# Patient Record
Sex: FEMALE | Race: WHITE | NOT HISPANIC OR LATINO | URBAN - METROPOLITAN AREA
[De-identification: names, ages, dates, MRNs, and addresses within clinical notes are randomized per-mention and may not be internally consistent; named-entity substitution may affect disease eponyms.]

---

## 2017-06-30 ENCOUNTER — ON CAMPUS - OUTPATIENT (AMBULATORY)
Dept: URBAN - METROPOLITAN AREA HOSPITAL 2 | Facility: HOSPITAL | Age: 72
End: 2017-06-30
Payer: OTHER GOVERNMENT

## 2017-06-30 VITALS
DIASTOLIC BLOOD PRESSURE: 56 MMHG | WEIGHT: 187 LBS | DIASTOLIC BLOOD PRESSURE: 64 MMHG | SYSTOLIC BLOOD PRESSURE: 144 MMHG | OXYGEN SATURATION: 92 % | SYSTOLIC BLOOD PRESSURE: 184 MMHG | TEMPERATURE: 97.7 F | HEART RATE: 74 BPM | DIASTOLIC BLOOD PRESSURE: 67 MMHG | DIASTOLIC BLOOD PRESSURE: 84 MMHG | DIASTOLIC BLOOD PRESSURE: 82 MMHG | RESPIRATION RATE: 16 BRPM | HEIGHT: 67 IN | HEART RATE: 71 BPM | RESPIRATION RATE: 19 BRPM | OXYGEN SATURATION: 98 % | RESPIRATION RATE: 18 BRPM | SYSTOLIC BLOOD PRESSURE: 146 MMHG | SYSTOLIC BLOOD PRESSURE: 125 MMHG | OXYGEN SATURATION: 95 % | OXYGEN SATURATION: 96 % | HEART RATE: 67 BPM | HEART RATE: 80 BPM | SYSTOLIC BLOOD PRESSURE: 138 MMHG | HEART RATE: 66 BPM | OXYGEN SATURATION: 97 %

## 2017-06-30 DIAGNOSIS — R10.13 EPIGASTRIC PAIN: ICD-10-CM

## 2017-06-30 DIAGNOSIS — K44.9 DIAPHRAGMATIC HERNIA WITHOUT OBSTRUCTION OR GANGRENE: ICD-10-CM

## 2017-06-30 DIAGNOSIS — K21.9 GASTRO-ESOPHAGEAL REFLUX DISEASE WITHOUT ESOPHAGITIS: ICD-10-CM

## 2017-06-30 PROCEDURE — 43235 EGD DIAGNOSTIC BRUSH WASH: CPT | Performed by: INTERNAL MEDICINE

## 2017-06-30 RX ORDER — DICYCLOMINE HYDROCHLORIDE 20 MG/1
40 TABLET ORAL
Qty: 60 | Refills: 11 | Status: COMPLETED
Start: 2017-06-30 | End: 2023-05-31

## 2017-06-30 RX ADMIN — PROPOFOL: 10 INJECTION, EMULSION INTRAVENOUS at 13:43

## 2017-07-06 ENCOUNTER — HOSPITAL ENCOUNTER (OUTPATIENT)
Dept: LAB | Facility: HOSPITAL | Age: 72
Setting detail: SPECIMEN
Discharge: HOME | End: 2017-07-06
Attending: INTERNAL MEDICINE | Admitting: INTERNAL MEDICINE

## 2017-07-06 LAB
ALBUMIN SERPL-MCNC: 3.8 G/DL (ref 3.5–4.8)
ALBUMIN/GLOB SERPL: 1.1 {RATIO} (ref 1–1.7)
ALP SERPL-CCNC: 61 IU/L (ref 32–91)
ALT SERPL-CCNC: 16 IU/L (ref 14–54)
ANION GAP SERPL CALC-SCNC: 15.2 MMOL/L (ref 10–20)
AST SERPL-CCNC: 23 IU/L (ref 15–41)
BILIRUB SERPL-MCNC: 0.5 MG/DL (ref 0.3–1.2)
BUN SERPL-MCNC: 13 MG/DL (ref 8–20)
BUN/CREAT SERPL: 13 (ref 5.4–26.2)
CALCIUM SERPL-MCNC: 8.4 MG/DL (ref 8.9–10.3)
CHLORIDE SERPL-SCNC: 101 MMOL/L (ref 101–111)
CONV CO2: 28 MMOL/L (ref 22–32)
CONV TOTAL PROTEIN: 7.2 G/DL (ref 6.1–7.9)
CREAT UR-MCNC: 1 MG/DL (ref 0.4–1)
GLOBULIN UR ELPH-MCNC: 3.4 G/DL (ref 2.5–3.8)
GLUCOSE SERPL-MCNC: 140 MG/DL (ref 65–99)
POTASSIUM SERPL-SCNC: 4.2 MMOL/L (ref 3.6–5.1)
SODIUM SERPL-SCNC: 140 MMOL/L (ref 136–144)

## 2017-08-04 ENCOUNTER — HOSPITAL ENCOUNTER (OUTPATIENT)
Dept: CT IMAGING | Facility: HOSPITAL | Age: 72
Discharge: HOME | End: 2017-08-04
Attending: INTERNAL MEDICINE | Admitting: INTERNAL MEDICINE

## 2017-08-04 LAB — CREAT BLDA-MCNC: 0.9 MG/DL (ref 0.6–1.3)

## 2018-01-18 ENCOUNTER — OFFICE (AMBULATORY)
Dept: URBAN - METROPOLITAN AREA PATHOLOGY 4 | Facility: PATHOLOGY | Age: 73
End: 2018-01-18
Payer: OTHER GOVERNMENT

## 2018-01-18 ENCOUNTER — ON CAMPUS - OUTPATIENT (AMBULATORY)
Dept: URBAN - METROPOLITAN AREA HOSPITAL 2 | Facility: HOSPITAL | Age: 73
End: 2018-01-18

## 2018-01-18 ENCOUNTER — HOSPITAL ENCOUNTER (OUTPATIENT)
Dept: OTHER | Facility: HOSPITAL | Age: 73
Setting detail: SPECIMEN
Discharge: HOME | End: 2018-01-18
Attending: INTERNAL MEDICINE | Admitting: INTERNAL MEDICINE

## 2018-01-18 VITALS
DIASTOLIC BLOOD PRESSURE: 56 MMHG | HEART RATE: 65 BPM | HEART RATE: 67 BPM | SYSTOLIC BLOOD PRESSURE: 149 MMHG | SYSTOLIC BLOOD PRESSURE: 126 MMHG | DIASTOLIC BLOOD PRESSURE: 71 MMHG | HEART RATE: 58 BPM | HEART RATE: 62 BPM | DIASTOLIC BLOOD PRESSURE: 70 MMHG | SYSTOLIC BLOOD PRESSURE: 166 MMHG | OXYGEN SATURATION: 93 % | HEART RATE: 61 BPM | SYSTOLIC BLOOD PRESSURE: 145 MMHG | TEMPERATURE: 97.5 F | SYSTOLIC BLOOD PRESSURE: 125 MMHG | DIASTOLIC BLOOD PRESSURE: 74 MMHG | OXYGEN SATURATION: 90 % | DIASTOLIC BLOOD PRESSURE: 68 MMHG | DIASTOLIC BLOOD PRESSURE: 55 MMHG | RESPIRATION RATE: 16 BRPM | DIASTOLIC BLOOD PRESSURE: 108 MMHG | SYSTOLIC BLOOD PRESSURE: 123 MMHG | HEART RATE: 59 BPM | DIASTOLIC BLOOD PRESSURE: 69 MMHG | DIASTOLIC BLOOD PRESSURE: 51 MMHG | RESPIRATION RATE: 18 BRPM | SYSTOLIC BLOOD PRESSURE: 100 MMHG | WEIGHT: 185 LBS | OXYGEN SATURATION: 94 % | SYSTOLIC BLOOD PRESSURE: 101 MMHG | OXYGEN SATURATION: 97 % | HEART RATE: 66 BPM | SYSTOLIC BLOOD PRESSURE: 89 MMHG | OXYGEN SATURATION: 91 % | HEIGHT: 67 IN

## 2018-01-18 DIAGNOSIS — D12.3 BENIGN NEOPLASM OF TRANSVERSE COLON: ICD-10-CM

## 2018-01-18 DIAGNOSIS — D50.0 IRON DEFICIENCY ANEMIA SECONDARY TO BLOOD LOSS (CHRONIC): ICD-10-CM

## 2018-01-18 DIAGNOSIS — K57.30 DIVERTICULOSIS OF LARGE INTESTINE WITHOUT PERFORATION OR ABS: ICD-10-CM

## 2018-01-18 LAB
GI HISTOLOGY: A. UNSPECIFIED: (no result)
GI HISTOLOGY: PDF REPORT: (no result)

## 2018-01-18 PROCEDURE — 88305 TISSUE EXAM BY PATHOLOGIST: CPT | Mod: 26 | Performed by: INTERNAL MEDICINE

## 2018-01-18 PROCEDURE — 45385 COLONOSCOPY W/LESION REMOVAL: CPT | Performed by: INTERNAL MEDICINE

## 2018-01-18 RX ADMIN — PROPOFOL: 10 INJECTION, EMULSION INTRAVENOUS at 08:10

## 2018-02-02 ENCOUNTER — HOSPITAL ENCOUNTER (OUTPATIENT)
Dept: LAB | Facility: HOSPITAL | Age: 73
Setting detail: SPECIMEN
Discharge: HOME | End: 2018-02-02
Attending: INTERNAL MEDICINE | Admitting: INTERNAL MEDICINE

## 2018-02-14 ENCOUNTER — HOSPITAL ENCOUNTER (OUTPATIENT)
Dept: CT IMAGING | Facility: HOSPITAL | Age: 73
Discharge: HOME | End: 2018-02-14
Attending: NURSE PRACTITIONER | Admitting: NURSE PRACTITIONER

## 2018-02-14 LAB — CREAT BLDA-MCNC: 0.9 MG/DL (ref 0.6–1.3)

## 2018-03-23 ENCOUNTER — HOSPITAL ENCOUNTER (OUTPATIENT)
Dept: GENERAL RADIOLOGY | Facility: HOSPITAL | Age: 73
Discharge: HOME | End: 2018-03-23
Attending: NURSE PRACTITIONER | Admitting: NURSE PRACTITIONER

## 2018-04-25 ENCOUNTER — HOSPITAL ENCOUNTER (OUTPATIENT)
Dept: PAIN MEDICINE | Facility: HOSPITAL | Age: 73
Discharge: HOME | End: 2018-04-25
Attending: ANESTHESIOLOGY | Admitting: ANESTHESIOLOGY

## 2018-04-25 LAB
AMPHETAMINES UR QL SCN: NEGATIVE
BZE UR QL SCN: NEGATIVE
CREAT 24H UR-MCNC: NORMAL MG/DL
METHADONE UR QL SCN: NEGATIVE
OPIATE CONFIRMATION URINE: NORMAL
THC SERPLBLD CFM-MCNC: NEGATIVE NG/ML

## 2018-05-23 ENCOUNTER — HOSPITAL ENCOUNTER (OUTPATIENT)
Dept: ULTRASOUND IMAGING | Facility: HOSPITAL | Age: 73
Discharge: HOME | End: 2018-05-23
Attending: INTERNAL MEDICINE | Admitting: INTERNAL MEDICINE

## 2018-05-30 ENCOUNTER — HOSPITAL ENCOUNTER (OUTPATIENT)
Dept: PAIN MEDICINE | Facility: HOSPITAL | Age: 73
Discharge: HOME | End: 2018-05-30
Attending: ANESTHESIOLOGY | Admitting: ANESTHESIOLOGY

## 2018-06-29 ENCOUNTER — HOSPITAL ENCOUNTER (OUTPATIENT)
Dept: PAIN MEDICINE | Facility: HOSPITAL | Age: 73
Discharge: HOME | End: 2018-06-29
Attending: ANESTHESIOLOGY | Admitting: ANESTHESIOLOGY

## 2018-07-09 ENCOUNTER — HOSPITAL ENCOUNTER (OUTPATIENT)
Dept: OTHER | Facility: HOSPITAL | Age: 73
Discharge: HOME | End: 2018-07-09
Attending: NURSE PRACTITIONER | Admitting: NURSE PRACTITIONER

## 2018-08-21 ENCOUNTER — HOSPITAL ENCOUNTER (OUTPATIENT)
Dept: LAB | Facility: HOSPITAL | Age: 73
Setting detail: SPECIMEN
Discharge: HOME | End: 2018-08-21
Attending: INTERNAL MEDICINE | Admitting: INTERNAL MEDICINE

## 2018-08-21 LAB
ALBUMIN SERPL-MCNC: 3.9 G/DL (ref 3.5–4.8)
ALBUMIN/GLOB SERPL: 1.1 {RATIO} (ref 1–1.7)
ALP SERPL-CCNC: 67 IU/L (ref 32–91)
ALT SERPL-CCNC: 18 IU/L (ref 14–54)
ANION GAP SERPL CALC-SCNC: 13 MMOL/L (ref 10–20)
AST SERPL-CCNC: 19 IU/L (ref 15–41)
BILIRUB SERPL-MCNC: 0.6 MG/DL (ref 0.3–1.2)
BUN SERPL-MCNC: 13 MG/DL (ref 8–20)
BUN/CREAT SERPL: 13 (ref 5.4–26.2)
CALCIUM SERPL-MCNC: 8.6 MG/DL (ref 8.9–10.3)
CHLORIDE SERPL-SCNC: 101 MMOL/L (ref 101–111)
CHOLEST SERPL-MCNC: 125 MG/DL
CHOLEST/HDLC SERPL: 3.6 {RATIO}
CONV CO2: 28 MMOL/L (ref 22–32)
CONV LDL CHOLESTEROL DIRECT: 69 MG/DL (ref 0–100)
CONV MICROALBUM.,U,RANDOM: 5 MG/L
CONV TOTAL PROTEIN: 7.4 G/DL (ref 6.1–7.9)
CREAT 24H UR-MCNC: 154.5 MG/DL
CREAT UR-MCNC: 1 MG/DL (ref 0.4–1)
GLOBULIN UR ELPH-MCNC: 3.5 G/DL (ref 2.5–3.8)
GLUCOSE SERPL-MCNC: 126 MG/DL (ref 65–99)
HDLC SERPL-MCNC: 35 MG/DL
LDLC/HDLC SERPL: 2 {RATIO}
LIPID INTERPRETATION: ABNORMAL
MICROALBUMIN/CREAT UR: 3.2 UG/MG
POTASSIUM SERPL-SCNC: 4 MMOL/L (ref 3.6–5.1)
SODIUM SERPL-SCNC: 138 MMOL/L (ref 136–144)
TRIGL SERPL-MCNC: 158 MG/DL
VLDLC SERPL CALC-MCNC: 21.5 MG/DL

## 2018-08-30 ENCOUNTER — HOSPITAL ENCOUNTER (OUTPATIENT)
Dept: PAIN MEDICINE | Facility: HOSPITAL | Age: 73
Discharge: HOME | End: 2018-08-30
Attending: ANESTHESIOLOGY | Admitting: ANESTHESIOLOGY

## 2018-08-30 LAB
AMPHETAMINES UR QL SCN: NEGATIVE
BARBITURATES UR QL SCN: NEGATIVE
BENZODIAZ UR QL SCN: NEGATIVE
BZE UR QL SCN: NEGATIVE
CREAT 24H UR-MCNC: NORMAL MG/DL
METHADONE UR QL SCN: NEGATIVE
OPIATE CONFIRMATION URINE: NORMAL
OPIATES TESTED UR SCN: NEGATIVE
PCP UR QL: NEGATIVE
THC SERPLBLD CFM-MCNC: NEGATIVE NG/ML

## 2018-09-21 ENCOUNTER — HOSPITAL ENCOUNTER (OUTPATIENT)
Dept: LAB | Facility: HOSPITAL | Age: 73
Setting detail: SPECIMEN
Discharge: HOME | End: 2018-09-21
Attending: INTERNAL MEDICINE | Admitting: INTERNAL MEDICINE

## 2018-09-21 LAB
ALBUMIN SERPL-MCNC: 3.9 G/DL (ref 3.5–4.8)
ALBUMIN/GLOB SERPL: 1.1 {RATIO} (ref 1–1.7)
ALP SERPL-CCNC: 77 IU/L (ref 32–91)
ALT SERPL-CCNC: 18 IU/L (ref 14–54)
ANION GAP SERPL CALC-SCNC: 15.7 MMOL/L (ref 10–20)
AST SERPL-CCNC: 20 IU/L (ref 15–41)
BILIRUB SERPL-MCNC: 0.5 MG/DL (ref 0.3–1.2)
BUN SERPL-MCNC: 21 MG/DL (ref 8–20)
BUN/CREAT SERPL: 21 (ref 5.4–26.2)
CALCIUM SERPL-MCNC: 9.8 MG/DL (ref 8.9–10.3)
CHLORIDE SERPL-SCNC: 101 MMOL/L (ref 101–111)
CHOLEST SERPL-MCNC: 152 MG/DL
CHOLEST/HDLC SERPL: 3.2 {RATIO}
CONV CO2: 29 MMOL/L (ref 22–32)
CONV LDL CHOLESTEROL DIRECT: 93 MG/DL (ref 0–100)
CONV MICROALBUM.,U,RANDOM: 18 MG/L
CONV TOTAL PROTEIN: 7.4 G/DL (ref 6.1–7.9)
CREAT 24H UR-MCNC: 134.4 MG/DL
CREAT UR-MCNC: 1 MG/DL (ref 0.4–1)
GLOBULIN UR ELPH-MCNC: 3.5 G/DL (ref 2.5–3.8)
GLUCOSE SERPL-MCNC: 94 MG/DL (ref 65–99)
HDLC SERPL-MCNC: 48 MG/DL
LDLC/HDLC SERPL: 1.9 {RATIO}
LIPID INTERPRETATION: NORMAL
MICROALBUMIN/CREAT UR: 13.4 UG/MG
POTASSIUM SERPL-SCNC: 4.7 MMOL/L (ref 3.6–5.1)
SODIUM SERPL-SCNC: 141 MMOL/L (ref 136–144)
TRIGL SERPL-MCNC: 97 MG/DL
VLDLC SERPL CALC-MCNC: 10.8 MG/DL

## 2018-10-29 ENCOUNTER — HOSPITAL ENCOUNTER (OUTPATIENT)
Dept: PAIN MEDICINE | Facility: HOSPITAL | Age: 73
Discharge: HOME | End: 2018-10-29
Attending: ANESTHESIOLOGY | Admitting: ANESTHESIOLOGY

## 2018-12-19 ENCOUNTER — HOSPITAL ENCOUNTER (OUTPATIENT)
Dept: LAB | Facility: HOSPITAL | Age: 73
Setting detail: SPECIMEN
Discharge: HOME | End: 2018-12-19
Attending: INTERNAL MEDICINE | Admitting: INTERNAL MEDICINE

## 2018-12-19 ENCOUNTER — HOSPITAL ENCOUNTER (OUTPATIENT)
Dept: PAIN MEDICINE | Facility: HOSPITAL | Age: 73
Discharge: HOME | End: 2018-12-19
Attending: ANESTHESIOLOGY | Admitting: ANESTHESIOLOGY

## 2018-12-19 LAB
ALBUMIN SERPL-MCNC: 3.6 G/DL (ref 3.5–4.8)
ALBUMIN/GLOB SERPL: 1.1 {RATIO} (ref 1–1.7)
ALP SERPL-CCNC: 76 IU/L (ref 32–91)
ALT SERPL-CCNC: 16 IU/L (ref 14–54)
AMPHETAMINES UR QL SCN: NEGATIVE
ANION GAP SERPL CALC-SCNC: 17.1 MMOL/L (ref 10–20)
AST SERPL-CCNC: 20 IU/L (ref 15–41)
BARBITURATES UR QL SCN: NEGATIVE
BENZODIAZ UR QL SCN: NEGATIVE
BILIRUB SERPL-MCNC: 0.5 MG/DL (ref 0.3–1.2)
BUN SERPL-MCNC: 12 MG/DL (ref 8–20)
BUN/CREAT SERPL: 13.3 (ref 5.4–26.2)
BZE UR QL SCN: NEGATIVE
CALCIUM SERPL-MCNC: 8.1 MG/DL (ref 8.9–10.3)
CHLORIDE SERPL-SCNC: 102 MMOL/L (ref 101–111)
CONV CO2: 25 MMOL/L (ref 22–32)
CONV TOTAL PROTEIN: 6.8 G/DL (ref 6.1–7.9)
CREAT 24H UR-MCNC: 57.7 MG/DL
CREAT UR-MCNC: 0.9 MG/DL (ref 0.4–1)
GLOBULIN UR ELPH-MCNC: 3.2 G/DL (ref 2.5–3.8)
GLUCOSE SERPL-MCNC: 101 MG/DL (ref 65–99)
METHADONE UR QL SCN: NEGATIVE
OPIATE CONFIRMATION URINE: NORMAL
OPIATES TESTED UR SCN: NEGATIVE
PCP UR QL: NEGATIVE
POTASSIUM SERPL-SCNC: 4.1 MMOL/L (ref 3.6–5.1)
SODIUM SERPL-SCNC: 140 MMOL/L (ref 136–144)
T4 FREE SERPL-MCNC: 1.4 NG/DL (ref 0.58–1.64)
THC SERPLBLD CFM-MCNC: NEGATIVE NG/ML
THYROGLOB AB SERPL-ACNC: 14 [IU]/ML (ref 0–4)
THYROGLOB AG TISS QL IMSTN: <1 NG/ML (ref 0–33)
TSH SERPL-ACNC: 0.06 UIU/ML (ref 0.34–5.6)

## 2018-12-20 LAB — HBA1C MFR BLD: 7.9 % (ref 0–5.6)

## 2019-01-12 ENCOUNTER — HOSPITAL ENCOUNTER (OUTPATIENT)
Dept: ULTRASOUND IMAGING | Facility: HOSPITAL | Age: 74
Discharge: HOME | End: 2019-01-12
Attending: INTERNAL MEDICINE | Admitting: INTERNAL MEDICINE

## 2019-01-14 ENCOUNTER — HOSPITAL ENCOUNTER (OUTPATIENT)
Dept: RESPIRATORY THERAPY | Facility: HOSPITAL | Age: 74
Discharge: HOME | End: 2019-01-14
Attending: NURSE PRACTITIONER | Admitting: NURSE PRACTITIONER

## 2019-01-22 ENCOUNTER — HOSPITAL ENCOUNTER (OUTPATIENT)
Dept: PREADMISSION TESTING | Facility: HOSPITAL | Age: 74
Discharge: HOME | End: 2019-01-22
Attending: INTERNAL MEDICINE | Admitting: INTERNAL MEDICINE

## 2019-01-22 LAB
ANION GAP SERPL CALC-SCNC: 17.7 MMOL/L (ref 10–20)
APTT BLD: 26.8 SEC (ref 24–31)
BASOPHILS # BLD AUTO: 0.1 10*3/UL (ref 0–0.2)
BASOPHILS NFR BLD AUTO: 1 % (ref 0–2)
BUN SERPL-MCNC: 15 MG/DL (ref 8–20)
BUN/CREAT SERPL: 16.7 (ref 5.4–26.2)
CALCIUM SERPL-MCNC: 7.9 MG/DL (ref 8.9–10.3)
CHLORIDE SERPL-SCNC: 95 MMOL/L (ref 101–111)
CHOLEST SERPL-MCNC: 140 MG/DL
CHOLEST/HDLC SERPL: 2.9 {RATIO}
CONV CO2: 27 MMOL/L (ref 22–32)
CONV LDL CHOLESTEROL DIRECT: 79 MG/DL (ref 0–100)
CREAT UR-MCNC: 0.9 MG/DL (ref 0.4–1)
DIFFERENTIAL METHOD BLD: (no result)
EOSINOPHIL # BLD AUTO: 0.2 10*3/UL (ref 0–0.3)
EOSINOPHIL # BLD AUTO: 3 % (ref 0–3)
ERYTHROCYTE [DISTWIDTH] IN BLOOD BY AUTOMATED COUNT: 15.6 % (ref 11.5–14.5)
GLUCOSE SERPL-MCNC: 72 MG/DL (ref 65–99)
HCT VFR BLD AUTO: 45.5 % (ref 35–49)
HDLC SERPL-MCNC: 48 MG/DL
HGB BLD-MCNC: 14.9 G/DL (ref 12–15)
INR PPP: 1
LDLC/HDLC SERPL: 1.6 {RATIO}
LIPID INTERPRETATION: NORMAL
LYMPHOCYTES # BLD AUTO: 2.6 10*3/UL (ref 0.8–4.8)
LYMPHOCYTES NFR BLD AUTO: 35 % (ref 18–42)
MCH RBC QN AUTO: 27.9 PG (ref 26–32)
MCHC RBC AUTO-ENTMCNC: 32.7 G/DL (ref 32–36)
MCV RBC AUTO: 85.2 FL (ref 80–94)
MONOCYTES # BLD AUTO: 0.8 10*3/UL (ref 0.1–1.3)
MONOCYTES NFR BLD AUTO: 11 % (ref 2–11)
NEUTROPHILS # BLD AUTO: 3.7 10*3/UL (ref 2.3–8.6)
NEUTROPHILS NFR BLD AUTO: 50 % (ref 50–75)
NRBC BLD AUTO-RTO: 0 /100{WBCS}
NRBC/RBC NFR BLD MANUAL: 0 10*3/UL
PLATELET # BLD AUTO: 190 10*3/UL (ref 150–450)
PMV BLD AUTO: 9.6 FL (ref 7.4–10.4)
POTASSIUM SERPL-SCNC: 3.7 MMOL/L (ref 3.6–5.1)
PROTHROMBIN TIME: 10.2 SEC (ref 9.6–11.7)
RBC # BLD AUTO: 5.34 10*6/UL (ref 4–5.4)
SODIUM SERPL-SCNC: 136 MMOL/L (ref 136–144)
TRIGL SERPL-MCNC: 100 MG/DL
VLDLC SERPL CALC-MCNC: 13.1 MG/DL
WBC # BLD AUTO: 7.4 10*3/UL (ref 4.5–11.5)

## 2019-02-07 ENCOUNTER — HOSPITAL ENCOUNTER (OUTPATIENT)
Dept: LAB | Facility: HOSPITAL | Age: 74
Setting detail: SPECIMEN
Discharge: HOME | End: 2019-02-07
Attending: INTERNAL MEDICINE | Admitting: INTERNAL MEDICINE

## 2019-02-07 LAB
ALBUMIN SERPL-MCNC: 3.9 G/DL (ref 3.5–4.8)
ALBUMIN/GLOB SERPL: 1.1 {RATIO} (ref 1–1.7)
ALP SERPL-CCNC: 79 IU/L (ref 32–91)
ALT SERPL-CCNC: 19 IU/L (ref 14–54)
ANION GAP SERPL CALC-SCNC: 16.9 MMOL/L (ref 10–20)
AST SERPL-CCNC: 22 IU/L (ref 15–41)
BILIRUB SERPL-MCNC: 0.5 MG/DL (ref 0.3–1.2)
BUN SERPL-MCNC: 9 MG/DL (ref 8–20)
BUN/CREAT SERPL: 9 (ref 5.4–26.2)
CALCIUM SERPL-MCNC: 8.2 MG/DL (ref 8.9–10.3)
CHLORIDE SERPL-SCNC: 98 MMOL/L (ref 101–111)
CONV CO2: 25 MMOL/L (ref 22–32)
CONV TOTAL PROTEIN: 7.5 G/DL (ref 6.1–7.9)
CREAT UR-MCNC: 1 MG/DL (ref 0.4–1)
GLOBULIN UR ELPH-MCNC: 3.6 G/DL (ref 2.5–3.8)
GLUCOSE SERPL-MCNC: 162 MG/DL (ref 65–99)
POTASSIUM SERPL-SCNC: 3.9 MMOL/L (ref 3.6–5.1)
SODIUM SERPL-SCNC: 136 MMOL/L (ref 136–144)

## 2019-02-18 ENCOUNTER — HOSPITAL ENCOUNTER (OUTPATIENT)
Dept: OTHER | Facility: HOSPITAL | Age: 74
Setting detail: RECURRING SERIES
Discharge: HOME | End: 2019-02-20
Attending: INTERNAL MEDICINE | Admitting: INTERNAL MEDICINE

## 2019-02-20 ENCOUNTER — HOSPITAL ENCOUNTER (OUTPATIENT)
Dept: NUCLEAR MEDICINE | Facility: HOSPITAL | Age: 74
Discharge: HOME | End: 2019-02-20
Attending: INTERNAL MEDICINE | Admitting: INTERNAL MEDICINE

## 2019-02-27 ENCOUNTER — HOSPITAL ENCOUNTER (OUTPATIENT)
Dept: PAIN MEDICINE | Facility: HOSPITAL | Age: 74
Discharge: HOME | End: 2019-02-27
Attending: ANESTHESIOLOGY | Admitting: ANESTHESIOLOGY

## 2019-04-29 ENCOUNTER — HOSPITAL ENCOUNTER (OUTPATIENT)
Dept: PAIN MEDICINE | Facility: HOSPITAL | Age: 74
Discharge: HOME | End: 2019-04-29
Attending: ANESTHESIOLOGY | Admitting: ANESTHESIOLOGY

## 2019-04-29 ENCOUNTER — CONVERSION ENCOUNTER (OUTPATIENT)
Dept: PAIN MEDICINE | Facility: CLINIC | Age: 74
End: 2019-04-29

## 2019-04-29 LAB
AMPHETAMINES UR QL SCN: NEGATIVE
BARBITURATES UR QL SCN: NEGATIVE
BENZODIAZ UR QL SCN: ABNORMAL
BENZODIAZ UR QL SCN: ABNORMAL
BZE UR QL SCN: NEGATIVE
CREAT 24H UR-MCNC: ABNORMAL MG/DL
METHADONE UR QL SCN: NEGATIVE
OPIATE CONFIRMATION URINE: ABNORMAL
OPIATES TESTED UR SCN: NEGATIVE
PCP UR QL: NEGATIVE
THC SERPLBLD CFM-MCNC: NEGATIVE NG/ML

## 2019-05-15 ENCOUNTER — HOSPITAL ENCOUNTER (OUTPATIENT)
Dept: LAB | Facility: HOSPITAL | Age: 74
Setting detail: SPECIMEN
Discharge: HOME | End: 2019-05-15
Attending: INTERNAL MEDICINE | Admitting: INTERNAL MEDICINE

## 2019-05-15 LAB
ALBUMIN SERPL-MCNC: 3.7 G/DL (ref 3.5–4.8)
ALBUMIN/GLOB SERPL: 1.1 {RATIO} (ref 1–1.7)
ALP SERPL-CCNC: 79 IU/L (ref 32–91)
ALT SERPL-CCNC: 17 IU/L (ref 14–54)
ANION GAP SERPL CALC-SCNC: 18.1 MMOL/L (ref 10–20)
AST SERPL-CCNC: 16 IU/L (ref 15–41)
BILIRUB SERPL-MCNC: 0.5 MG/DL (ref 0.3–1.2)
BUN SERPL-MCNC: 12 MG/DL (ref 8–20)
BUN/CREAT SERPL: 15 (ref 5.4–26.2)
CALCIUM SERPL-MCNC: 7.9 MG/DL (ref 8.9–10.3)
CHLORIDE SERPL-SCNC: 102 MMOL/L (ref 101–111)
CHOLEST SERPL-MCNC: 131 MG/DL
CHOLEST/HDLC SERPL: 3 {RATIO}
CONV CO2: 25 MMOL/L (ref 22–32)
CONV LDL CHOLESTEROL DIRECT: 82 MG/DL (ref 0–100)
CONV MICROALBUM.,U,RANDOM: 11 MG/L
CONV TOTAL PROTEIN: 7 G/DL (ref 6.1–7.9)
CREAT 24H UR-MCNC: 46.5 MG/DL
CREAT UR-MCNC: 0.8 MG/DL (ref 0.4–1)
GLOBULIN UR ELPH-MCNC: 3.3 G/DL (ref 2.5–3.8)
GLUCOSE SERPL-MCNC: 181 MG/DL (ref 65–99)
HBA1C MFR BLD: 7.8 % (ref 0–5.6)
HDLC SERPL-MCNC: 44 MG/DL
LDLC/HDLC SERPL: 1.9 {RATIO}
LIPID INTERPRETATION: NORMAL
MICROALBUMIN/CREAT UR: 23.7 UG/MG
POTASSIUM SERPL-SCNC: 4.1 MMOL/L (ref 3.6–5.1)
SODIUM SERPL-SCNC: 141 MMOL/L (ref 136–144)
T4 FREE SERPL-MCNC: 1.31 NG/DL (ref 0.58–1.64)
THYROGLOB AB SERPL-ACNC: 12 [IU]/ML (ref 0–4)
THYROGLOB AG TISS QL IMSTN: <1 NG/ML (ref 0–33)
TRIGL SERPL-MCNC: 135 MG/DL
TSH SERPL-ACNC: 0.04 UIU/ML (ref 0.34–5.6)
VLDLC SERPL CALC-MCNC: 4.9 MG/DL

## 2019-06-03 VITALS
WEIGHT: 185 LBS | SYSTOLIC BLOOD PRESSURE: 144 MMHG | HEART RATE: 76 BPM | OXYGEN SATURATION: 97 % | HEIGHT: 67 IN | RESPIRATION RATE: 16 BRPM | BODY MASS INDEX: 29.03 KG/M2 | DIASTOLIC BLOOD PRESSURE: 69 MMHG

## 2019-06-10 ENCOUNTER — HOSPITAL ENCOUNTER (OUTPATIENT)
Dept: CARDIOLOGY | Facility: HOSPITAL | Age: 74
Discharge: HOME | End: 2019-06-10
Attending: NURSE PRACTITIONER | Admitting: NURSE PRACTITIONER

## 2019-06-24 ENCOUNTER — OFFICE VISIT (OUTPATIENT)
Dept: PAIN MEDICINE | Facility: CLINIC | Age: 74
End: 2019-06-24

## 2019-06-24 VITALS
RESPIRATION RATE: 16 BRPM | TEMPERATURE: 97.8 F | HEIGHT: 67 IN | WEIGHT: 184 LBS | SYSTOLIC BLOOD PRESSURE: 147 MMHG | OXYGEN SATURATION: 96 % | HEART RATE: 70 BPM | DIASTOLIC BLOOD PRESSURE: 80 MMHG | BODY MASS INDEX: 28.88 KG/M2

## 2019-06-24 DIAGNOSIS — M47.817 LUMBOSACRAL SPONDYLOSIS WITHOUT MYELOPATHY: ICD-10-CM

## 2019-06-24 DIAGNOSIS — M06.9 RHEUMATOID ARTHRITIS INVOLVING MULTIPLE SITES, UNSPECIFIED RHEUMATOID FACTOR PRESENCE: ICD-10-CM

## 2019-06-24 DIAGNOSIS — M25.50 POLYARTHRALGIA: ICD-10-CM

## 2019-06-24 DIAGNOSIS — Z79.899 OTHER LONG TERM (CURRENT) DRUG THERAPY: ICD-10-CM

## 2019-06-24 DIAGNOSIS — G89.4 CHRONIC PAIN SYNDROME: Primary | ICD-10-CM

## 2019-06-24 PROBLEM — G89.29 CHRONIC PAIN: Status: ACTIVE | Noted: 2018-04-25

## 2019-06-24 PROCEDURE — 99214 OFFICE O/P EST MOD 30 MIN: CPT | Performed by: ANESTHESIOLOGY

## 2019-06-24 PROCEDURE — G0463 HOSPITAL OUTPT CLINIC VISIT: HCPCS

## 2019-06-24 RX ORDER — CALCIUM CARBONATE/VITAMIN D3 500-10/5ML
LIQUID (ML) ORAL
COMMUNITY
Start: 2018-04-25 | End: 2019-08-20 | Stop reason: ALTCHOICE

## 2019-06-24 RX ORDER — ERGOCALCIFEROL 1.25 MG/1
50000 CAPSULE ORAL WEEKLY
Refills: 2 | Status: ON HOLD | COMMUNITY
Start: 2019-06-07 | End: 2019-10-14

## 2019-06-24 RX ORDER — HYDROCODONE BITARTRATE AND ACETAMINOPHEN 7.5; 325 MG/1; MG/1
1 TABLET ORAL 2 TIMES DAILY PRN
Qty: 60 TABLET | Refills: 0 | Status: SHIPPED | OUTPATIENT
Start: 2019-06-24 | End: 2019-09-18

## 2019-06-24 RX ORDER — HYDROCODONE BITARTRATE AND ACETAMINOPHEN 7.5; 325 MG/1; MG/1
1 TABLET ORAL 2 TIMES DAILY PRN
Refills: 0 | COMMUNITY
Start: 2019-06-05 | End: 2019-06-24 | Stop reason: SDUPTHER

## 2019-06-24 RX ORDER — AMLODIPINE BESYLATE AND BENAZEPRIL HYDROCHLORIDE 10; 40 MG/1; MG/1
CAPSULE ORAL
COMMUNITY
Start: 2011-10-21 | End: 2019-10-02

## 2019-06-24 RX ORDER — GLIMEPIRIDE 2 MG/1
TABLET ORAL
COMMUNITY
Start: 2019-01-18 | End: 2019-08-20

## 2019-06-24 RX ORDER — ASPIRIN 325 MG
TABLET ORAL
COMMUNITY
Start: 2018-04-23 | End: 2019-10-02

## 2019-06-24 RX ORDER — ESOMEPRAZOLE MAGNESIUM 40 MG/1
CAPSULE, DELAYED RELEASE ORAL
COMMUNITY
Start: 2011-10-21 | End: 2019-10-02

## 2019-06-24 RX ORDER — METOPROLOL SUCCINATE 100 MG/1
TABLET, EXTENDED RELEASE ORAL
COMMUNITY
Start: 2011-10-21 | End: 2019-10-02

## 2019-06-24 RX ORDER — LEVOTHYROXINE SODIUM 137 UG/1
137 TABLET ORAL DAILY
COMMUNITY
Start: 2011-10-21 | End: 2021-05-24 | Stop reason: SDUPTHER

## 2019-06-24 RX ORDER — HYDROCODONE BITARTRATE AND ACETAMINOPHEN 7.5; 325 MG/1; MG/1
1 TABLET ORAL 2 TIMES DAILY PRN
Qty: 60 TABLET | Refills: 0 | Status: SHIPPED | OUTPATIENT
Start: 2019-06-24 | End: 2019-06-24 | Stop reason: SDUPTHER

## 2019-06-24 RX ORDER — CLORAZEPATE DIPOTASSIUM 7.5 MG/1
7.5 TABLET ORAL 2 TIMES DAILY
Refills: 3 | COMMUNITY
Start: 2019-05-29 | End: 2019-08-20 | Stop reason: ALTCHOICE

## 2019-06-24 RX ORDER — ROSUVASTATIN CALCIUM 20 MG/1
TABLET, COATED ORAL
COMMUNITY
Start: 2017-06-29 | End: 2019-10-02

## 2019-06-24 RX ORDER — CALCITRIOL 0.25 UG/1
CAPSULE, LIQUID FILLED ORAL
Refills: 0 | COMMUNITY
Start: 2019-03-27 | End: 2019-07-10 | Stop reason: SDUPTHER

## 2019-06-24 RX ORDER — ESCITALOPRAM OXALATE 10 MG/1
10 TABLET ORAL EVERY EVENING
COMMUNITY
Start: 2019-05-21

## 2019-06-24 RX ORDER — INSULIN GLARGINE 100 [IU]/ML
INJECTION, SOLUTION SUBCUTANEOUS
COMMUNITY
Start: 2017-06-29 | End: 2019-10-02

## 2019-06-24 NOTE — PROGRESS NOTES
Subjective    CC joint pain  Marilyn J Pumphrey is a 73 y.o. female with multiple comorbidities including DM, polyarthralgia from  inflammatory osteoarthritis here for f/u.  Continued polyarthralgia/left knee pain,  and generalized myofascial pain interfering with daily activity, sleep,mood.    Continues to derive functional benefit from hydrocodone.  Tried methotrexate and  Celebrex without relief.  Tried Cymbalta, sevella, tramadol without relief.    Keeps regular follow-up with Rheumatology. and ortho. considering right knee replecement.   Good releif with knee injections by ortho.      Utilizes hydrocodone 7.5/325  twice daily with good relief functional benefit denies side effects.    Pain Assessment   Location of Pain: Lower Back, R Hip, L Hip, L Leg, neck pain, joint  Description of Pain: Dull/Aching, Throbbing, Stabbing  Previous Pain Rating :9  Current Pain Ratin  Aggravating Factors: Activity  Alleviating Factors: Rest, Medication    The following portions of the patient's history were reviewed and updated as appropriate: allergies, current medications, past family history, past medical history, past social history, past surgical history and problem list.    Review of Systems   Constitutional: Negative for chills, fatigue and fever.   HENT: Negative for trouble swallowing.    Respiratory: Negative.  Negative for shortness of breath.    Cardiovascular: Negative for chest pain, palpitations and leg swelling.   Gastrointestinal: Negative for nausea and vomiting.   Musculoskeletal: Positive for arthralgias, back pain and myalgias. Negative for gait problem, joint swelling, neck pain and neck stiffness.   Skin: Negative for color change, dry skin, rash and skin lesions.   Neurological: Negative for dizziness, weakness, light-headedness and headache.   Hematological: Does not bruise/bleed easily.   Psychiatric/Behavioral: Negative for behavioral problems, suicidal ideas and depressed mood.   All other  "systems reviewed and are negative.      Objective   Physical Exam   Constitutional: She is oriented to person, place, and time. She appears well-developed and well-nourished. No distress.   Eyes: Conjunctivae are normal. Pupils are equal, round, and reactive to light.   Neck: Muscular tenderness present. Decreased range of motion present. No Kernig's sign noted.   Cardiovascular: Normal heart sounds and normal pulses.   Pulmonary/Chest: Effort normal and breath sounds normal.   Musculoskeletal:        Lumbar back: She exhibits decreased range of motion and tenderness.   Back: Paraspinal tenderness, negative leg raise.  Pain with extension/side rotation.      Vascular Status -  Her right foot exhibits no edema. Her left foot exhibits no edema.  Lymphadenopathy:     She has no cervical adenopathy.   Neurological: She is alert and oriented to person, place, and time. She has normal strength and normal reflexes. No sensory deficit. Coordination and gait normal.   Skin: Skin is warm and dry. Capillary refill takes less than 2 seconds.   Psychiatric: She has a normal mood and affect. Her behavior is normal.   Vitals reviewed.    /80   Pulse 70   Temp 97.8 °F (36.6 °C)   Resp 16   Ht 170.2 cm (67\")   Wt 83.5 kg (184 lb)   SpO2 96%   BMI 28.82 kg/m²     Global pain scale and PHQ 9 on chart  Opioid risk tool low risk    Assessment/Plan   Shaila was seen today for back pain and pain.    Diagnoses and all orders for this visit:    Chronic pain syndrome    Other long term (current) drug therapy    Rheumatoid arthritis involving multiple sites, unspecified rheumatoid factor presence (CMS/HCC)    Polyarthralgia    Lumbosacral spondylosis without myelopathy    Other orders  -     Discontinue: HYDROcodone-acetaminophen (NORCO) 7.5-325 MG per tablet; Take 1 tablet by mouth 2 (Two) Times a Day As Needed for Moderate Pain  or Severe Pain .  -     Discontinue: HYDROcodone-acetaminophen (NORCO) 7.5-325 MG per tablet; Take " 1 tablet by mouth 2 (Two) Times a Day As Needed for Moderate Pain  or Severe Pain .  -     HYDROcodone-acetaminophen (NORCO) 7.5-325 MG per tablet; Take 1 tablet by mouth 2 (Two) Times a Day As Needed for Moderate Pain  or Severe Pain .        Summary  73 y.o. female with multiple comorbidities including DM, polyarthralgia from  inflammatory osteoarthritis here for f/u.  Continued polyarthralgia/left knee pain,  and generalized myofascial pain interfering with daily activity, sleep,mood.    Continues to derive functional benefit from hydrocodone.    Continue Hydrocodone 7.5/325 twice daily as needed for severe pain.  UDS and  Inspect reviewed.    Discussed risk of tolerance, dependence, respiratory depression, coma and death associated with use of oral opioids for treatment of chronic nonmalignant pain.      Discussed increased risk with combining opioid and benzo. On clorazepate prn anxiety.     RTC in 3 month

## 2019-07-10 RX ORDER — CALCITRIOL 0.25 UG/1
0.25 CAPSULE, LIQUID FILLED ORAL 2 TIMES DAILY
Qty: 180 CAPSULE | Refills: 0 | Status: SHIPPED | OUTPATIENT
Start: 2019-07-10 | End: 2019-09-28 | Stop reason: SDUPTHER

## 2019-08-08 PROBLEM — I10 HYPERTENSION, BENIGN: Status: ACTIVE | Noted: 2017-06-29

## 2019-08-08 PROBLEM — I25.10 CAD (CORONARY ARTERY DISEASE): Status: ACTIVE | Noted: 2019-01-24

## 2019-08-08 PROBLEM — G47.33 OSA (OBSTRUCTIVE SLEEP APNEA): Status: ACTIVE | Noted: 2019-01-24

## 2019-08-08 PROBLEM — C73 MALIGNANT NEOPLASM OF THYROID GLAND: Status: ACTIVE | Noted: 2017-06-29

## 2019-08-08 PROBLEM — J20.9 ACUTE BRONCHITIS: Status: ACTIVE | Noted: 2017-12-17

## 2019-08-08 PROBLEM — E89.2 POSTSURGICAL HYPOPARATHYROIDISM: Status: ACTIVE | Noted: 2017-06-29

## 2019-08-08 PROBLEM — M25.50 POLYARTHRALGIA: Status: ACTIVE | Noted: 2018-04-25

## 2019-08-08 PROBLEM — E87.6 HYPOKALEMIA: Status: ACTIVE | Noted: 2017-09-15

## 2019-08-08 PROBLEM — J39.2: Status: ACTIVE | Noted: 2017-08-07

## 2019-08-08 PROBLEM — G25.81 RESTLESS LEGS SYNDROME (RLS): Status: ACTIVE | Noted: 2019-01-24

## 2019-08-08 PROBLEM — E78.5 HYPERLIPIDEMIA: Status: ACTIVE | Noted: 2017-06-29

## 2019-08-08 PROBLEM — F32.A DEPRESSION: Status: ACTIVE | Noted: 2019-08-08

## 2019-08-08 PROBLEM — E11.9 TYPE 2 DIABETES MELLITUS WITHOUT COMPLICATIONS (HCC): Status: ACTIVE | Noted: 2018-04-23

## 2019-08-08 PROBLEM — Z82.49: Status: ACTIVE | Noted: 2019-01-24

## 2019-08-08 PROBLEM — Z86.711 HISTORY OF PULMONARY EMBOLISM: Status: ACTIVE | Noted: 2019-01-24

## 2019-08-08 PROBLEM — E89.0 HYPOTHYROIDISM, POSTSURGICAL: Status: ACTIVE | Noted: 2017-08-11

## 2019-08-08 PROBLEM — E13.9 DIABETES 1.5, MANAGED AS TYPE 1 (HCC): Status: ACTIVE | Noted: 2019-01-24

## 2019-08-08 PROBLEM — E11.65 TYPE 2 DIABETES MELLITUS WITH HYPERGLYCEMIA: Status: ACTIVE | Noted: 2017-06-29

## 2019-08-08 PROBLEM — R07.9 CHEST PAIN: Status: ACTIVE | Noted: 2019-01-18

## 2019-08-08 PROBLEM — M19.90 OSTEOARTHRITIS: Status: ACTIVE | Noted: 2018-04-25

## 2019-08-08 PROBLEM — K21.9 GASTROESOPHAGEAL REFLUX DISEASE: Status: ACTIVE | Noted: 2019-01-24

## 2019-08-08 RX ORDER — PRAMIPEXOLE DIHYDROCHLORIDE 1 MG/1
1 TABLET ORAL 2 TIMES DAILY
COMMUNITY
End: 2021-05-24

## 2019-08-08 RX ORDER — THIAMINE HCL 100 MG
TABLET ORAL
COMMUNITY
End: 2019-10-02

## 2019-08-08 RX ORDER — POTASSIUM CHLORIDE 750 MG/1
10 CAPSULE, EXTENDED RELEASE ORAL
COMMUNITY
End: 2019-08-20 | Stop reason: ALTCHOICE

## 2019-08-12 DIAGNOSIS — E89.2 POSTSURGICAL HYPOPARATHYROIDISM (HCC): ICD-10-CM

## 2019-08-12 DIAGNOSIS — E89.0 HYPOTHYROIDISM, POSTSURGICAL: ICD-10-CM

## 2019-08-12 DIAGNOSIS — C73 MALIGNANT NEOPLASM OF THYROID GLAND (HCC): ICD-10-CM

## 2019-08-12 DIAGNOSIS — E78.5 HYPERLIPIDEMIA, UNSPECIFIED HYPERLIPIDEMIA TYPE: Primary | ICD-10-CM

## 2019-08-12 DIAGNOSIS — E11.65 TYPE 2 DIABETES MELLITUS WITH HYPERGLYCEMIA, UNSPECIFIED WHETHER LONG TERM INSULIN USE (HCC): ICD-10-CM

## 2019-08-12 DIAGNOSIS — I10 HYPERTENSION, BENIGN: ICD-10-CM

## 2019-08-13 ENCOUNTER — LAB (OUTPATIENT)
Dept: LAB | Facility: HOSPITAL | Age: 74
End: 2019-08-13

## 2019-08-13 DIAGNOSIS — E89.0 HYPOTHYROIDISM, POSTSURGICAL: ICD-10-CM

## 2019-08-13 DIAGNOSIS — E89.2 POSTSURGICAL HYPOPARATHYROIDISM (HCC): ICD-10-CM

## 2019-08-13 DIAGNOSIS — C73 MALIGNANT NEOPLASM OF THYROID GLAND (HCC): ICD-10-CM

## 2019-08-13 DIAGNOSIS — E11.65 TYPE 2 DIABETES MELLITUS WITH HYPERGLYCEMIA, UNSPECIFIED WHETHER LONG TERM INSULIN USE (HCC): ICD-10-CM

## 2019-08-13 DIAGNOSIS — I10 HYPERTENSION, BENIGN: ICD-10-CM

## 2019-08-13 DIAGNOSIS — E78.5 HYPERLIPIDEMIA, UNSPECIFIED HYPERLIPIDEMIA TYPE: ICD-10-CM

## 2019-08-13 LAB
ALBUMIN SERPL-MCNC: 3.6 G/DL (ref 3.5–4.8)
ALBUMIN/GLOB SERPL: 1.1 G/DL (ref 1–1.7)
ALP SERPL-CCNC: 75 U/L (ref 32–91)
ALT SERPL W P-5'-P-CCNC: 15 U/L (ref 14–54)
ANION GAP SERPL CALCULATED.3IONS-SCNC: 16.6 MMOL/L (ref 5–15)
AST SERPL-CCNC: 17 U/L (ref 15–41)
BILIRUB SERPL-MCNC: 0.5 MG/DL (ref 0.3–1.2)
BUN BLD-MCNC: 13 MG/DL (ref 8–20)
BUN/CREAT SERPL: 16.3 (ref 5.4–26.2)
CALCIUM SPEC-SCNC: 8.6 MG/DL (ref 8.9–10.3)
CHLORIDE SERPL-SCNC: 100 MMOL/L (ref 101–111)
CO2 SERPL-SCNC: 29 MMOL/L (ref 22–32)
CREAT BLD-MCNC: 0.8 MG/DL (ref 0.4–1)
GFR SERPL CREATININE-BSD FRML MDRD: 71 ML/MIN/1.73
GLOBULIN UR ELPH-MCNC: 3.2 GM/DL (ref 2.5–3.8)
GLUCOSE BLD-MCNC: 78 MG/DL (ref 65–99)
HBA1C MFR BLD: 7.9 % (ref 3.5–5.6)
POTASSIUM BLD-SCNC: 4.6 MMOL/L (ref 3.6–5.1)
PROT SERPL-MCNC: 6.8 G/DL (ref 6.1–7.9)
SODIUM BLD-SCNC: 141 MMOL/L (ref 136–144)
T4 FREE SERPL-MCNC: 1.46 NG/DL (ref 0.58–1.64)
TSH SERPL DL<=0.05 MIU/L-ACNC: 0.05 MIU/ML (ref 0.34–5.6)

## 2019-08-13 PROCEDURE — 36415 COLL VENOUS BLD VENIPUNCTURE: CPT

## 2019-08-13 PROCEDURE — 84443 ASSAY THYROID STIM HORMONE: CPT | Performed by: INTERNAL MEDICINE

## 2019-08-13 PROCEDURE — 80053 COMPREHEN METABOLIC PANEL: CPT | Performed by: INTERNAL MEDICINE

## 2019-08-13 PROCEDURE — 83036 HEMOGLOBIN GLYCOSYLATED A1C: CPT | Performed by: INTERNAL MEDICINE

## 2019-08-13 PROCEDURE — 84439 ASSAY OF FREE THYROXINE: CPT | Performed by: INTERNAL MEDICINE

## 2019-08-13 PROCEDURE — 84432 ASSAY OF THYROGLOBULIN: CPT | Performed by: INTERNAL MEDICINE

## 2019-08-14 LAB
THYROGLOB AB SERPL-ACNC: 5 IU/ML (ref 1–4)
THYROGLOB AG TISS QL IMSTN: <1 NG/ML (ref 0–33)

## 2019-08-15 ENCOUNTER — LAB (OUTPATIENT)
Dept: LAB | Facility: HOSPITAL | Age: 74
End: 2019-08-15

## 2019-08-15 DIAGNOSIS — I10 HYPERTENSION, BENIGN: ICD-10-CM

## 2019-08-15 DIAGNOSIS — E89.0 HYPOTHYROIDISM, POSTSURGICAL: ICD-10-CM

## 2019-08-15 DIAGNOSIS — E78.5 HYPERLIPIDEMIA, UNSPECIFIED HYPERLIPIDEMIA TYPE: ICD-10-CM

## 2019-08-15 DIAGNOSIS — E89.2 POSTSURGICAL HYPOPARATHYROIDISM (HCC): ICD-10-CM

## 2019-08-15 DIAGNOSIS — E11.65 TYPE 2 DIABETES MELLITUS WITH HYPERGLYCEMIA, UNSPECIFIED WHETHER LONG TERM INSULIN USE (HCC): ICD-10-CM

## 2019-08-15 DIAGNOSIS — C73 MALIGNANT NEOPLASM OF THYROID GLAND (HCC): ICD-10-CM

## 2019-08-15 LAB
CALCIUM 24H UR-MCNC: 8.3 MG/DL
CALCIUM 24H UR-MRATE: 311.3 MG/24 HR (ref 50–150)
COLLECT DURATION TIME UR: 24 HRS
COLLECT DURATION TIME UR: 24 HRS
CREAT UR-MCNC: 31.4 MG/DL
CREATINE 24H UR-MRATE: 1.18 G/24 HR (ref 0.8–1.7)
SPECIMEN VOL 24H UR: 3750 ML
SPECIMEN VOL 24H UR: 3750 ML

## 2019-08-15 PROCEDURE — 82340 ASSAY OF CALCIUM IN URINE: CPT | Performed by: INTERNAL MEDICINE

## 2019-08-15 PROCEDURE — 82570 ASSAY OF URINE CREATININE: CPT | Performed by: INTERNAL MEDICINE

## 2019-08-15 PROCEDURE — 81050 URINALYSIS VOLUME MEASURE: CPT | Performed by: INTERNAL MEDICINE

## 2019-08-20 ENCOUNTER — OFFICE VISIT (OUTPATIENT)
Dept: ENDOCRINOLOGY | Facility: CLINIC | Age: 74
End: 2019-08-20

## 2019-08-20 VITALS
HEIGHT: 67 IN | WEIGHT: 182 LBS | SYSTOLIC BLOOD PRESSURE: 145 MMHG | OXYGEN SATURATION: 96 % | HEART RATE: 69 BPM | DIASTOLIC BLOOD PRESSURE: 70 MMHG | BODY MASS INDEX: 28.56 KG/M2

## 2019-08-20 DIAGNOSIS — E89.2 POSTSURGICAL HYPOPARATHYROIDISM (HCC): ICD-10-CM

## 2019-08-20 DIAGNOSIS — Z79.4 TYPE 2 DIABETES MELLITUS WITH HYPERGLYCEMIA, WITH LONG-TERM CURRENT USE OF INSULIN (HCC): ICD-10-CM

## 2019-08-20 DIAGNOSIS — E11.65 TYPE 2 DIABETES MELLITUS WITH HYPERGLYCEMIA, WITH LONG-TERM CURRENT USE OF INSULIN (HCC): ICD-10-CM

## 2019-08-20 DIAGNOSIS — C73 MALIGNANT NEOPLASM OF THYROID GLAND (HCC): Primary | ICD-10-CM

## 2019-08-20 DIAGNOSIS — E89.0 HYPOTHYROIDISM, POSTSURGICAL: ICD-10-CM

## 2019-08-20 DIAGNOSIS — E78.5 HYPERLIPIDEMIA, UNSPECIFIED HYPERLIPIDEMIA TYPE: ICD-10-CM

## 2019-08-20 DIAGNOSIS — I10 HYPERTENSION, BENIGN: ICD-10-CM

## 2019-08-20 PROCEDURE — 99214 OFFICE O/P EST MOD 30 MIN: CPT | Performed by: INTERNAL MEDICINE

## 2019-08-20 RX ORDER — HYDROCHLOROTHIAZIDE 12.5 MG/1
12.5 TABLET ORAL DAILY
Qty: 30 TABLET | Refills: 6 | Status: SHIPPED | OUTPATIENT
Start: 2019-08-20 | End: 2019-10-02

## 2019-08-20 NOTE — PROGRESS NOTES
Endocrine Progress Note Outpatient     Patient Care Team:  Suma Campbell APRN as PCP - General  Suma Campbell APRN as PCP - Claims Attributed  Suma Campbell APRN as PCP - Family Medicine    Chief Complaint: Follow up thyroid cancer and type 2 diabetes    HPI: 73-year-old female with history of thyroid cancer, surgical hypoparathyroidism, type 2 diabetes, hypothyroidism, hyperlipidemia is here for follow-up.  For type 2 diabetes she is currently on Janumet XR 50/thousand 2 tablets daily, Jardiance 25 mg once a day, Lantus 50 units daily, Amaryl 2 mg 2 mg twice a day along with NovoLog sliding scale.  She tells me that she does not need NovoLog often because she drops blood sugars quickly after that.    Thyroid cancer: Status post total thyroidectomy, she did not receive radioactive iodine treatment after thyroid surgery.  Surgery was done in September 2016.  Pathology showed 1.6 cm papillary thyroid cancer with clear margins and no lymphatic or vascular invasion.  Is currently on suppressive therapy with levothyroxine.  Neck ultrasound in January 2019 did not show abnormal lymph nodes.    Postsurgical hypothyroidism: Currently on calcium supplementation along with calcitriol 0.25 mcg twice a day and vitamin D 50,000 units once a week.  She denies any history of kidney stones.    Hypothyroidism: On levothyroxine 137 mcg p.o. daily.    Hyperlipidemia: On Crestor.    Overall she is doing well however she does have some fluctuations in the blood sugars.  She has been having some aches and pains.    Past Medical History:   Diagnosis Date   • Arthritis    • Bone pain    • Depression    • Disease of thyroid gland    • DM type 2 (diabetes mellitus, type 2) (CMS/[facility])    • GERD (gastroesophageal reflux disease)    • Hypertension    • Osteoarthritis    • Sleep apnea        Social History     Socioeconomic History   • Marital status:      Spouse name: Not on file   • Number of children: Not  on file   • Years of education: Not on file   • Highest education level: Not on file   Tobacco Use   • Smoking status: Never Smoker   Substance and Sexual Activity   • Alcohol use: No     Frequency: Never   • Drug use: No       Family History   Problem Relation Age of Onset   • Diabetes Father    • Heart disease Father    • Hypertension Father    • Hyperlipidemia Father    • Thyroid disease Father    • Cancer Sister         lung cancer   • Thyroid disease Sister    • Diabetes Brother    • Heart disease Brother    • Cancer Brother         lung cancer       Allergies   Allergen Reactions   • Codeine Shortness Of Breath       ROS:   Constitutional:  Admit fatigue, tiredness.    Eyes:  Denies change in visual acuity   HENT:  Denies nasal congestion or sore throat   Respiratory: denies cough, admit shortness of breath.   Cardiovascular:  denies chest pain, edema   GI:  Denies abdominal pain, admit nausea, and vomiting.   Musculoskeletal:  Denies back pain or joint pain, admit muscle aches.    Integument:  Denies dry skin and rash   Neurologic:  Admit headache, denies focal weakness or sensory changes   Endocrine:  Denies polyuria or polydipsia   Psychiatric:  Admit depression or anxiety      Vitals:    08/20/19 1328   BP: 145/70   Pulse: 69   SpO2: 96%       Physical Exam:  GEN: NAD, conversant  EYES: EOMI, PERRL, no conjunctival erythema  NECK: no thyromegaly, full ROM   CV: RRR, no murmurs/rubs/gallops, no peripheral edema  LUNG: CTAB, no wheezes/rales/ronchi  SKIN: no rashes, no acanthosis  MSK: no deformities, full ROM of all extremities  NEURO: no tremors, DTR normal  PSYCH: AOX3, appropriate mood, affect normal      Results Review:     I reviewed the patient's new clinical results.    Lab Results   Component Value Date    HGBA1C 7.9 (H) 08/13/2019    HGBA1C 7.8 (H) 05/15/2019    HGBA1C 7.9 (H) 12/19/2018      Lab Results   Component Value Date    GLUCOSE 78 08/13/2019    BUN 13 08/13/2019    CREATININE 0.80  08/13/2019    EGFRIFNONA 71 08/13/2019    EGFRIFAFRI >60 02/14/2018    BCR 16.3 08/13/2019    K 4.6 08/13/2019    CO2 29.0 08/13/2019    CALCIUM 8.6 (L) 08/13/2019    ALBUMIN 3.60 08/13/2019    LABIL2 1.1 05/15/2019    AST 17 08/13/2019    ALT 15 08/13/2019    CHOL 131 05/15/2019    TRIG 135 05/15/2019    LDL 82 05/15/2019    HDL 44 05/15/2019     Lab Results   Component Value Date    TSH 0.050 (L) 08/13/2019    FREET4 1.46 08/13/2019    THYROIDAB 24 (H) 06/11/2017     Labs from August 2019 showed a 24 urine calcium was high at 311, TG antibodies 5 for the TG level less than 1, TSH 0.05 with free T4 1.46, calcium was 8.6 and A1c 7.9%.    Medication Review: Reviewed.       Current Outpatient Medications:   •  amLODIPine-benazepril (LOTREL) 10-40 MG per capsule, LOTREL 10-40 MG CAPS, Disp: , Rfl:   •  aspirin 325 MG tablet, ASPIR- MG ORAL TABLET DELAYED RELEASE (ASPIRIN), Disp: , Rfl:   •  calcitriol (ROCALTROL) 0.25 MCG capsule, Take 1 capsule by mouth 2 (Two) Times a Day., Disp: 180 capsule, Rfl: 0  •  CALCIUM CARBONATE-VIT D-MIN PO, Take  by mouth., Disp: , Rfl:   •  CALCIUM PO, CALCIUM TABS, Disp: , Rfl:   •  Empagliflozin (JARDIANCE) 25 MG tablet, Daily., Disp: , Rfl:   •  escitalopram (LEXAPRO) 10 MG tablet, Daily., Disp: , Rfl:   •  esomeprazole (NEXIUM) 40 MG capsule, NEXIUM 40 MG CPDR, Disp: , Rfl:   •  glimepiride (AMARYL) 2 MG tablet, GLIMEPIRIDE 2 MG TABS, Disp: , Rfl:   •  HYDROcodone-acetaminophen (NORCO) 7.5-325 MG per tablet, Take 1 tablet by mouth 2 (Two) Times a Day As Needed for Moderate Pain  or Severe Pain ., Disp: 60 tablet, Rfl: 0  •  insulin aspart (NOVOLOG FLEXPEN) 100 UNIT/ML solution pen-injector sc pen, NOVOLOG FLEXPEN 100 UNIT/ML SOPN, Disp: , Rfl:   •  insulin glargine (LANTUS) 100 UNIT/ML injection, LANTUS 100 UNIT/ML SOLN, Disp: , Rfl:   •  levothyroxine (SYNTHROID) 137 MCG tablet, Daily., Disp: , Rfl:   •  Magnesium 500 MG tablet, Take  by mouth., Disp: , Rfl:   •  metFORMIN  "(GLUCOPHAGE) 1000 MG tablet, Daily., Disp: , Rfl:   •  metoprolol succinate XL (TOPROL XL) 100 MG 24 hr tablet, TOPROL  MG XR24H-TAB, Disp: , Rfl:   •  pramipexole (MIRAPEX) 1 MG tablet, Take 1 mg by mouth 3 (Three) Times a Day., Disp: , Rfl:   •  rosuvastatin (CRESTOR) 20 MG tablet, CRESTOR 20 MG TABS, Disp: , Rfl:   •  sitaGLIPtin-metFORMIN (JANUMET)  MG per tablet, Take 1,000 mg by mouth., Disp: , Rfl:   •  vitamin D (ERGOCALCIFEROL) 48527 units capsule capsule, Take 50,000 Units by mouth 1 (One) Time Per Week., Disp: , Rfl: 2      Assessment/Plan   1.  Diabetes mellitus type II: Uncontrolled with A1c 7.9%.  Will DC glimepiride due to fluctuating blood sugars and add NovoLog 10 units with each meal.  We will continue Lantus 50 units subcu daily along with Janumet and Jardiance and will follow blood sugars and A1c.  She wants to proceed with knee surgery, her A1c is below 8, it is at 7.9% now.  She still is little higher risk for the infection and slow healing, I would recommend that she can proceed with surgery if it is absolutely necessary however if she can wait then will be better her A1c is below 7.5.  2.  Thyroid cancer: Status post total thyroidectomy, TG level antibodies are declining, TSH is 0.05 with free T4 1.46.  We will continue suppressive therapy with levothyroxine.  3.  Hypothyroidism: Secondary to thyroid surgery, on levothyroxine 137 mcg p.o. daily.  We will continue and follow labs  4.  Postsurgical hypoparathyroidism: Serum calcium is 8.6 however she does have significant hypercalciuria, I am going to add hydrochlorothiazide 12.5 mg p.o. daily and follow labs.  5.  Hyperlipidemia: Well-controlled and on Crestor, will follow lipid panel.            Gege Ford MD FACE.  06/15/19  4:34 PM      EMR Dragon / transcription disclaimer:     \"Dictated utilizing Dragon dictation\".                 "

## 2019-08-20 NOTE — PATIENT INSTRUCTIONS
DC glimepiride and start NovoLog 10 units before each meal  Start hydrochlorothiazide 12.5 mg p.o. daily  Please get all labs done before follow-up in 3 months  She can proceed with knee surgery since her A1c is 7.9 however I would prefer that if possible we can wait and bring her A1c below 7.5%.

## 2019-09-03 ENCOUNTER — APPOINTMENT (OUTPATIENT)
Dept: CT IMAGING | Facility: HOSPITAL | Age: 74
End: 2019-09-03

## 2019-09-03 ENCOUNTER — HOSPITAL ENCOUNTER (EMERGENCY)
Facility: HOSPITAL | Age: 74
Discharge: HOME | End: 2019-09-03
Admitting: EMERGENCY MEDICINE

## 2019-09-03 VITALS
TEMPERATURE: 98.3 F | OXYGEN SATURATION: 94 % | HEART RATE: 67 BPM | RESPIRATION RATE: 16 BRPM | HEIGHT: 67 IN | WEIGHT: 185.63 LBS | SYSTOLIC BLOOD PRESSURE: 158 MMHG | DIASTOLIC BLOOD PRESSURE: 81 MMHG | BODY MASS INDEX: 29.13 KG/M2

## 2019-09-03 DIAGNOSIS — R51.9 ACUTE INTRACTABLE HEADACHE, UNSPECIFIED HEADACHE TYPE: Primary | ICD-10-CM

## 2019-09-03 DIAGNOSIS — M54.2 CERVICAL MUSCLE PAIN: ICD-10-CM

## 2019-09-03 LAB
ANION GAP SERPL CALCULATED.3IONS-SCNC: 18.8 MMOL/L (ref 5–15)
BASOPHILS # BLD AUTO: 0.1 10*3/MM3 (ref 0–0.2)
BASOPHILS NFR BLD AUTO: 0.7 % (ref 0–1.5)
BUN BLD-MCNC: 17 MG/DL (ref 8–20)
BUN/CREAT SERPL: 15.5 (ref 5.4–26.2)
CALCIUM SPEC-SCNC: 8.8 MG/DL (ref 8.9–10.3)
CALCIUM SPEC-SCNC: 8.8 MG/DL (ref 8.9–10.3)
CHLORIDE SERPL-SCNC: 97 MMOL/L (ref 101–111)
CO2 SERPL-SCNC: 26 MMOL/L (ref 22–32)
CREAT BLD-MCNC: 1.1 MG/DL (ref 0.4–1)
DEPRECATED RDW RBC AUTO: 50.3 FL (ref 37–54)
EOSINOPHIL # BLD AUTO: 0.5 10*3/MM3 (ref 0–0.4)
EOSINOPHIL NFR BLD AUTO: 5.7 % (ref 0.3–6.2)
ERYTHROCYTE [DISTWIDTH] IN BLOOD BY AUTOMATED COUNT: 17.6 % (ref 12.3–15.4)
GFR SERPL CREATININE-BSD FRML MDRD: 49 ML/MIN/1.73
GLUCOSE BLD-MCNC: 280 MG/DL (ref 65–99)
HCT VFR BLD AUTO: 46.9 % (ref 34–46.6)
HGB BLD-MCNC: 15.1 G/DL (ref 12–15.9)
LYMPHOCYTES # BLD AUTO: 2.3 10*3/MM3 (ref 0.7–3.1)
LYMPHOCYTES NFR BLD AUTO: 26.5 % (ref 19.6–45.3)
MCH RBC QN AUTO: 26.1 PG (ref 26.6–33)
MCHC RBC AUTO-ENTMCNC: 32.1 G/DL (ref 31.5–35.7)
MCV RBC AUTO: 81.2 FL (ref 79–97)
MONOCYTES # BLD AUTO: 1 10*3/MM3 (ref 0.1–0.9)
MONOCYTES NFR BLD AUTO: 11.3 % (ref 5–12)
NEUTROPHILS # BLD AUTO: 4.9 10*3/MM3 (ref 1.7–7)
NEUTROPHILS NFR BLD AUTO: 55.8 % (ref 42.7–76)
NRBC BLD AUTO-RTO: 0.2 /100 WBC (ref 0–0.2)
PLATELET # BLD AUTO: 203 10*3/MM3 (ref 140–450)
PMV BLD AUTO: 9.4 FL (ref 6–12)
POTASSIUM BLD-SCNC: 3.8 MMOL/L (ref 3.6–5.1)
RBC # BLD AUTO: 5.77 10*6/MM3 (ref 3.77–5.28)
SODIUM BLD-SCNC: 138 MMOL/L (ref 136–144)
TSH SERPL DL<=0.05 MIU/L-ACNC: 0.05 UIU/ML (ref 0.34–5.6)
WBC NRBC COR # BLD: 8.7 10*3/MM3 (ref 3.4–10.8)

## 2019-09-03 PROCEDURE — 25010000002 DIPHENHYDRAMINE PER 50 MG: Performed by: PHYSICIAN ASSISTANT

## 2019-09-03 PROCEDURE — 84443 ASSAY THYROID STIM HORMONE: CPT | Performed by: PHYSICIAN ASSISTANT

## 2019-09-03 PROCEDURE — 85025 COMPLETE CBC W/AUTO DIFF WBC: CPT | Performed by: PHYSICIAN ASSISTANT

## 2019-09-03 PROCEDURE — 82310 ASSAY OF CALCIUM: CPT | Performed by: PHYSICIAN ASSISTANT

## 2019-09-03 PROCEDURE — 96375 TX/PRO/DX INJ NEW DRUG ADDON: CPT

## 2019-09-03 PROCEDURE — 80048 BASIC METABOLIC PNL TOTAL CA: CPT | Performed by: PHYSICIAN ASSISTANT

## 2019-09-03 PROCEDURE — 25010000002 PROMETHAZINE PER 50 MG: Performed by: PHYSICIAN ASSISTANT

## 2019-09-03 PROCEDURE — 25010000002 KETOROLAC TROMETHAMINE PER 15 MG: Performed by: PHYSICIAN ASSISTANT

## 2019-09-03 PROCEDURE — 99284 EMERGENCY DEPT VISIT MOD MDM: CPT

## 2019-09-03 PROCEDURE — 72125 CT NECK SPINE W/O DYE: CPT

## 2019-09-03 PROCEDURE — 96374 THER/PROPH/DIAG INJ IV PUSH: CPT

## 2019-09-03 PROCEDURE — 70450 CT HEAD/BRAIN W/O DYE: CPT

## 2019-09-03 RX ORDER — METHOCARBAMOL 500 MG/1
500 TABLET, FILM COATED ORAL 3 TIMES DAILY PRN
Qty: 30 TABLET | Refills: 0 | Status: SHIPPED | OUTPATIENT
Start: 2019-09-03 | End: 2019-10-02

## 2019-09-03 RX ORDER — HYDROCODONE BITARTRATE AND ACETAMINOPHEN 5; 325 MG/1; MG/1
1 TABLET ORAL EVERY 6 HOURS PRN
Qty: 8 TABLET | Refills: 0 | Status: SHIPPED | OUTPATIENT
Start: 2019-09-03 | End: 2019-09-18

## 2019-09-03 RX ORDER — DIPHENHYDRAMINE HYDROCHLORIDE 50 MG/ML
25 INJECTION INTRAMUSCULAR; INTRAVENOUS ONCE
Status: COMPLETED | OUTPATIENT
Start: 2019-09-03 | End: 2019-09-03

## 2019-09-03 RX ORDER — METHYLPREDNISOLONE 4 MG/1
TABLET ORAL
Qty: 21 TABLET | Refills: 0 | Status: SHIPPED | OUTPATIENT
Start: 2019-09-03 | End: 2019-10-02

## 2019-09-03 RX ORDER — KETOROLAC TROMETHAMINE 15 MG/ML
15 INJECTION, SOLUTION INTRAMUSCULAR; INTRAVENOUS ONCE
Status: COMPLETED | OUTPATIENT
Start: 2019-09-03 | End: 2019-09-03

## 2019-09-03 RX ORDER — METHOCARBAMOL 500 MG/1
500 TABLET, FILM COATED ORAL ONCE
Status: COMPLETED | OUTPATIENT
Start: 2019-09-03 | End: 2019-09-03

## 2019-09-03 RX ORDER — LIDOCAINE 50 MG/G
1 PATCH TOPICAL EVERY 24 HOURS
Qty: 30 PATCH | Refills: 0 | Status: SHIPPED | OUTPATIENT
Start: 2019-09-03 | End: 2020-06-12

## 2019-09-03 RX ORDER — SODIUM CHLORIDE 0.9 % (FLUSH) 0.9 %
10 SYRINGE (ML) INJECTION AS NEEDED
Status: DISCONTINUED | OUTPATIENT
Start: 2019-09-03 | End: 2019-09-03 | Stop reason: HOSPADM

## 2019-09-03 RX ADMIN — METHOCARBAMOL 500 MG: 500 TABLET ORAL at 13:19

## 2019-09-03 RX ADMIN — KETOROLAC TROMETHAMINE 15 MG: 15 INJECTION, SOLUTION INTRAMUSCULAR; INTRAVENOUS at 13:19

## 2019-09-03 RX ADMIN — PROMETHAZINE HYDROCHLORIDE 12.5 MG: 25 INJECTION INTRAMUSCULAR; INTRAVENOUS at 13:19

## 2019-09-03 RX ADMIN — DIPHENHYDRAMINE HYDROCHLORIDE 25 MG: 50 INJECTION, SOLUTION INTRAMUSCULAR; INTRAVENOUS at 13:20

## 2019-09-03 RX ADMIN — SODIUM CHLORIDE 500 ML: 0.9 INJECTION, SOLUTION INTRAVENOUS at 13:25

## 2019-09-03 NOTE — ED NOTES
Headache x 3 weeks. Vomited today, became lightheaded and broke out in a sweat. Pressure in back of her head     Lyubov Lyons RN  09/03/19 8628

## 2019-09-03 NOTE — ED PROVIDER NOTES
Subjective   History: Patient is a 73 year old female who presents to the ER with a 3 week HA history. She reports that she has been radiating from her upper back to the base of her skull. She has tried OTC medications without relief. She reports that nothing makes it better. She has had some associated nausea and vomiting for this. She reports no difficulty with full ROM of her neck. No fevers, chills, chest pain, cough, congestion. She has an appointment tomorrow with her PCP. Patient reports that she has never had a headache similar to this. She does have a history of thyroid problems and reports she used to get migraines before they discovered her thyroid problems, but hasn't had problems with Has since treatment for this several years ago      Onset: 3 weeks  Location: base of skull and head  Duration:constant  Character: Sharp pain   Aggravating/Alleviating factors: None  Radiation upper back and neck  Severity: moderate              Review of Systems   Constitutional: Negative.    Respiratory: Negative.    Cardiovascular: Negative.    Gastrointestinal: Positive for nausea and vomiting.   Genitourinary: Negative.    Musculoskeletal: Positive for neck pain.   Skin: Negative.    Neurological: Positive for headaches.   Psychiatric/Behavioral: Negative.        Past Medical History:   Diagnosis Date   • Arthritis    • Bone pain    • Depression    • Disease of thyroid gland    • DM type 2 (diabetes mellitus, type 2) (CMS/[facility])    • GERD (gastroesophageal reflux disease)    • Hypertension    • Osteoarthritis    • Sleep apnea        Allergies   Allergen Reactions   • Codeine Shortness Of Breath       Past Surgical History:   Procedure Laterality Date   • CARPAL TUNNEL RELEASE     • CHOLECYSTECTOMY  1974   • HYSTERECTOMY  1993    partial abdominal hysterectomy   • OOPHORECTOMY Bilateral 2015   • THYROID SURGERY      2 thyroid surgery   • TONSILLECTOMY     • VEIN LIGATION AND STRIPPING BILATERAL         Family History    Problem Relation Age of Onset   • Diabetes Father    • Heart disease Father    • Hypertension Father    • Hyperlipidemia Father    • Thyroid disease Father    • Cancer Sister         lung cancer   • Thyroid disease Sister    • Diabetes Brother    • Heart disease Brother    • Cancer Brother         lung cancer       Social History     Socioeconomic History   • Marital status:      Spouse name: Not on file   • Number of children: Not on file   • Years of education: Not on file   • Highest education level: Not on file   Tobacco Use   • Smoking status: Never Smoker   Substance and Sexual Activity   • Alcohol use: No     Frequency: Never   • Drug use: No           Objective   Physical Exam   Constitutional: She is oriented to person, place, and time. She appears well-developed and well-nourished.   HENT:   Head: Normocephalic and atraumatic.   Eyes: Pupils are equal, round, and reactive to light.   Neck: Normal range of motion.   Pulmonary/Chest: Effort normal.   Musculoskeletal: Normal range of motion.   Full ROM of neck intact without difficulty or increase in pain. Patient reports painful area to palpation in between her scapular region   Neurological: She is alert and oriented to person, place, and time. No cranial nerve deficit or sensory deficit. She exhibits normal muscle tone. Coordination normal.   Skin: Skin is warm and dry.   Psychiatric: She has a normal mood and affect. Her behavior is normal.       Procedures           ED Course  ED Course as of Sep 03 1654   Tue Sep 03, 2019   1633 On-revaluation patient was crying stating that initially she felt better but her neck and between her shoulder blades was immensely painful. She was moved from triage because she was in such severe pain.   [MG]   1634 CT cervical spine was ordered for further evaluation  [MG]      ED Course User Index  [MG] Lindsay Theodore PA-C         Final Diagnosis: as of Sep 03 1654   Acute intractable headache, unspecified headache  type   Cervical muscle pain        Ct Head Without Contrast    Result Date: 9/3/2019  Features of mild chronic microvascular disease, similar to 06/10/2017. No acute intracranial findings.  Electronically Signed By-Dr. Halima Harding MD On:9/3/2019 2:42 PM This report was finalized on [id number] by Dr. Halima Harding MD.    Ct Cervical Spine Without Contrast    Result Date: 9/3/2019   1. No acute fracture or dislocation. 2. Degenerative changes. 3. Neural foraminal narrowing as described above. There is no central canal stenosis.  Electronically Signed By-Palmer Broussard On:9/3/2019 4:41 PM This report was finalized on [id number] by  Palmer Broussard, .    Labs Reviewed   BASIC METABOLIC PANEL - Abnormal; Notable for the following components:       Result Value    Glucose 280 (*)     Creatinine 1.10 (*)     Chloride 97 (*)     Calcium 8.8 (*)     eGFR Non African Amer 49 (*)     Anion Gap 18.8 (*)     All other components within normal limits    Narrative:     The MDRD GFR formula is only valid for adults with stable renal function between ages 18 and 70.   TSH - Abnormal; Notable for the following components:    TSH 0.050 (*)     All other components within normal limits   CALCIUM - Abnormal; Notable for the following components:    Calcium 8.8 (*)     All other components within normal limits   CBC WITH AUTO DIFFERENTIAL - Abnormal; Notable for the following components:    RBC 5.77 (*)     Hematocrit 46.9 (*)     MCH 26.1 (*)     RDW 17.6 (*)     Monocytes, Absolute 1.00 (*)     Eosinophils, Absolute 0.50 (*)     All other components within normal limits   CBC AND DIFFERENTIAL    Narrative:     The following orders were created for panel order CBC & Differential.  Procedure                               Abnormality         Status                     ---------                               -----------         ------                     CBC Auto Differential[[id number]]        Abnormal            Final result                  Please view results for these tests on the individual orders.     Medications   sodium chloride 0.9 % flush 10 mL (not administered)   sodium chloride 0.9 % bolus 500 mL (0 mL Intravenous Stopped 9/3/19 1513)   methocarbamol (ROBAXIN) tablet 500 mg (500 mg Oral Given 9/3/19 1319)   promethazine (PHENERGAN) 12.5 mg in sodium chloride 0.9 % 50 mL (12.5 mg Intravenous Given 9/3/19 1319)   diphenhydrAMINE (BENADRYL) injection 25 mg (25 mg Intravenous Given 9/3/19 1320)   ketorolac (TORADOL) injection 15 mg (15 mg Intravenous Given 9/3/19 1319)               MDM  Number of Diagnoses or Management Options  Acute intractable headache, unspecified headache type:   Cervical muscle pain:   Diagnosis management comments: DISPOSITION:   Chart Review:  Comorbidity:  has a past medical history of Arthritis, Bone pain, Cancer (CMS/HCC), Depression, Disease of thyroid gland, DM type 2 (diabetes mellitus, type 2) (CMS/HCC), GERD (gastroesophageal reflux disease), Hypertension, Osteoarthritis, and Sleep apnea.  Differentials:this list is not all inclusive and does not constitute the entirety of considered causes --> intracranial abnormality, muscular strain from neck   ECG: interpreted by ER physician and reviewed by myself:   Labs: CBC - WBC/Hgb/Hct/Plts: --/15.1/46.9*/-- (09/03 1314) LYTES - Na/K/Cl/CO2: 138/3.8/97*/26.0 (09/03 1314) CHEM - BUN/Cr/glu/ALT/AST/amyl/lip:17/--/--/--/--/--/-- (09/03 1314)   .rr    Imaging: Was interpreted by physician and reviewed by myself:  Ct Head Without Contrast    Result Date: 9/3/2019  Features of mild chronic microvascular disease, similar to 06/10/2017. No acute intracranial findings.  Electronically Signed By-Dr. Halima Harding MD On:9/3/2019 2:42 PM This report was finalized on [id number] by Dr. Halima Harding MD.    Ct Cervical Spine Without Contrast    Result Date: 9/3/2019   1. No acute fracture or dislocation. 2. Degenerative changes. 3. Neural foraminal narrowing as described  above. There is no central canal stenosis.  Electronically Signed By-Palmer Broussard On:9/3/2019 4:41 PM This report was finalized on [id number] by  Palmer Broussard, .      Disposition/Treatment:  Patient is a 73-year-old female presents to the ER with headache.  Patient was initially evaluated and reported that she was having some pain in between her scapular region rating up to her neck but her headache was her main concern.  She was given a migraine cocktail.  She went for CT scan when she returned to the staging.  She reported an increase in her neck pain and started crying.  She was moved into a discharge room and had a cervical spine CT ordered.  Patient CT of her cervical spine she has degenerative and arthritic changes of her spine but no acute abnormalities.  Patient was discharged home with a Medrol Dosepak Robaxin and lidocaine patches.  She was given a small prescription for pain medication she was stable at time of discharge.  And inspect was performed.  Return precautions all concerns were provided she has follow-up scheduled with her primary care doctor tomorrow.    She did have some basic lab work obtained.  I will defer these findings for her PCP appointment tomorrow.       Amount and/or Complexity of Data Reviewed  Clinical lab tests: reviewed  Tests in the radiology section of CPT®: reviewed    Patient Progress  Patient progress: stable               Lindsay Theodore PA-C  09/03/19 1405

## 2019-09-04 ENCOUNTER — TRANSCRIBE ORDERS (OUTPATIENT)
Dept: ADMINISTRATIVE | Facility: HOSPITAL | Age: 74
End: 2019-09-04

## 2019-09-04 DIAGNOSIS — Z12.39 SCREENING BREAST EXAMINATION: Primary | ICD-10-CM

## 2019-09-13 ENCOUNTER — APPOINTMENT (OUTPATIENT)
Dept: MAMMOGRAPHY | Facility: HOSPITAL | Age: 74
End: 2019-09-13

## 2019-09-18 ENCOUNTER — OFFICE VISIT (OUTPATIENT)
Dept: PAIN MEDICINE | Facility: CLINIC | Age: 74
End: 2019-09-18

## 2019-09-18 VITALS
SYSTOLIC BLOOD PRESSURE: 146 MMHG | HEIGHT: 67 IN | WEIGHT: 185 LBS | TEMPERATURE: 98 F | RESPIRATION RATE: 16 BRPM | BODY MASS INDEX: 29.03 KG/M2 | OXYGEN SATURATION: 95 % | DIASTOLIC BLOOD PRESSURE: 76 MMHG | HEART RATE: 67 BPM

## 2019-09-18 DIAGNOSIS — Z79.899 HIGH RISK MEDICATION USE: ICD-10-CM

## 2019-09-18 DIAGNOSIS — M47.817 LUMBOSACRAL SPONDYLOSIS WITHOUT MYELOPATHY: ICD-10-CM

## 2019-09-18 DIAGNOSIS — M06.9 RHEUMATOID ARTHRITIS, INVOLVING UNSPECIFIED SITE, UNSPECIFIED RHEUMATOID FACTOR PRESENCE: ICD-10-CM

## 2019-09-18 DIAGNOSIS — F19.90 CURRENT DRUG USE: Primary | ICD-10-CM

## 2019-09-18 DIAGNOSIS — G89.4 CHRONIC PAIN SYNDROME: Primary | ICD-10-CM

## 2019-09-18 DIAGNOSIS — M25.50 POLYARTHRALGIA: ICD-10-CM

## 2019-09-18 PROCEDURE — 99214 OFFICE O/P EST MOD 30 MIN: CPT | Performed by: ANESTHESIOLOGY

## 2019-09-18 PROCEDURE — G0463 HOSPITAL OUTPT CLINIC VISIT: HCPCS | Performed by: ANESTHESIOLOGY

## 2019-09-18 RX ORDER — HYDROCODONE BITARTRATE AND ACETAMINOPHEN 7.5; 325 MG/1; MG/1
1 TABLET ORAL 2 TIMES DAILY PRN
Qty: 60 TABLET | Refills: 0 | Status: ON HOLD | OUTPATIENT
Start: 2019-09-18 | End: 2019-10-14

## 2019-09-18 RX ORDER — BACLOFEN 10 MG/1
TABLET ORAL
Refills: 0 | COMMUNITY
Start: 2019-09-04 | End: 2019-10-02

## 2019-09-18 RX ORDER — CHLORTHALIDONE 25 MG/1
TABLET ORAL
COMMUNITY
Start: 2019-09-04 | End: 2019-10-02

## 2019-09-18 RX ORDER — IBUPROFEN 800 MG/1
TABLET ORAL
Refills: 0 | COMMUNITY
Start: 2019-09-04 | End: 2019-10-02

## 2019-09-18 NOTE — PROGRESS NOTES
Subjective    CC joint pain  Marilyn J Pumphrey is a 73 y.o. female with multiple comorbidities including DM, polyarthralgia from  inflammatory osteoarthritis here for f/u.  Continued polyarthralgia/left knee pain,  and generalized myofascial pain interfering with daily activity, sleep,mood.    Planning knee surgery in 2 weeks  Continues to derive functional benefit from hydrocodone.  Tried methotrexate and  Celebrex without relief.  Tried Cymbalta, sevella, tramadol without relief.    Keeps regular follow-up with Rheumatology. and ortho. considering right knee replecement.   Good releif with knee injections by ortho.      Utilizes hydrocodone 7.5/325  twice daily with good relief functional benefit denies side effects.    Pain Assessment   Location of Pain: Lower Back, R Hip, L Hip, L Leg, neck pain, joint  Description of Pain: Dull/Aching, Throbbing, Stabbing  Previous Pain Rating :9  Current Pain Ratin  Aggravating Factors: Activity  Alleviating Factors: Rest, Medication    The following portions of the patient's history were reviewed and updated as appropriate: allergies, current medications, past family history, past medical history, past social history, past surgical history and problem list.   has a past medical history of Arthritis, Bone pain, Cancer (CMS/HCC), Depression, Disease of thyroid gland, DM type 2 (diabetes mellitus, type 2) (CMS/HCC), GERD (gastroesophageal reflux disease), Hypertension, Osteoarthritis, and Sleep apnea.   has a past surgical history that includes Tonsillectomy; Thyroid surgery; Oophorectomy (Bilateral, ); Hysterectomy (); Cholecystectomy (); Carpal tunnel release; and vein ligation and stripping bilateral.  Social History     Tobacco Use   • Smoking status: Never Smoker   Substance Use Topics   • Alcohol use: No     Frequency: Never       Review of Systems   Constitutional: Negative for chills, fatigue and fever.   HENT: Negative for swollen glands and trouble  "swallowing.    Respiratory: Negative.  Negative for shortness of breath.    Cardiovascular: Negative for chest pain, palpitations and leg swelling.   Gastrointestinal: Negative for nausea and vomiting.   Musculoskeletal: Positive for arthralgias, back pain and myalgias. Negative for gait problem, joint swelling, neck pain and neck stiffness.   Skin: Negative for color change, dry skin, rash and skin lesions.   Neurological: Negative for dizziness, weakness, light-headedness and headache.   Hematological: Does not bruise/bleed easily.   Psychiatric/Behavioral: Negative for behavioral problems, suicidal ideas and depressed mood.   All other systems reviewed and are negative.      Objective   Physical Exam   Constitutional: She is oriented to person, place, and time. She appears well-developed and well-nourished. No distress.   Eyes: Conjunctivae are normal. Pupils are equal, round, and reactive to light.   Neck: Muscular tenderness present. Decreased range of motion present. No Kernig's sign noted.   Cardiovascular: Normal heart sounds and normal pulses.   Pulmonary/Chest: Effort normal and breath sounds normal.   Musculoskeletal:        Lumbar back: She exhibits decreased range of motion and tenderness.   Back: Paraspinal tenderness, negative leg raise.  Pain with extension/side rotation.      Vascular Status -  Her right foot exhibits no edema. Her left foot exhibits no edema.  Lymphadenopathy:     She has no cervical adenopathy.   Neurological: She is alert and oriented to person, place, and time. She has normal strength and normal reflexes. No sensory deficit. Coordination and gait normal.   Skin: Skin is warm and dry. Capillary refill takes less than 2 seconds.   Psychiatric: She has a normal mood and affect. Her behavior is normal.   Vitals reviewed.    /76   Pulse 67   Temp 98 °F (36.7 °C)   Resp 16   Ht 170.2 cm (67\")   Wt 83.9 kg (185 lb)   SpO2 95%   BMI 28.98 kg/m²     Global pain scale and PHQ " 9 on chart  Opioid risk tool low risk    Assessment/Plan    Shaila was seen today for back pain, joint pain and follow-up.    Diagnoses and all orders for this visit:    Chronic pain syndrome    Polyarthralgia    Lumbosacral spondylosis without myelopathy    High risk medication use    Rheumatoid arthritis, involving unspecified site, unspecified rheumatoid factor presence (CMS/HCC)    Other orders  -     HYDROcodone-acetaminophen (NORCO) 7.5-325 MG per tablet; Take 1 tablet by mouth 2 (Two) Times a Day As Needed for Moderate Pain  or Severe Pain .      Summary  73 y.o. female with multiple comorbidities including DM, polyarthralgia from  inflammatory osteoarthritis here for f/u.  Chronic polyarthralgia/left knee pain,  and generalized myofascial pain interfering with daily activity, sleep,mood.      Planning knee surgery in 2 weeks.  Okay for surgeon to prescribe opioid postop if indicated.    Continue Hydrocodone 7.5/325 twice daily as needed for severe pain.  UDS and  Inspect reviewed.    Discussed risk of tolerance, dependence, respiratory depression, coma and death associated with use of oral opioids for treatment of chronic nonmalignant pain.      Discussed increased risk with combining opioid and benzo. On clorazepate prn anxiety.     RTC in 2 months

## 2019-09-23 ENCOUNTER — HOSPITAL ENCOUNTER (OUTPATIENT)
Dept: MAMMOGRAPHY | Facility: HOSPITAL | Age: 74
Discharge: HOME | End: 2019-09-23
Admitting: NURSE PRACTITIONER

## 2019-09-23 DIAGNOSIS — Z12.39 SCREENING BREAST EXAMINATION: ICD-10-CM

## 2019-09-23 PROCEDURE — 77063 BREAST TOMOSYNTHESIS BI: CPT

## 2019-09-23 PROCEDURE — 77067 SCR MAMMO BI INCL CAD: CPT

## 2019-09-30 RX ORDER — CALCITRIOL 0.25 UG/1
CAPSULE, LIQUID FILLED ORAL
Qty: 180 CAPSULE | Refills: 1 | Status: SHIPPED | OUTPATIENT
Start: 2019-09-30 | End: 2019-10-02

## 2019-10-02 ENCOUNTER — HOSPITAL ENCOUNTER (OUTPATIENT)
Dept: GENERAL RADIOLOGY | Facility: HOSPITAL | Age: 74
Discharge: HOME | End: 2019-10-02
Admitting: ORTHOPAEDIC SURGERY

## 2019-10-02 ENCOUNTER — HOSPITAL ENCOUNTER (OUTPATIENT)
Dept: GENERAL RADIOLOGY | Facility: HOSPITAL | Age: 74
Discharge: HOME | End: 2019-10-02

## 2019-10-02 ENCOUNTER — APPOINTMENT (OUTPATIENT)
Dept: PREADMISSION TESTING | Facility: HOSPITAL | Age: 74
End: 2019-10-02

## 2019-10-02 VITALS
TEMPERATURE: 98.1 F | RESPIRATION RATE: 20 BRPM | HEART RATE: 73 BPM | WEIGHT: 180 LBS | OXYGEN SATURATION: 95 % | BODY MASS INDEX: 28.25 KG/M2 | HEIGHT: 67 IN | SYSTOLIC BLOOD PRESSURE: 143 MMHG | DIASTOLIC BLOOD PRESSURE: 77 MMHG

## 2019-10-02 DIAGNOSIS — M17.12 PRIMARY OSTEOARTHRITIS OF LEFT KNEE: ICD-10-CM

## 2019-10-02 LAB
ANION GAP SERPL CALCULATED.3IONS-SCNC: 11 MMOL/L (ref 5–15)
BILIRUB UR QL STRIP: NEGATIVE
BUN BLD-MCNC: 20 MG/DL (ref 8–23)
BUN/CREAT SERPL: 20.2 (ref 7–25)
CALCIUM SPEC-SCNC: 10.8 MG/DL (ref 8.6–10.5)
CHLORIDE SERPL-SCNC: 95 MMOL/L (ref 98–107)
CLARITY UR: ABNORMAL
CO2 SERPL-SCNC: 32 MMOL/L (ref 22–29)
COLOR UR: YELLOW
CREAT BLD-MCNC: 0.99 MG/DL (ref 0.57–1)
DEPRECATED RDW RBC AUTO: 44.6 FL (ref 37–54)
ERYTHROCYTE [DISTWIDTH] IN BLOOD BY AUTOMATED COUNT: 15.6 % (ref 12.3–15.4)
GFR SERPL CREATININE-BSD FRML MDRD: 55 ML/MIN/1.73
GLUCOSE BLD-MCNC: 142 MG/DL (ref 65–99)
GLUCOSE UR STRIP-MCNC: ABNORMAL MG/DL
HBA1C MFR BLD: 8.3 % (ref 4.8–5.6)
HCT VFR BLD AUTO: 46.5 % (ref 34–46.6)
HGB BLD-MCNC: 14.8 G/DL (ref 12–15.9)
HGB UR QL STRIP.AUTO: NEGATIVE
KETONES UR QL STRIP: NEGATIVE
LEUKOCYTE ESTERASE UR QL STRIP.AUTO: NEGATIVE
MCH RBC QN AUTO: 25.6 PG (ref 26.6–33)
MCHC RBC AUTO-ENTMCNC: 31.8 G/DL (ref 31.5–35.7)
MCV RBC AUTO: 80.3 FL (ref 79–97)
NITRITE UR QL STRIP: NEGATIVE
PH UR STRIP.AUTO: 8 [PH] (ref 5–8)
PLATELET # BLD AUTO: 223 10*3/MM3 (ref 140–450)
PMV BLD AUTO: 11.1 FL (ref 6–12)
POTASSIUM BLD-SCNC: 4.5 MMOL/L (ref 3.5–5.2)
PROT UR QL STRIP: NEGATIVE
RBC # BLD AUTO: 5.79 10*6/MM3 (ref 3.77–5.28)
SODIUM BLD-SCNC: 138 MMOL/L (ref 136–145)
SP GR UR STRIP: 1.01 (ref 1–1.03)
UROBILINOGEN UR QL STRIP: ABNORMAL
WBC NRBC COR # BLD: 8.63 10*3/MM3 (ref 3.4–10.8)

## 2019-10-02 PROCEDURE — 80048 BASIC METABOLIC PNL TOTAL CA: CPT | Performed by: ORTHOPAEDIC SURGERY

## 2019-10-02 PROCEDURE — 36415 COLL VENOUS BLD VENIPUNCTURE: CPT

## 2019-10-02 PROCEDURE — 85027 COMPLETE CBC AUTOMATED: CPT | Performed by: ORTHOPAEDIC SURGERY

## 2019-10-02 PROCEDURE — 83036 HEMOGLOBIN GLYCOSYLATED A1C: CPT | Performed by: ORTHOPAEDIC SURGERY

## 2019-10-02 PROCEDURE — 81003 URINALYSIS AUTO W/O SCOPE: CPT | Performed by: ORTHOPAEDIC SURGERY

## 2019-10-02 PROCEDURE — 93005 ELECTROCARDIOGRAM TRACING: CPT

## 2019-10-02 PROCEDURE — 63710000001 MUPIROCIN 2 % OINTMENT: Performed by: ORTHOPAEDIC SURGERY

## 2019-10-02 PROCEDURE — 73562 X-RAY EXAM OF KNEE 3: CPT

## 2019-10-02 PROCEDURE — 93010 ELECTROCARDIOGRAM REPORT: CPT | Performed by: INTERNAL MEDICINE

## 2019-10-02 PROCEDURE — 71046 X-RAY EXAM CHEST 2 VIEWS: CPT

## 2019-10-02 PROCEDURE — A9270 NON-COVERED ITEM OR SERVICE: HCPCS | Performed by: ORTHOPAEDIC SURGERY

## 2019-10-02 RX ORDER — ASPIRIN 325 MG
325 TABLET ORAL DAILY
COMMUNITY
End: 2019-10-15 | Stop reason: HOSPADM

## 2019-10-02 RX ORDER — CHLORTHALIDONE 25 MG/1
25 TABLET ORAL DAILY
COMMUNITY
End: 2019-12-06

## 2019-10-02 RX ORDER — PROMETHAZINE HYDROCHLORIDE 25 MG/1
1 TABLET ORAL AS NEEDED
Refills: 1 | COMMUNITY
Start: 2019-09-22

## 2019-10-02 RX ORDER — CLORAZEPATE DIPOTASSIUM 7.5 MG/1
7.5 TABLET ORAL 2 TIMES DAILY
Refills: 3 | COMMUNITY
Start: 2019-09-25 | End: 2022-05-12 | Stop reason: HOSPADM

## 2019-10-02 RX ORDER — ESOMEPRAZOLE MAGNESIUM 40 MG/1
40 CAPSULE, DELAYED RELEASE ORAL 2 TIMES DAILY
COMMUNITY

## 2019-10-02 RX ORDER — METOPROLOL TARTRATE 100 MG/1
100 TABLET ORAL 2 TIMES DAILY
COMMUNITY
End: 2020-10-06

## 2019-10-02 RX ORDER — AMLODIPINE BESYLATE AND BENAZEPRIL HYDROCHLORIDE 5; 20 MG/1; MG/1
1 CAPSULE ORAL 2 TIMES DAILY
Status: ON HOLD | COMMUNITY
End: 2022-05-12 | Stop reason: SDUPTHER

## 2019-10-02 RX ORDER — CALCITRIOL 0.25 UG/1
0.25 CAPSULE, LIQUID FILLED ORAL 2 TIMES DAILY
COMMUNITY
End: 2020-03-26

## 2019-10-02 RX ORDER — CHOLECALCIFEROL (VITAMIN D3) 1250 MCG
50000 CAPSULE ORAL
COMMUNITY
End: 2020-12-30 | Stop reason: SDUPTHER

## 2019-10-02 RX ORDER — INSULIN GLARGINE 100 [IU]/ML
50 INJECTION, SOLUTION SUBCUTANEOUS DAILY
COMMUNITY
End: 2020-10-06 | Stop reason: SDUPTHER

## 2019-10-02 ASSESSMENT — KOOS JR
KOOS JR SCORE: 28.251
KOOS JR SCORE: 23

## 2019-10-02 NOTE — DISCHARGE INSTRUCTIONS
Take the following medications the morning of surgery with a small sip of water:        General Instructions:  • Do not eat solid food after midnight the night before surgery.  • You may drink clear liquids day of surgery but must stop at least one hour before your hospital arrival time.  • It is beneficial for you to have a clear drink that contains carbohydrates the day of surgery.  We suggest a 12 to 20 ounce bottle of Gatorade or Powerade for non-diabetic patients or a 12 to 20 ounce bottle of G2 or Powerade Zero for diabetic patients. (Pediatric patients, are not advised to drink a 12 to 20 ounce carbohydrate drink)    Clear liquids are liquids you can see through.  Nothing red in color.     Plain water                               Sports drinks  Sodas                                   Gelatin (Jell-O)  Fruit juices without pulp such as white grape juice and apple juice  Popsicles that contain no fruit or yogurt  Tea or coffee (no cream or milk added)  Gatorade / Powerade  G2 / Powerade Zero    • Infants may have breast milk up to four hours before surgery.  • Infants drinking formula may drink formula up to six hours before surgery.   • Patients who avoid smoking, chewing tobacco and alcohol for 4 weeks prior to surgery have a reduced risk of post-operative complications.  Quit smoking as many days before surgery as you can.  • Do not smoke, use chewing tobacco or drink alcohol the day of surgery.   • If applicable bring your C-PAP/ BI-PAP machine.  • Bring any papers given to you in the doctor’s office.  • Wear clean comfortable clothes.  • Do not wear contact lenses, false eyelashes or make-up.  Bring a case for your glasses.   • Bring crutches or walker if applicable.  • Remove all piercings.  Leave jewelry and any other valuables at home.  • Hair extensions with metal clips must be removed prior to surgery.  • The Pre-Admission Testing nurse will instruct you to bring medications if unable to obtain an  accurate list in Pre-Admission Testing.        If you were given a blood bank ID arm band remember to bring it with you the day of surgery.    Preventing a Surgical Site Infection:  • For 2 to 3 days before surgery, avoid shaving with a razor because the razor can irritate skin and make it easier to develop an infection.    • Any areas of open skin can increase the risk of a post-operative wound infection by allowing bacteria to enter and travel throughout the body.  Notify your surgeon if you have any skin wounds / rashes even if it is not near the expected surgical site.  The area will need assessed to determine if surgery should be delayed until it is healed.  • The night prior to surgery sleep in a clean bed with clean clothing.  Do not allow pets to sleep with you.  • Shower on the morning of surgery using a fresh bar of anti-bacterial soap (such as Dial) and clean washcloth.  Dry with a clean towel and dress in clean clothing.  • Ask your surgeon if you will be receiving antibiotics prior to surgery.  • Make sure you, your family, and all healthcare providers clean their hands with soap and water or an alcohol based hand  before caring for you or your wound.    Day of surgery:  Your arrival time is approximately two hours before your scheduled surgery time.  Upon arrival, a Pre-op nurse and Anesthesiologist will review your health history, obtain vital signs, and answer questions you may have.  The only belongings needed at this time will be a list of your home medications and if applicable your C-PAP/BI-PAP machine.  If you are staying overnight your family can leave the rest of your belongings in the car and bring them to your room later.  A Pre-op nurse will start an IV and you may receive medication in preparation for surgery, including something to help you relax.  Your family will be able to see you in the Pre-op area.  Two visitors at a time will be allowed in the Pre-op room.  While you are in  surgery your family should notify the waiting room  if they leave the waiting room area and provide a contact phone number.    Please be aware that surgery does come with discomfort.  We want to make every effort to control your discomfort so please discuss any uncontrolled symptoms with your nurse.   Your doctor will most likely have prescribed pain medications.      If you are going home after surgery you will receive individualized written care instructions before being discharged.  A responsible adult must drive you to and from the hospital on the day of your surgery and stay with you for 24 hours.    If you are staying overnight following surgery, you will be transported to your hospital room following the recovery period.  [facility] has all private rooms.    You have received a list of surgical assistants for your reference.  If you have any questions please call Pre-Admission Testing at [phone].  Deductibles and co-payments are collected on the day of service. Please be prepared to pay the required co-pay, deductible or deposit on the day of service as defined by your plan.  2% CHLORAHEXIDINE GLUCONATE* CLOTH  Preparing or “prepping” skin before surgery can reduce the risk of infection at the surgical site. To make the process easier, [facility] has chosen disposable cloths moistened with a rinse-free, 2% Chlorhexidine Gluconate (CHG) antiseptic solution. The steps below outline the prepping process and should be carefully followed.        Use the prep cloth on the area that is circled in the diagram             Directions Night before Surgery  1) Shower using a fresh bar of anti-bacterial soap (such as Dial) and clean washcloth.  Use a clean towel to completely dry your skin.  2) Do not use any lotions, oils or creams on your skin.  3) Open the package and remove 1 cloth, wipe your skin for 30 seconds in a circular motion.  Allow to dry for 3 minutes.  4) Repeat #3  with second cloth.  5) Do not touch your eyes, ears, or mouth with the prep cloth.  6) Allow the wet prep solution to air dry.  7) Discard the prep cloth and wash your hands with soap and water.   8) Dress in clean bed clothes and sleep on fresh clean bed sheets.   9) You may experience some temporary itching after the prep.    Directions Day of Surgery  1) Repeat steps 1,2,3,4,5,6,7, and 9.   2) Dress in clean clothes before coming to the hospital.    BACTROBAN NASAL OINTMENT  There are many germs normally in your nose. Bactroban is an ointment that will help reduce these germs. Please follow these instructions for Bactroban use:      ____The day before surgery in the morning  Date________    ____The day before surgery in the evening              Date________    ____The day of surgery in the morning    Date________    **Squirt ½ package of Bactroban Ointment onto a cotton applicator and apply to inside of 1st nostril.  Squirt the remaining Bactroban and apply to the inside of the other nostril.    PERIDEX- ORAL:  Use only if your surgeon has ordered  Use the night before and morning of surgery - Swish, gargle, and spit - do not swallow.

## 2019-10-09 NOTE — PROGRESS NOTES
Pre Op Ortho Assessment    [facility]     Patient Name: Marilyn J Pumphrey  MRN: [id number]  Today's Date: 10/9/2019        PRE-OPERATIVE ORTHOPEDIC ASSESSMENT     Row Name 10/09/19 1538       Question    What is your age group?  1    Gender?  1    How far on average can you walk? (a block is 200 meters)  1    Which gait aid do you use? (more often than not)  1    Do you use community supports: (home help, meals on wheels, district nursing)  1    Will you live with someone who can care for you after your operation?  3    Your Score (out of 12)  8       Discharge Disposition/Planning:    Discussed the predicted discharge disposition needed based on RAPT Assessment with the patient  Yes    Patient Selected Discharge Disposition:  Home HAS A RAMP INTO THE HOSUE AND NO STEPS INSIDE.    Outpatient Rehabilitation Facility of Choice:  other *see comment NO CHOICE YET.  [facility]OME PL[facility] Preferred:  Other *see comment  [facility]    Post-operative Caregiver Name and Phone Number  Other LIVES WITH HER SISTER JASMINA AND SHE WILL TAKE CARE OF HER.       Home Equipment    Does patient have a walker for home use?  No    Does patient have a 3 in 1 commode for home use?  No    Does patient have a shower chair for home use?  No    Does patient have an elevated commode seat for home use?  No    Does patient have a cane for home use?  Yes    Is there any other medical equipment in the home?  No       Pre-Operative Class Attendance    Attended or scheduled to attend the pre-operative class within 1 year of total joint replacement?  Yes       Patient Education    Expected time of discharge discussed  Yes    Encouraged to attend pre-operative class  Yes    Education regarding predicted discharge disposition based on RAPT score  Yes    Patient receptive and voiced understanding of information given  Yes            Wilman Morris LPN

## 2019-10-14 ENCOUNTER — HOSPITAL ENCOUNTER (INPATIENT)
Facility: HOSPITAL | Age: 74
LOS: 1 days | Discharge: HOME HEALTH | End: 2019-10-15
Attending: ORTHOPAEDIC SURGERY | Admitting: ORTHOPAEDIC SURGERY

## 2019-10-14 ENCOUNTER — APPOINTMENT (OUTPATIENT)
Dept: GENERAL RADIOLOGY | Facility: HOSPITAL | Age: 74
End: 2019-10-14

## 2019-10-14 ENCOUNTER — ANESTHESIA (OUTPATIENT)
Dept: PERIOP | Facility: HOSPITAL | Age: 74
End: 2019-10-14

## 2019-10-14 ENCOUNTER — ANESTHESIA EVENT (OUTPATIENT)
Dept: PERIOP | Facility: HOSPITAL | Age: 74
End: 2019-10-14

## 2019-10-14 DIAGNOSIS — M17.12 PRIMARY OSTEOARTHRITIS OF LEFT KNEE: Primary | ICD-10-CM

## 2019-10-14 LAB
GLUCOSE BLDC GLUCOMTR-MCNC: 156 MG/DL (ref 70–130)
GLUCOSE BLDC GLUCOMTR-MCNC: 192 MG/DL (ref 70–130)
GLUCOSE BLDC GLUCOMTR-MCNC: 231 MG/DL (ref 70–130)
HBA1C MFR BLD: 8.3 % (ref 4.8–5.6)
HCT VFR BLD AUTO: 42.7 % (ref 34–46.6)
HGB BLD-MCNC: 13.5 G/DL (ref 12–15.9)

## 2019-10-14 PROCEDURE — 25010000003 CEFAZOLIN IN DEXTROSE 2-4 GM/100ML-% SOLUTION: Performed by: ORTHOPAEDIC SURGERY

## 2019-10-14 PROCEDURE — 25010000002 FENTANYL CITRATE (PF) 100 MCG/2ML SOLUTION: Performed by: NURSE ANESTHETIST, CERTIFIED REGISTERED

## 2019-10-14 PROCEDURE — 25010000003 CEFAZOLIN IN DEXTROSE 2-4 GM/100ML-% SOLUTION: Performed by: NURSE ANESTHETIST, CERTIFIED REGISTERED

## 2019-10-14 PROCEDURE — 97110 THERAPEUTIC EXERCISES: CPT

## 2019-10-14 PROCEDURE — 97162 PT EVAL MOD COMPLEX 30 MIN: CPT

## 2019-10-14 PROCEDURE — C1776 JOINT DEVICE (IMPLANTABLE): HCPCS | Performed by: ORTHOPAEDIC SURGERY

## 2019-10-14 PROCEDURE — 25010000002 ROPIVACAINE PER 1 MG: Performed by: ANESTHESIOLOGY

## 2019-10-14 PROCEDURE — 85014 HEMATOCRIT: CPT | Performed by: ORTHOPAEDIC SURGERY

## 2019-10-14 PROCEDURE — 83036 HEMOGLOBIN GLYCOSYLATED A1C: CPT | Performed by: ORTHOPAEDIC SURGERY

## 2019-10-14 PROCEDURE — 0SRD0J9 REPLACEMENT OF LEFT KNEE JOINT WITH SYNTHETIC SUBSTITUTE, CEMENTED, OPEN APPROACH: ICD-10-PCS | Performed by: ORTHOPAEDIC SURGERY

## 2019-10-14 PROCEDURE — 63710000001 INSULIN GLARGINE PER 5 UNITS: Performed by: ORTHOPAEDIC SURGERY

## 2019-10-14 PROCEDURE — 25010000002 HYDROMORPHONE PER 4 MG: Performed by: NURSE ANESTHETIST, CERTIFIED REGISTERED

## 2019-10-14 PROCEDURE — 25010000002 DEXAMETHASONE PER 1 MG: Performed by: NURSE ANESTHETIST, CERTIFIED REGISTERED

## 2019-10-14 PROCEDURE — 25010000002 FENTANYL CITRATE (PF) 100 MCG/2ML SOLUTION: Performed by: ANESTHESIOLOGY

## 2019-10-14 PROCEDURE — 25010000002 MIDAZOLAM PER 1 MG: Performed by: ANESTHESIOLOGY

## 2019-10-14 PROCEDURE — 25010000002 ONDANSETRON PER 1 MG: Performed by: NURSE ANESTHETIST, CERTIFIED REGISTERED

## 2019-10-14 PROCEDURE — C1713 ANCHOR/SCREW BN/BN,TIS/BN: HCPCS | Performed by: ORTHOPAEDIC SURGERY

## 2019-10-14 PROCEDURE — 25010000002 NEOSTIGMINE PER 0.5 MG: Performed by: NURSE ANESTHETIST, CERTIFIED REGISTERED

## 2019-10-14 PROCEDURE — 85018 HEMOGLOBIN: CPT | Performed by: ORTHOPAEDIC SURGERY

## 2019-10-14 PROCEDURE — C9290 INJ, BUPIVACAINE LIPOSOME: HCPCS | Performed by: ORTHOPAEDIC SURGERY

## 2019-10-14 PROCEDURE — 73560 X-RAY EXAM OF KNEE 1 OR 2: CPT

## 2019-10-14 PROCEDURE — 25010000002 PROPOFOL 10 MG/ML EMULSION: Performed by: NURSE ANESTHETIST, CERTIFIED REGISTERED

## 2019-10-14 PROCEDURE — 82962 GLUCOSE BLOOD TEST: CPT

## 2019-10-14 PROCEDURE — 63710000001 INSULIN LISPRO (HUMAN) PER 5 UNITS: Performed by: ORTHOPAEDIC SURGERY

## 2019-10-14 PROCEDURE — 25010000003 BUPIVACAINE LIPOSOME 1.3 % SUSPENSION 20 ML VIAL: Performed by: ORTHOPAEDIC SURGERY

## 2019-10-14 DEVICE — CMT BONE R 1X40: Type: IMPLANTABLE DEVICE | Site: KNEE | Status: FUNCTIONAL

## 2019-10-14 DEVICE — IMPLANTABLE DEVICE: Type: IMPLANTABLE DEVICE | Site: KNEE | Status: FUNCTIONAL

## 2019-10-14 DEVICE — STEM TIB/KN PERSONA TRABECULAR 2PEG SZE LT: Type: IMPLANTABLE DEVICE | Site: KNEE | Status: FUNCTIONAL

## 2019-10-14 DEVICE — PAT KN PERSONA VE CRS/LNK CMT 8.5X32MM: Type: IMPLANTABLE DEVICE | Site: KNEE | Status: FUNCTIONAL

## 2019-10-14 DEVICE — CAP TOTL KN POROUS/HYBRID PRIMARY: Type: IMPLANTABLE DEVICE | Site: KNEE | Status: FUNCTIONAL

## 2019-10-14 DEVICE — SCRW HEX PERSONA FML 2.5X25MM PK/2: Type: IMPLANTABLE DEVICE | Site: KNEE | Status: FUNCTIONAL

## 2019-10-14 DEVICE — CAP BEAR KN VE UPCHRG: Type: IMPLANTABLE DEVICE | Site: KNEE | Status: FUNCTIONAL

## 2019-10-14 DEVICE — CAP PAT KN VE/TM UPCHRG: Type: IMPLANTABLE DEVICE | Site: KNEE | Status: FUNCTIONAL

## 2019-10-14 DEVICE — COMP FEM/KN PERSONA CR POR COCR STD SZ7 LT: Type: IMPLANTABLE DEVICE | Site: KNEE | Status: FUNCTIONAL

## 2019-10-14 RX ORDER — LABETALOL HYDROCHLORIDE 5 MG/ML
5 INJECTION, SOLUTION INTRAVENOUS
Status: DISCONTINUED | OUTPATIENT
Start: 2019-10-14 | End: 2019-10-14 | Stop reason: HOSPADM

## 2019-10-14 RX ORDER — SENNA AND DOCUSATE SODIUM 50; 8.6 MG/1; MG/1
2 TABLET, FILM COATED ORAL 2 TIMES DAILY
Status: DISCONTINUED | OUTPATIENT
Start: 2019-10-14 | End: 2019-10-15 | Stop reason: HOSPADM

## 2019-10-14 RX ORDER — ESCITALOPRAM OXALATE 10 MG/1
10 TABLET ORAL EVERY EVENING
Status: DISCONTINUED | OUTPATIENT
Start: 2019-10-14 | End: 2019-10-15 | Stop reason: HOSPADM

## 2019-10-14 RX ORDER — NALOXONE HCL 0.4 MG/ML
0.1 VIAL (ML) INJECTION
Status: DISCONTINUED | OUTPATIENT
Start: 2019-10-14 | End: 2019-10-15 | Stop reason: HOSPADM

## 2019-10-14 RX ORDER — ROSUVASTATIN CALCIUM 20 MG/1
20 TABLET, COATED ORAL NIGHTLY
Status: DISCONTINUED | OUTPATIENT
Start: 2019-10-14 | End: 2019-10-15 | Stop reason: HOSPADM

## 2019-10-14 RX ORDER — SODIUM CHLORIDE 0.9 % (FLUSH) 0.9 %
1-10 SYRINGE (ML) INJECTION AS NEEDED
Status: DISCONTINUED | OUTPATIENT
Start: 2019-10-14 | End: 2019-10-15 | Stop reason: HOSPADM

## 2019-10-14 RX ORDER — LIDOCAINE HYDROCHLORIDE 20 MG/ML
INJECTION, SOLUTION INFILTRATION; PERINEURAL AS NEEDED
Status: DISCONTINUED | OUTPATIENT
Start: 2019-10-14 | End: 2019-10-14 | Stop reason: SURG

## 2019-10-14 RX ORDER — NALOXONE HCL 0.4 MG/ML
0.2 VIAL (ML) INJECTION AS NEEDED
Status: DISCONTINUED | OUTPATIENT
Start: 2019-10-14 | End: 2019-10-14 | Stop reason: HOSPADM

## 2019-10-14 RX ORDER — FLUMAZENIL 0.1 MG/ML
0.2 INJECTION INTRAVENOUS AS NEEDED
Status: DISCONTINUED | OUTPATIENT
Start: 2019-10-14 | End: 2019-10-14 | Stop reason: HOSPADM

## 2019-10-14 RX ORDER — UREA 10 %
1 LOTION (ML) TOPICAL NIGHTLY PRN
Status: DISCONTINUED | OUTPATIENT
Start: 2019-10-14 | End: 2019-10-15 | Stop reason: HOSPADM

## 2019-10-14 RX ORDER — PRAMIPEXOLE DIHYDROCHLORIDE 1 MG/1
1 TABLET ORAL 2 TIMES DAILY
Status: DISCONTINUED | OUTPATIENT
Start: 2019-10-14 | End: 2019-10-15 | Stop reason: HOSPADM

## 2019-10-14 RX ORDER — FENTANYL CITRATE 50 UG/ML
50 INJECTION, SOLUTION INTRAMUSCULAR; INTRAVENOUS
Status: DISCONTINUED | OUTPATIENT
Start: 2019-10-14 | End: 2019-10-14 | Stop reason: HOSPADM

## 2019-10-14 RX ORDER — CHLORTHALIDONE 25 MG/1
25 TABLET ORAL DAILY
Status: DISCONTINUED | OUTPATIENT
Start: 2019-10-14 | End: 2019-10-14

## 2019-10-14 RX ORDER — ACETAMINOPHEN 325 MG/1
325 TABLET ORAL EVERY 4 HOURS PRN
Status: DISCONTINUED | OUTPATIENT
Start: 2019-10-14 | End: 2019-10-15 | Stop reason: HOSPADM

## 2019-10-14 RX ORDER — DEXTROSE MONOHYDRATE 25 G/50ML
25 INJECTION, SOLUTION INTRAVENOUS
Status: DISCONTINUED | OUTPATIENT
Start: 2019-10-14 | End: 2019-10-15 | Stop reason: HOSPADM

## 2019-10-14 RX ORDER — SODIUM CHLORIDE 0.9 % (FLUSH) 0.9 %
3-10 SYRINGE (ML) INJECTION AS NEEDED
Status: DISCONTINUED | OUTPATIENT
Start: 2019-10-14 | End: 2019-10-14 | Stop reason: HOSPADM

## 2019-10-14 RX ORDER — DEXAMETHASONE SODIUM PHOSPHATE 10 MG/ML
INJECTION INTRAMUSCULAR; INTRAVENOUS AS NEEDED
Status: DISCONTINUED | OUTPATIENT
Start: 2019-10-14 | End: 2019-10-14 | Stop reason: SURG

## 2019-10-14 RX ORDER — PROMETHAZINE HYDROCHLORIDE 25 MG/1
25 TABLET ORAL EVERY 6 HOURS PRN
Status: DISCONTINUED | OUTPATIENT
Start: 2019-10-14 | End: 2019-10-15 | Stop reason: HOSPADM

## 2019-10-14 RX ORDER — GLYCOPYRROLATE 0.2 MG/ML
INJECTION INTRAMUSCULAR; INTRAVENOUS AS NEEDED
Status: DISCONTINUED | OUTPATIENT
Start: 2019-10-14 | End: 2019-10-14 | Stop reason: SURG

## 2019-10-14 RX ORDER — CLORAZEPATE DIPOTASSIUM 3.75 MG/1
7.5 TABLET ORAL 2 TIMES DAILY
Status: DISCONTINUED | OUTPATIENT
Start: 2019-10-14 | End: 2019-10-15 | Stop reason: HOSPADM

## 2019-10-14 RX ORDER — MIDAZOLAM HYDROCHLORIDE 1 MG/ML
2 INJECTION INTRAMUSCULAR; INTRAVENOUS
Status: DISCONTINUED | OUTPATIENT
Start: 2019-10-14 | End: 2019-10-14 | Stop reason: HOSPADM

## 2019-10-14 RX ORDER — LIDOCAINE HYDROCHLORIDE 10 MG/ML
0.5 INJECTION, SOLUTION EPIDURAL; INFILTRATION; INTRACAUDAL; PERINEURAL ONCE AS NEEDED
Status: DISCONTINUED | OUTPATIENT
Start: 2019-10-14 | End: 2019-10-14 | Stop reason: HOSPADM

## 2019-10-14 RX ORDER — LEVOTHYROXINE SODIUM 137 UG/1
137 TABLET ORAL DAILY
Status: DISCONTINUED | OUTPATIENT
Start: 2019-10-14 | End: 2019-10-15 | Stop reason: HOSPADM

## 2019-10-14 RX ORDER — PROPOFOL 10 MG/ML
VIAL (ML) INTRAVENOUS AS NEEDED
Status: DISCONTINUED | OUTPATIENT
Start: 2019-10-14 | End: 2019-10-14 | Stop reason: SURG

## 2019-10-14 RX ORDER — ONDANSETRON 4 MG/1
4 TABLET, FILM COATED ORAL EVERY 6 HOURS PRN
Status: DISCONTINUED | OUTPATIENT
Start: 2019-10-14 | End: 2019-10-15 | Stop reason: HOSPADM

## 2019-10-14 RX ORDER — INSULIN GLARGINE 100 [IU]/ML
50 INJECTION, SOLUTION SUBCUTANEOUS DAILY
Status: DISCONTINUED | OUTPATIENT
Start: 2019-10-14 | End: 2019-10-15 | Stop reason: HOSPADM

## 2019-10-14 RX ORDER — METOPROLOL TARTRATE 100 MG/1
100 TABLET ORAL EVERY 12 HOURS SCHEDULED
Status: DISCONTINUED | OUTPATIENT
Start: 2019-10-14 | End: 2019-10-15 | Stop reason: HOSPADM

## 2019-10-14 RX ORDER — PROMETHAZINE HYDROCHLORIDE 25 MG/1
25 TABLET ORAL ONCE AS NEEDED
Status: DISCONTINUED | OUTPATIENT
Start: 2019-10-14 | End: 2019-10-14 | Stop reason: HOSPADM

## 2019-10-14 RX ORDER — CALCITRIOL 0.25 UG/1
0.25 CAPSULE, LIQUID FILLED ORAL 2 TIMES DAILY
Status: DISCONTINUED | OUTPATIENT
Start: 2019-10-14 | End: 2019-10-15 | Stop reason: HOSPADM

## 2019-10-14 RX ORDER — ACETAMINOPHEN 650 MG
TABLET, EXTENDED RELEASE ORAL AS NEEDED
Status: DISCONTINUED | OUTPATIENT
Start: 2019-10-14 | End: 2019-10-14 | Stop reason: HOSPADM

## 2019-10-14 RX ORDER — PROMETHAZINE HYDROCHLORIDE 25 MG/1
25 SUPPOSITORY RECTAL ONCE AS NEEDED
Status: DISCONTINUED | OUTPATIENT
Start: 2019-10-14 | End: 2019-10-14 | Stop reason: HOSPADM

## 2019-10-14 RX ORDER — SODIUM CHLORIDE 0.9 % (FLUSH) 0.9 %
3 SYRINGE (ML) INJECTION EVERY 12 HOURS SCHEDULED
Status: DISCONTINUED | OUTPATIENT
Start: 2019-10-14 | End: 2019-10-14 | Stop reason: HOSPADM

## 2019-10-14 RX ORDER — MAGNESIUM HYDROXIDE 1200 MG/15ML
LIQUID ORAL AS NEEDED
Status: DISCONTINUED | OUTPATIENT
Start: 2019-10-14 | End: 2019-10-14 | Stop reason: HOSPADM

## 2019-10-14 RX ORDER — HYDRALAZINE HYDROCHLORIDE 20 MG/ML
5 INJECTION INTRAMUSCULAR; INTRAVENOUS
Status: DISCONTINUED | OUTPATIENT
Start: 2019-10-14 | End: 2019-10-14 | Stop reason: HOSPADM

## 2019-10-14 RX ORDER — SODIUM CHLORIDE 0.9 % (FLUSH) 0.9 %
3 SYRINGE (ML) INJECTION EVERY 12 HOURS SCHEDULED
Status: DISCONTINUED | OUTPATIENT
Start: 2019-10-14 | End: 2019-10-15 | Stop reason: HOSPADM

## 2019-10-14 RX ORDER — ACETAMINOPHEN 325 MG/1
650 TABLET ORAL ONCE AS NEEDED
Status: DISCONTINUED | OUTPATIENT
Start: 2019-10-14 | End: 2019-10-14 | Stop reason: HOSPADM

## 2019-10-14 RX ORDER — MIDAZOLAM HYDROCHLORIDE 1 MG/ML
1 INJECTION INTRAMUSCULAR; INTRAVENOUS
Status: DISCONTINUED | OUTPATIENT
Start: 2019-10-14 | End: 2019-10-14 | Stop reason: HOSPADM

## 2019-10-14 RX ORDER — ONDANSETRON 2 MG/ML
INJECTION INTRAMUSCULAR; INTRAVENOUS AS NEEDED
Status: DISCONTINUED | OUTPATIENT
Start: 2019-10-14 | End: 2019-10-14 | Stop reason: SURG

## 2019-10-14 RX ORDER — KETOROLAC TROMETHAMINE 15 MG/ML
15 INJECTION, SOLUTION INTRAMUSCULAR; INTRAVENOUS EVERY 6 HOURS PRN
Status: DISCONTINUED | OUTPATIENT
Start: 2019-10-14 | End: 2019-10-15 | Stop reason: HOSPADM

## 2019-10-14 RX ORDER — TRANEXAMIC ACID 100 MG/ML
INJECTION, SOLUTION INTRAVENOUS AS NEEDED
Status: DISCONTINUED | OUTPATIENT
Start: 2019-10-14 | End: 2019-10-14 | Stop reason: HOSPADM

## 2019-10-14 RX ORDER — PROMETHAZINE HYDROCHLORIDE 25 MG/ML
12.5 INJECTION, SOLUTION INTRAMUSCULAR; INTRAVENOUS ONCE AS NEEDED
Status: DISCONTINUED | OUTPATIENT
Start: 2019-10-14 | End: 2019-10-14 | Stop reason: HOSPADM

## 2019-10-14 RX ORDER — PROMETHAZINE HYDROCHLORIDE 25 MG/ML
6.25 INJECTION, SOLUTION INTRAMUSCULAR; INTRAVENOUS
Status: DISCONTINUED | OUTPATIENT
Start: 2019-10-14 | End: 2019-10-14 | Stop reason: HOSPADM

## 2019-10-14 RX ORDER — HYDROCODONE BITARTRATE AND ACETAMINOPHEN 5; 325 MG/1; MG/1
1 TABLET ORAL ONCE AS NEEDED
Status: DISCONTINUED | OUTPATIENT
Start: 2019-10-14 | End: 2019-10-14 | Stop reason: HOSPADM

## 2019-10-14 RX ORDER — ASPIRIN 325 MG
325 TABLET ORAL DAILY
Status: COMPLETED | OUTPATIENT
Start: 2019-10-14 | End: 2019-10-14

## 2019-10-14 RX ORDER — DIPHENHYDRAMINE HYDROCHLORIDE 50 MG/ML
12.5 INJECTION INTRAMUSCULAR; INTRAVENOUS
Status: DISCONTINUED | OUTPATIENT
Start: 2019-10-14 | End: 2019-10-14 | Stop reason: HOSPADM

## 2019-10-14 RX ORDER — OXYCODONE HYDROCHLORIDE AND ACETAMINOPHEN 5; 325 MG/1; MG/1
TABLET ORAL
Status: COMPLETED
Start: 2019-10-14 | End: 2019-10-14

## 2019-10-14 RX ORDER — OXYCODONE AND ACETAMINOPHEN 10; 325 MG/1; MG/1
2 TABLET ORAL EVERY 4 HOURS PRN
Status: DISCONTINUED | OUTPATIENT
Start: 2019-10-14 | End: 2019-10-15 | Stop reason: HOSPADM

## 2019-10-14 RX ORDER — HYDROMORPHONE HYDROCHLORIDE 1 MG/ML
0.5 INJECTION, SOLUTION INTRAMUSCULAR; INTRAVENOUS; SUBCUTANEOUS
Status: DISCONTINUED | OUTPATIENT
Start: 2019-10-14 | End: 2019-10-15 | Stop reason: HOSPADM

## 2019-10-14 RX ORDER — SODIUM CHLORIDE, SODIUM LACTATE, POTASSIUM CHLORIDE, CALCIUM CHLORIDE 600; 310; 30; 20 MG/100ML; MG/100ML; MG/100ML; MG/100ML
9 INJECTION, SOLUTION INTRAVENOUS CONTINUOUS
Status: DISCONTINUED | OUTPATIENT
Start: 2019-10-14 | End: 2019-10-14 | Stop reason: HOSPADM

## 2019-10-14 RX ORDER — NICOTINE POLACRILEX 4 MG
15 LOZENGE BUCCAL
Status: DISCONTINUED | OUTPATIENT
Start: 2019-10-14 | End: 2019-10-15 | Stop reason: HOSPADM

## 2019-10-14 RX ORDER — ROCURONIUM BROMIDE 10 MG/ML
INJECTION, SOLUTION INTRAVENOUS AS NEEDED
Status: DISCONTINUED | OUTPATIENT
Start: 2019-10-14 | End: 2019-10-14 | Stop reason: SURG

## 2019-10-14 RX ORDER — ROPIVACAINE HYDROCHLORIDE 5 MG/ML
INJECTION, SOLUTION EPIDURAL; INFILTRATION; PERINEURAL
Status: COMPLETED | OUTPATIENT
Start: 2019-10-14 | End: 2019-10-14

## 2019-10-14 RX ORDER — OXYCODONE HYDROCHLORIDE AND ACETAMINOPHEN 5; 325 MG/1; MG/1
2 TABLET ORAL EVERY 4 HOURS PRN
Status: DISCONTINUED | OUTPATIENT
Start: 2019-10-14 | End: 2019-10-15 | Stop reason: HOSPADM

## 2019-10-14 RX ORDER — OXYCODONE AND ACETAMINOPHEN 7.5; 325 MG/1; MG/1
1 TABLET ORAL ONCE AS NEEDED
Status: DISCONTINUED | OUTPATIENT
Start: 2019-10-14 | End: 2019-10-14 | Stop reason: HOSPADM

## 2019-10-14 RX ORDER — ONDANSETRON 2 MG/ML
4 INJECTION INTRAMUSCULAR; INTRAVENOUS EVERY 6 HOURS PRN
Status: DISCONTINUED | OUTPATIENT
Start: 2019-10-14 | End: 2019-10-15 | Stop reason: HOSPADM

## 2019-10-14 RX ORDER — SODIUM CHLORIDE, SODIUM LACTATE, POTASSIUM CHLORIDE, CALCIUM CHLORIDE 600; 310; 30; 20 MG/100ML; MG/100ML; MG/100ML; MG/100ML
100 INJECTION, SOLUTION INTRAVENOUS CONTINUOUS
Status: DISCONTINUED | OUTPATIENT
Start: 2019-10-14 | End: 2019-10-15

## 2019-10-14 RX ORDER — BISACODYL 10 MG
10 SUPPOSITORY, RECTAL RECTAL DAILY PRN
Status: DISCONTINUED | OUTPATIENT
Start: 2019-10-14 | End: 2019-10-15 | Stop reason: HOSPADM

## 2019-10-14 RX ORDER — HYDROMORPHONE HYDROCHLORIDE 1 MG/ML
0.5 INJECTION, SOLUTION INTRAMUSCULAR; INTRAVENOUS; SUBCUTANEOUS
Status: DISCONTINUED | OUTPATIENT
Start: 2019-10-14 | End: 2019-10-14 | Stop reason: HOSPADM

## 2019-10-14 RX ORDER — CEFAZOLIN SODIUM 2 G/100ML
INJECTION, SOLUTION INTRAVENOUS AS NEEDED
Status: DISCONTINUED | OUTPATIENT
Start: 2019-10-14 | End: 2019-10-14 | Stop reason: SURG

## 2019-10-14 RX ORDER — PANTOPRAZOLE SODIUM 40 MG/1
40 TABLET, DELAYED RELEASE ORAL EVERY MORNING
Status: DISCONTINUED | OUTPATIENT
Start: 2019-10-15 | End: 2019-10-15 | Stop reason: HOSPADM

## 2019-10-14 RX ORDER — FAMOTIDINE 10 MG/ML
20 INJECTION, SOLUTION INTRAVENOUS ONCE
Status: COMPLETED | OUTPATIENT
Start: 2019-10-14 | End: 2019-10-14

## 2019-10-14 RX ORDER — CEFAZOLIN SODIUM 2 G/100ML
2 INJECTION, SOLUTION INTRAVENOUS EVERY 8 HOURS
Status: COMPLETED | OUTPATIENT
Start: 2019-10-14 | End: 2019-10-15

## 2019-10-14 RX ORDER — EPHEDRINE SULFATE 50 MG/ML
5 INJECTION, SOLUTION INTRAVENOUS ONCE AS NEEDED
Status: DISCONTINUED | OUTPATIENT
Start: 2019-10-14 | End: 2019-10-14 | Stop reason: HOSPADM

## 2019-10-14 RX ORDER — DIPHENHYDRAMINE HCL 25 MG
25 CAPSULE ORAL
Status: DISCONTINUED | OUTPATIENT
Start: 2019-10-14 | End: 2019-10-14 | Stop reason: HOSPADM

## 2019-10-14 RX ORDER — ONDANSETRON 2 MG/ML
4 INJECTION INTRAMUSCULAR; INTRAVENOUS ONCE AS NEEDED
Status: DISCONTINUED | OUTPATIENT
Start: 2019-10-14 | End: 2019-10-14 | Stop reason: HOSPADM

## 2019-10-14 RX ADMIN — Medication 400 MG: at 21:01

## 2019-10-14 RX ADMIN — INSULIN GLARGINE 50 UNITS: 100 INJECTION, SOLUTION SUBCUTANEOUS at 17:11

## 2019-10-14 RX ADMIN — FENTANYL CITRATE 50 MCG: 50 INJECTION INTRAMUSCULAR; INTRAVENOUS at 10:18

## 2019-10-14 RX ADMIN — DEXAMETHASONE SODIUM PHOSPHATE 10 MG: 10 INJECTION INTRAMUSCULAR; INTRAVENOUS at 09:50

## 2019-10-14 RX ADMIN — FENTANYL CITRATE 100 MCG: 50 INJECTION INTRAMUSCULAR; INTRAVENOUS at 09:43

## 2019-10-14 RX ADMIN — INSULIN LISPRO 4 UNITS: 100 INJECTION, SOLUTION INTRAVENOUS; SUBCUTANEOUS at 17:13

## 2019-10-14 RX ADMIN — FENTANYL CITRATE 50 MCG: 50 INJECTION INTRAMUSCULAR; INTRAVENOUS at 08:10

## 2019-10-14 RX ADMIN — SUGAMMADEX 200 MG: 100 INJECTION, SOLUTION INTRAVENOUS at 11:15

## 2019-10-14 RX ADMIN — METOPROLOL TARTRATE 100 MG: 100 TABLET, FILM COATED ORAL at 21:01

## 2019-10-14 RX ADMIN — OXYCODONE HYDROCHLORIDE AND ACETAMINOPHEN 2 TABLET: 10; 325 TABLET ORAL at 21:07

## 2019-10-14 RX ADMIN — INSULIN LISPRO 10 UNITS: 100 INJECTION, SOLUTION INTRAVENOUS; SUBCUTANEOUS at 17:11

## 2019-10-14 RX ADMIN — CALCITRIOL 0.25 MCG: 0.25 CAPSULE ORAL at 21:01

## 2019-10-14 RX ADMIN — HYDROMORPHONE HYDROCHLORIDE 0.5 MG: 1 INJECTION, SOLUTION INTRAMUSCULAR; INTRAVENOUS; SUBCUTANEOUS at 12:16

## 2019-10-14 RX ADMIN — GLYCOPYRROLATE 0.6 MG: 0.2 INJECTION INTRAMUSCULAR; INTRAVENOUS at 11:00

## 2019-10-14 RX ADMIN — CLORAZEPATE DIPOTASSIUM 7.5 MG: 3.75 TABLET ORAL at 22:53

## 2019-10-14 RX ADMIN — FAMOTIDINE 20 MG: 10 INJECTION INTRAVENOUS at 08:16

## 2019-10-14 RX ADMIN — HYDROMORPHONE HYDROCHLORIDE 0.5 MG: 1 INJECTION, SOLUTION INTRAMUSCULAR; INTRAVENOUS; SUBCUTANEOUS at 12:27

## 2019-10-14 RX ADMIN — OXYCODONE AND ACETAMINOPHEN 2 TABLET: 5; 325 TABLET ORAL at 12:18

## 2019-10-14 RX ADMIN — LIDOCAINE HYDROCHLORIDE 100 MG: 20 INJECTION, SOLUTION INFILTRATION; PERINEURAL at 09:43

## 2019-10-14 RX ADMIN — PRAMIPEXOLE DIHYDROCHLORIDE 1 MG: 1 TABLET ORAL at 21:01

## 2019-10-14 RX ADMIN — SODIUM CHLORIDE, PRESERVATIVE FREE 3 ML: 5 INJECTION INTRAVENOUS at 17:12

## 2019-10-14 RX ADMIN — CEFAZOLIN SODIUM 2 G: 2 INJECTION, SOLUTION INTRAVENOUS at 09:36

## 2019-10-14 RX ADMIN — ROSUVASTATIN CALCIUM 20 MG: 20 TABLET, FILM COATED ORAL at 21:01

## 2019-10-14 RX ADMIN — CALCIUM CARBONATE-VITAMIN D TAB 500 MG-200 UNIT 1000 MG: 500-200 TAB at 21:01

## 2019-10-14 RX ADMIN — ONDANSETRON 4 MG: 2 INJECTION INTRAMUSCULAR; INTRAVENOUS at 09:50

## 2019-10-14 RX ADMIN — ESCITALOPRAM 10 MG: 10 TABLET, FILM COATED ORAL at 21:01

## 2019-10-14 RX ADMIN — ASPIRIN 325 MG: 325 TABLET ORAL at 21:02

## 2019-10-14 RX ADMIN — OXYCODONE HYDROCHLORIDE AND ACETAMINOPHEN 2 TABLET: 5; 325 TABLET ORAL at 12:18

## 2019-10-14 RX ADMIN — INSULIN LISPRO 2 UNITS: 100 INJECTION, SOLUTION INTRAVENOUS; SUBCUTANEOUS at 21:01

## 2019-10-14 RX ADMIN — MIDAZOLAM 2 MG: 1 INJECTION INTRAMUSCULAR; INTRAVENOUS at 08:15

## 2019-10-14 RX ADMIN — FENTANYL CITRATE 50 MCG: 50 INJECTION INTRAMUSCULAR; INTRAVENOUS at 10:05

## 2019-10-14 RX ADMIN — CEFAZOLIN SODIUM 2 G: 2 INJECTION, SOLUTION INTRAVENOUS at 17:11

## 2019-10-14 RX ADMIN — FENTANYL CITRATE 50 MCG: 50 INJECTION INTRAMUSCULAR; INTRAVENOUS at 11:50

## 2019-10-14 RX ADMIN — FENTANYL CITRATE 50 MCG: 50 INJECTION INTRAMUSCULAR; INTRAVENOUS at 11:57

## 2019-10-14 RX ADMIN — SODIUM CHLORIDE, POTASSIUM CHLORIDE, SODIUM LACTATE AND CALCIUM CHLORIDE: 600; 310; 30; 20 INJECTION, SOLUTION INTRAVENOUS at 11:20

## 2019-10-14 RX ADMIN — ROPIVACAINE HYDROCHLORIDE 30 ML: 5 INJECTION, SOLUTION EPIDURAL; INFILTRATION; PERINEURAL at 08:17

## 2019-10-14 RX ADMIN — PROPOFOL 200 MG: 10 INJECTION, EMULSION INTRAVENOUS at 09:43

## 2019-10-14 RX ADMIN — NEOSTIGMINE METHYLSULFATE 5 MG: 1 INJECTION INTRAMUSCULAR; INTRAVENOUS; SUBCUTANEOUS at 11:00

## 2019-10-14 RX ADMIN — OXYCODONE HYDROCHLORIDE AND ACETAMINOPHEN 2 TABLET: 10; 325 TABLET ORAL at 16:22

## 2019-10-14 RX ADMIN — ROCURONIUM BROMIDE 50 MG: 10 INJECTION INTRAVENOUS at 09:43

## 2019-10-14 RX ADMIN — SENNOSIDES AND DOCUSATE SODIUM 2 TABLET: 8.6; 5 TABLET ORAL at 21:02

## 2019-10-14 NOTE — THERAPY EVALUATION
Patient Name: Marilyn J Pumphrey  : 1945    MRN: [id number]                              Today's Date: 10/14/2019       Admit Date: 10/14/2019    Visit Dx:     ICD-10-CM ICD-9-CM   1. Primary osteoarthritis of left knee M17.12 715.16     Patient Active Problem List   Diagnosis   • Chronic pain   • Other long term (current) drug therapy   • Rheumatoid arthritis (CMS/[facility])   • Acute bronchitis   • Arm pain, right   • CAD (coronary artery disease)   • Chest pain   • Depression   • Diabetes 1.5, managed as type 1 (CMS/[facility])   • Edema of nasopharynx   • FH: thromboembolic disease   • Gastroesophageal reflux disease   • History of pulmonary embolism   • Hyperlipidemia   • Hypertension, benign   • Hypokalemia   • Hypothyroidism, postsurgical   • Injury of costal cartilage   • Malignant neoplasm of thyroid gland (CMS/[facility])   • XAVI (obstructive sleep apnea)   • Osteoarthritis   • Polyarthralgia   • Postsurgical hypoparathyroidism (CMS/[facility])   • Restless legs syndrome (RLS)   • Type 2 diabetes mellitus with hyperglycemia (CMS/[facility])   • Type 2 diabetes mellitus without complications (CMS/[facility])   • Wrist sprain   • Osteoarthritis of left knee     Past Medical History:   Diagnosis Date   • Anxiety    • Arthritis    • Bone pain    • Cancer (CMS/[facility]) 2016    THYROID   • Depression    • Disease of thyroid gland    • DM type 2 (diabetes mellitus, type 2) (CMS/[facility])     TYPE 2   • GERD (gastroesophageal reflux disease)    • Hypertension    • Irregular heart beat    • Osteoarthritis    • PONV (postoperative nausea and vomiting)     DOES WELL WITH PROPOFOL   • Sleep apnea     C-PAP IN USE     Past Surgical History:   Procedure Laterality Date   • CARPAL TUNNEL RELEASE     • CHOLECYSTECTOMY     • HYSTERECTOMY      partial abdominal hysterectomy   • OOPHORECTOMY Bilateral    • THYROID SURGERY      2 thyroid surgery   • TONSILLECTOMY     • VEIN LIGATION AND STRIPPING BILATERAL       General Information     Row Name  10/14/19 1611          PT Evaluation Time/Intention    Document Type  evaluation  -MS     Row Name 10/14/19 1611          General Information    Patient Profile Reviewed?  yes  -MS     Prior Level of Function  independent:;all household mobility no prior use of AD, access to BSC  -MS       User Key  (r) = Recorded By, (t) = Taken By, (c) = Cosigned By    Initials Name Provider Type    MS MarcialLuiza, PT Physical Therapist        Mobility     Row Name 10/14/19 1629          Bed Mobility Assessment/Treatment    Bed Mobility Assessment/Treatment  sit-supine  -MS     Sit-Supine [place] (Bed Mobility)  supervision  -MS     Row Name 10/14/19 1629          Sit-Stand Transfer    Sit-Stand [place] (Transfers)  contact guard;verbal cues;nonverbal cues (demo/gesture)  -MS     Assistive Device (Sit-Stand Transfers)  walker, front-wheeled  -MS     Row Name 10/14/19 1629          Gait/Stairs Assessment/Training    [place]ord Level (Gait)  contact guard;verbal cues;nonverbal cues (demo/gesture)  -MS     Assistive Device (Gait)  walker, front-wheeled  -MS     Distance in Feet (Gait)  30  -MS     Pattern (Gait)  step-to  -MS     Deviations/Abnormal Patterns (Gait)  antalgic  -MS     Right Sided Gait Deviations  weight shift ability decreased  -MS     Row Name 10/14/19 1629          Mobility Assessment/Intervention    Extremity Weight-bearing Status  left lower extremity  -MS     Left Upper Extremity (Weight-bearing Status)  weight-bearing as tolerated (WBAT)  -MS       User Key  (r) = Recorded By, (t) = Taken By, (c) = Cosigned By    Initials Name Provider Type    MS MarcialLuiza, PT Physical Therapist        Obj/Interventions     Row Name 10/14/19 1629          General ROM    GENERAL ROM COMMENTS  L LE limited 2/2 pain  -MS     Row Name 10/14/19 1629          MMT (Manual Muscle Testing)    General MMT Comments  L LE post op weakness  -MS     Row Name 10/14/19 1629          Therapeutic Exercise    Comment  (Therapeutic Exercise)  TKA protocol x 10 reps  -MS     Row Name 10/14/19 1629          Static Sitting Balance    Level of Barber (Unsupported Sitting, Static Balance)  supervision  -MS     Sitting Position (Unsupported Sitting, Static Balance)  sitting on edge of bed  -MS     Row Name 10/14/19 1629          Static Standing Balance    Level of Barber (Supported Standing, Static Balance)  supervision  -MS     Assistive Device Utilized (Supported Standing, Static Balance)  walker, rolling  -MS     Row Name 10/14/19 1629          Sensory Assessment/Intervention    Sensory General Assessment  no sensation deficits identified  -MS       User Key  (r) = Recorded By, (t) = Taken By, (c) = Cosigned By    Initials Name Provider Type    MS MarcialLuiza, PT Physical Therapist        Goals/Plan     Row Name 10/14/19 1631          Bed Mobility Goal 1 (PT)    Activity/Assistive Device (Bed Mobility Goal 1, PT)  bed mobility activities, all  -MS     Barber Level/Cues Needed (Bed Mobility Goal 1, PT)  supervision required  -MS     Time Frame (Bed Mobility Goal 1, PT)  1 week  -MS     Row Name 10/14/19 1631          Transfer Goal 1 (PT)    Activity/Assistive Device (Transfer Goal 1, PT)  transfers, all  -MS     Barber Level/Cues Needed (Transfer Goal 1, PT)  supervision required  -MS     Time Frame (Transfer Goal 1, PT)  1 week  -MS     Row Name 10/14/19 1631          Gait Training Goal 1 (PT)    Activity/Assistive Device (Gait Training Goal 1, PT)  gait (walking locomotion)  -MS     Barber Level (Gait Training Goal 1, PT)  supervision required  -MS     Distance (Gait Goal 1, PT)  150  -MS     Time Frame (Gait Training Goal 1, PT)  1 week  -MS     Row Name 10/14/19 1631          ROM Goal 1 (PT)    ROM Goal 1 (PT)  L knee AROM 5-90'  -MS     Time Frame (ROM Goal 1, PT)  1 week  -MS       User Key  (r) = Recorded By, (t) = Taken By, (c) = Cosigned By    Initials Name Provider Type    MS Marcial  Luiza MOSS, PT Physical Therapist        Clinical Impression     Row Name 10/14/19 1630          Pain Assessment    Additional Documentation  Pain Scale: Numbers Pre/Post-Treatment (Group)  -MS     Row Name 10/14/19 1630          Pain Scale: Numbers Pre/Post-Treatment    Pain Scale: Numbers, Pretreatment  8/10  -MS     Pain Scale: Numbers, Post-Treatment  8/10  -MS     Pain Location - Side  Left  -MS     Pain Location - Orientation  lower  -MS     Pain Location  knee  -MS     Pain Intervention(s)  Repositioned;Ambulation/increased activity;Rest  -MS     Row Name 10/14/19 1630          Plan of Care Review    Plan of Care Reviewed With  patient  -MS     Row Name 10/14/19 1630          Physical Therapy Clinical Impression    Patient/Family Goals Statement (PT Clinical Impression)  POD0 L TKA  -MS     Criteria for Skilled Interventions Met (PT Clinical Impression)  treatment indicated  -MS     Rehab Potential (PT Clinical Summary)  good, to achieve stated therapy goals  -MS     Row Name 10/14/19 1630          Vital Signs    O2 Delivery Pre Treatment  room air  -MS     O2 Delivery Post Treatment  room air  -MS     Row Name 10/14/19 1630          Positioning and Restraints    Pre-Treatment Position  bedside commode  -MS     Post Treatment Position  bed  -MS     In Bed  fowlers;call light within reach;encouraged to call for assist;with family/caregiver  -MS       User Key  (r) = Recorded By, (t) = Taken By, (c) = Cosigned By    Initials Name Provider Type    MS MarcialLuiza, PT Physical Therapist        Outcome Measures     Row Name 10/14/19 1632          How much help from another person do you currently need...    Turning from your back to your side while in flat bed without using bedrails?  4  -MS     Moving from lying on back to sitting on the side of a flat bed without bedrails?  4  -MS     Moving to and from a bed to a chair (including a wheelchair)?  3  -MS     Standing up from a chair using your arms (e.g.,  wheelchair, bedside chair)?  3  -MS     Climbing 3-5 steps with a railing?  2  -MS     To walk in hospital room?  3  -MS     AM-PAC 6 Clicks Score (PT)  19  -MS     Row Name 10/14/19 1632          Functional Assessment    Outcome Measure Options  AM-PAC 6 Clicks Basic Mobility (PT)  -MS       User Key  (r) = Recorded By, (t) = Taken By, (c) = Cosigned By    Initials Name Provider Type    MS Luiza Marcial, PT Physical Therapist        Physical Therapy Education     Title: PT OT SLP Therapies (In Progress)     Topic: Physical Therapy (In Progress)     Point: Mobility training (In Progress)     Learning Progress Summary           Patient Acceptance, E, NR by MS at 10/14/2019  4:32 PM                   Point: Home exercise program (In Progress)     Learning Progress Summary           Patient Acceptance, E, NR by MS at 10/14/2019  4:32 PM                   Point: Body mechanics (In Progress)     Learning Progress Summary           Patient Acceptance, E, NR by MS at 10/14/2019  4:32 PM                   Point: Precautions (In Progress)     Learning Progress Summary           Patient Acceptance, E, NR by MS at 10/14/2019  4:32 PM                               User Key     Initials Effective Dates Name Provider Type Discipline    MS 03/04/19 -  Luiza Marcial, PT Physical Therapist PT              PT Recommendation and Plan  Planned Therapy Interventions (PT Eval): balance training, bed mobility training, home exercise program, patient/family education, postural re-education, stair training, transfer training  Outcome Summary/Treatment Plan (PT)  Anticipated Equipment Needs at Discharge (PT): front wheeled walker  Anticipated Discharge Disposition (PT): home with home health  Plan of Care Reviewed With: patient  Outcome Summary: Patient is a pleasant 73 y.o. female POD0 L TKA with expected post op weakness and impaired functional mobility. Patient is independent at baseline and lives at home with no stairs. RW  ordered 10/14. Today, patient performed bed mobility with SV, required CGA for transfers, and ambulated 30' CGA with a RW. Patient will benefit from skilled PT services acutely to address functional deficits as well as improve level of independence prior to discharge. Anticipate home with HH PT upon DC.     Time Calculation:   PT Charges     Row Name 10/14/19 1610             Time Calculation    Start Time  1552  -MS      Stop Time  1610  -MS      Time Calculation (min)  18 min  -MS      PT Received On  10/14/19  -MS      PT - Next Appointment  10/15/19  -MS      PT Goal Re-Cert Due Date  10/21/19  -MS        User Key  (r) = Recorded By, (t) = Taken By, (c) = Cosigned By    Initials Name Provider Type    Luiza Williamson, PT Physical Therapist        Therapy Charges for Today     Code Description Service Date Service Provider Modifiers Qty    49208231700 HC PT EVAL MOD COMPLEXITY 2 10/14/2019 Luiza Marcial, PT GP 1    63089238469 HC PT THER PROC EA 15 MIN 10/14/2019 Luiza Marcial, PT GP 1          PT G-Codes  Outcome Measure Options: AM-PAC 6 Clicks Basic Mobility (PT)  AM-PAC 6 Clicks Score (PT): 19    Luiza Marcial PT  10/14/2019

## 2019-10-14 NOTE — H&P
ORTHOPEDIC SURGERY PRE-OP HISTORY AND PHYSICAL      Patient: Marilyn J Pumphrey  Date of Admission: 10/14/2019  7:13 AM  YOB: 1945  Medical Record Number: [id number]  Attending Physician: Sanjay Collado MD  Consulting Physician: Sanjay Clolado MD    CHIEF COMPLIANT: Left Knee Pain.    HISTORY OF PRESENT ILLINESS: Patient is a 73 y.o. female presents to [facility] with above complaints.  The patient failed conservative treatment and patient requested surgical intervention.  The patient presents for a  Left total knee.    ALLERGIES:   Allergies   Allergen Reactions   • Codeine Shortness Of Breath       HOME MEDICATIONS:  Medications Prior to Admission   Medication Sig Dispense Refill Last Dose   • amLODIPine-benazepril (LOTREL) 10-20 MG per capsule Take 1 capsule by mouth Every Evening.   10/13/2019 at 2100   • calcitriol (ROCALTROL) 0.25 MCG capsule Take 0.25 mcg by mouth 2 (Two) Times a Day.   10/13/2019 at 2100   • CALCIUM CARBONATE-VIT D-MIN PO Take 1,200 mg by mouth 2 (Two) Times a Day.   10/13/2019 at 2100   • chlorthalidone (HYGROTON) 25 MG tablet Take 25 mg by mouth Daily.   10/13/2019 at 0800   • Cholecalciferol (VITAMIN D3) 85808 units capsule Take 50,000 Units by mouth Every 7 (Seven) Days. AMPARO   10/13/2019 at 0800   • Empagliflozin (JARDIANCE) 10 MG tablet Take 1 tablet by mouth Every Morning.   10/13/2019 at 0800   • escitalopram (LEXAPRO) 10 MG tablet Take 10 mg by mouth Every Evening.   10/13/2019 at 2100   • esomeprazole (nexIUM) 40 MG capsule Take 40 mg by mouth Every Evening.   10/13/2019 at 2100   • insulin aspart (NOVOLOG FLEXPEN) 100 UNIT/ML solution pen-injector sc pen Inject 10 units before each meal (Patient taking differently: 10 Units. Inject 10 units before each meal) 5 pen 4 10/13/2019 at 1800   • insulin glargine (LANTUS) 100 UNIT/ML injection Inject 50 Units under the skin into the appropriate area as directed Daily.   10/13/2019 at 0800   •  levothyroxine (SYNTHROID) 137 MCG tablet Take 137 mcg by mouth Daily.   10/13/2019 at 0800   • lidocaine (LIDODERM) 5 % Place 1 patch on the skin as directed by provider Daily. Remove & Discard patch within 12 hours or as directed by MD (Patient taking differently: Place 1 patch on the skin as directed by provider As Needed. Remove & Discard patch within 12 hours or as directed by MD) 30 patch 0 Past Week at Unknown time   • magnesium oxide (MAG-OX) 400 tablet tablet Take 400 mg by mouth 2 (Two) Times a Day.   10/13/2019 at 2100   • metFORMIN (GLUCOPHAGE) 1000 MG tablet Take 1,000 mg by mouth 2 (Two) Times a Day With Meals.   10/13/2019 at 2100   • metoprolol tartrate (LOPRESSOR) 100 MG tablet Take 100 mg by mouth 2 (Two) Times a Day.   10/14/2019 at 0500   • pramipexole (MIRAPEX) 1 MG tablet Take 1 mg by mouth 2 (Two) Times a Day.   10/13/2019 at 2100   • promethazine (PHENERGAN) 25 MG tablet Take 1 tablet by mouth As Needed.  1 10/13/2019 at 1400   • rosuvastatin (CRESTOR) 20 MG tablet Take 20 mg by mouth Every Night.   10/13/2019 at 2100   • vitamin D (ERGOCALCIFEROL) 99457 units capsule capsule Take 50,000 Units by mouth 1 (One) Time Per Week.  2 Patient Taking Differently at Unknown time   • aspirin 325 MG tablet Take 325 mg by mouth Daily. HOLD 5 DAYS FOR SX   10/7/2019 at 0800   • clorazepate (TRANXENE) 7.5 MG tablet Take 7.5 mg by mouth 2 (Two) Times a Day.  3 10/12/2019 at 0800   • HYDROcodone-acetaminophen (NORCO) 7.5-325 MG per tablet Take 1 tablet by mouth 2 (Two) Times a Day As Needed for Moderate Pain  or Severe Pain . (Patient taking differently: Take 1 tablet by mouth As Needed for Moderate Pain  or Severe Pain .) 60 tablet 0 10/13/2019 at 1400       Past Medical History:   Diagnosis Date   • Anxiety    • Arthritis    • Bone pain    • Cancer (CMS/[facility]) 09/2016    THYROID   • Depression    • Disease of thyroid gland    • DM type 2 (diabetes mellitus, type 2) (CMS/[facility])     TYPE 2   • GERD  (gastroesophageal reflux disease)    • Hypertension    • Irregular heart beat    • Osteoarthritis    • PONV (postoperative nausea and vomiting)     DOES WELL WITH PROPOFOL   • Sleep apnea     C-PAP IN USE     Past Surgical History:   Procedure Laterality Date   • CARPAL TUNNEL RELEASE     • CHOLECYSTECTOMY  1974   • HYSTERECTOMY  1993    partial abdominal hysterectomy   • OOPHORECTOMY Bilateral 2015   • THYROID SURGERY      2 thyroid surgery   • TONSILLECTOMY     • VEIN LIGATION AND STRIPPING BILATERAL       Social History     Occupational History   • Not on file   Tobacco Use   • Smoking status: Never Smoker   Substance and Sexual Activity   • Alcohol use: No     Frequency: Never   • Drug use: No   • Sexual activity: Defer      Social History     Social History Narrative   • Not on file     Family History   Problem Relation Age of Onset   • Diabetes Father    • Heart disease Father    • Hypertension Father    • Hyperlipidemia Father    • Thyroid disease Father    • Cancer Sister         lung cancer   • Thyroid disease Sister    • Diabetes Brother    • Heart disease Brother    • Cancer Brother         lung cancer       REVIEW OF SYSTEMS:    HEENT: Patient denies any headaches, vision changes, change in hearing, or tinnitus, Patient denies epistaxis, sinus pain, hoarseness, or dysphagia   Pulmonary: Patient denies any cough, congestion, acute change in SOA or wheezing.   Cardiovascular: Patient denies any change in chest pain, dyspnea, palpitations, weakness, intolerance of exercise, varicosities, change in murmur   Gastrointestinal:  Patient denies change in appetite, melena, change in bowel habits.   Genital/Urinary: Patient denies dysuria, change in color of urine, change in frequency of urination, pain with urgency, change in incontinence, retention.   Musculoskeletal: Patient denies complaints of acute changes in symptoms of other joints not mentioned above.   Neurological: Patient denies changes in dizziness,  tremor, ataxia, or difficulty in speaking or changes in memory.   Endocrine system: Patient denies acute changes in tremors, palpitations, polyuria, polydipsia, polyphagia, diaphoresis, exophthalmos, or goiter.   Psychological: Patient denies thoughts/plans of harming self or other; denies acute changes in depression,  insomnia, night terrors, dillan, disorientation.   Skin: Patient denies any bruising, rashes, discoloration, pruritus,or wounds not mentioned in history of present illness or chief complaint above.   Hematopoietic: Patient denies current bleeding, epistaxis, hematuria, or melena.    PHYSICAL EXAM:   Vitals:  Vitals:    10/14/19 0816 10/14/19 0818 10/14/19 0819 10/14/19 0821   BP:   129/68    BP Location:       Pulse: 72 73  66   Resp:       Temp:       TempSrc:       SpO2:           General:  73 y.o. female who appears about stated age.    Alert, cooperative, in no acute distress                       Head:    Normocephalic, without obvious abnormality, atraumatic   Eyes:            Lids and lashes normal, conjunctivae and sclerae normal, no         icterus, no pallor, corneas clear, PERRLA   Ears:    Ears appear intact with no abnormalities noted   Throat:   No oral lesions, no thrush, oral mucosa moist   Neck:   No adenopathy, supple, trachea midline, no JVD   Back:     Limited exam shows no severe kyphosis present,no visible           erythema, no excessive  tenderness to palpation.    Lungs:     Respirations regular, even and unlabored.     Heart:    Normal rate, Pulses palpable   Chest Wall:    No abnormalities observed.   Abdomen:     Normal bowel sounds, no masses, no organomegaly, soft              non-tender, non-distended, no guarding, no rebound                      tenderness   Rectal:     Deferred   Pulses:   Pulses palpable and equal bilaterally   Skin:   No bleeding, bruising or rash   Lymph nodes:   No palpable adenopathy   Extremities:     Left Knee: Skin intact.  Painful ROM.  NVI  distally.      DIAGNOSTIC TEST:  Admission on 10/14/2019   Component Date Value Ref Range Status   • Glucose 10/14/2019 156* 70 - 130 mg/dL Final       No results found.      ASSESSMENT:  Left Knee Osteoarthritis.    Elevated DVT/PE risk    Patient Active Problem List   Diagnosis   • Chronic pain   • Other long term (current) drug therapy   • Rheumatoid arthritis (CMS/HCC)   • Acute bronchitis   • Arm pain, right   • CAD (coronary artery disease)   • Chest pain   • Depression   • Diabetes 1.5, managed as type 1 (CMS/HCC)   • Edema of nasopharynx   • FH: thromboembolic disease   • Gastroesophageal reflux disease   • History of pulmonary embolism   • Hyperlipidemia   • Hypertension, benign   • Hypokalemia   • Hypothyroidism, postsurgical   • Injury of costal cartilage   • Malignant neoplasm of thyroid gland (CMS/HCC)   • XAVI (obstructive sleep apnea)   • Osteoarthritis   • Polyarthralgia   • Postsurgical hypoparathyroidism (CMS/HCC)   • Restless legs syndrome (RLS)   • Type 2 diabetes mellitus with hyperglycemia (CMS/HCC)   • Type 2 diabetes mellitus without complications (CMS/HCC)   • Wrist sprain   • Osteoarthritis of left knee       PLAN:    Left TKA. Will use eliquis for DVT prophylaxis post-op.    Risks and benefits of surgical intervention were discussed in detail with the patient.  Risks of infection, fracture, dislocation, extremity length discrepancy, neurovascular injury, persistent pain, medical risks, anesthetic risk, need for additional surgery, deep venous thrombosis, pulmonary embolism and death.      The above diagnosis and treatment plan was discussed with the patient and/or family.  They were educated in both non-surgical and surgical treatment options for their condition.   They were given the opportunity to ask questions and were answered to their satisfaction.  They agreed to proceed with the above treatment plan.        Sanjay Collado MD  Date: 10/14/2019

## 2019-10-14 NOTE — ANESTHESIA PREPROCEDURE EVALUATION
Anesthesia Evaluation     Patient summary reviewed and Nursing notes reviewed   history of anesthetic complications: PONV  NPO Solid Status: > 8 hours  NPO Liquid Status: > 8 hours           Airway   Mallampati: I  Dental    (+) upper dentures    Pulmonary - normal exam    breath sounds clear to auscultation  (+) sleep apnea on CPAP,   Cardiovascular - normal exam    (+) hypertension, hyperlipidemia,       Neuro/Psych  (+) psychiatric history Anxiety and Depression,     GI/Hepatic/Renal/Endo    (+)  GERD,  diabetes mellitus type 2, hypothyroidism,     Musculoskeletal     Abdominal    Substance History      OB/GYN          Other   (+) arthritis                     Anesthesia Plan    ASA 3     general with block     intravenous induction   Anesthetic plan, all risks, benefits, and alternatives have been provided, discussed and informed consent has been obtained with: patient.

## 2019-10-14 NOTE — ANESTHESIA PROCEDURE NOTES
Peripheral Block    Pre-sedation assessment completed: 10/14/2019 8:08 AM    Patient reassessed immediately prior to procedure    Patient location during procedure: holding area  Start time: 10/14/2019 8:09 AM  Stop time: 10/14/2019 8:18 AM  Reason for block: at surgeon's request and post-op pain management  Performed by  Anesthesiologist: Luis Manuel Lambert MD  Preanesthetic Checklist  Completed: patient identified, site marked, surgical consent, pre-op evaluation, timeout performed, IV checked, risks and benefits discussed and monitors and equipment checked  Prep:  Sterile barriers:gloves and sterile barriers  Prep: ChloraPrep  Patient monitoring: blood pressure monitoring, continuous pulse oximetry and EKG  Procedure  Sedation:yes  Performed under: local infiltration  Guidance:ultrasound guided  ULTRASOUND INTERPRETATION.  Using ultrasound guidance a 21 G gauge needle was placed in close proximity to the femoral nerve, at which point, under ultrasound guidance anesthetic was injected in the area of the nerve and spread of the anesthesia was seen on ultrasound in close proximity thereto.  There were no abnormalities seen on ultrasound; a digital image was taken; and the patient tolerated the procedure with no complications. Images:still images obtained    Block Type:adductor canal block and femoral  Injection Technique:single-shot  Needle Type:echogenic      Medications Used: ropivacaine (NAROPIN) 0.5 % injection, 30 mL  Med admintered at 10/14/2019 8:17 AM      Post Assessment  Injection Assessment: negative aspiration for heme, no paresthesia on injection and incremental injection  Patient Tolerance:comfortable throughout block  Complications:no

## 2019-10-14 NOTE — DISCHARGE PLACEMENT REQUEST
"Pumphrey, Marilyn J (73 y.o. Female)     Date of Birth Social Security Number Address Home Phone MRN    1945  [id number] [address]  [place] [address] [phone] [id number]    Jewish Marital Status          Sabianism        Admission Date Admission Type Admitting Provider Attending Provider Department, Room/Bed    10/14/19 Elective Sanjay Collado MD Rhoads, David, MD [address], P878/1    Discharge Date Discharge Disposition Discharge Destination                       Attending Provider:  Sanjay Collado MD    Allergies:  Codeine    Isolation:  None   Infection:  None   Code Status:  CPR    Ht:  170.2 cm (67\")   Wt:  81.6 kg (180 lb)    Admission Cmt:  None   Principal Problem:  Osteoarthritis of left knee [M17.12]                 Active Insurance as of 10/14/2019     Primary Coverage     Payor Plan Insurance Group Employer/Plan Group    MEDICARE MEDICARE A & B      Payor Plan Address Payor Plan Phone Number Payor Plan Fax Number Effective Dates    [address] [phone]  10/1/2011 - None Entered    [facility] [id number]       Subscriber Name Subscriber Birth Date Member ID       PUMPHREY,MARILYN J 1945 [id number]           Secondary Coverage     Payor Plan Insurance Group Employer/Plan Group           Payor Plan Address Payor Plan Phone Number Payor Plan Fax Number Effective Dates    [address] [phone]  3/1/2012 - None Entered    [place] [address]       Subscriber Name Subscriber Birth Date Member ID       PUMPHREY,MARILYN J 1945 [id number]                 Emergency Contacts      (Rel.) Home Phone Work Phone Mobile Phone    MICHELLE VYASRINA (Daughter) [phone] -- [phone]    JASMINA SLAUGHTER (Sister) -- -- [phone]          "

## 2019-10-14 NOTE — CONSULTS
Patient Name:  Marilyn J Pumphrey  YOB: 1945  MRN:  [id number]  Date of Admission:  10/14/2019  Date of Consult:  10/14/2019  Patient Care Team:  Suma Campbell APRN as PCP - General  Lisa Wells MD as PCP - Claims Attributed  Suma Campbell APRN as PCP - Family Medicine    Inpatient Internal Medicine Consult  Consult performed by: Chai Koo MD  Consult ordered by: Sanjay Collado MD  Reason for consult: Evaluate status and make recommendations regarding treatment for diabetes and hypertension.          Subjective   History of Present Illness  Ms. Pumphrey is a 73 y.o. female that has been admitted to [facility] following elective TOTAL KNEE ARTHROPLASTY.  She has been admitted to an orthopedic floor following surgery and we were asked to see and assist with her medical problems, specifically relating to her blood sugars.  At the time of my visit she denies any chest pain, SOA, nausea, vomiting or diarrhea.  She has tolerated a diet.  She does complain of expected postoperative discomfort.  She reports being in a normal state of health leading up to surgery.      Past Medical History:   Diagnosis Date   • Anxiety    • Arthritis    • Bone pain    • Cancer (CMS/[facility]) 09/2016    THYROID   • Depression    • Disease of thyroid gland    • DM type 2 (diabetes mellitus, type 2) (CMS/[facility])     TYPE 2   • GERD (gastroesophageal reflux disease)    • Hypertension    • Irregular heart beat    • Osteoarthritis    • PONV (postoperative nausea and vomiting)     DOES WELL WITH PROPOFOL   • Sleep apnea     C-PAP IN USE     Past Surgical History:   Procedure Laterality Date   • CARPAL TUNNEL RELEASE     • CHOLECYSTECTOMY  1974   • HYSTERECTOMY  1993    partial abdominal hysterectomy   • OOPHORECTOMY Bilateral 2015   • THYROID SURGERY      2 thyroid surgery   • TONSILLECTOMY     • VEIN LIGATION AND STRIPPING BILATERAL       Family History   Problem Relation Age of Onset    • Diabetes Father    • Heart disease Father    • Hypertension Father    • Hyperlipidemia Father    • Thyroid disease Father    • Cancer Sister         lung cancer   • Thyroid disease Sister    • Diabetes Brother    • Heart disease Brother    • Cancer Brother         lung cancer     Social History     Tobacco Use   • Smoking status: Never Smoker   Substance Use Topics   • Alcohol use: No     Frequency: Never   • Drug use: No     Medications Prior to Admission   Medication Sig Dispense Refill Last Dose   • amLODIPine-benazepril (LOTREL) 10-20 MG per capsule Take 1 capsule by mouth Every Evening.   10/13/2019 at 2100   • calcitriol (ROCALTROL) 0.25 MCG capsule Take 0.25 mcg by mouth 2 (Two) Times a Day.   10/13/2019 at 2100   • CALCIUM CARBONATE-VIT D-MIN PO Take 1,200 mg by mouth 2 (Two) Times a Day.   10/13/2019 at 2100   • chlorthalidone (HYGROTON) 25 MG tablet Take 25 mg by mouth Daily.   10/13/2019 at 0800   • Cholecalciferol (VITAMIN D3) 19945 units capsule Take 50,000 Units by mouth Every 7 (Seven) Days. AMPARO   10/13/2019 at 0800   • Empagliflozin (JARDIANCE) 10 MG tablet Take 1 tablet by mouth Every Morning.   10/13/2019 at 0800   • escitalopram (LEXAPRO) 10 MG tablet Take 10 mg by mouth Every Evening.   10/13/2019 at 2100   • esomeprazole (nexIUM) 40 MG capsule Take 40 mg by mouth Every Evening.   10/13/2019 at 2100   • insulin aspart (NOVOLOG FLEXPEN) 100 UNIT/ML solution pen-injector sc pen Inject 10 units before each meal (Patient taking differently: 10 Units. Inject 10 units before each meal) 5 pen 4 10/13/2019 at 1800   • insulin glargine (LANTUS) 100 UNIT/ML injection Inject 50 Units under the skin into the appropriate area as directed Daily.   10/13/2019 at 0800   • levothyroxine (SYNTHROID) 137 MCG tablet Take 137 mcg by mouth Daily.   10/13/2019 at 0800   • lidocaine (LIDODERM) 5 % Place 1 patch on the skin as directed by provider Daily. Remove & Discard patch within 12 hours or as directed by MD  (Patient taking differently: Place 1 patch on the skin as directed by provider As Needed. Remove & Discard patch within 12 hours or as directed by MD) 30 patch 0 Past Week at Unknown time   • magnesium oxide (MAG-OX) 400 tablet tablet Take 400 mg by mouth 2 (Two) Times a Day.   10/13/2019 at 2100   • metFORMIN (GLUCOPHAGE) 1000 MG tablet Take 1,000 mg by mouth 2 (Two) Times a Day With Meals.   10/13/2019 at 2100   • metoprolol tartrate (LOPRESSOR) 100 MG tablet Take 100 mg by mouth 2 (Two) Times a Day.   10/14/2019 at 0500   • pramipexole (MIRAPEX) 1 MG tablet Take 1 mg by mouth 2 (Two) Times a Day.   10/13/2019 at 2100   • promethazine (PHENERGAN) 25 MG tablet Take 1 tablet by mouth As Needed.  1 10/13/2019 at 1400   • rosuvastatin (CRESTOR) 20 MG tablet Take 20 mg by mouth Every Night.   10/13/2019 at 2100   • aspirin 325 MG tablet Take 325 mg by mouth Daily. HOLD 5 DAYS FOR SX   10/7/2019 at 0800   • clorazepate (TRANXENE) 7.5 MG tablet Take 7.5 mg by mouth 2 (Two) Times a Day.  3 10/12/2019 at 0800     Allergies:  Codeine    Review of Systems   Constitutional: Negative.    HENT: Negative.    Respiratory: Negative.    Cardiovascular: Negative.    Gastrointestinal: Negative.    Endocrine: Negative.    Genitourinary: Negative.    Musculoskeletal: Negative.    Skin: Negative.    Neurological: Negative.    Hematological: Negative.    Psychiatric/Behavioral: Negative.        Objective      Vital Signs  Temp:  [97.6 °F (36.4 °C)-98.9 °F (37.2 °C)] 97.6 °F (36.4 °C)  Heart Rate:  [64-80] 69  Resp:  [12-18] 12  BP: (119-146)/(58-95) 121/60  There is no height or weight on file to calculate BMI.    Physical Exam   Constitutional: She is oriented to person, place, and time. She appears well-developed and well-nourished. No distress.   HENT:   Head: Normocephalic and atraumatic.   Eyes: Conjunctivae and EOM are normal. No scleral icterus.   Neck: Normal range of motion. No JVD present.   Cardiovascular: Normal rate and  regular rhythm.   Pulmonary/Chest: Effort normal and breath sounds normal.   Abdominal: Soft. Bowel sounds are normal. She exhibits no distension. There is no tenderness.   Musculoskeletal: Normal range of motion. She exhibits no edema.   Neurological: She is alert and oriented to person, place, and time.   Skin: Skin is warm and dry.   Psychiatric: She has a normal mood and affect. Her behavior is normal.   Nursing note and vitals reviewed.      Results Review:   I reviewed the patient's new clinical results.  I reviewed the patient's new imaging results and agree with the interpretation.  I reviewed the patient's other test results and agree with the interpretation  I personally viewed and interpreted the patient's EKG/Telemetry data         Assessment/Plan     Active Hospital Problems    Diagnosis POA   • **Osteoarthritis of left knee [M17.12] Yes   • Rheumatoid arthritis (CMS/[facility]) [M06.9] Yes   • XAVI (obstructive sleep apnea) [G47.33] Yes   • CAD (coronary artery disease) [I25.10] Yes   • Type 2 diabetes mellitus without complications (CMS/[facility]) [E11.9] Yes   • Hypertension, benign [I10] Yes       Ms. Pumphrey is a 73 y.o. female who is s/p TOTAL KNEE ARTHROPLASTY.    · She seems to be doing well thus far postoperatively.   · Jardiance has been restarted.  · Continue her long acting insulin - LANTUS 50 units SC daily.  · HUMALOG - moderate dose correctional factor as needed.  · Monitor glucose QAC and QHS. For any BG less than 70 mg/dL, treat per hospital protocol  · Recent A1c 8.3%.  Fasting BMP ordered for in the morning.  Will review.    · NCS diet.  · Will hold chlorthalidone, amlodipine and lisinopril.  Anticipate fluctuations in BP due to blood loss, hypovolemia, anesthesia, narcotics etc.   · Continue IVFs as ordered.    · Monitor renal function.    Thank you very much for asking [facility] to be involved in this patient's care. We will follow along with you.      Chai Koo MD  [facility]  10/14/19  1:05 PM

## 2019-10-14 NOTE — ANESTHESIA PROCEDURE NOTES
Airway  Urgency: elective    Date/Time: 10/14/2019 9:46 AM  Airway not difficult    General Information and Staff    Patient location during procedure: OR  CRNA: Nini Bustamante CRNA    Indications and Patient Condition  Indications for airway management: airway protection    Preoxygenated: yes  MILS maintained throughout  Mask difficulty assessment: 2 - vent by mask + OA or adjuvant +/- NMBA    Final Airway Details  Final airway type: endotracheal airway      Successful airway: ETT  Cuffed: yes   Successful intubation technique: direct laryngoscopy  Endotracheal tube insertion site: oral  Blade: Fontana  Blade size: 2  ETT size (mm): 7.0  Cormack-Lehane Classification: grade I - full view of glottis  Placement verified by: chest auscultation and capnometry   Cuff volume (mL): 8  Measured from: lips  ETT/EBT  to lips (cm): 19  Number of attempts at approach: 1  Assessment: lips, teeth, and gum same as pre-op and atraumatic intubation

## 2019-10-14 NOTE — PLAN OF CARE
Problem: Patient Care Overview  Goal: Plan of Care Review  Outcome: Ongoing (interventions implemented as appropriate)   10/14/19 1400 10/14/19 2614   Plan of Care Review   Progress --  improving   OTHER   Outcome Summary --  pt admitted to unit from PACU at 1300 with LTK. VSS. Dressing in place CDI. C/O increased pain on arrival. pain controlled with PO pain medication. pt able to wotk with PT. Voiding per BSC. Up to chair. family at bedside. pt plans to D/C home with HH when able. pt educated on the importance of Glucose monitoring and the use of medications as ordered for HO DM. Verbalized understanding. will cont to monitor.    Coping/Psychosocial   Plan of Care Reviewed With patient --      Goal: Individualization and Mutuality  Outcome: Ongoing (interventions implemented as appropriate)    Goal: Discharge Needs Assessment  Outcome: Ongoing (interventions implemented as appropriate)    Goal: Interprofessional Rounds/Family Conf  Outcome: Ongoing (interventions implemented as appropriate)      Problem: Fall Risk (Adult)  Goal: Identify Related Risk Factors and Signs and Symptoms  Outcome: Ongoing (interventions implemented as appropriate)    Goal: Absence of Fall  Outcome: Ongoing (interventions implemented as appropriate)      Problem: Pain, Chronic (Adult)  Goal: Identify Related Risk Factors and Signs and Symptoms  Outcome: Ongoing (interventions implemented as appropriate)    Goal: Acceptable Pain/Comfort Level and Functional Ability  Outcome: Ongoing (interventions implemented as appropriate)      Problem: Knee Arthroplasty (Total, Partial) (Adult)  Goal: Signs and Symptoms of Listed Potential Problems Will be Absent, Minimized or Managed (Knee Arthroplasty)  Outcome: Ongoing (interventions implemented as appropriate)    Goal: Anesthesia/Sedation Recovery  Outcome: Ongoing (interventions implemented as appropriate)

## 2019-10-14 NOTE — OP NOTE
TOTAL KNEE ARTHROPLASTY  Procedure Note    Marilyn J Pumphrey  10/14/2019    Pre-op Diagnosis: Osteoarthritis Left  knee primary generalized  Post-op Diagnosis:Same  Procedure: Left  Total Knee Arthroplasty, Pressfit  Surgeon:  Sanjay Collado MD  Anesthesia: General with Block, Anesthesiologist: Rupal Small MD  CRNA: Nini Bustamante CRNA  Staff: Circulator: Ashley Roy RN  Scrub Person: Celia Vaughan; Myles Purvis  Vendor Representative: Shai Cortes  Assistant: Jann Crooks DO CFA  Estimated Blood Loss:50 ml   Specimens: none  Drains: none  Complications: None    Components Utilized:     Implant Name Type Inv. Item Serial No.  Lot No. LRB No. Used   SCRW HEX PERSONA FML 2.5X25MM - [id number] Implant SCRW HEX PERSONA FML 2.5X25MM  [employer] [id number] Left 1   CMT BONE R 1X40 - [id number] Implant CMT BONE R 1X40  [employer] [id number] Left 1   PAT KN PERSONA VE CRS/LNK CMT 32MM - [id number] Implant PAT KN PERSONA VE CRS/LNK CMT 32MM  [employer] [id number] Left 1   STEM TIB PERSONA TRABECULAR 2PEG RASHEEDA LT - [id number] Implant STEM TIB PERSONA TRABECULAR 2PEG RASHEEDA LT  [employer] [id number] Left 1   COMP FEM PERSONA CR POR COCR STD SZ7 LT - [id number] Implant COMP FEM PERSONA CR POR COCR STD SZ7 LT  [employer] [id number] Left 1   ART/SRF KN PERSONA/VE PS EF 6TO7 13MM LT - [id number] Implant ART/SRF KN PERSONA/VE PS EF 6TO7 13MM LT  [employer] [id number] Left 1       Indication for Procedure:   The patient is a 73 y.o. female presents today for a total knee arthroplasty procedure because of failure to conservatively manage the patient's pain for arthritis.  The patient was educated in risks of surgery that could include possible risk of infection, deep venous thrombosis, pulmonary embolism, fracture, neurovascular injury, leg length discrepancy, dislocation, possible persistent pain, need for additional surgeries, anesthetic risks, medical risks including heart  attack and stroke, and death.  The discussion occurred in the office pre-operatively, and patient had the opportunity to ask questions, and concerns about the proposed surgery.  The patient also understood that medicine is not an exact science, and that outcomes of the surgical procedure may be less than desired. The patient wished to proceed.      Protocols for intravenous antibiotics and venous thrombosis were followed for this patient.  IV antibiotics were infused prior to surgery and will be discontinued within 24 hours of completion of the surgical procedure.  Thrombosis prophylaxis will be initiated within 24 hours of the completion of the surgical procedure.      Procedure:   After the patient was identified in the preoperative area, and the surgical site confirmed and marked, the patient was brought to the operating room on a stretcher.  They were placed supine on the operating room table and the above anesthetic was placed uneventfully.  A time-out procedure was performed.  The intravenous ancef antibiotics infusion was completed.  A non-sterile tourniquet was placed on the operative thigh, and was prepped and draped in the usual sterile fashion.  An Esmarch was to exsanguinate the limb, and the tourniquet was inflated.     A 10 blade scalpel was used to make a longitudinal incision from above the patella to medial to the tibial tubercle.  A medial parapatellar arthrotomy was performed with another 10 blade scalpel.  The medial joint line was elevated subperiosteally with electrocautery and a Lopez elevator.  The patellar fat pad was removed.  The patella was everted and osteotomy cut completed with oscillating saw.  The patella guide and drill was used to finish preparing the patella.  A patella protecting guide was placed, and the patella was everted off the side of the femur laterally.     The knee was then flexed and [place]'s line was drawn on the distal femur with electrocautery.  Then the drill was  placed into the distal femur into the medullary canal.  The intramedullary distal femoral valgus cutting guide set to 5 degrees of femoral valgus was then placed into the medullary canal.  It was then pinned and the oscillating saw was used to make the osteotomy.  Then the anterior referencing distal femoral guide was then placed and the above femoral size was measured and 3 degrees of external rotation were drilled.  The 4 in 1 femoral cutting guide was placed and pinned and the oscillating saw was used to make the appropriate cuts.     Then the extramedullary cutting guide was placed aligned with the tibial eminence superiorly and the tibial shaft distally, pinned 4 mm from the medial tibial plateau.  The oscillating saw was then used to complete the osteotomy cuts.   A laminar  was then placed medially and then laterally to remove the medial and lateral meniscus and the posterior osteophytes.  Then the tibial baseplate was placed on the tibial surface with a drop ingris to confirm an appropriate cut and size of implant.  It was then pinned externally rotated to the medial third of the tibial tubercle.  Then trial reduction was then performed with the above implants and was found to be stable in all planes.  The patella tracked centrally on the trochlear groove.    The lug holes were drilled in the distal femur and the tibia was drilled and broached in the typical fashion.  The trial components were then removed and the final implants were confirmed and opened.  The tibial and femoral components were then impacted pressfit.  The trial polyethylene component was placed and the knee was placed into extension.  The patellar bone surfaces were then irrigated and dried.  [place] cement was then mixed and placed on the cut surface of the patella and the back side of the patellar component.  The patellar clamp was placed and the cement was allowed to harden.  The tourniquet was then dropped.  Hemostasis was  obtained.  The wound was then irrigated with the pulse lavage irrigation system with a 3 liter mixture of betadine and normal saline.  The local mixture was injected throughout the knee for post-operative pain control. The knee was ranged in flexion and extension and was checked for stability.  The above polyethylene gave the best balance and was opened.  The final polyethylene was inserted into the tibial tray and snapped into place.  The knee was flexed to 90 degrees and the arthrotomy was closed with #1 Stratafix suture.  Transexemic acid 1 gm was injected into the joint.  The deep dermis was closed with #1 Stratafix, and the skin was closed with 3-0 Monocryl suture.  Skin glue was applied and sterile dressing were applied.   Sponge and needle counts were completed and were correct.  The patient was awakened from anesthetic and was returned to recovery in stable condition.    Sanjay Collado MD    Date: 10/14/2019  Time: 11:01 AM

## 2019-10-14 NOTE — PLAN OF CARE
Problem: Patient Care Overview  Goal: Plan of Care Review   10/14/19 7409   OTHER   Outcome Summary Patient is a pleasant 73 y.o. female POD0 L TKA with expected post op weakness and impaired functional mobility. Patient is independent at baseline and lives at home with no stairs. RW ordered 10/14. Today, patient performed bed mobility with SV, required CGA for transfers, and ambulated 30' CGA with a RW. Patient will benefit from skilled PT services acutely to address functional deficits as well as improve level of independence prior to discharge. Anticipate home with HH PT upon DC.   Coping/Psychosocial   Plan of Care Reviewed With patient

## 2019-10-14 NOTE — ANESTHESIA POSTPROCEDURE EVALUATION
Patient: Marilyn J Pumphrey    Procedure Summary     Date:  10/14/19 Room / Location:  [facility] OR [address] [place] [address] MAIN OR    Anesthesia Start:  0936 Anesthesia Stop:  1124    Procedure:  TOTAL KNEE ARTHROPLASTY (Left Knee) Diagnosis:      Surgeon:  Sanjay Collado MD Provider:  Rupal Small MD    Anesthesia Type:  general with block ASA Status:  3          Anesthesia Type: general with block  Last vitals  BP   126/61 (10/14/19 1205)   Temp   37.2 °C (98.9 °F) (10/14/19 1121)   Pulse   75 (10/14/19 1205)   Resp   14 (10/14/19 1205)     SpO2   92 % (10/14/19 1205)     Post Anesthesia Care and Evaluation    Patient location during evaluation: bedside  Patient participation: complete - patient participated  Level of consciousness: awake  Pain management: adequate  Airway patency: patent  Anesthetic complications: No anesthetic complications    Cardiovascular status: acceptable  Respiratory status: acceptable  Hydration status: acceptable

## 2019-10-15 ENCOUNTER — APPOINTMENT (OUTPATIENT)
Dept: CARDIOLOGY | Facility: HOSPITAL | Age: 74
End: 2019-10-15

## 2019-10-15 VITALS
DIASTOLIC BLOOD PRESSURE: 55 MMHG | HEIGHT: 67 IN | BODY MASS INDEX: 28.24 KG/M2 | WEIGHT: 179.9 LBS | SYSTOLIC BLOOD PRESSURE: 96 MMHG | TEMPERATURE: 97 F | RESPIRATION RATE: 16 BRPM | HEART RATE: 62 BPM | OXYGEN SATURATION: 92 %

## 2019-10-15 LAB
ANION GAP SERPL CALCULATED.3IONS-SCNC: 12.1 MMOL/L (ref 5–15)
BH CV LOW VAS RIGHT LESSER SAPH VESSEL: 1
BH CV LOWER VASCULAR LEFT COMMON FEMORAL AUGMENT: NORMAL
BH CV LOWER VASCULAR LEFT COMMON FEMORAL COMPETENT: NORMAL
BH CV LOWER VASCULAR LEFT COMMON FEMORAL COMPRESS: NORMAL
BH CV LOWER VASCULAR LEFT COMMON FEMORAL PHASIC: NORMAL
BH CV LOWER VASCULAR LEFT COMMON FEMORAL SPONT: NORMAL
BH CV LOWER VASCULAR LEFT DISTAL FEMORAL COMPRESS: NORMAL
BH CV LOWER VASCULAR LEFT GASTRONEMIUS COMPRESS: NORMAL
BH CV LOWER VASCULAR LEFT GREATER SAPH AK COMPRESS: NORMAL
BH CV LOWER VASCULAR LEFT GREATER SAPH BK COMPRESS: NORMAL
BH CV LOWER VASCULAR LEFT MID FEMORAL AUGMENT: NORMAL
BH CV LOWER VASCULAR LEFT MID FEMORAL COMPETENT: NORMAL
BH CV LOWER VASCULAR LEFT MID FEMORAL COMPRESS: NORMAL
BH CV LOWER VASCULAR LEFT MID FEMORAL PHASIC: NORMAL
BH CV LOWER VASCULAR LEFT MID FEMORAL SPONT: NORMAL
BH CV LOWER VASCULAR LEFT PERONEAL COMPRESS: NORMAL
BH CV LOWER VASCULAR LEFT POPLITEAL AUGMENT: NORMAL
BH CV LOWER VASCULAR LEFT POPLITEAL COMPETENT: NORMAL
BH CV LOWER VASCULAR LEFT POPLITEAL COMPRESS: NORMAL
BH CV LOWER VASCULAR LEFT POPLITEAL PHASIC: NORMAL
BH CV LOWER VASCULAR LEFT POPLITEAL SPONT: NORMAL
BH CV LOWER VASCULAR LEFT POSTERIOR TIBIAL COMPRESS: NORMAL
BH CV LOWER VASCULAR LEFT PROFUNDA FEMORAL COMPRESS: NORMAL
BH CV LOWER VASCULAR LEFT PROXIMAL FEMORAL COMPRESS: NORMAL
BH CV LOWER VASCULAR LEFT SAPHENOFEMORAL JUNCTION COMPRESS: NORMAL
BH CV LOWER VASCULAR RIGHT COMMON FEMORAL AUGMENT: NORMAL
BH CV LOWER VASCULAR RIGHT COMMON FEMORAL COMPETENT: NORMAL
BH CV LOWER VASCULAR RIGHT COMMON FEMORAL COMPRESS: NORMAL
BH CV LOWER VASCULAR RIGHT COMMON FEMORAL PHASIC: NORMAL
BH CV LOWER VASCULAR RIGHT COMMON FEMORAL SPONT: NORMAL
BH CV LOWER VASCULAR RIGHT DISTAL FEMORAL COMPRESS: NORMAL
BH CV LOWER VASCULAR RIGHT GASTRONEMIUS COMPRESS: NORMAL
BH CV LOWER VASCULAR RIGHT LESSER SAPH COMPRESS: NORMAL
BH CV LOWER VASCULAR RIGHT LESSER SAPH THROMBUS: NORMAL
BH CV LOWER VASCULAR RIGHT MID FEMORAL AUGMENT: NORMAL
BH CV LOWER VASCULAR RIGHT MID FEMORAL COMPETENT: NORMAL
BH CV LOWER VASCULAR RIGHT MID FEMORAL COMPRESS: NORMAL
BH CV LOWER VASCULAR RIGHT MID FEMORAL PHASIC: NORMAL
BH CV LOWER VASCULAR RIGHT MID FEMORAL SPONT: NORMAL
BH CV LOWER VASCULAR RIGHT PERONEAL COMPRESS: NORMAL
BH CV LOWER VASCULAR RIGHT POPLITEAL AUGMENT: NORMAL
BH CV LOWER VASCULAR RIGHT POPLITEAL COMPETENT: NORMAL
BH CV LOWER VASCULAR RIGHT POPLITEAL COMPRESS: NORMAL
BH CV LOWER VASCULAR RIGHT POPLITEAL PHASIC: NORMAL
BH CV LOWER VASCULAR RIGHT POPLITEAL SPONT: NORMAL
BH CV LOWER VASCULAR RIGHT POSTERIOR TIBIAL COMPRESS: NORMAL
BH CV LOWER VASCULAR RIGHT PROFUNDA FEMORAL COMPRESS: NORMAL
BH CV LOWER VASCULAR RIGHT PROXIMAL FEMORAL COMPRESS: NORMAL
BH CV LOWER VASCULAR RIGHT SAPHENOFEMORAL JUNCTION COMPRESS: NORMAL
BUN BLD-MCNC: 18 MG/DL (ref 8–23)
BUN/CREAT SERPL: 18.6 (ref 7–25)
CALCIUM SPEC-SCNC: 9 MG/DL (ref 8.6–10.5)
CHLORIDE SERPL-SCNC: 87 MMOL/L (ref 98–107)
CO2 SERPL-SCNC: 30.9 MMOL/L (ref 22–29)
CREAT BLD-MCNC: 0.97 MG/DL (ref 0.57–1)
GFR SERPL CREATININE-BSD FRML MDRD: 56 ML/MIN/1.73
GLUCOSE BLD-MCNC: 165 MG/DL (ref 65–99)
GLUCOSE BLDC GLUCOMTR-MCNC: 149 MG/DL (ref 70–130)
GLUCOSE BLDC GLUCOMTR-MCNC: 89 MG/DL (ref 70–130)
HCT VFR BLD AUTO: 43.1 % (ref 34–46.6)
HGB BLD-MCNC: 13.5 G/DL (ref 12–15.9)
POTASSIUM BLD-SCNC: 3.6 MMOL/L (ref 3.5–5.2)
SODIUM BLD-SCNC: 130 MMOL/L (ref 136–145)

## 2019-10-15 PROCEDURE — 93970 EXTREMITY STUDY: CPT

## 2019-10-15 PROCEDURE — 82962 GLUCOSE BLOOD TEST: CPT

## 2019-10-15 PROCEDURE — 97150 GROUP THERAPEUTIC PROCEDURES: CPT

## 2019-10-15 PROCEDURE — 63710000001 INSULIN LISPRO (HUMAN) PER 5 UNITS: Performed by: ORTHOPAEDIC SURGERY

## 2019-10-15 PROCEDURE — 97110 THERAPEUTIC EXERCISES: CPT

## 2019-10-15 PROCEDURE — 25010000003 CEFAZOLIN IN DEXTROSE 2-4 GM/100ML-% SOLUTION: Performed by: ORTHOPAEDIC SURGERY

## 2019-10-15 PROCEDURE — 80048 BASIC METABOLIC PNL TOTAL CA: CPT | Performed by: ORTHOPAEDIC SURGERY

## 2019-10-15 PROCEDURE — 25010000002 KETOROLAC TROMETHAMINE PER 15 MG: Performed by: ORTHOPAEDIC SURGERY

## 2019-10-15 PROCEDURE — 63710000001 INSULIN GLARGINE PER 5 UNITS: Performed by: ORTHOPAEDIC SURGERY

## 2019-10-15 PROCEDURE — 85018 HEMOGLOBIN: CPT | Performed by: ORTHOPAEDIC SURGERY

## 2019-10-15 PROCEDURE — 85014 HEMATOCRIT: CPT | Performed by: ORTHOPAEDIC SURGERY

## 2019-10-15 RX ORDER — SENNA AND DOCUSATE SODIUM 50; 8.6 MG/1; MG/1
2 TABLET, FILM COATED ORAL 2 TIMES DAILY
Qty: 60 TABLET | Refills: 1 | Status: SHIPPED | OUTPATIENT
Start: 2019-10-15 | End: 2020-10-06

## 2019-10-15 RX ORDER — OXYCODONE AND ACETAMINOPHEN 10; 325 MG/1; MG/1
1-2 TABLET ORAL EVERY 4 HOURS PRN
Qty: 56 TABLET | Refills: 0 | Status: SHIPPED | OUTPATIENT
Start: 2019-10-15 | End: 2019-10-21 | Stop reason: HOSPADM

## 2019-10-15 RX ADMIN — CALCITRIOL 0.25 MCG: 0.25 CAPSULE ORAL at 08:04

## 2019-10-15 RX ADMIN — CLORAZEPATE DIPOTASSIUM 7.5 MG: 3.75 TABLET ORAL at 08:05

## 2019-10-15 RX ADMIN — INSULIN GLARGINE 50 UNITS: 100 INJECTION, SOLUTION SUBCUTANEOUS at 08:07

## 2019-10-15 RX ADMIN — LEVOTHYROXINE SODIUM 137 MCG: 137 TABLET ORAL at 08:05

## 2019-10-15 RX ADMIN — OXYCODONE AND ACETAMINOPHEN 2 TABLET: 5; 325 TABLET ORAL at 10:11

## 2019-10-15 RX ADMIN — SODIUM CHLORIDE, PRESERVATIVE FREE 3 ML: 5 INJECTION INTRAVENOUS at 08:05

## 2019-10-15 RX ADMIN — KETOROLAC TROMETHAMINE 15 MG: 15 INJECTION, SOLUTION INTRAMUSCULAR; INTRAVENOUS at 05:22

## 2019-10-15 RX ADMIN — SENNOSIDES AND DOCUSATE SODIUM 2 TABLET: 8.6; 5 TABLET ORAL at 08:05

## 2019-10-15 RX ADMIN — OXYCODONE AND ACETAMINOPHEN 2 TABLET: 5; 325 TABLET ORAL at 10:30

## 2019-10-15 RX ADMIN — METOPROLOL TARTRATE 100 MG: 100 TABLET, FILM COATED ORAL at 08:05

## 2019-10-15 RX ADMIN — APIXABAN 2.5 MG: 2.5 TABLET, FILM COATED ORAL at 08:05

## 2019-10-15 RX ADMIN — CALCIUM CARBONATE-VITAMIN D TAB 500 MG-200 UNIT 1000 MG: 500-200 TAB at 08:05

## 2019-10-15 RX ADMIN — PANTOPRAZOLE SODIUM 40 MG: 40 TABLET, DELAYED RELEASE ORAL at 05:22

## 2019-10-15 RX ADMIN — Medication 400 MG: at 08:05

## 2019-10-15 RX ADMIN — OXYCODONE AND ACETAMINOPHEN 2 TABLET: 5; 325 TABLET ORAL at 14:58

## 2019-10-15 RX ADMIN — INSULIN LISPRO 10 UNITS: 100 INJECTION, SOLUTION INTRAVENOUS; SUBCUTANEOUS at 08:05

## 2019-10-15 RX ADMIN — OXYCODONE HYDROCHLORIDE AND ACETAMINOPHEN 2 TABLET: 10; 325 TABLET ORAL at 01:46

## 2019-10-15 RX ADMIN — OXYCODONE HYDROCHLORIDE AND ACETAMINOPHEN 2 TABLET: 10; 325 TABLET ORAL at 05:22

## 2019-10-15 RX ADMIN — CEFAZOLIN SODIUM 2 G: 2 INJECTION, SOLUTION INTRAVENOUS at 01:45

## 2019-10-15 NOTE — PLAN OF CARE
Problem: Patient Care Overview  Goal: Plan of Care Review  Outcome: Ongoing (interventions implemented as appropriate)   10/14/19 9079   Plan of Care Review   Progress improving   OTHER   Outcome Summary patient ambulating with assistance to bathroom, pain controlled with PO medication at this time, educated on b/p and glucose monitoring and CPAP use at night   Coping/Psychosocial   Plan of Care Reviewed With patient     Goal: Individualization and Mutuality  Outcome: Ongoing (interventions implemented as appropriate)    Goal: Discharge Needs Assessment  Outcome: Ongoing (interventions implemented as appropriate)    Goal: Interprofessional Rounds/Family Conf  Outcome: Ongoing (interventions implemented as appropriate)      Problem: Fall Risk (Adult)  Goal: Identify Related Risk Factors and Signs and Symptoms  Outcome: Outcome(s) achieved Date Met: 10/14/19    Goal: Absence of Fall  Outcome: Ongoing (interventions implemented as appropriate)      Problem: Knee Arthroplasty (Total, Partial) (Adult)  Goal: Signs and Symptoms of Listed Potential Problems Will be Absent, Minimized or Managed (Knee Arthroplasty)  Outcome: Ongoing (interventions implemented as appropriate)    Goal: Anesthesia/Sedation Recovery  Outcome: Outcome(s) achieved Date Met: 10/14/19

## 2019-10-15 NOTE — DISCHARGE INSTRUCTIONS
"    Dr. Sanjay Collado Total Knee Joint Replacement Discharge Instructions:  Office Phone Number: [phone]    \"I would like to thank you for the opportunity for trusting me in taking care of you during your recent surgical procedure. I do not take that trust lightly, and I look forward to the opportunity in providing any future orthopedic care to you or your family.\"    Sanjay Collado M.D.    [phone] personal cell    I. ACTIVITIES:  1. Exercises:  ? Complete exercise program as taught by the hospital physical therapist 2 times per day  ? Exercise program will be advanced by the physical therapist  ? During the day be up ambulating every 2 hours (while awake) for short distances  ? Complete the ankle pump exercises at least 10 times per hour (while awake)  ? Elevate legs most of the day the first week post operatively and thereafter elevate legs when in bed and for at least 30 minutes during the day. Caution must be taken to avoid pillow placement under the bend of the knee as this can led to flexion contractures of the knee.  ? Use cold packs 20-30 minutes approximately 5 times per day. This should be done before and after completing your exercises and at any time you are experiencing pain/ stiffness in your operative extremity.      2. Activities of Daily Living:  ? No tub baths, hot tubs, or swimming pools for 4 weeks  ? The clear dressing with thin white gauze strip dressing is waterproof.  You may shower without covering the dressing.  After 7 days you may remove the dressing.  After dressing removal, do not scrub or rub the incision. Allow skin glue to fall off over the next few weeks.  After the dressing is removed, simply let the water run over the incision and pat dry.    II. Restrictions  ? Do not kneel on operative leg.  ? Dr. Collado will discuss with you when you will be able to drive again.  ? Weight bearing is as tolerated, and range of motion as tolerated.  ? First week stay inside on even " terrain. May go up and down stairs one stair at a time utilizing the hand rail.  ? Once you feel confident, you may venture outside.    III. Precautions:  ? Everyone that comes near you should wash their hands  ? Avoid if possible dental work or other elective surgeries within 12 weeks of your surgical procedure.  ?  If dental work or surgical procedure is deemed absolutely necessary within 12 weeks of surgery, you will need to contact Dr. Collado office as you will need to take antibiotics 1 hour prior to any dental work (including teeth cleanings).  ? Dr. Collado will prescribe prophylactic antibiotics for all dental procedures for one year  as a precautionary measure to minimize risk of infection.  If you are a diabetic or take immunosuppressive medication, you may have to take prophylactic antibiotics the remainder of your life before dental work.    ? Avoid sick people. If you must be around someone who is ill, they should wear a mask.  ? Avoid visits to the Emergency Room or Urgent Care unless you are having a life threatening event.   ? Dr. Collado does not routinely place on stockings, but if you are having swelling in your feet or ankle then you may obtain stockings from your local pharmacy or [facility].   Stockings are to be placed on in the morning and removed at night. Monitor the stockings to ensure that any swelling is not causing the stockings to become too tight. In this case, remove stockings immediately.    IV. INCISION CARE:   Dr. Sanjay Collado takes great care in closing your incision to give you the best opportunity for a healthy incision with minimal scarring. He places sutures below the skin surface that will eventually dissolve.  The incision is then covered with a skin glue which makes the incision water tight, and minimizes bleeding onto the dressing.  No staples are used.  Occasionally one of the buried stitches may come to the skin surface and may need to be removed.  Please  resist the temptation of removing the stitch by yourself.  Dr. Collado will be happy to remove it for you.  Bruising around the knee and back of thigh is normal and to be expected.  You may also have pain and or bruising in the thigh where a tourniquet was used.    ? Please keep dressing in place at least until post-op day 7. You may remove and replace dressing before day 7 if the dressing begins to fall off or becomes saturated. Wash your hands and under your finger nails prior to dressing changes.  After day 7 as long as incision is dry and intact, you may leave the dressing off and open to air.    ? If dressing must be changed, utilize dry gauze and paper tape. Avoid touching the side of the gauze that goes against the incision with your hands.  ? No creams or ointments to the incision until permission given by Dr. Collado.  ? Do not touch or pick at the incision, or try to remove any sutures or skin glue.  ? Check dressing every day and notify surgeon immediately if any of the following signs or symptoms are noted:  o Increase in redness  o Increase in swelling of the entire extremity that does not go away with elevation.  Notify office that you may have a blood clot.    o Drainage oozing from the incision  o Pulling apart of the edges of the incision  o Increase in overall body temperature (greater than 100.5 degrees)    V. Medications:   1. Anticoagulants: You will be discharged on an anticoagulant. This is a prophylactic medication that helps prevent blood clots during your post-operative period. The type and length of dosage varies based on your individual needs, procedure performed, and Dr. Collado' preference.  ? While taking the anticoagulant, you should avoid taking any additional aspirin than what is prescribed.   ? Notify surgeon immediately if any sarah bleeding is noted in the urine, stool, emesis, or from the nose or the incision. Blood in the stool will often appear as black rather than red. Blood in  urine may appear as pink. Blood in emesis may appear as brown/black like coffee grounds.  ? You will need to apply pressure for longer periods of time to any cuts or abrasions to stop bleeding  ? Avoid alcohol while taking anticoagulants.    2. Stool Softeners: You will be at greater risk of constipation after surgery due to being less mobile and the pain medications.   ? Take stool softeners as instructed by your surgeon while on pain medications. Over the counter Colace 100 mg 1-2 capsules twice daily.   ? If stools become too loose or too frequent, please decreases the dosage or stop the stool softener.  ? If constipation occurs despite use of stool softeners, you are to continue the stool softeners and add a laxative (Milk of Magnesia 1 ounce daily as needed).  If no bowel movement occurs past 3 days, then purchase Magnesium citrate and drink 1/2 bottle every 8 hours (on ice tastes better) until success. If no bowel movement by post-operative day 5,  please call Dr. Collado office for further instructions.   You may need to decrease or stop your pain medications if bowel movements to not occur.     ? Drink plenty of fluids, and eat fruits and vegetables during your recovery time.    3. Pain Medications utilized after surgery are narcotics and the law requires that the following information be given to all patients that are prescribed narcotics:  ? CLASSIFICATION: Pain medications are called Opioids and are narcotics  ? LEGALITIES: It is illegal to share narcotics with others and to drive within 24 hours of taking narcotics  ? POTENTIAL SIDE EFFECTS: Potential side effects of opioids include: nausea, vomiting, itching, dizziness, drowsiness, dry mouth, constipation, and difficulty urinating.  ? POTENTIAL ADVERSE EFFECTS:   o Opioid tolerance can develop with use of pain medications and this simply means that it requires more and more of the medication to control pain; however, this is seen more in patients that  "use opioids for longer periods of time.  o Opioid dependence can develop with use of Opioids and this simply means that to stop the medication can cause withdrawal symptoms; however, this is seen with patients that use Opioids for longer periods of time.  o Opioid addiction can develop with use of Opioids and the incidence of this is very unlikely in patients who take the medications as ordered and stop the medications as instructed.  o Opioid overdose can be dangerous, but is unlikely when the medication is taken as ordered and stopped when ordered. It is important not to mix opioids with alcohol or with and type of sedative such as Benadryl as this can lead to over sedation and respiratory difficulty.  ? DOSAGE:   o Pain medications will need to be taken consistently for the first few days to decrease pain and promote adequate pain relief and participation in physical therapy.  o After the initial surgical pain begins to resolve, you may begin to decrease the pain medication. By the end of 6 weeks, you should be off of pain medications except for before physical therapy or to help with pain when attempting to fall asleep.  Pain medications will be tapered to lesser dosages as you are further from your surgical day.  No pain medications will be provided after 3 months from surgery.     o Refills will not be given by the office during evening hours, or weekends.  o To seek refills on pain medications during the post-operative period, you must call the office 48 hours in advance to request the refill. The office will then notify you when to  the prescription. DO NOT wait until you are out of the medication to request a refill.  They can not be \"called in\" to the pharmacy.      V. FOLLOW-UP VISITS:  ? You will need to follow up in the office with Dr. Collado in 10-14 days.  Please call [phone] if you need to confirm or reschedule your appointment time.   ? If you have any concerns or suspected complications " prior to your follow up visit, please call your surgeons office. Do not wait until your appointment time if you suspect complications. These will need to be addressed in the office promptly.

## 2019-10-15 NOTE — PROGRESS NOTES
Bilateral leg venous Doppler preliminary report: chronic superficial venous thrombosis right leg.  Negative for dvt.  Called report to Dr. Collado.

## 2019-10-15 NOTE — SIGNIFICANT NOTE
10/15/19 0946   Rehab Treatment   Discipline physical therapy assistant   Reason Treatment Not Performed other (see comments)  (per nsg, pt may have blood clot. pt going for doppler this am. will f/u later this afternoon)   Recommendation   PT - Next Appointment 10/15/19

## 2019-10-15 NOTE — DISCHARGE SUMMARY
Discharge Summary    Date of Admission: 10/14/2019  7:13 AM  Date of Discharge:  10/15/2019    Discharge Diagnosis:   Osteoarthritis of left knee, unspecified osteoarthritis type [M17.12]      PMHX:   Past Medical History:   Diagnosis Date   • Anxiety    • Arthritis    • Bone pain    • Cancer (CMS/[facility]) 09/2016    THYROID   • Depression    • Disease of thyroid gland    • DM type 2 (diabetes mellitus, type 2) (CMS/[facility])     TYPE 2   • GERD (gastroesophageal reflux disease)    • Hypertension    • Irregular heart beat    • Osteoarthritis    • PONV (postoperative nausea and vomiting)     DOES WELL WITH PROPOFOL   • Sleep apnea     C-PAP IN USE       Discharge Disposition  Home-Health Care [facility]    Procedures Performed  Procedure(s):  TOTAL KNEE ARTHROPLASTY       Indication for Admission  Patient is a 73 y.o. female admitted after undergoing the above surgical procedure. They were admitted for post-operative pain control, medical management and physical therapy.  They progressed with physical therapy.  She was having calf pain so a doppler was ordered.  This was pending at the time of this dictation.  They were deemed stable for discharge.      Consults:   Consults     Date and Time Order Name Status Description    10/14/2019 0858 Inpatient Internal Medicine Consult Completed           Discharge Instructions:  Patient is weight bearing as tolerated on the operative leg.  Patient is to progress range of motion and ambulation as tolerated.  Use walker as needed for stability and gait.  May progress to cane as tolerated.  The dressing should be left on knee until post-operative day 7, and then removed.  Dressing is waterproof. Patient may shower.  Use ace wrap as needed for swelling.  Patient will follow-up in the office in 2 weeks.  Call the office at [phone] for any questions or concerns.      Discharge Medications     Discharge Medications      New Medications      Instructions Start Date   apixaban 2.5 MG tablet  tablet  Commonly known as:  ELIQUIS   2.5 mg, Oral, Every 12 Hours Scheduled      oxyCODONE-acetaminophen  MG per tablet  Commonly known as:  PERCOCET   1-2 tablets, Oral, Every 4 Hours PRN      sennosides-docusate sodium 8.6-50 MG tablet  Commonly known as:  SENOKOT-S   2 tablets, Oral, 2 Times Daily         Changes to Medications      Instructions Start Date   aspirin 81 MG tablet  What changed:    · medication strength  · how much to take  · additional instructions   81 mg, Oral, Daily      insulin aspart 100 UNIT/ML solution pen-injector sc pen  Commonly known as:  NOVOLOG FLEXPEN  What changed:    · how much to take  · additional instructions   Inject 10 units before each meal      lidocaine 5 %  Commonly known as:  LIDODERM  What changed:    · when to take this  · reasons to take this  · additional instructions   1 patch, Transdermal, Every 24 Hours, Remove & Discard patch within 12 hours or as directed by MD         Continue These Medications      Instructions Start Date   amLODIPine-benazepril 10-20 MG per capsule  Commonly known as:  LOTREL   1 capsule, Oral, Every Evening      calcitriol 0.25 MCG capsule  Commonly known as:  ROCALTROL   0.25 mcg, Oral, 2 Times Daily      CALCIUM CARBONATE-VIT D-MIN PO   1,200 mg, Oral, 2 Times Daily      chlorthalidone 25 MG tablet  Commonly known as:  HYGROTON   25 mg, Oral, Daily      clorazepate 7.5 MG tablet  Commonly known as:  TRANXENE   7.5 mg, Oral, 2 Times Daily      esomeprazole 40 MG capsule  Commonly known as:  nexIUM   40 mg, Oral, Every Evening      insulin glargine 100 UNIT/ML injection  Commonly known as:  LANTUS   50 Units, Subcutaneous, Daily      JARDIANCE 10 MG tablet  Generic drug:  Empagliflozin   1 tablet, Oral, Every Morning      LEXAPRO 10 MG tablet  Generic drug:  escitalopram   10 mg, Oral, Every Evening      magnesium oxide 400 tablet tablet  Commonly known as:  MAG-OX   400 mg, Oral, 2 Times Daily      metFORMIN 1000 MG tablet  Commonly  known as:  GLUCOPHAGE   1,000 mg, Oral, 2 Times Daily With Meals      metoprolol tartrate 100 MG tablet  Commonly known as:  LOPRESSOR   100 mg, Oral, 2 Times Daily      pramipexole 1 MG tablet  Commonly known as:  MIRAPEX   1 mg, Oral, 2 Times Daily      promethazine 25 MG tablet  Commonly known as:  PHENERGAN   1 tablet, Oral, As Needed      rosuvastatin 20 MG tablet  Commonly known as:  CRESTOR   20 mg, Oral, Nightly      SYNTHROID 137 MCG tablet  Generic drug:  levothyroxine   137 mcg, Oral, Daily      Vitamin D3 77102 units capsule   50,000 Units, Oral, Every 7 Days, SUNDAY              Discharge Diet:   Diet Instructions     Advance Diet As Tolerated      Resume your normal diet.        You may continue your diabetic diet as previously tolerated                    Activity at Discharge:   Activity Instructions     Activity as Tolerated      Work Restrictions      Type of Restriction:  Work    May Return to Work:  Other    Return to Work Instructions:  No work until cleared by physician's office.    Additional Activity Instructions:      You are weight bearing as tolerated  You may increase your activity as you feel able.                     Follow-up Appointments  Future Appointments   Date Time Provider Department Center   11/13/2019 10:00 AM Lisa Wells MD [facility]  [facility] None   11/22/2019  8:30 AM LAB  [facility]  [facility] None   12/6/2019  1:50 PM Gege Ford MD [facility] None     Additional Instructions for the Follow-ups that You Need to Schedule     Discharge Follow-up with Specified Provider: Dr. Collado office.  An appointment has already been made at time of surgery scheduling.  Call office if you need to verify appointment time or date.  [phone]; 2 Weeks   As directed      To:  Dr. Collado office.  An appointment has already been made at time of surgery scheduling.  Call office if you need to verify appointment time or date.  [phone]    Follow Up:  2 Weeks          Referral to Home Health   As directed      Face to Face Visit Date:  10/15/2019    Follow-up provider for Plan of Care?:  I will be treating the patient on an ongoing basis.  Please send me the Plan of Care for signature.    Follow-up provider:  SANJAY GARZA [[id number]]    Reason/Clinical Findings:  s/p TKA    Describe mobility limitations that make leaving home difficult:  taxing effort    Nursing/Therapeutic Services Requested:  Physical Therapy    PT orders:  Total joint pathway    Frequency:  1 Week 1               Test Results Pending at Discharge       Sanjay Garza MD  10/15/19,  7:27 AM

## 2019-10-15 NOTE — PROGRESS NOTES
Orthopedic Progress Note    Subjective :   Patient seen and examined. Resting comfortably. No issues overnight. Pain controlled. Walked yesterday with PT.    Objective :    Vital signs in last 24 hours:  Temp:  [96.8 °F (36 °C)-99 °F (37.2 °C)] 96.8 °F (36 °C)  Heart Rate:  [60-80] 60  Resp:  [12-16] 16  BP: (102-146)/(56-79) 107/61  Vitals:    10/14/19 1918 10/14/19 2252 10/15/19 0228 10/15/19 0715   BP: 137/65 102/56 111/59 107/61   BP Location: Right arm Left arm Left arm Left arm   Patient Position: Sitting Lying Lying Lying   Pulse: 73 60 64 60   Resp: 16 16 16 16   Temp: 96.8 °F (36 °C) 98.4 °F (36.9 °C) 97.3 °F (36.3 °C) 96.8 °F (36 °C)   TempSrc: Oral Oral Oral Oral   SpO2: 92% 92% 93% 96%   Weight:       Height:           PHYSICAL EXAM:  Patient is calm, in no acute distress, awake and oriented x 3.  Dressing clean, dry and intact.  No signs of infection.  Swelling is appropriate.  Ecchymosis is appropriate in amount.  Homans test is negative.  Patient is neurovascularly intact distally.  EHL,FHL,TA,GS are intact  DP/TP 2+    LABS:  Results from last 7 days   Lab Units 10/15/19  0320   HEMOGLOBIN g/dL 13.5   HEMATOCRIT % 43.1     Results from last 7 days   Lab Units 10/15/19  0320   SODIUM mmol/L 130*   POTASSIUM mmol/L 3.6   CHLORIDE mmol/L 87*   CO2 mmol/L 30.9*   BUN mg/dL 18   CREATININE mg/dL 0.97   GLUCOSE mg/dL 165*   CALCIUM mg/dL 9.0           ASSESSMENT:  Status post left Total knee arthroplasty, POD# 1    Plan:  Continue physical therapy, WBAT. ROM as tolerated with physical therapy  Continue SCDs, Continue Eliquis 2.5mg one tab PO BID x14 days for DVT prophylaxis  Doppler today to rule out DVT given her history of DVT/PE in past. IF normal then ok to d/c.   Dispo planning for home with home health when medically stable today.  F/U Dr. Collado office in 2 weeks.     Augusto Shankar, PA-C    Date: 10/15/2019  Time: 7:30 AM

## 2019-10-15 NOTE — PROGRESS NOTES
Discharge Planning Assessment  [facility]     Patient Name: Marilyn J Pumphrey  MRN: [id number]  Today's Date: 10/15/2019    Admit Date: 10/14/2019    Discharge Needs Assessment     Row Name 10/15/19 1116       Living Environment    Lives With  alone    Current Living Arrangements  home/apartment/condo    Family Caregiver if Needed  sibling(s)       Discharge Needs Assessment    Readmission Within the Last 30 Days  no previous admission in last 30 days    Concerns to be Addressed  discharge planning    Equipment Needed After Discharge  walker, rolling;raised toilet    Discharge Facility/Level of Care Needs  home with [facility]        Discharge Plan     Row Name 10/15/19 1116       Plan    Plan  [facility]    Patient/Family in Agreement with Plan  yes    Plan Comments  Spoke with pt, verified correct information on facesheet and explained the role of CCP. Pt would like to d/c home with [facility], referral given to Nida with [facility] who states they are able to accept. Plan will be to d/c home with [facility] and family support. No other needs identified.     Final Discharge Disposition Code  06 - home with home health care    Final Note  Pt to d/c home with [facility], notified Nida with [facility] of d/c orders.        Destination      No service coordination in this encounter.      Durable Medical Equipment      No service coordination in this encounter.      Dialysis/Infusion      No service coordination in this encounter.      Home Medical Care      Service Provider Request Status Selected Services Address Phone Number Fax Number    [facility] Accepted N/A [address] [address] [phone]    [facility] Pending - Request Sent N/A [address] [facility] [address] [phone]      Therapy      No service coordination in this encounter.      Community Resources      No service coordination in this encounter.        Expected Discharge Date  and Time     Expected Discharge Date Expected Discharge Time    Oct 15, 2019         Demographic Summary    No documentation.       Functional Status    No documentation.       Psychosocial    No documentation.       Abuse/Neglect    No documentation.       Legal    No documentation.       Substance Abuse    No documentation.       Patient Forms     Row Name 10/15/19 1117       Patient Forms    Provider Choice List  Delivered    Delivered to  Patient    Method of delivery  In person            Kelly Wilcox RN

## 2019-10-15 NOTE — THERAPY TREATMENT NOTE
Patient Name: Marilyn J Pumphrey  : 1945    MRN: [id number]                              Today's Date: 10/15/2019       Admit Date: 10/14/2019    Visit Dx:     ICD-10-CM ICD-9-CM   1. Primary osteoarthritis of left knee M17.12 715.16     Patient Active Problem List   Diagnosis   • Chronic pain   • Other long term (current) drug therapy   • Rheumatoid arthritis (CMS/[facility])   • Acute bronchitis   • Arm pain, right   • CAD (coronary artery disease)   • Chest pain   • Depression   • Diabetes 1.5, managed as type 1 (CMS/[facility])   • Edema of nasopharynx   • FH: thromboembolic disease   • Gastroesophageal reflux disease   • History of pulmonary embolism   • Hyperlipidemia   • Hypertension, benign   • Hypokalemia   • Hypothyroidism, postsurgical   • Injury of costal cartilage   • Malignant neoplasm of thyroid gland (CMS/[facility])   • XVAI (obstructive sleep apnea)   • Osteoarthritis   • Polyarthralgia   • Postsurgical hypoparathyroidism (CMS/[facility])   • Restless legs syndrome (RLS)   • Type 2 diabetes mellitus with hyperglycemia (CMS/[facility])   • Type 2 diabetes mellitus without complications (CMS/[facility])   • Wrist sprain   • Osteoarthritis of left knee     Past Medical History:   Diagnosis Date   • Anxiety    • Arthritis    • Bone pain    • Cancer (CMS/[facility]) 2016    THYROID   • Depression    • Disease of thyroid gland    • DM type 2 (diabetes mellitus, type 2) (CMS/[facility])     TYPE 2   • GERD (gastroesophageal reflux disease)    • Hypertension    • Irregular heart beat    • Osteoarthritis    • PONV (postoperative nausea and vomiting)     DOES WELL WITH PROPOFOL   • Sleep apnea     C-PAP IN USE     Past Surgical History:   Procedure Laterality Date   • CARPAL TUNNEL RELEASE     • CHOLECYSTECTOMY     • HYSTERECTOMY      partial abdominal hysterectomy   • OOPHORECTOMY Bilateral    • THYROID SURGERY      2 thyroid surgery   • TONSILLECTOMY     • TOTAL KNEE ARTHROPLASTY Left 10/14/2019    Procedure: TOTAL KNEE ARTHROPLASTY;   Surgeon: Sanjay Collado MD;  [facility];  Service: Orthopedics   • VEIN LIGATION AND STRIPPING BILATERAL       General Information     Row Name 10/15/19 1532          PT Evaluation Time/Intention    Document Type  therapy note (daily note)  -     Mode of Treatment  physical therapy;group therapy  -     Row Name 10/15/19 1532          General Information    Existing Precautions/Restrictions  fall  -     Row Name 10/15/19 1532          Cognitive Assessment/Intervention- PT/OT    Orientation Status (Cognition)  oriented x 3  -       User Key  (r) = Recorded By, (t) = Taken By, (c) = Cosigned By    Initials Name Provider Type     Ashley Haas PTA Physical Therapy Assistant        Mobility     Row Name 10/15/19 1532          Gait/Stairs Assessment/Training    Hughes Level (Gait)  contact guard  -     Assistive Device (Gait)  walker, front-wheeled  -     Distance in Feet (Gait)  130  -     Pattern (Gait)  step-to  -     Deviations/Abnormal Patterns (Gait)  antalgic;gait speed decreased  -     Bilateral Gait Deviations  forward flexed posture  -     Row Name 10/15/19 1532          Mobility Assessment/Intervention    Extremity Weight-bearing Status  left lower extremity  -     Left Upper Extremity (Weight-bearing Status)  weight-bearing as tolerated (WBAT)  -       User Key  (r) = Recorded By, (t) = Taken By, (c) = Cosigned By    Initials Name Provider Type     Ashley Haas PTA Physical Therapy Assistant        Obj/Interventions     Row Name 10/15/19 1533          Therapeutic Exercise    Comment (Therapeutic Exercise)  Left TKA protocol X15 reps  -       User Key  (r) = Recorded By, (t) = Taken By, (c) = Cosigned By    Initials Name Provider Type     Ashley Haas PTA Physical Therapy Assistant        Goals/Plan    No documentation.       Clinical Impression     Row Name 10/15/19 1533          Pain Scale: Numbers Pre/Post-Treatment    Pain Location - Side  Left  -      Pain Location  knee  -EH     Pain Intervention(s)  Ambulation/increased activity;Repositioned  -     Row Name 10/15/19 1533          Plan of Care Review    Plan of Care Reviewed With  patient  -     Row Name 10/15/19 1533          Positioning and Restraints    Pre-Treatment Position  sitting in chair/recliner  -EH     Post Treatment Position  chair  -EH     In Chair  reclined;call light within reach;encouraged to call for assist;exit alarm on;with family/caregiver  -     Row Name 10/15/19 1533          Pain Scale: Word Pre/Post-Treatment    Pain: Word Scale, Pretreatment  4 - moderate pain  -       User Key  (r) = Recorded By, (t) = Taken By, (c) = Cosigned By    Initials Name Provider Type     Ashley Haas PTA Physical Therapy Assistant        Outcome Measures     Row Name 10/15/19 1534          How much help from another person do you currently need...    Turning from your back to your side while in flat bed without using bedrails?  4  -EH     Moving from lying on back to sitting on the side of a flat bed without bedrails?  4  -EH     Moving to and from a bed to a chair (including a wheelchair)?  3  -EH     Standing up from a chair using your arms (e.g., wheelchair, bedside chair)?  3  -EH     Climbing 3-5 steps with a railing?  2  -EH     To walk in hospital room?  3  -EH     AM-PAC 6 Clicks Score (PT)  19  -EH       User Key  (r) = Recorded By, (t) = Taken By, (c) = Cosigned By    Initials Name Provider Type    Ashley Garvin PTA Physical Therapy Assistant        Physical Therapy Education     Title: PT OT SLP Therapies (Resolved)     Topic: Physical Therapy (Resolved)     Point: Mobility training (Resolved)     Learning Progress Summary           Patient Acceptance, E, NR by MS at 10/14/2019  4:32 PM                   Point: Home exercise program (Resolved)     Learning Progress Summary           Patient Acceptance, E, NR by MS at 10/14/2019  4:32 PM                   Point: Body mechanics  (Resolved)     Learning Progress Summary           Patient Acceptance, E, NR by MS at 10/14/2019  4:32 PM                   Point: Precautions (Resolved)     Learning Progress Summary           Patient Acceptance, E, NR by MS at 10/14/2019  4:32 PM                               User Key     Initials Effective Dates Name Provider Type Discipline    MS 03/04/19 -  Luiza Marcial PT Physical Therapist PT              PT Recommendation and Plan     Plan of Care Reviewed With: patient     Time Calculation:   PT Charges     Row Name 10/15/19 1531 10/15/19 0946          Time Calculation    Start Time  1400  -EH  --     Stop Time  1442  -EH  --     Time Calculation (min)  42 min  -EH  --     PT Received On  10/15/19  -  --     PT - Next Appointment  10/16/19  -  10/15/19  -        Time Calculation- PT    Total Timed Code Minutes- PT  35 minute(s)  -EH  --       User Key  (r) = Recorded By, (t) = Taken By, (c) = Cosigned By    Initials Name Provider Type     Ashley Haas PTA Physical Therapy Assistant        Therapy Charges for Today     Code Description Service Date Service Provider Modifiers Qty    96502068252 HC PT THER PROC EA 15 MIN 10/15/2019 Ashley Haas PTA GP 1    78847497906 HC PT THER PROC GROUP 10/15/2019 Ashley Haas PTA GP 1          PT G-Codes  Outcome Measure Options: AM-PAC 6 Clicks Basic Mobility (PT)  AM-PAC 6 Clicks Score (PT): 19    Ashley Haas PTA  10/15/2019

## 2019-10-16 ENCOUNTER — READMISSION MANAGEMENT (OUTPATIENT)
Dept: CALL CENTER | Facility: HOSPITAL | Age: 74
End: 2019-10-16

## 2019-10-16 NOTE — OUTREACH NOTE
Prep Survey      Responses   Facility patient discharged from?  [facility]   Is patient eligible?  Yes   Discharge diagnosis  TOTAL KNEE ARTHROPLASTY   Does the patient have one of the following disease processes/diagnoses(primary or secondary)?  Total Joint Replacement   Does the patient have Home health ordered?  Yes   What is the Home health agency?   [facility]   Is there a DME ordered?  Yes   What DME was ordered?  Olivia Meneses, raised toilet   Medication alerts for this patient  Started Eliquis   Prep survey completed?  Yes          Marjorie Jovel LPN

## 2019-10-18 ENCOUNTER — READMISSION MANAGEMENT (OUTPATIENT)
Dept: CALL CENTER | Facility: HOSPITAL | Age: 74
End: 2019-10-18

## 2019-10-18 NOTE — OUTREACH NOTE
Total Joint Week 1 Survey      Responses   Facility patient discharged from?  [facility]   Does the patient have one of the following disease processes/diagnoses(primary or secondary)?  Total Joint Replacement   Is there a successful TCM telephone encounter documented?  No   Joint surgery performed?  Knee   Week 1 attempt successful?  Yes   Call start time  1421   Call end time  1431   Has the patient been back in either the hospital or Emergency Department since discharge?  No   Discharge diagnosis  TOTAL KNEE ARTHROPLASTY   Does the patient have all medications related to this admission filled (includes all antibiotics, pain medications, etc.)  Yes   Is the patient taking all medications as directed (includes completed medication regime)?  Yes   Is the patient able to teach back alternate methods of pain control?  Ice, Knee-elevation/no pillow under knee, Reposition, Correct alignment, Short, frequent activity   Does the patient have a follow up appointment with their surgeon?  Yes   Has the patient kept scheduled appointments due by today?  N/A   What is the Home health agency?   [facility]   Has home health visited the patient within 72 hours of discharge?  Yes   What DME was ordered?  Rolling Walker, raised toilet   Psychosocial issues?  No   When is the first therapy visit scheduled (PO Day) including how many days per week   Therapy at home per PT. She has had therapy x 2 this week.    Has the patient began therapy sessions (either in the home or as an out patient)?  Yes   If the patient has started attending therapy, what post op day did they begin to attend (either in home or as an out patient)?    Post op Day 2   Does the patient have a wound vac in place?  N/A   Has the patient fallen since discharge?  No   Comments  Patient is in alot of pain. She is using pain medication as recommended .  is coming to check today.    Did the patient receive a copy of their discharge instructions?  Yes    Nursing interventions  Reviewed instructions with patient   What is the patient's perception of their functional status since discharge?  Improving   Is the patient able to teach back signs and symptoms of infection?  Temp >100.4 for 24h or longer, Incisional drainage, Increased swelling or redness around incision (not associated with surgical edema), Changes in mobility, Severe discomfort or pain, Shortness of breath or chest pain   Is the patient able to teach back how to prevent infection?  Check incision daily, Wash hands before and after touching incision, Keep incision covered if drainage, Keep incision covered if pets in house, Shower only as directed by surgeon, Eat well-balanced diet, No tub baths, hot tub or swimming   Is the patient able to teach back signs and symptoms of DVT?  Redness in calf, Area hot to touch, Shortness of breath or chest pain, Severe pain in calf, Swelling in calf   Is the patient able to teach back home safety measures?  Ability to shower   Is the patient/caregiver able to teach back the hierarchy of who to call/visit for symptoms/problems? PCP, Specialist, Home health nurse, Urgent Care, ED, 911  Yes   Week 1 call completed?  Yes          Donny Torres RN

## 2019-10-19 ENCOUNTER — APPOINTMENT (OUTPATIENT)
Dept: GENERAL RADIOLOGY | Facility: HOSPITAL | Age: 74
End: 2019-10-19

## 2019-10-19 ENCOUNTER — HOSPITAL ENCOUNTER (EMERGENCY)
Facility: HOSPITAL | Age: 74
Discharge: ANOTHER HOSPITAL | End: 2019-10-19
Admitting: EMERGENCY MEDICINE

## 2019-10-19 ENCOUNTER — HOSPITAL ENCOUNTER (OUTPATIENT)
Facility: HOSPITAL | Age: 74
Setting detail: OBSERVATION
LOS: 1 days | Discharge: HOME HEALTH | End: 2019-10-21
Attending: HOSPITALIST | Admitting: ORTHOPAEDIC SURGERY

## 2019-10-19 ENCOUNTER — APPOINTMENT (OUTPATIENT)
Dept: CT IMAGING | Facility: HOSPITAL | Age: 74
End: 2019-10-19

## 2019-10-19 VITALS
HEIGHT: 67 IN | BODY MASS INDEX: 28.56 KG/M2 | OXYGEN SATURATION: 96 % | WEIGHT: 182 LBS | RESPIRATION RATE: 18 BRPM | DIASTOLIC BLOOD PRESSURE: 48 MMHG | SYSTOLIC BLOOD PRESSURE: 131 MMHG | TEMPERATURE: 99.3 F | HEART RATE: 99 BPM

## 2019-10-19 DIAGNOSIS — R06.00 DYSPNEA, UNSPECIFIED TYPE: ICD-10-CM

## 2019-10-19 DIAGNOSIS — A41.9 SEPSIS WITHOUT ACUTE ORGAN DYSFUNCTION, DUE TO UNSPECIFIED ORGANISM (HCC): ICD-10-CM

## 2019-10-19 DIAGNOSIS — M25.562 ACUTE PAIN OF LEFT KNEE: Primary | ICD-10-CM

## 2019-10-19 DIAGNOSIS — R07.9 CHEST PAIN, UNSPECIFIED TYPE: ICD-10-CM

## 2019-10-19 DIAGNOSIS — Z96.652 STATUS POST LEFT KNEE REPLACEMENT: Primary | ICD-10-CM

## 2019-10-19 DIAGNOSIS — J02.9 SORE THROAT: ICD-10-CM

## 2019-10-19 PROBLEM — R79.89 ABNORMAL LFTS: Status: ACTIVE | Noted: 2019-10-19

## 2019-10-19 PROBLEM — E86.0 DEHYDRATION: Status: ACTIVE | Noted: 2019-10-19

## 2019-10-19 PROBLEM — E83.52 HYPERCALCEMIA: Status: ACTIVE | Noted: 2019-10-19

## 2019-10-19 PROBLEM — R07.0 IMPAIRED SWALLOWING ASSOCIATED WITH THROAT PAIN: Status: ACTIVE | Noted: 2019-10-19

## 2019-10-19 PROBLEM — R13.10 IMPAIRED SWALLOWING ASSOCIATED WITH THROAT PAIN: Status: ACTIVE | Noted: 2019-10-19

## 2019-10-19 LAB
ALBUMIN SERPL-MCNC: 3.3 G/DL (ref 3.5–5.2)
ALBUMIN/GLOB SERPL: 0.9 G/DL
ALP SERPL-CCNC: 271 U/L (ref 39–117)
ALT SERPL W P-5'-P-CCNC: 65 U/L (ref 1–33)
ANION GAP SERPL CALCULATED.3IONS-SCNC: 18 MMOL/L (ref 5–15)
AST SERPL-CCNC: 50 U/L (ref 1–32)
BASOPHILS # BLD AUTO: 0.1 10*3/MM3 (ref 0–0.2)
BASOPHILS NFR BLD AUTO: 0.9 % (ref 0–1.5)
BILIRUB SERPL-MCNC: 1 MG/DL (ref 0.2–1.2)
BILIRUB UR QL STRIP: NEGATIVE
BUN BLD-MCNC: 16 MG/DL (ref 8–23)
BUN/CREAT SERPL: 14.5 (ref 7–25)
CALCIUM SPEC-SCNC: 11.4 MG/DL (ref 8.6–10.5)
CHLORIDE SERPL-SCNC: 87 MMOL/L (ref 98–107)
CLARITY UR: CLEAR
CO2 SERPL-SCNC: 28 MMOL/L (ref 22–29)
COLOR UR: YELLOW
CREAT BLD-MCNC: 1.1 MG/DL (ref 0.57–1)
CRP SERPL-MCNC: 17.57 MG/DL (ref 0–0.5)
D-LACTATE SERPL-SCNC: 0.9 MMOL/L (ref 0.5–2)
D-LACTATE SERPL-SCNC: 2 MMOL/L (ref 0.5–2)
DEPRECATED RDW RBC AUTO: 47.3 FL (ref 37–54)
EOSINOPHIL # BLD AUTO: 0.3 10*3/MM3 (ref 0–0.4)
EOSINOPHIL NFR BLD AUTO: 2.3 % (ref 0.3–6.2)
ERYTHROCYTE [DISTWIDTH] IN BLOOD BY AUTOMATED COUNT: 16.7 % (ref 12.3–15.4)
ERYTHROCYTE [SEDIMENTATION RATE] IN BLOOD: 54 MM/HR (ref 0–30)
GFR SERPL CREATININE-BSD FRML MDRD: 49 ML/MIN/1.73
GLOBULIN UR ELPH-MCNC: 3.5 GM/DL
GLUCOSE BLD-MCNC: 139 MG/DL (ref 65–99)
GLUCOSE BLDC GLUCOMTR-MCNC: 91 MG/DL (ref 70–130)
GLUCOSE BLDC GLUCOMTR-MCNC: 98 MG/DL (ref 70–130)
GLUCOSE UR STRIP-MCNC: ABNORMAL MG/DL
HCT VFR BLD AUTO: 41 % (ref 34–46.6)
HGB BLD-MCNC: 13.5 G/DL (ref 12–15.9)
HGB UR QL STRIP.AUTO: NEGATIVE
INR PPP: 1.19 (ref 0.9–1.1)
KETONES UR QL STRIP: ABNORMAL
LEUKOCYTE ESTERASE UR QL STRIP.AUTO: NEGATIVE
LYMPHOCYTES # BLD AUTO: 1.4 10*3/MM3 (ref 0.7–3.1)
LYMPHOCYTES NFR BLD AUTO: 10.3 % (ref 19.6–45.3)
MCH RBC QN AUTO: 26.7 PG (ref 26.6–33)
MCHC RBC AUTO-ENTMCNC: 33 G/DL (ref 31.5–35.7)
MCV RBC AUTO: 81 FL (ref 79–97)
MONOCYTES # BLD AUTO: 1.2 10*3/MM3 (ref 0.1–0.9)
MONOCYTES NFR BLD AUTO: 9 % (ref 5–12)
NEUTROPHILS # BLD AUTO: 10.8 10*3/MM3 (ref 1.7–7)
NEUTROPHILS NFR BLD AUTO: 77.5 % (ref 42.7–76)
NITRITE UR QL STRIP: NEGATIVE
NRBC BLD AUTO-RTO: 0.2 /100 WBC (ref 0–0.2)
PH UR STRIP.AUTO: 6 [PH] (ref 5–8)
PLATELET # BLD AUTO: 258 10*3/MM3 (ref 140–450)
PMV BLD AUTO: 9.5 FL (ref 6–12)
POTASSIUM BLD-SCNC: 3.9 MMOL/L (ref 3.5–5.2)
PROCALCITONIN SERPL-MCNC: 0.23 NG/ML (ref 0.1–0.25)
PROT SERPL-MCNC: 6.8 G/DL (ref 6–8.5)
PROT UR QL STRIP: NEGATIVE
PROTHROMBIN TIME: 12.1 SECONDS (ref 9.6–11.7)
RBC # BLD AUTO: 5.06 10*6/MM3 (ref 3.77–5.28)
SODIUM BLD-SCNC: 133 MMOL/L (ref 136–145)
SP GR UR STRIP: 1.02 (ref 1–1.03)
UROBILINOGEN UR QL STRIP: ABNORMAL
WBC NRBC COR # BLD: 13.9 10*3/MM3 (ref 3.4–10.8)

## 2019-10-19 PROCEDURE — 82962 GLUCOSE BLOOD TEST: CPT

## 2019-10-19 PROCEDURE — 85025 COMPLETE CBC W/AUTO DIFF WBC: CPT | Performed by: NURSE PRACTITIONER

## 2019-10-19 PROCEDURE — 96375 TX/PRO/DX INJ NEW DRUG ADDON: CPT

## 2019-10-19 PROCEDURE — 87040 BLOOD CULTURE FOR BACTERIA: CPT | Performed by: NURSE PRACTITIONER

## 2019-10-19 PROCEDURE — 25010000002 VANCOMYCIN 10 G RECONSTITUTED SOLUTION: Performed by: NURSE PRACTITIONER

## 2019-10-19 PROCEDURE — 86140 C-REACTIVE PROTEIN: CPT | Performed by: NURSE PRACTITIONER

## 2019-10-19 PROCEDURE — 85610 PROTHROMBIN TIME: CPT | Performed by: NURSE PRACTITIONER

## 2019-10-19 PROCEDURE — 81003 URINALYSIS AUTO W/O SCOPE: CPT | Performed by: NURSE PRACTITIONER

## 2019-10-19 PROCEDURE — 96365 THER/PROPH/DIAG IV INF INIT: CPT

## 2019-10-19 PROCEDURE — 0 IOPAMIDOL PER 1 ML: Performed by: NURSE PRACTITIONER

## 2019-10-19 PROCEDURE — 71045 X-RAY EXAM CHEST 1 VIEW: CPT

## 2019-10-19 PROCEDURE — 96361 HYDRATE IV INFUSION ADD-ON: CPT

## 2019-10-19 PROCEDURE — 83605 ASSAY OF LACTIC ACID: CPT | Performed by: HOSPITALIST

## 2019-10-19 PROCEDURE — 25010000002 CEFEPIME PER 500 MG: Performed by: NURSE PRACTITIONER

## 2019-10-19 PROCEDURE — 96360 HYDRATION IV INFUSION INIT: CPT

## 2019-10-19 PROCEDURE — 71275 CT ANGIOGRAPHY CHEST: CPT

## 2019-10-19 PROCEDURE — 25010000002 ONDANSETRON PER 1 MG: Performed by: NURSE PRACTITIONER

## 2019-10-19 PROCEDURE — 83605 ASSAY OF LACTIC ACID: CPT

## 2019-10-19 PROCEDURE — 93005 ELECTROCARDIOGRAM TRACING: CPT | Performed by: EMERGENCY MEDICINE

## 2019-10-19 PROCEDURE — 96372 THER/PROPH/DIAG INJ SC/IM: CPT

## 2019-10-19 PROCEDURE — 70491 CT SOFT TISSUE NECK W/DYE: CPT

## 2019-10-19 PROCEDURE — 25010000002 ENOXAPARIN PER 10 MG: Performed by: HOSPITALIST

## 2019-10-19 PROCEDURE — 80053 COMPREHEN METABOLIC PANEL: CPT | Performed by: NURSE PRACTITIONER

## 2019-10-19 PROCEDURE — 84145 PROCALCITONIN (PCT): CPT | Performed by: NURSE PRACTITIONER

## 2019-10-19 PROCEDURE — 85652 RBC SED RATE AUTOMATED: CPT | Performed by: NURSE PRACTITIONER

## 2019-10-19 PROCEDURE — 99284 EMERGENCY DEPT VISIT MOD MDM: CPT

## 2019-10-19 RX ORDER — PANTOPRAZOLE SODIUM 40 MG/1
40 TABLET, DELAYED RELEASE ORAL EVERY MORNING
Status: DISCONTINUED | OUTPATIENT
Start: 2019-10-20 | End: 2019-10-21 | Stop reason: HOSPADM

## 2019-10-19 RX ORDER — VANCOMYCIN HYDROCHLORIDE
20 ONCE
Status: COMPLETED | OUTPATIENT
Start: 2019-10-19 | End: 2019-10-19

## 2019-10-19 RX ORDER — DEXTROSE MONOHYDRATE 25 G/50ML
25 INJECTION, SOLUTION INTRAVENOUS
Status: DISCONTINUED | OUTPATIENT
Start: 2019-10-19 | End: 2019-10-21 | Stop reason: HOSPADM

## 2019-10-19 RX ORDER — PRAMIPEXOLE DIHYDROCHLORIDE 1 MG/1
1 TABLET ORAL 2 TIMES DAILY
Status: DISCONTINUED | OUTPATIENT
Start: 2019-10-19 | End: 2019-10-21 | Stop reason: HOSPADM

## 2019-10-19 RX ORDER — OXYCODONE AND ACETAMINOPHEN 10; 325 MG/1; MG/1
1 TABLET ORAL EVERY 4 HOURS PRN
Status: DISCONTINUED | OUTPATIENT
Start: 2019-10-19 | End: 2019-10-21

## 2019-10-19 RX ORDER — LEVOTHYROXINE SODIUM 137 UG/1
137 TABLET ORAL DAILY
Status: DISCONTINUED | OUTPATIENT
Start: 2019-10-20 | End: 2019-10-21 | Stop reason: HOSPADM

## 2019-10-19 RX ORDER — ONDANSETRON 4 MG/1
4 TABLET, FILM COATED ORAL EVERY 6 HOURS PRN
Status: DISCONTINUED | OUTPATIENT
Start: 2019-10-19 | End: 2019-10-21 | Stop reason: HOSPADM

## 2019-10-19 RX ORDER — METOPROLOL TARTRATE 100 MG/1
100 TABLET ORAL EVERY 12 HOURS SCHEDULED
Status: DISCONTINUED | OUTPATIENT
Start: 2019-10-19 | End: 2019-10-21 | Stop reason: HOSPADM

## 2019-10-19 RX ORDER — ESCITALOPRAM OXALATE 10 MG/1
10 TABLET ORAL EVERY EVENING
Status: DISCONTINUED | OUTPATIENT
Start: 2019-10-19 | End: 2019-10-21 | Stop reason: HOSPADM

## 2019-10-19 RX ORDER — CLORAZEPATE DIPOTASSIUM 7.5 MG/1
7.5 TABLET ORAL 2 TIMES DAILY
Status: DISCONTINUED | OUTPATIENT
Start: 2019-10-19 | End: 2019-10-21 | Stop reason: HOSPADM

## 2019-10-19 RX ORDER — ACETAMINOPHEN 160 MG/5ML
650 SOLUTION ORAL EVERY 4 HOURS PRN
Status: DISCONTINUED | OUTPATIENT
Start: 2019-10-19 | End: 2019-10-21 | Stop reason: HOSPADM

## 2019-10-19 RX ORDER — ONDANSETRON 2 MG/ML
4 INJECTION INTRAMUSCULAR; INTRAVENOUS ONCE
Status: COMPLETED | OUTPATIENT
Start: 2019-10-19 | End: 2019-10-19

## 2019-10-19 RX ORDER — NICOTINE POLACRILEX 4 MG
15 LOZENGE BUCCAL
Status: DISCONTINUED | OUTPATIENT
Start: 2019-10-19 | End: 2019-10-21 | Stop reason: HOSPADM

## 2019-10-19 RX ORDER — SENNA AND DOCUSATE SODIUM 50; 8.6 MG/1; MG/1
2 TABLET, FILM COATED ORAL 2 TIMES DAILY
Status: DISCONTINUED | OUTPATIENT
Start: 2019-10-19 | End: 2019-10-21 | Stop reason: HOSPADM

## 2019-10-19 RX ORDER — ACETAMINOPHEN 650 MG/1
650 SUPPOSITORY RECTAL EVERY 4 HOURS PRN
Status: DISCONTINUED | OUTPATIENT
Start: 2019-10-19 | End: 2019-10-21 | Stop reason: HOSPADM

## 2019-10-19 RX ORDER — SODIUM CHLORIDE 0.9 % (FLUSH) 0.9 %
10 SYRINGE (ML) INJECTION AS NEEDED
Status: DISCONTINUED | OUTPATIENT
Start: 2019-10-19 | End: 2019-10-19 | Stop reason: HOSPADM

## 2019-10-19 RX ORDER — SODIUM CHLORIDE 9 MG/ML
150 INJECTION, SOLUTION INTRAVENOUS CONTINUOUS
Status: DISCONTINUED | OUTPATIENT
Start: 2019-10-19 | End: 2019-10-20

## 2019-10-19 RX ORDER — ONDANSETRON 2 MG/ML
4 INJECTION INTRAMUSCULAR; INTRAVENOUS EVERY 6 HOURS PRN
Status: DISCONTINUED | OUTPATIENT
Start: 2019-10-19 | End: 2019-10-21 | Stop reason: HOSPADM

## 2019-10-19 RX ORDER — NITROGLYCERIN 0.4 MG/1
0.4 TABLET SUBLINGUAL
Status: DISCONTINUED | OUTPATIENT
Start: 2019-10-19 | End: 2019-10-21 | Stop reason: HOSPADM

## 2019-10-19 RX ORDER — OXYCODONE HYDROCHLORIDE 5 MG/1
5 TABLET ORAL ONCE
Status: COMPLETED | OUTPATIENT
Start: 2019-10-19 | End: 2019-10-19

## 2019-10-19 RX ORDER — ACETAMINOPHEN 325 MG/1
650 TABLET ORAL EVERY 4 HOURS PRN
Status: DISCONTINUED | OUTPATIENT
Start: 2019-10-19 | End: 2019-10-21 | Stop reason: HOSPADM

## 2019-10-19 RX ORDER — ASPIRIN 81 MG/1
81 TABLET ORAL DAILY
Status: DISCONTINUED | OUTPATIENT
Start: 2019-10-20 | End: 2019-10-21 | Stop reason: HOSPADM

## 2019-10-19 RX ORDER — ROSUVASTATIN CALCIUM 20 MG/1
20 TABLET, COATED ORAL NIGHTLY
Status: DISCONTINUED | OUTPATIENT
Start: 2019-10-19 | End: 2019-10-21 | Stop reason: HOSPADM

## 2019-10-19 RX ORDER — INSULIN GLARGINE 100 [IU]/ML
50 INJECTION, SOLUTION SUBCUTANEOUS DAILY
Status: DISCONTINUED | OUTPATIENT
Start: 2019-10-20 | End: 2019-10-21 | Stop reason: HOSPADM

## 2019-10-19 RX ADMIN — ROSUVASTATIN CALCIUM 20 MG: 20 TABLET, FILM COATED ORAL at 22:38

## 2019-10-19 RX ADMIN — ENOXAPARIN SODIUM 40 MG: 40 INJECTION SUBCUTANEOUS at 18:44

## 2019-10-19 RX ADMIN — OXYCODONE HYDROCHLORIDE 5 MG: 5 TABLET ORAL at 16:40

## 2019-10-19 RX ADMIN — METOPROLOL TARTRATE 100 MG: 100 TABLET, FILM COATED ORAL at 22:38

## 2019-10-19 RX ADMIN — IOPAMIDOL 150 ML: 755 INJECTION, SOLUTION INTRAVENOUS at 14:39

## 2019-10-19 RX ADMIN — PRAMIPEXOLE DIHYDROCHLORIDE 1 MG: 1 TABLET ORAL at 22:38

## 2019-10-19 RX ADMIN — APIXABAN 2.5 MG: 2.5 TABLET, FILM COATED ORAL at 22:38

## 2019-10-19 RX ADMIN — ONDANSETRON 4 MG: 2 INJECTION INTRAMUSCULAR; INTRAVENOUS at 13:28

## 2019-10-19 RX ADMIN — OXYCODONE HYDROCHLORIDE AND ACETAMINOPHEN 1 TABLET: 10; 325 TABLET ORAL at 21:41

## 2019-10-19 RX ADMIN — VANCOMYCIN HYDROCHLORIDE 1500 MG: 10 INJECTION, POWDER, LYOPHILIZED, FOR SOLUTION INTRAVENOUS at 14:45

## 2019-10-19 RX ADMIN — SODIUM CHLORIDE 150 ML/HR: 9 INJECTION, SOLUTION INTRAVENOUS at 18:44

## 2019-10-19 RX ADMIN — ESCITALOPRAM 10 MG: 10 TABLET, FILM COATED ORAL at 22:38

## 2019-10-19 RX ADMIN — CEFEPIME HYDROCHLORIDE 2 G: 2 INJECTION, POWDER, FOR SOLUTION INTRAVENOUS at 13:28

## 2019-10-19 NOTE — PROGRESS NOTES
Pharmacy Consult - [facility]    Marilyn J Pumphrey has been consulted for pharmacy to dose enoxaparin for venous thromboembolism prophylaxis per Dr Koo's request.       Relevant clinical data and objective history reviewed:  73 y.o. female   87.5 kg (192 lb 14.4 oz)    Home Anticoagulation: Apixaban--currently held     Past Medical History:   Diagnosis Date   • Anxiety    • Arthritis    • Bone pain    • Cancer (CMS/[facility]) 09/2016    THYROID   • Depression    • Disease of thyroid gland    • DM type 2 (diabetes mellitus, type 2) (CMS/[facility])     TYPE 2   • GERD (gastroesophageal reflux disease)    • Hypertension    • Irregular heart beat    • Osteoarthritis    • PONV (postoperative nausea and vomiting)     DOES WELL WITH PROPOFOL   • Sleep apnea     C-PAP IN USE     is allergic to codeine.    Lab Results   Component Value Date    WBC 13.90 (H) 10/19/2019    HGB 13.5 10/19/2019    HCT 41.0 10/19/2019    MCV 81.0 10/19/2019     10/19/2019     Lab Results   Component Value Date    GLUCOSE 139 (H) 10/19/2019    CALCIUM 11.4 (H) 10/19/2019     (L) 10/19/2019    K 3.9 10/19/2019    CO2 28.0 10/19/2019    CL 87 (L) 10/19/2019    BUN 16 10/19/2019    CREATININE 1.10 (H) 10/19/2019    EGFRIFAFRI >60 02/14/2018    EGFRIFNONA 49 (L) 10/19/2019    BCR 14.5 10/19/2019    ANIONGAP 18.0 (H) 10/19/2019       Estimated Creatinine Clearance: 52.5 mL/min (A) (by C-G formula based on SCr of 1.1 mg/dL (H)).    Active Inpatient Anticoagulation Orders: None    Assessment/Plan    Will start patient on 40 mg subcutaneous every 24 hours, adjusted for renal function. Pharmacy will continue to follow.     Meredith Harmon [facility]

## 2019-10-19 NOTE — PLAN OF CARE
Problem: Patient Care Overview  Goal: Plan of Care Review  Outcome: Ongoing (interventions implemented as appropriate)   10/19/19 6441   Coping/Psychosocial   Plan of Care Reviewed With patient   Plan of Care Review   Progress no change   OTHER   Outcome Summary DA from Rk, c/o pain left knee, 3L NC, no falls, await admission orders, continue to monitor       Problem: Sepsis/Septic Shock (Adult)  Goal: Signs and Symptoms of Listed Potential Problems Will be Absent, Minimized or Managed (Sepsis/Septic Shock)  Outcome: Ongoing (interventions implemented as appropriate)      Problem: Pain, Acute (Adult)  Goal: Identify Related Risk Factors and Signs and Symptoms  Outcome: Ongoing (interventions implemented as appropriate)    Goal: Acceptable Pain Control/Comfort Level  Outcome: Ongoing (interventions implemented as appropriate)

## 2019-10-20 ENCOUNTER — APPOINTMENT (OUTPATIENT)
Dept: CARDIOLOGY | Facility: HOSPITAL | Age: 74
End: 2019-10-20

## 2019-10-20 ENCOUNTER — READMISSION MANAGEMENT (OUTPATIENT)
Dept: CALL CENTER | Facility: HOSPITAL | Age: 74
End: 2019-10-20

## 2019-10-20 PROBLEM — E83.52 HYPERCALCEMIA: Status: RESOLVED | Noted: 2019-10-19 | Resolved: 2019-10-20

## 2019-10-20 PROBLEM — Z96.659 HX OF TOTAL KNEE ARTHROPLASTY: Status: ACTIVE | Noted: 2019-10-20

## 2019-10-20 PROBLEM — E86.0 DEHYDRATION: Status: RESOLVED | Noted: 2019-10-19 | Resolved: 2019-10-20

## 2019-10-20 PROBLEM — J98.11 ATELECTASIS: Status: ACTIVE | Noted: 2019-10-20

## 2019-10-20 PROBLEM — J96.01 ACUTE RESPIRATORY FAILURE WITH HYPOXIA (HCC): Status: ACTIVE | Noted: 2019-10-20

## 2019-10-20 LAB
25(OH)D3 SERPL-MCNC: 42.6 NG/ML (ref 30–100)
ALBUMIN SERPL-MCNC: 2.9 G/DL (ref 3.5–5.2)
ALBUMIN/GLOB SERPL: 0.8 G/DL
ALP SERPL-CCNC: 217 U/L (ref 39–117)
ALT SERPL W P-5'-P-CCNC: 49 U/L (ref 1–33)
ANION GAP SERPL CALCULATED.3IONS-SCNC: 12.1 MMOL/L (ref 5–15)
AST SERPL-CCNC: 37 U/L (ref 1–32)
BASOPHILS # BLD AUTO: 0.03 10*3/MM3 (ref 0–0.2)
BASOPHILS NFR BLD AUTO: 0.3 % (ref 0–1.5)
BH CV LOWER VASCULAR LEFT COMMON FEMORAL AUGMENT: NORMAL
BH CV LOWER VASCULAR LEFT COMMON FEMORAL COMPETENT: NORMAL
BH CV LOWER VASCULAR LEFT COMMON FEMORAL COMPRESS: NORMAL
BH CV LOWER VASCULAR LEFT COMMON FEMORAL PHASIC: NORMAL
BH CV LOWER VASCULAR LEFT COMMON FEMORAL SPONT: NORMAL
BH CV LOWER VASCULAR LEFT DISTAL FEMORAL COMPRESS: NORMAL
BH CV LOWER VASCULAR LEFT GASTRONEMIUS COMPRESS: NORMAL
BH CV LOWER VASCULAR LEFT GREATER SAPH AK COMPRESS: NORMAL
BH CV LOWER VASCULAR LEFT GREATER SAPH BK COMPRESS: NORMAL
BH CV LOWER VASCULAR LEFT MID FEMORAL AUGMENT: NORMAL
BH CV LOWER VASCULAR LEFT MID FEMORAL COMPETENT: NORMAL
BH CV LOWER VASCULAR LEFT MID FEMORAL COMPRESS: NORMAL
BH CV LOWER VASCULAR LEFT MID FEMORAL PHASIC: NORMAL
BH CV LOWER VASCULAR LEFT MID FEMORAL SPONT: NORMAL
BH CV LOWER VASCULAR LEFT PERONEAL COMPRESS: NORMAL
BH CV LOWER VASCULAR LEFT POPLITEAL AUGMENT: NORMAL
BH CV LOWER VASCULAR LEFT POPLITEAL COMPETENT: NORMAL
BH CV LOWER VASCULAR LEFT POPLITEAL COMPRESS: NORMAL
BH CV LOWER VASCULAR LEFT POPLITEAL PHASIC: NORMAL
BH CV LOWER VASCULAR LEFT POPLITEAL SPONT: NORMAL
BH CV LOWER VASCULAR LEFT POSTERIOR TIBIAL COMPRESS: NORMAL
BH CV LOWER VASCULAR LEFT PROFUNDA FEMORAL COMPRESS: NORMAL
BH CV LOWER VASCULAR LEFT PROXIMAL FEMORAL COMPRESS: NORMAL
BH CV LOWER VASCULAR LEFT SAPHENOFEMORAL JUNCTION AUGMENT: NORMAL
BH CV LOWER VASCULAR LEFT SAPHENOFEMORAL JUNCTION COMPETENT: NORMAL
BH CV LOWER VASCULAR LEFT SAPHENOFEMORAL JUNCTION COMPRESS: NORMAL
BH CV LOWER VASCULAR LEFT SAPHENOFEMORAL JUNCTION PHASIC: NORMAL
BH CV LOWER VASCULAR LEFT SAPHENOFEMORAL JUNCTION SPONT: NORMAL
BH CV LOWER VASCULAR RIGHT COMMON FEMORAL AUGMENT: NORMAL
BH CV LOWER VASCULAR RIGHT COMMON FEMORAL COMPETENT: NORMAL
BH CV LOWER VASCULAR RIGHT COMMON FEMORAL COMPRESS: NORMAL
BH CV LOWER VASCULAR RIGHT COMMON FEMORAL PHASIC: NORMAL
BH CV LOWER VASCULAR RIGHT COMMON FEMORAL SPONT: NORMAL
BILIRUB SERPL-MCNC: 0.7 MG/DL (ref 0.2–1.2)
BUN BLD-MCNC: 13 MG/DL (ref 8–23)
BUN/CREAT SERPL: 14.6 (ref 7–25)
CALCIUM SPEC-SCNC: 10.2 MG/DL (ref 8.6–10.5)
CHLORIDE SERPL-SCNC: 93 MMOL/L (ref 98–107)
CO2 SERPL-SCNC: 31.9 MMOL/L (ref 22–29)
CREAT BLD-MCNC: 0.89 MG/DL (ref 0.57–1)
CRP SERPL-MCNC: 19.65 MG/DL (ref 0–0.5)
D-LACTATE SERPL-SCNC: 1.3 MMOL/L (ref 0.5–2)
DEPRECATED RDW RBC AUTO: 43.7 FL (ref 37–54)
EOSINOPHIL # BLD AUTO: 0.34 10*3/MM3 (ref 0–0.4)
EOSINOPHIL NFR BLD AUTO: 3.3 % (ref 0.3–6.2)
ERYTHROCYTE [DISTWIDTH] IN BLOOD BY AUTOMATED COUNT: 15.2 % (ref 12.3–15.4)
GFR SERPL CREATININE-BSD FRML MDRD: 62 ML/MIN/1.73
GLOBULIN UR ELPH-MCNC: 3.6 GM/DL
GLUCOSE BLD-MCNC: 86 MG/DL (ref 65–99)
GLUCOSE BLDC GLUCOMTR-MCNC: 142 MG/DL (ref 70–130)
GLUCOSE BLDC GLUCOMTR-MCNC: 188 MG/DL (ref 70–130)
GLUCOSE BLDC GLUCOMTR-MCNC: 82 MG/DL (ref 70–130)
GLUCOSE BLDC GLUCOMTR-MCNC: 86 MG/DL (ref 70–130)
HCT VFR BLD AUTO: 36.9 % (ref 34–46.6)
HGB BLD-MCNC: 11.8 G/DL (ref 12–15.9)
IMM GRANULOCYTES # BLD AUTO: 0.06 10*3/MM3 (ref 0–0.05)
IMM GRANULOCYTES NFR BLD AUTO: 0.6 % (ref 0–0.5)
LYMPHOCYTES # BLD AUTO: 2.01 10*3/MM3 (ref 0.7–3.1)
LYMPHOCYTES NFR BLD AUTO: 19.8 % (ref 19.6–45.3)
MAGNESIUM SERPL-MCNC: 1.9 MG/DL (ref 1.6–2.4)
MCH RBC QN AUTO: 25.8 PG (ref 26.6–33)
MCHC RBC AUTO-ENTMCNC: 32 G/DL (ref 31.5–35.7)
MCV RBC AUTO: 80.6 FL (ref 79–97)
MONOCYTES # BLD AUTO: 1.65 10*3/MM3 (ref 0.1–0.9)
MONOCYTES NFR BLD AUTO: 16.3 % (ref 5–12)
NEUTROPHILS # BLD AUTO: 6.06 10*3/MM3 (ref 1.7–7)
NEUTROPHILS NFR BLD AUTO: 59.7 % (ref 42.7–76)
NRBC BLD AUTO-RTO: 0 /100 WBC (ref 0–0.2)
PLATELET # BLD AUTO: 247 10*3/MM3 (ref 140–450)
PMV BLD AUTO: 11.3 FL (ref 6–12)
POTASSIUM BLD-SCNC: 4.6 MMOL/L (ref 3.5–5.2)
PROCALCITONIN SERPL-MCNC: 0.25 NG/ML (ref 0.1–0.25)
PROT SERPL-MCNC: 6.5 G/DL (ref 6–8.5)
RBC # BLD AUTO: 4.58 10*6/MM3 (ref 3.77–5.28)
SODIUM BLD-SCNC: 137 MMOL/L (ref 136–145)
WBC NRBC COR # BLD: 10.15 10*3/MM3 (ref 3.4–10.8)

## 2019-10-20 PROCEDURE — 97162 PT EVAL MOD COMPLEX 30 MIN: CPT

## 2019-10-20 PROCEDURE — G0378 HOSPITAL OBSERVATION PER HR: HCPCS

## 2019-10-20 PROCEDURE — 85025 COMPLETE CBC W/AUTO DIFF WBC: CPT | Performed by: HOSPITALIST

## 2019-10-20 PROCEDURE — 80053 COMPREHEN METABOLIC PANEL: CPT | Performed by: HOSPITALIST

## 2019-10-20 PROCEDURE — 94799 UNLISTED PULMONARY SVC/PX: CPT

## 2019-10-20 PROCEDURE — 93971 EXTREMITY STUDY: CPT

## 2019-10-20 PROCEDURE — 96361 HYDRATE IV INFUSION ADD-ON: CPT

## 2019-10-20 PROCEDURE — 94640 AIRWAY INHALATION TREATMENT: CPT

## 2019-10-20 PROCEDURE — 82962 GLUCOSE BLOOD TEST: CPT

## 2019-10-20 PROCEDURE — 97116 GAIT TRAINING THERAPY: CPT

## 2019-10-20 PROCEDURE — 63710000001 INSULIN LISPRO (HUMAN) PER 5 UNITS: Performed by: HOSPITALIST

## 2019-10-20 PROCEDURE — 97110 THERAPEUTIC EXERCISES: CPT

## 2019-10-20 PROCEDURE — 63710000001 INSULIN GLARGINE PER 5 UNITS: Performed by: HOSPITALIST

## 2019-10-20 PROCEDURE — 83605 ASSAY OF LACTIC ACID: CPT | Performed by: HOSPITALIST

## 2019-10-20 PROCEDURE — 83735 ASSAY OF MAGNESIUM: CPT | Performed by: HOSPITALIST

## 2019-10-20 PROCEDURE — 84145 PROCALCITONIN (PCT): CPT | Performed by: HOSPITALIST

## 2019-10-20 PROCEDURE — 86140 C-REACTIVE PROTEIN: CPT | Performed by: HOSPITALIST

## 2019-10-20 PROCEDURE — 82306 VITAMIN D 25 HYDROXY: CPT | Performed by: HOSPITALIST

## 2019-10-20 RX ORDER — ALBUTEROL SULFATE 1.25 MG/3ML
1.25 SOLUTION RESPIRATORY (INHALATION)
Status: DISCONTINUED | OUTPATIENT
Start: 2019-10-20 | End: 2019-10-21 | Stop reason: HOSPADM

## 2019-10-20 RX ORDER — SODIUM CHLORIDE 9 MG/ML
100 INJECTION, SOLUTION INTRAVENOUS CONTINUOUS
Status: DISCONTINUED | OUTPATIENT
Start: 2019-10-20 | End: 2019-10-20

## 2019-10-20 RX ADMIN — CLORAZEPATE DIPOTASSIUM 7.5 MG: 7.5 TABLET ORAL at 08:58

## 2019-10-20 RX ADMIN — APIXABAN 2.5 MG: 2.5 TABLET, FILM COATED ORAL at 20:44

## 2019-10-20 RX ADMIN — OXYCODONE HYDROCHLORIDE AND ACETAMINOPHEN 1 TABLET: 10; 325 TABLET ORAL at 08:57

## 2019-10-20 RX ADMIN — ALBUTEROL SULFATE 1.25 MG: 1.25 SOLUTION RESPIRATORY (INHALATION) at 20:31

## 2019-10-20 RX ADMIN — METOPROLOL TARTRATE 100 MG: 100 TABLET, FILM COATED ORAL at 08:58

## 2019-10-20 RX ADMIN — PRAMIPEXOLE DIHYDROCHLORIDE 1 MG: 1 TABLET ORAL at 20:43

## 2019-10-20 RX ADMIN — OXYCODONE HYDROCHLORIDE AND ACETAMINOPHEN 1 TABLET: 10; 325 TABLET ORAL at 20:43

## 2019-10-20 RX ADMIN — AMLODIPINE BESYLATE: 10 TABLET ORAL at 08:58

## 2019-10-20 RX ADMIN — LEVOTHYROXINE SODIUM 137 MCG: 137 TABLET ORAL at 06:50

## 2019-10-20 RX ADMIN — OXYCODONE HYDROCHLORIDE AND ACETAMINOPHEN 1 TABLET: 10; 325 TABLET ORAL at 02:18

## 2019-10-20 RX ADMIN — ESCITALOPRAM 10 MG: 10 TABLET, FILM COATED ORAL at 17:50

## 2019-10-20 RX ADMIN — ALBUTEROL SULFATE 1.25 MG: 1.25 SOLUTION RESPIRATORY (INHALATION) at 15:52

## 2019-10-20 RX ADMIN — ROSUVASTATIN CALCIUM 20 MG: 20 TABLET, FILM COATED ORAL at 20:44

## 2019-10-20 RX ADMIN — APIXABAN 2.5 MG: 2.5 TABLET, FILM COATED ORAL at 08:58

## 2019-10-20 RX ADMIN — OXYCODONE HYDROCHLORIDE AND ACETAMINOPHEN 1 TABLET: 10; 325 TABLET ORAL at 14:25

## 2019-10-20 RX ADMIN — PANTOPRAZOLE SODIUM 40 MG: 40 TABLET, DELAYED RELEASE ORAL at 06:50

## 2019-10-20 RX ADMIN — ASPIRIN 81 MG: 81 TABLET, COATED ORAL at 08:58

## 2019-10-20 RX ADMIN — INSULIN LISPRO 2 UNITS: 100 INJECTION, SOLUTION INTRAVENOUS; SUBCUTANEOUS at 17:49

## 2019-10-20 RX ADMIN — SENNOSIDES AND DOCUSATE SODIUM 2 TABLET: 8.6; 5 TABLET ORAL at 20:43

## 2019-10-20 RX ADMIN — INSULIN GLARGINE 50 UNITS: 100 INJECTION, SOLUTION SUBCUTANEOUS at 08:58

## 2019-10-20 RX ADMIN — PRAMIPEXOLE DIHYDROCHLORIDE 1 MG: 1 TABLET ORAL at 08:58

## 2019-10-20 RX ADMIN — METOPROLOL TARTRATE 100 MG: 100 TABLET, FILM COATED ORAL at 20:43

## 2019-10-20 NOTE — PLAN OF CARE
Problem: Patient Care Overview  Goal: Plan of Care Review  Outcome: Ongoing (interventions implemented as appropriate)   10/20/19 1828   Coping/Psychosocial   Plan of Care Reviewed With patient   Plan of Care Review   Progress improving   OTHER   Outcome Summary Requiring 3-5L O2 with CPAP. O2 drops to 79% easily. C/o pain in throat and left knee. Ice applied and prn medications given. ENT and ortho consult. Up with assist to BSC, stand and pivot. Lactic normal. IVF. vss. will continue to monitor.

## 2019-10-20 NOTE — H&P
Patient Name:  Marilyn J Pumphrey  YOB: 1945  MRN:  [id number]  Admit Date:  10/19/2019  Patient Care Team:  Suma Campbell APRN as PCP - General  Lisa Wells MD as PCP - Claims Attributed  Suma Campbell APRN as PCP - Family Medicine      Subjective   History Present Illness     No chief complaint on file.      Ms. Pumphrey is a 73 y.o. non-smoker who  has a past medical history of Anxiety, Arthritis, Bone pain, Cancer (CMS/[facility]ly [facility]) (09/2016), Depression, Disease of thyroid gland, DM type 2 (diabetes mellitus, type 2) (CMS/[facility]), GERD (gastroesophageal reflux disease), Hypertension, Irregular heart beat, Osteoarthritis, PONV (postoperative nausea and vomiting), and Sleep apnea. that presents to [facility] complaining of throat pain.  She initially was seen at [facility] and sent here for further evaluation by her orthopedic surgeon.  She had left knee replacement surgery last week by Dr. Sanjay Powell.  She was discharged after an uneventful hospital stay.  She says she had some discomfort in her throat felt due to ET tube from surgery.  She has been able to swallow but has had increasing pain and discomfort.  She has regurgitation of food intermittently.  She is able to tolerate some soft food and liquids.  No fever or chills.  No sick contacts or other respiratory symptoms.      History of Present Illness  Review of Systems   Constitutional: Negative.    HENT: Positive for sore throat and trouble swallowing.    Respiratory: Negative.  Negative for shortness of breath.    Cardiovascular: Negative.  Negative for chest pain.   Gastrointestinal: Negative.    Endocrine: Negative.    Genitourinary: Negative.    Musculoskeletal: Positive for joint swelling.   Skin: Negative.    Neurological: Negative.    Hematological: Negative.    Psychiatric/Behavioral: Negative.         Personal History     Past Medical History:   Diagnosis Date   • Anxiety    • Arthritis     • Bone pain    • Cancer (CMS/HCC) 09/2016    THYROID   • Depression    • Disease of thyroid gland    • DM type 2 (diabetes mellitus, type 2) (CMS/HCC)     TYPE 2   • GERD (gastroesophageal reflux disease)    • Hypertension    • Irregular heart beat    • Osteoarthritis    • PONV (postoperative nausea and vomiting)     DOES WELL WITH PROPOFOL   • Sleep apnea     C-PAP IN USE     Past Surgical History:   Procedure Laterality Date   • CARPAL TUNNEL RELEASE     • CHOLECYSTECTOMY  1974   • HYSTERECTOMY  1993    partial abdominal hysterectomy   • OOPHORECTOMY Bilateral 2015   • THYROID SURGERY      2 thyroid surgery   • TONSILLECTOMY     • TOTAL KNEE ARTHROPLASTY Left 10/14/2019    Procedure: TOTAL KNEE ARTHROPLASTY;  Surgeon: Sanjay Collado MD;  Location: [facility] OR;  Service: Orthopedics   • VEIN LIGATION AND STRIPPING BILATERAL       Family History   Problem Relation Age of Onset   • Diabetes Father    • Heart disease Father    • Hypertension Father    • Hyperlipidemia Father    • Thyroid disease Father    • Cancer Sister         lung cancer   • Thyroid disease Sister    • Diabetes Brother    • Heart disease Brother    • Cancer Brother         lung cancer     Social History     Tobacco Use   • Smoking status: Never Smoker   Substance Use Topics   • Alcohol use: No     Frequency: Never   • Drug use: No     Current Facility-Administered Medications on File Prior to Encounter   Medication Dose Route Frequency Provider Last Rate Last Dose   • [COMPLETED] ceFEPime (MAXIPIME) in SWFI 2g/10ml IV PUSH syringe  2 g Intravenous Once Abdiaziz Horton APRN   2 g at 10/19/19 1328   • [COMPLETED] iopamidol (ISOVUE-370) 76 % injection 100 mL  100 mL Intravenous Once in imaging Surinder Hortona APRN   150 mL at 10/19/19 1439   • [COMPLETED] ondansetron (ZOFRAN) injection 4 mg  4 mg Intravenous Once Surinder Hortona APRN   4 mg at 10/19/19 1328   • [COMPLETED] oxyCODONE (ROXICODONE) immediate release tablet 5 mg  5 mg Oral Once  Abdiaziz Horton APRN   5 mg at 10/19/19 1640   • [COMPLETED] vancomycin IVPB 1500 mg in 0.9% NaCl (Premix) 250 mL  20 mg/kg (Adjusted) Intravenous Once Abdiaziz Horton APRN   Stopped at 10/19/19 1650   • [DISCONTINUED] !Vancomycin Level Draw Needed   Does not apply Once Abdiaziz Horton APRN       • [DISCONTINUED] !Vancomycin Level Draw Needed   Does not apply Once Abdiaziz Horton APRN       • [DISCONTINUED] cefepime 2 gm IVPB in 100 ml NS (MBP)  2 g Intravenous Q12H Abdiaziz Horton APRN       • [DISCONTINUED] Pharmacy to dose vancomycin   Does not apply Continuous PRN Abdiaziz Horton APRN       • [DISCONTINUED] sodium chloride 0.9 % flush 10 mL  10 mL Intravenous PRN Abdiaziz Horton APRN       • [DISCONTINUED] vancomycin 1000 mg/250 mL 0.9% NS IVPB ([facility])  15 mg/kg (Adjusted) Intravenous Q24H Abdiaziz Horton APRN         Current Outpatient Medications on File Prior to Encounter   Medication Sig Dispense Refill   • amLODIPine-benazepril (LOTREL) 10-20 MG per capsule Take 1 capsule by mouth Every Evening.     • apixaban (ELIQUIS) 2.5 MG tablet tablet Take 1 tablet by mouth Every 12 (Twelve) Hours. 60 tablet 0   • aspirin 81 MG tablet Take 1 tablet by mouth Daily. 30 tablet 0   • calcitriol (ROCALTROL) 0.25 MCG capsule Take 0.25 mcg by mouth 2 (Two) Times a Day.     • CALCIUM CARBONATE-VIT D-MIN PO Take 1,200 mg by mouth 2 (Two) Times a Day.     • chlorthalidone (HYGROTON) 25 MG tablet Take 25 mg by mouth Daily.     • Cholecalciferol (VITAMIN D3) 28006 units capsule Take 50,000 Units by mouth Every 7 (Seven) Days. SUNDAY     • clorazepate (TRANXENE) 7.5 MG tablet Take 7.5 mg by mouth 2 (Two) Times a Day.  3   • escitalopram (LEXAPRO) 10 MG tablet Take 10 mg by mouth Every Evening.     • esomeprazole (nexIUM) 40 MG capsule Take 40 mg by mouth Every Evening.     • insulin aspart (NOVOLOG FLEXPEN) 100 UNIT/ML solution pen-injector sc pen Inject 10 units before each meal (Patient taking differently: 10  Units. Inject 10 units before each meal) 5 pen 4   • insulin glargine (LANTUS) 100 UNIT/ML injection Inject 50 Units under the skin into the appropriate area as directed Daily.     • levothyroxine (SYNTHROID) 137 MCG tablet Take 137 mcg by mouth Daily.     • magnesium oxide (MAG-OX) 400 tablet tablet Take 400 mg by mouth 2 (Two) Times a Day.     • metFORMIN (GLUCOPHAGE) 1000 MG tablet Take 1,000 mg by mouth 2 (Two) Times a Day With Meals.     • metoprolol tartrate (LOPRESSOR) 100 MG tablet Take 100 mg by mouth 2 (Two) Times a Day.     • oxyCODONE-acetaminophen (PERCOCET)  MG per tablet Take 1-2 tablets by mouth Every 4 (Four) Hours As Needed for Severe Pain  for up to 9 days. 56 tablet 0   • pramipexole (MIRAPEX) 1 MG tablet Take 1 mg by mouth 2 (Two) Times a Day.     • promethazine (PHENERGAN) 25 MG tablet Take 1 tablet by mouth As Needed.  1   • rosuvastatin (CRESTOR) 20 MG tablet Take 20 mg by mouth Every Night.     • SITagliptin (JANUVIA) 50 MG tablet Take 50 mg by mouth 2 (Two) Times a Day.     • Empagliflozin (JARDIANCE) 10 MG tablet Take 1 tablet by mouth Every Morning.     • lidocaine (LIDODERM) 5 % Place 1 patch on the skin as directed by provider Daily. Remove & Discard patch within 12 hours or as directed by MD (Patient taking differently: Place 1 patch on the skin as directed by provider As Needed. Remove & Discard patch within 12 hours or as directed by MD) 30 patch 0   • sennosides-docusate sodium (SENOKOT-S) 8.6-50 MG tablet Take 2 tablets by mouth 2 (Two) Times a Day. 60 tablet 1     Allergies   Allergen Reactions   • Codeine Shortness Of Breath       Objective    Objective     Vital Signs  Temp:  [99.1 °F (37.3 °C)-99.7 °F (37.6 °C)] 99.1 °F (37.3 °C)  Heart Rate:  [] 96  Resp:  [18] 18  BP: (111-131)/(44-64) 126/44  SpO2:  [89 %-96 %] 92 %  on  Flow (L/min):  [3-4] 3;   Device (Oxygen Therapy): nasal cannula  Body mass index is 30.21 kg/m².    Physical Exam   Constitutional: She is  oriented to person, place, and time. She appears well-developed and well-nourished. No distress.   HENT:   Head: Normocephalic and atraumatic.   Mouth/Throat: Mucous membranes are dry. Posterior oropharyngeal erythema present. No oropharyngeal exudate.   Eyes: Conjunctivae and EOM are normal. No scleral icterus.   Neck: Normal range of motion. No JVD present.   Cardiovascular: Normal rate and regular rhythm.   Pulmonary/Chest: Effort normal and breath sounds normal.   Abdominal: Soft. Bowel sounds are normal. She exhibits no distension. There is no tenderness.   Musculoskeletal: Normal range of motion. She exhibits no edema.   Left knee with expected postsurgical swelling, no obvious erythema.  Wound dressed and did not remove dressing.   Neurological: She is alert and oriented to person, place, and time.   Skin: Skin is warm and dry.   Psychiatric: She has a normal mood and affect. Her behavior is normal.   Nursing note and vitals reviewed.      Results Review:  I reviewed the patient's new clinical results.  I reviewed the patient's new imaging results and agree with the interpretation.  I reviewed the patient's other test results and agree with the interpretation  I personally viewed and interpreted the patient's EKG/Telemetry data  Discussed with ED provider.    Lab Results (last 24 hours)     Procedure Component Value Units Date/Time    POC Lactate [[id number]]  (Normal) Collected:  10/19/19 1304    Specimen:  Blood Updated:  10/19/19 1306     Lactate 2.0 mmol/L      Comment: Serial Number: [id number]:  [id number]       CBC & Differential [[id number]] Collected:  10/19/19 1308    Specimen:  Blood Updated:  10/19/19 1320    Narrative:       The following orders were created for panel order CBC & Differential.  Procedure                               Abnormality         Status                     ---------                               -----------         ------                     CBC Auto  Differential[[id number]]        Abnormal            Final result                 Please view results for these tests on the individual orders.    Comprehensive Metabolic Panel [[id number]]  (Abnormal) Collected:  10/19/19 1308    Specimen:  Blood Updated:  10/19/19 1346     Glucose 139 mg/dL      BUN 16 mg/dL      Creatinine 1.10 mg/dL      Sodium 133 mmol/L      Potassium 3.9 mmol/L      Chloride 87 mmol/L      CO2 28.0 mmol/L      Calcium 11.4 mg/dL      Total Protein 6.8 g/dL      Albumin 3.30 g/dL      ALT (SGPT) 65 U/L      AST (SGOT) 50 U/L      Alkaline Phosphatase 271 U/L      Total Bilirubin 1.0 mg/dL      eGFR Non African Amer 49 mL/min/1.73      Globulin 3.5 gm/dL      A/G Ratio 0.9 g/dL      BUN/Creatinine Ratio 14.5     Anion Gap 18.0 mmol/L     Narrative:       GFR Normal >60  Chronic Kidney Disease <60  Kidney Failure <15    Protime-INR [[id number]]  (Abnormal) Collected:  10/19/19 1308    Specimen:  Blood Updated:  10/19/19 1324     Protime 12.1 Seconds      INR 1.19    Blood Culture - Blood, Arm, Left [[id number]] Collected:  10/19/19 1308    Specimen:  Blood from Arm, Left Updated:  10/19/19 1317    CBC Auto Differential [[id number]]  (Abnormal) Collected:  10/19/19 1308    Specimen:  Blood Updated:  10/19/19 1320     WBC 13.90 10*3/mm3      RBC 5.06 10*6/mm3      Hemoglobin 13.5 g/dL      Hematocrit 41.0 %      MCV 81.0 fL      MCH 26.7 pg      MCHC 33.0 g/dL      RDW 16.7 %      RDW-SD 47.3 fl      MPV 9.5 fL      Platelets 258 10*3/mm3      Neutrophil % 77.5 %      Lymphocyte % 10.3 %      Monocyte % 9.0 %      Eosinophil % 2.3 %      Basophil % 0.9 %      Neutrophils, Absolute 10.80 10*3/mm3      Lymphocytes, Absolute 1.40 10*3/mm3      Monocytes, Absolute 1.20 10*3/mm3      Eosinophils, Absolute 0.30 10*3/mm3      Basophils, Absolute 0.10 10*3/mm3      nRBC 0.2 /100 WBC     Sedimentation Rate [[id number]]  (Abnormal) Collected:  10/19/19 1308    Specimen:  Blood Updated:  10/19/19 1614     Sed Rate  "54 mm/hr     C-reactive Protein [[id number]]  (Abnormal) Collected:  10/19/19 1308    Specimen:  Blood Updated:  10/19/19 1549     C-Reactive Protein 17.57 mg/dL     Procalcitonin [[id number]]  (Normal) Collected:  10/19/19 1308    Specimen:  Blood Updated:  10/19/19 1618     Procalcitonin 0.23 ng/mL     Narrative:       As a Marker for Sepsis (Non-Neonates):   1. <0.5 ng/mL represents a low risk of severe sepsis and/or septic shock.  1. >2 ng/mL represents a high risk of severe sepsis and/or septic shock.    As a Marker for Lower Respiratory Tract Infections that require antibiotic therapy:  PCT on Admission     Antibiotic Therapy             6-12 Hrs later  > 0.5                Strongly Recommended            >0.25 - <0.5         Recommended  0.1 - 0.25           Discouraged                   Remeasure/reassess PCT  <0.1                 Strongly Discouraged          Remeasure/reassess PCT      As 28 day mortality risk marker: \"Change in Procalcitonin Result\" (> 80 % or <=80 %) if Day 0 (or Day 1) and Day 4 values are available. Refer to http://www.[employer]-pct-calculator.com/   Change in PCT <=80 %   A decrease of PCT levels below or equal to 80 % defines a positive change in PCT test result representing a higher risk for 28-day all-cause mortality of patients diagnosed with severe sepsis or septic shock.  Change in PCT > 80 %   A decrease of PCT levels of more than 80 % defines a negative change in PCT result representing a lower risk for 28-day all-cause mortality of patients diagnosed with severe sepsis or septic shock.                Urinalysis With Microscopic If Indicated (No Culture) - Urine, Clean Catch [[id number]]  (Abnormal) Collected:  10/19/19 1322    Specimen:  Urine, Clean Catch Updated:  10/19/19 1341     Color, UA Yellow     Appearance, UA Clear     pH, UA 6.0     Specific Gravity, UA 1.025     Glucose, UA >=1000 mg/dL (3+)     Ketones, UA 15 mg/dL (1+)     Bilirubin, UA Negative     Blood, UA Negative "     Protein, UA Negative     Leuk Esterase, UA Negative     Nitrite, UA Negative     Urobilinogen, UA 0.2 E.U./dL    Narrative:       Urine microscopic not indicated.    Blood Culture - Blood, Arm, Left [[id number]] Collected:  10/19/19 1322    Specimen:  Blood from Arm, Left Updated:  10/19/19 1339    POC Glucose Once [[id number]]  (Normal) Collected:  10/19/19 1835    Specimen:  Blood Updated:  10/19/19 1836     Glucose 98 mg/dL     Lactic Acid, Plasma [[id number]]  (Normal) Collected:  10/19/19 1838    Specimen:  Blood Updated:  10/19/19 1906     Lactate 0.9 mmol/L     POC Glucose Once [[id number]]  (Normal) Collected:  10/19/19 2029    Specimen:  Blood Updated:  10/19/19 2031     Glucose 91 mg/dL           Imaging Results (last 24 hours)     ** No results found for the last 24 hours. **            No orders to display        Assessment/Plan     Active Hospital Problems    Diagnosis POA   • **Sepsis (CMS/[facility]) [A41.9] Yes   • Impaired swallowing associated with throat pain [R13.10, R07.0] Yes   • Status post left knee replacement [Z96.652] Not Applicable   • Hypercalcemia [E83.52] Yes   • Dehydration [E86.0] Yes   • Abnormal LFTs [R94.5] Yes   • Rheumatoid arthritis (CMS/[facility]) [M06.9] Yes   • XAVI (obstructive sleep apnea) [G47.33] Yes   • CAD (coronary artery disease) [I25.10] Yes   • Type 2 diabetes mellitus without complications (CMS/[facility]) [E11.9] Yes   • Hypertension, benign [I10] Yes       73 y.o. female admitted with Sepsis (CMS/HCC).    · Was asked to admit patient here for concerns of sepsis at outside facility.  Work-up there was negative for source of infection and it was felt possible she had a postoperative knee infection.  Her knee seems stable to me with no obvious signs of infection.  Will have her surgeon evaluate in a.m.  · Received empiric cefepime and vancomycin at OSH.  She is hemodynamically stable currently and again no obvious source of infection.  Will hold on further antibiotics for now.  Repeat  labs in a.m.  · It seems her primary complaint revolves around pain in the right side of her throat that began following her surgery last week.  She has had difficulty swallowing with regurgitation of food.  Concern for possible injury associated with intubation for surgery.  Will ask ENT to evaluate.  Currently breathing easily and controlling secretions and tolerating soft diet.  Will hold off on any steroids.  · Elevated calcium may be due to her taking calcium supplements.  She is also on calcitriol and vitamin D.  Will discontinue her calcium and check a vitamin D level.  Will give IV fluids and monitor.  · Repeat LFTs.  History cholecystectomy.  · Eliquis for DVT prophylaxis.  · Full code.  · Discussed with patient, nursing staff and ED provider.      Chai Koo MD  [facility]  10/19/19  9:48 PM

## 2019-10-20 NOTE — THERAPY EVALUATION
Patient Name: Marilyn J Pumphrey  : 1945    MRN: [id number]                              Today's Date: 10/20/2019       Admit Date: 10/19/2019    Visit Dx: No diagnosis found.  Patient Active Problem List   Diagnosis   • Chronic pain   • Other long term (current) drug therapy   • Rheumatoid arthritis (CMS/Formerly [facility])   • Acute bronchitis   • Arm pain, right   • CAD (coronary artery disease)   • Chest pain   • Depression   • Diabetes 1.5, managed as type 1 (CMS/Formerly [facility])   • Edema of nasopharynx   • FH: thromboembolic disease   • Gastroesophageal reflux disease   • History of pulmonary embolism   • Hyperlipidemia   • Hypertension, benign   • Hypokalemia   • Hypothyroidism, postsurgical   • Injury of costal cartilage   • Malignant neoplasm of thyroid gland (CMS/Formerly [facility])   • XAVI (obstructive sleep apnea)   • Osteoarthritis   • Polyarthralgia   • Postsurgical hypoparathyroidism (CMS/Formerly [facility])   • Restless legs syndrome (RLS)   • Type 2 diabetes mellitus with hyperglycemia (CMS/Formerly [facility])   • Type 2 diabetes mellitus without complications (CMS/Formerly [facility])   • Wrist sprain   • Osteoarthritis of left knee   • Sepsis (CMS/Formerly [facility])   • Impaired swallowing associated with throat pain   • Status post left knee replacement   • Hypercalcemia   • Dehydration   • Abnormal LFTs     Past Medical History:   Diagnosis Date   • Anxiety    • Arthritis    • Bone pain    • Cancer (CMS/Formerly [facility]) 2016    THYROID   • Depression    • Disease of thyroid gland    • DM type 2 (diabetes mellitus, type 2) (CMS/Formerly [facility])     TYPE 2   • GERD (gastroesophageal reflux disease)    • Hypertension    • Irregular heart beat    • Osteoarthritis    • PONV (postoperative nausea and vomiting)     DOES WELL WITH PROPOFOL   • Sleep apnea     C-PAP IN USE     Past Surgical History:   Procedure Laterality Date   • CARPAL TUNNEL RELEASE     • CHOLECYSTECTOMY     • HYSTERECTOMY      partial abdominal hysterectomy   • OOPHORECTOMY Bilateral    • THYROID SURGERY      2 thyroid surgery   •  TONSILLECTOMY     • TOTAL KNEE ARTHROPLASTY Left 10/14/2019    Procedure: TOTAL KNEE ARTHROPLASTY;  Surgeon: Sanjay Collado MD;  Location: [facility] MAIN OR;  Service: Orthopedics   • VEIN LIGATION AND STRIPPING BILATERAL       General Information     Row Name 10/20/19 0918          PT Evaluation Time/Intention    Document Type  evaluation  -     Mode of Treatment  individual therapy;physical therapy  -     Row Name 10/20/19 0918          General Information    Patient Profile Reviewed?  yes  -     Prior Level of Function  independent:;all household mobility;ADL's  -     Existing Precautions/Restrictions  fall  -     Barriers to Rehab  none identified  -     Row Name 10/20/19 0918          Relationship/Environment    Lives With  alone  -     Row Name 10/20/19 0918          Resource/Environmental Concerns    Current Living Arrangements  home/apartment/condo  -     Row Name 10/20/19 0918          Home Main Entrance    Number of Stairs, Main Entrance  none  -     Row Name 10/20/19 0918          Stairs Within Home, Primary    Number of Stairs, Within Home, Primary  none  -     Row Name 10/20/19 0918          Cognitive Assessment/Intervention- PT/OT    Orientation Status (Cognition)  oriented x 4  -     Row Name 10/20/19 0918          Safety Issues, Functional Mobility    Impairments Affecting Function (Mobility)  balance;endurance/activity tolerance;pain;range of motion (ROM);strength  -       User Key  (r) = Recorded By, (t) = Taken By, (c) = Cosigned By    Initials Name Provider Type     Paula Garner, SAMANTA Physical Therapist        Mobility     Row Name 10/20/19 0919          Bed Mobility Assessment/Treatment    Bed Mobility Assessment/Treatment  scooting/bridging;supine-sit;sit-supine  -     Scooting/Bridging [place] (Bed Mobility)  minimum assist (75% patient effort)  -     Supine-Sit [place] (Bed Mobility)  minimum assist (75% patient effort)  -     Sit-Supine [place]  (Bed Mobility)  minimum assist (75% patient effort)  -     Assistive Device (Bed Mobility)  bed rails;head of bed elevated  -     Row Name 10/20/19 0919          Sit-Stand Transfer    Sit-Stand [place] (Transfers)  contact guard  -     Assistive Device (Sit-Stand Transfers)  walker, front-wheeled  -     Row Name 10/20/19 0919          Gait/Stairs Assessment/Training    Gait/Stairs Assessment/Training  gait/ambulation independence;gait/ambulation assistive device  -     [place] Level (Gait)  minimum assist (75% patient effort)  -     Assistive Device (Gait)  walker, front-wheeled  -     Pattern (Gait)  step-to  -     Deviations/Abnormal Patterns (Gait)  antalgic;stride length decreased;pipe decreased  -     Comment (Gait/Stairs)  Pt with significant increase in pain w/ ambulation. No LOB, Leni as distance increases. Need 4L O2 and cueing for proper breathing  -       User Key  (r) = Recorded By, (t) = Taken By, (c) = Cosigned By    Initials Name Provider Type     Paula Garner, PT Physical Therapist        Obj/Interventions     Row Name 10/20/19 0921          General ROM    GENERAL ROM COMMENTS  L knee flexion/ extension impaired  -[facility] Name 10/20/19 0921          MMT (Manual Muscle Testing)    General MMT Comments  R LE grossly WNL, L LE grossly 3-/5, L knee ext/flx 2/5  -     Row Name 10/20/19 0921          Therapeutic Exercise    Comment (Therapeutic Exercise)  Ankle pumps, QS, SLR, hip abduction  -     Row Name 10/20/19 0921          Static Sitting Balance    Level of [place] (Unsupported Sitting, Static Balance)  contact guard assist  -     Sitting Position (Unsupported Sitting, Static Balance)  sitting on edge of bed  -     Row Name 10/20/19 0921          Dynamic Sitting Balance    Level of [place], Reaches Outside Midline (Sitting, Dynamic Balance)  contact guard assist  -     Sitting Position, Reaches Outside Midline (Sitting, Dynamic Balance)   sitting on edge of bed  -     Row Name 10/20/19 0921          Static Standing Balance    Level of [place] (Supported Standing, Static Balance)  minimal assist, 75% patient effort  -     Assistive Device Utilized (Supported Standing, Static Balance)  walker, rolling  -     Row Name 10/20/19 0921          Dynamic Standing Balance    Level of [place], Reaches Outside Midline (Standing, Dynamic Balance)  minimal assist, 75% patient effort  -     Assistive Device Utilized (Supported Standing, Dynamic Balance)  walker, rolling  -       User Key  (r) = Recorded By, (t) = Taken By, (c) = Cosigned By    Initials Name Provider Type     Paula Garner, PT Physical Therapist        Goals/Plan     Row Name 10/20/19 0922          Bed Mobility Goal 1 (PT)    Activity/Assistive Device (Bed Mobility Goal 1, PT)  bed mobility activities, all  -JH     [place] Level/Cues Needed (Bed Mobility Goal 1, PT)  supervision required;verbal cues required  -JH     Time Frame (Bed Mobility Goal 1, PT)  1 week  -     Row Name 10/20/19 0922          Transfer Goal 1 (PT)    Activity/Assistive Device (Transfer Goal 1, PT)  transfers, all  -JH     [place] Level/Cues Needed (Transfer Goal 1, PT)  supervision required;standby assist  -JH     Time Frame (Transfer Goal 1, PT)  1 week  -     Row Name 10/20/19 0922          Gait Training Goal 1 (PT)    Activity/Assistive Device (Gait Training Goal 1, PT)  gait (walking locomotion);assistive device use;walker, rolling  -     [place] Level (Gait Training Goal 1, PT)  standby assist  -     Distance (Gait Goal 1, PT)  150ft  -JH     Time Frame (Gait Training Goal 1, PT)  1 week  -       User Key  (r) = Recorded By, (t) = Taken By, (c) = Cosigned By    Initials Name Provider Type     Paula Garner, PT Physical Therapist        Clinical Impression     Row Name 10/20/19 0922          Pain Assessment    Additional Documentation  Pain Scale: Numbers  Pre/Post-Treatment (Group)  -     Row Name 10/20/19 0922          Pain Scale: Numbers Pre/Post-Treatment    Pain Scale: Numbers, Pretreatment  7/10  -     Pain Scale: Numbers, Post-Treatment  9/10  -     Pain Location - Side  Left  -     Pain Location  knee  -     Pain Intervention(s)  Repositioned;Rest;Cold pack  -     Row Name 10/20/19 0922          Plan of Care Review    Plan of Care Reviewed With  patient  -     Row Name 10/20/19 0922          Physical Therapy Clinical Impression    Patient/Family Goals Statement (PT Clinical Impression)  return home to [facility]  -     Criteria for Skilled Interventions Met (PT Clinical Impression)  yes;treatment indicated  -     Rehab Potential (PT Clinical Summary)  good, to achieve stated therapy goals  -     Row Name 10/20/19 0922          Positioning and Restraints    Pre-Treatment Position  in bed  -     Post Treatment Position  bed  -     In Bed  notified nsg;supine;call light within reach;encouraged to call for assist;exit alarm on  -       User Key  (r) = Recorded By, (t) = Taken By, (c) = Cosigned By    Initials Name Provider Type     Paula Garner, PT Physical Therapist        Outcome Measures     Row Name 10/20/19 0924          How much help from another person do you currently need...    Turning from your back to your side while in flat bed without using bedrails?  3  -JH     Moving from lying on back to sitting on the side of a flat bed without bedrails?  3  -JH     Moving to and from a bed to a chair (including a wheelchair)?  3  -JH     Standing up from a chair using your arms (e.g., wheelchair, bedside chair)?  3  -JH     Climbing 3-5 steps with a railing?  2  -JH     To walk in hospital room?  3  -     AM-PAC 6 Clicks Score (PT)  17  -     Row Name 10/20/19 0924          Functional Assessment    Outcome Measure Options  AM-PAC 6 Clicks Basic Mobility (PT)  -       User Key  (r) = Recorded By, (t) = Taken By, (c) = Cosigned By     Initials Name Provider Type     Paula Garner, SAMANTA Physical Therapist        Physical Therapy Education     Title: PT OT SLP Therapies (Done)     Topic: Physical Therapy (Done)     Point: Mobility training (Done)     Learning Progress Summary           Patient Acceptance, E,TB, VU,NR by  at 10/20/2019  9:24 AM                   Point: Home exercise program (Done)     Learning Progress Summary           Patient Acceptance, E,TB, VU,NR by  at 10/20/2019  9:24 AM                   Point: Body mechanics (Done)     Learning Progress Summary           Patient Acceptance, E,TB, VU,NR by  at 10/20/2019  9:24 AM                   Point: Precautions (Done)     Learning Progress Summary           Patient Acceptance, E,TB, VU,NR by  at 10/20/2019  9:24 AM                               User Key     Initials Effective Dates Name Provider Type [facility] 03/13/19 -  Paula Garner, SAMANTA Physical Therapist PT              PT Recommendation and Plan     Outcome Summary/Treatment Plan (PT)  Anticipated Discharge Disposition (PT): home with assist, home with home health  Plan of Care Reviewed With: patient  Outcome Summary: Pt evaluated for PT need following admission for sepsis after recent L TKA. She lives alone in single story home w/ no steps to enter. Reports ind PLOF. At present needs Hugh for bed mobility and transfers, as well as short distance ambulation up to 60ftw/ fww. Pt educated on therex to perform throughout day while in bed to promote increased ROM and strength in L LE per TKA protocol. She will benefit from PT to address reduced activity tolerance and allow return to PLOF. When pt is medically stable she will likely be okay for [place] home w/ HH     Time Calculation:   PT Charges     Row Name 10/20/19 0927             Time Calculation    Start Time  0855  -      Stop Time  0918  -      Time Calculation (min)  23 min  -      PT Received On  10/20/19  -      PT - Next Appointment  10/21/19  -       PT Goal Re-Cert Due Date  10/27/19  -SARA        User Key  (r) = Recorded By, (t) = Taken By, (c) = Cosigned By    Initials Name Provider Type    Paula Ambriz, PT Physical Therapist        Therapy Charges for Today     Code Description Service Date Service Provider Modifiers Qty    62548508988 HC PT EVAL MOD COMPLEXITY 1 10/20/2019 Paula Garner, PT GP 1    98616904254 HC GAIT TRAINING EA 15 MIN 10/20/2019 Paula Garner, PT GP 1    68587258949 HC PT THER PROC EA 15 MIN 10/20/2019 Paula Garner, PT GP 1          PT G-Codes  Outcome Measure Options: AM-PAC 6 Clicks Basic Mobility (PT)  AM-PAC 6 Clicks Score (PT): 17    Paula Garner, PT  10/20/2019

## 2019-10-20 NOTE — OUTREACH NOTE
Total Joint Week 2 Survey      Responses   Facility patient discharged from?  [place]   Does the patient have one of the following disease processes/diagnoses(primary or secondary)?  Total Joint Replacement   Week 2 attempt successful?  No   Revoke  Readmitted          Shania Landers RN

## 2019-10-20 NOTE — PROGRESS NOTES
"    DAILY PROGRESS NOTE  [facility]    Patient Identification:  Name: Marilyn J Pumphrey  Age: 73 y.o.  Sex: female  :  1945  MRN: [id number]         Primary Care Physician: Suma Campbell APRN    Subjective:  Interval History: Patient would like to go home.  Her only complaint is left knee pain.  She does also have some sore throat still.  She denies any new rash or erythema fever chills night sweats.  She denies nausea vomiting or diarrhea    Objective: No family at bedside.  Case discussed with RN    Scheduled Meds:    albuterol 1.25 mg Nebulization 4x Daily - RT   amLODIPine-lisinopril 10-20 mg combo dose  Oral Q24H   apixaban 2.5 mg Oral Q12H   aspirin 81 mg Oral Daily   clorazepate 7.5 mg Oral BID   escitalopram 10 mg Oral Q PM   insulin glargine 50 Units Subcutaneous Daily   insulin lispro 0-9 Units Subcutaneous 4x Daily With Meals & Nightly   levothyroxine 137 mcg Oral Daily   metoprolol tartrate 100 mg Oral Q12H   pantoprazole 40 mg Oral QAM   pramipexole 1 mg Oral BID   rosuvastatin 20 mg Oral Nightly   sennosides-docusate sodium 2 tablet Oral BID     Continuous Infusions:     Vital signs in last 24 hours:  Temp:  [98 °F (36.7 °C)-99.6 °F (37.6 °C)] 98.6 °F (37 °C)  Heart Rate:  [] 90  Resp:  [18] 18  BP: (111-139)/(44-66) 139/66    Intake/Output:    Intake/Output Summary (Last 24 hours) at 10/20/2019 1415  Last data filed at 10/20/2019 1351  Gross per 24 hour   Intake 570 ml   Output 850 ml   Net -280 ml       Exam:  /66 (BP Location: Right arm, Patient Position: Sitting)   Pulse 90   Temp 98.6 °F (37 °C) (Oral)   Resp 18   Ht 170.2 cm (67\")   Wt 86 kg (189 lb 9.5 oz)   SpO2 93%   BMI 29.69 kg/m²     General Appearance:    Alert, cooperative, no distress, AAOx3, interactive and conversational, nontoxic-appearing                          Head:    Normocephalic, without obvious abnormality, atraumatic                         Throat:   Oral mucosa pink and " moist                           Neck:   Supple, symmetrical, trachea midline, no JVD                         Lungs:    Clear to auscultation bilaterally, respirations unlabored                          Heart:    Regular rate and rhythm, S1 and S2 normal                  Abdomen:     Soft, non-tender, bowel sounds active                 Extremities: No signs of sepsis or infection to left knee.  She has diffuse edema in that left lower extremity                        Pulses:   Pulses palpable in lower extremities                  Neurologic:   CNII-XII intact, no focal deficits noted     Data Review:  Labs in chart were reviewed.    Assessment:  Active Hospital Problems    Diagnosis  POA   • **Acute respiratory failure with hypoxia (CMS/HCC) [J96.01]  Unknown   • Hx of total knee arthroplasty [Z96.659]  Not Applicable   • Atelectasis [J98.11]  Unknown   • Impaired swallowing associated with throat pain [R13.10, R07.0]  Yes   • Status post left knee replacement [Z96.652]  Not Applicable   • Abnormal LFTs [R94.5]  Yes   • Rheumatoid arthritis (CMS/HCC) [M06.9]  Yes   • XAVI (obstructive sleep apnea) [G47.33]  Yes   • CAD (coronary artery disease) [I25.10]  Yes   • Type 2 diabetes mellitus without complications (CMS/HCC) [E11.9]  Yes   • Hypertension, benign [I10]  Yes      Resolved Hospital Problems    Diagnosis Date Resolved POA   • Hypercalcemia [E83.52] 10/20/2019 Yes   • Dehydration [E86.0] 10/20/2019 Yes       Plan:    No concerns for infection from orthopedic standpoint   -Labs not consistent with sepsis though procalcitonin/CRP are elevated and can be due to multiple other etiologies but normal lactate/WBC   -Check Doppler of lower extremity to ensure no aspects of DVT given the fact that we have had to hold Eliquis at certain times -if positive then will consider CTA of the chest given hypoxic respiratory failure   -No further antibiotics continued and thoughts of a septic knee ruled out   -Trend  CBC/CRP    Hospital status transition to observation and ultimately will keep till tomorrow secondary to O2 requirements   -Transient respiratory failure with hypoxia secondary to atelectasis.    -Chest x-ray without focal pneumonia.     -Needs to increase pulmonary toilet and will add scheduled nebulizer      ENT consulted secondary to probable postoperative issues with intubation but CT of the neck without acute issue   -Definitely makes tolerance of XAVI mask more difficult      DM2 with stable blood sugars -continue basal bolus regimen      Disposition -ultimately I was planning to discharge today but since still hypoxic and I do not see her oxygen requirements correcting this quickly with use of incentive spirometry, will observe overnight while also awaiting Doppler report and ENT input.  Hospital status transitioned observation    Kirby Valencia MD  10/20/2019  2:15 PM

## 2019-10-20 NOTE — PLAN OF CARE
Problem: Patient Care Overview  Goal: Plan of Care Review   10/20/19 0924   Coping/Psychosocial   Plan of Care Reviewed With patient   OTHER   Outcome Summary Pt evaluated for PT need following admission for sepsis after recent L TKA. She lives alone in single story home w/ no steps to enter. Reports ind PLOF. At present needs Hugh for bed mobility and transfers, as well as short distance ambulation up to 60ftw/ fww. Pt educated on therex to perform throughout day while in bed to promote increased ROM and strength in L LE per TKA protocol. She will benefit from PT to address reduced activity tolerance and allow return to PLOF. When pt is medically stable she will likely be okay for [place] home w/ HH

## 2019-10-20 NOTE — PLAN OF CARE
Problem: Patient Care Overview  Goal: Plan of Care Review  Outcome: Ongoing (interventions implemented as appropriate)   10/20/19 1029   Coping/Psychosocial   Plan of Care Reviewed With patient   Plan of Care Review   Progress improving   OTHER   Outcome Summary vss, no falls, c/o pain, prn percocet, 3L NC, lle doppler negative, started IS, dc ivf, poss dc home tomorrow, await ENT consult, continue to monitor       Problem: Sepsis/Septic Shock (Adult)  Goal: Signs and Symptoms of Listed Potential Problems Will be Absent, Minimized or Managed (Sepsis/Septic Shock)  Outcome: Ongoing (interventions implemented as appropriate)      Problem: Pain, Acute (Adult)  Goal: Identify Related Risk Factors and Signs and Symptoms  Outcome: Ongoing (interventions implemented as appropriate)    Goal: Acceptable Pain Control/Comfort Level  Outcome: Ongoing (interventions implemented as appropriate)      Problem: Fall Risk (Adult)  Goal: Identify Related Risk Factors and Signs and Symptoms  Outcome: Ongoing (interventions implemented as appropriate)    Goal: Absence of Fall  Outcome: Ongoing (interventions implemented as appropriate)      Problem: Skin Injury Risk (Adult)  Goal: Identify Related Risk Factors and Signs and Symptoms  Outcome: Ongoing (interventions implemented as appropriate)    Goal: Skin Health and Integrity  Outcome: Ongoing (interventions implemented as appropriate)

## 2019-10-20 NOTE — CONSULTS
Orthopaedic Consult    Jaskaran Louise MD         Patient: Marilyn J Pumphrey    Date of Admission: 10/19/2019  5:20 PM    YOB: 1945    Medical Record Number: [id number]    Attending Physician: Kirby Valencia MD  Consulting Physician: Jaskaran Louise MD      Chief Complaints: Sepsis (CMS/[facility]) [A41.9]      History of Present Illness: 73 y.o. female admitted to [facility] with Sepsis (CMS/[facility]) [A41.9].  The patient is a 73-year-old female with a history of multiple medical comorbidities who is status post left total knee arthroplasty by my partner Dr. Collado on 10/14/2019.  She presented to [facility] emergency department yesterday with complaints of pain in her throat as well as pain in the left knee.  There is also some history of shortness of air.  An extensive work-up was performed including a CT PE protocol as the patient has a history of pulmonary embolism.  This was negative as was the duplex of the left lower extremity.  She has transferred to [facility] for further evaluation and concern for infection in her left knee replacement.  She denies any fevers or chills.  Per my chart review she has been afebrile since her admission.  She states that her knee pain is a little bit better today than it was Thursday or Friday.  Allergies:   Allergies   Allergen Reactions   • Codeine Shortness Of Breath       Medications:   Home Medications:  Medications Prior to Admission   Medication Sig Dispense Refill Last Dose   • amLODIPine-benazepril (LOTREL) 10-20 MG per capsule Take 1 capsule by mouth Every Evening.   10/13/2019 at 2100   • apixaban (ELIQUIS) 2.5 MG tablet tablet Take 1 tablet by mouth Every 12 (Twelve) Hours. 60 tablet 0    • aspirin 81 MG tablet Take 1 tablet by mouth Daily. 30 tablet 0    • calcitriol (ROCALTROL) 0.25 MCG capsule Take 0.25 mcg by mouth 2 (Two) Times a Day.   10/13/2019 at 2100   • CALCIUM CARBONATE-VIT D-MIN PO Take 1,200 mg by mouth 2 (Two)  Times a Day.   10/13/2019 at 2100   • chlorthalidone (HYGROTON) 25 MG tablet Take 25 mg by mouth Daily.   10/13/2019 at 0800   • Cholecalciferol (VITAMIN D3) 47867 units capsule Take 50,000 Units by mouth Every 7 (Seven) Days. AMPARO   10/13/2019 at 0800   • clorazepate (TRANXENE) 7.5 MG tablet Take 7.5 mg by mouth 2 (Two) Times a Day.  3 10/12/2019 at 0800   • escitalopram (LEXAPRO) 10 MG tablet Take 10 mg by mouth Every Evening.   10/13/2019 at 2100   • esomeprazole (nexIUM) 40 MG capsule Take 40 mg by mouth Every Evening.   10/13/2019 at 2100   • insulin aspart (NOVOLOG FLEXPEN) 100 UNIT/ML solution pen-injector sc pen Inject 10 units before each meal (Patient taking differently: 10 Units. Inject 10 units before each meal) 5 pen 4 10/13/2019 at 1800   • insulin glargine (LANTUS) 100 UNIT/ML injection Inject 50 Units under the skin into the appropriate area as directed Daily.   10/13/2019 at 0800   • levothyroxine (SYNTHROID) 137 MCG tablet Take 137 mcg by mouth Daily.   10/13/2019 at 0800   • magnesium oxide (MAG-OX) 400 tablet tablet Take 400 mg by mouth 2 (Two) Times a Day.   10/13/2019 at 2100   • metFORMIN (GLUCOPHAGE) 1000 MG tablet Take 1,000 mg by mouth 2 (Two) Times a Day With Meals.   10/13/2019 at 2100   • metoprolol tartrate (LOPRESSOR) 100 MG tablet Take 100 mg by mouth 2 (Two) Times a Day.   10/14/2019 at 0500   • oxyCODONE-acetaminophen (PERCOCET)  MG per tablet Take 1-2 tablets by mouth Every 4 (Four) Hours As Needed for Severe Pain  for up to 9 days. 56 tablet 0    • pramipexole (MIRAPEX) 1 MG tablet Take 1 mg by mouth 2 (Two) Times a Day.   10/13/2019 at 2100   • promethazine (PHENERGAN) 25 MG tablet Take 1 tablet by mouth As Needed.  1 10/13/2019 at 1400   • rosuvastatin (CRESTOR) 20 MG tablet Take 20 mg by mouth Every Night.   10/13/2019 at 2100   • SITagliptin (JANUVIA) 50 MG tablet Take 50 mg by mouth 2 (Two) Times a Day.      • Empagliflozin (JARDIANCE) 10 MG tablet Take 1 tablet by  mouth Every Morning.   10/13/2019 at 0800   • lidocaine (LIDODERM) 5 % Place 1 patch on the skin as directed by provider Daily. Remove & Discard patch within 12 hours or as directed by MD (Patient taking differently: Place 1 patch on the skin as directed by provider As Needed. Remove & Discard patch within 12 hours or as directed by MD) 30 patch 0 Past Week at Unknown time   • sennosides-docusate sodium (SENOKOT-S) 8.6-50 MG tablet Take 2 tablets by mouth 2 (Two) Times a Day. 60 tablet 1        Current Medications:  Scheduled Meds:  amLODIPine-lisinopril 10-20 mg combo dose  Oral Q24H   apixaban 2.5 mg Oral Q12H   aspirin 81 mg Oral Daily   clorazepate 7.5 mg Oral BID   escitalopram 10 mg Oral Q PM   insulin glargine 50 Units Subcutaneous Daily   insulin lispro 0-9 Units Subcutaneous 4x Daily With Meals & Nightly   levothyroxine 137 mcg Oral Daily   metoprolol tartrate 100 mg Oral Q12H   pantoprazole 40 mg Oral QAM   pramipexole 1 mg Oral BID   rosuvastatin 20 mg Oral Nightly   sennosides-docusate sodium 2 tablet Oral BID     Continuous Infusions:  sodium chloride 100 mL/hr Last Rate: 100 mL/hr (10/20/19 0521)     PRN Meds:.•  acetaminophen **OR** acetaminophen **OR** acetaminophen  •  dextrose  •  dextrose  •  glucagon (human recombinant)  •  nitroglycerin  •  ondansetron **OR** ondansetron  •  oxyCODONE-acetaminophen    Past Medical History:   Diagnosis Date   • Anxiety    • Arthritis    • Bone pain    • Cancer (CMS/[facility]) 09/2016    THYROID   • Depression    • Disease of thyroid gland    • DM type 2 (diabetes mellitus, type 2) (CMS/[facility])     TYPE 2   • GERD (gastroesophageal reflux disease)    • Hypertension    • Irregular heart beat    • Osteoarthritis    • PONV (postoperative nausea and vomiting)     DOES WELL WITH PROPOFOL   • Sleep apnea     C-PAP IN USE     Past Surgical History:   Procedure Laterality Date   • CARPAL TUNNEL RELEASE     • CHOLECYSTECTOMY  1974   • HYSTERECTOMY  1993    partial abdominal  hysterectomy   • OOPHORECTOMY Bilateral 2015   • THYROID SURGERY      2 thyroid surgery   • TONSILLECTOMY     • TOTAL KNEE ARTHROPLASTY Left 10/14/2019    Procedure: TOTAL KNEE ARTHROPLASTY;  Surgeon: Sanjay Collado MD;  Location: [facility] MAIN OR;  Service: Orthopedics   • VEIN LIGATION AND STRIPPING BILATERAL       Social History     Occupational History   • Not on file   Tobacco Use   • Smoking status: Never Smoker   Substance and Sexual Activity   • Alcohol use: No     Frequency: Never   • Drug use: No   • Sexual activity: Defer    Social History     Social History Narrative   • Not on file     Family History   Problem Relation Age of Onset   • Diabetes Father    • Heart disease Father    • Hypertension Father    • Hyperlipidemia Father    • Thyroid disease Father    • Cancer Sister         lung cancer   • Thyroid disease Sister    • Diabetes Brother    • Heart disease Brother    • Cancer Brother         lung cancer         Review of Systems:   Constitutional: Negative for fatigue, fever, or weight loss  HEENT: Patient denies any headaches, vision changes,Admits to wearing glasses.  She does endorse a sore throat.  Pulmonary: Patient denies any cough, congestion, SOA, or wheezing  Cardiovascular: Patient denies any chest pain, palpitations, weakness,  Gastrointestinal:  Patient denies nausea, vomiting, diarrhea, constipation  Genital/Urinary: Patient denies dysuria, urinary frequency, urgency, incontinence, retention  Musculoskeletal: Positive for left knee pain and decreased range of motion. Negative for muscle cramps  Neurological: Patient denies dizziness, paresthesia, loss of sensation, seizures, syncope  Endocrine system: Patient denies tremors, cold or heat intolerance  Psychological: Patient denies thoughts/plans or harming self or other; hallucinations,  insomnia  Skin: Patient denies any bruising, rashes, lesions, or ulcers   Hematopoietic: Patient denies history of MRSA or slow wound  healing,  spontaneous or excessive bleeding    Physical Exam: 73 y.o. female  Vitals:    10/19/19 2237 10/20/19 0455 10/20/19 0728 10/20/19 0857   BP: 123/53  125/64    BP Location:   Left arm    Patient Position:   Lying    Pulse: 97  78 90   Resp: 18  18    Temp: 98.3 °F (36.8 °C)  98 °F (36.7 °C)    TempSrc: Oral  Oral    SpO2:       Weight:  86 kg (189 lb 9.5 oz)     Height:                                General Appearance:  Alert, cooperative, in no acute distress    HEENT:    Normocephalic,  Atraumatic, Pupils are equal   Neck:   No adenopathy, supple, trachea midline, no thyromegaly       Lungs:     Clear to auscultation, equal expansion bilaterally, respirations regular, even and               unlabored    Heart:    Regular rhythm and normal rate, normal S1 and S2, no murmur, no gallops   Chest Wall:    Within normal limits   Abdomen:     Normal bowel sounds, no masses,  soft non-tender, non-distended,       Rectal:  Musculoskeletal:     Deferred  Examination of the left knee demonstrates no erythema.  Appropriate swelling given the patient's postoperative status.  Minimally tender.  She is able to perform a straight leg raise with a 15 degree extensor lag.  Active range of motion is 15-80.  There does seem to be some decreased effort upon her active examination.  No coronal or sagittal plane instability about the knee.  Distally the extremity is neurovascular intact L1-S1 dermatomes and myotomes.  Negative Karla maneuver.  No pain with resisted ankle dorsiflexion or plantar flexion.   Extremities:   No clubbing, no edema, no cyanosis   Pulses  Neurovascular:   Pulses palpable and equal bilaterally in all 4 extremities    Patient was alert and oriented x 3 spheres   Skin:   No lesions, ulcers or rashes   Neurologic:   Cranial nerves 2 - 12 grossly intact, sensation intact                    Assessment:  Patient Active Problem List   Diagnosis   • Chronic pain   • Other long term (current) drug therapy   •  Rheumatoid arthritis (CMS/HCC)   • Acute bronchitis   • Arm pain, right   • CAD (coronary artery disease)   • Chest pain   • Depression   • Diabetes 1.5, managed as type 1 (CMS/HCC)   • Edema of nasopharynx   • FH: thromboembolic disease   • Gastroesophageal reflux disease   • History of pulmonary embolism   • Hyperlipidemia   • Hypertension, benign   • Hypokalemia   • Hypothyroidism, postsurgical   • Injury of costal cartilage   • Malignant neoplasm of thyroid gland (CMS/HCC)   • XAVI (obstructive sleep apnea)   • Osteoarthritis   • Polyarthralgia   • Postsurgical hypoparathyroidism (CMS/HCC)   • Restless legs syndrome (RLS)   • Type 2 diabetes mellitus with hyperglycemia (CMS/HCC)   • Type 2 diabetes mellitus without complications (CMS/HCC)   • Wrist sprain   • Osteoarthritis of left knee   • Sepsis (CMS/HCC)   • Impaired swallowing associated with throat pain   • Status post left knee replacement   • Hypercalcemia   • Dehydration   • Abnormal LFTs           Plan:    I did examine the patient's knee with her daughters at bedside.  I discussed with her that I feel that her knee examination is very benign and consistent with a total knee arthroplasty that is 6 days out.  I did change her dressing for her today.    Her examination is not at all consistent with an infected total knee arthroplasty.  I think she is okay to resume weightbearing as well as physical therapy and range of motion exercises for her knee.    She is still on some oxygen by nasal cannula.  I discussed with her the importance of mobilization as well as performing incentive spirometry, especially given her history of prior pulmonary embolism.    Continue Eliquis for DVT prophylaxis.    Thank you for your assistance with the care of this patient.  We will continue to follow the patient along with you.    Date: 10/20/2019  Jaskaran Louise MD  Orthopaedic Surgeon    [facility]  [phone]

## 2019-10-21 VITALS
OXYGEN SATURATION: 100 % | BODY MASS INDEX: 29.48 KG/M2 | HEIGHT: 67 IN | DIASTOLIC BLOOD PRESSURE: 67 MMHG | HEART RATE: 87 BPM | SYSTOLIC BLOOD PRESSURE: 138 MMHG | TEMPERATURE: 99.3 F | RESPIRATION RATE: 18 BRPM | WEIGHT: 187.83 LBS

## 2019-10-21 LAB
CRP SERPL-MCNC: 16.59 MG/DL (ref 0–0.5)
DEPRECATED RDW RBC AUTO: 45.5 FL (ref 37–54)
ERYTHROCYTE [DISTWIDTH] IN BLOOD BY AUTOMATED COUNT: 15.2 % (ref 12.3–15.4)
GLUCOSE BLDC GLUCOMTR-MCNC: 117 MG/DL (ref 70–130)
GLUCOSE BLDC GLUCOMTR-MCNC: 135 MG/DL (ref 70–130)
HCT VFR BLD AUTO: 35.8 % (ref 34–46.6)
HGB BLD-MCNC: 11.4 G/DL (ref 12–15.9)
MCH RBC QN AUTO: 26.1 PG (ref 26.6–33)
MCHC RBC AUTO-ENTMCNC: 31.8 G/DL (ref 31.5–35.7)
MCV RBC AUTO: 82.1 FL (ref 79–97)
PLATELET # BLD AUTO: 240 10*3/MM3 (ref 140–450)
PMV BLD AUTO: 11.6 FL (ref 6–12)
RBC # BLD AUTO: 4.36 10*6/MM3 (ref 3.77–5.28)
WBC NRBC COR # BLD: 7.77 10*3/MM3 (ref 3.4–10.8)

## 2019-10-21 PROCEDURE — 85027 COMPLETE CBC AUTOMATED: CPT | Performed by: HOSPITALIST

## 2019-10-21 PROCEDURE — G0378 HOSPITAL OBSERVATION PER HR: HCPCS

## 2019-10-21 PROCEDURE — 86140 C-REACTIVE PROTEIN: CPT | Performed by: HOSPITALIST

## 2019-10-21 PROCEDURE — 82962 GLUCOSE BLOOD TEST: CPT

## 2019-10-21 PROCEDURE — 97110 THERAPEUTIC EXERCISES: CPT

## 2019-10-21 PROCEDURE — 97530 THERAPEUTIC ACTIVITIES: CPT

## 2019-10-21 PROCEDURE — 63710000001 INSULIN GLARGINE PER 5 UNITS: Performed by: HOSPITALIST

## 2019-10-21 PROCEDURE — 94799 UNLISTED PULMONARY SVC/PX: CPT

## 2019-10-21 RX ORDER — HYDROCODONE BITARTRATE AND ACETAMINOPHEN 10; 325 MG/1; MG/1
1 TABLET ORAL EVERY 4 HOURS PRN
Status: DISCONTINUED | OUTPATIENT
Start: 2019-10-21 | End: 2019-10-21 | Stop reason: HOSPADM

## 2019-10-21 RX ORDER — HYDROCODONE BITARTRATE AND ACETAMINOPHEN 10; 325 MG/1; MG/1
1 TABLET ORAL EVERY 4 HOURS PRN
Qty: 56 TABLET | Refills: 0 | Status: SHIPPED | OUTPATIENT
Start: 2019-10-21 | End: 2019-10-31

## 2019-10-21 RX ADMIN — METOPROLOL TARTRATE 100 MG: 100 TABLET, FILM COATED ORAL at 08:42

## 2019-10-21 RX ADMIN — ALBUTEROL SULFATE 1.25 MG: 1.25 SOLUTION RESPIRATORY (INHALATION) at 07:54

## 2019-10-21 RX ADMIN — ALBUTEROL SULFATE 1.25 MG: 1.25 SOLUTION RESPIRATORY (INHALATION) at 11:45

## 2019-10-21 RX ADMIN — INSULIN GLARGINE 50 UNITS: 100 INJECTION, SOLUTION SUBCUTANEOUS at 08:40

## 2019-10-21 RX ADMIN — ASPIRIN 81 MG: 81 TABLET, COATED ORAL at 08:42

## 2019-10-21 RX ADMIN — CLORAZEPATE DIPOTASSIUM 7.5 MG: 7.5 TABLET ORAL at 08:42

## 2019-10-21 RX ADMIN — AMLODIPINE BESYLATE: 10 TABLET ORAL at 08:42

## 2019-10-21 RX ADMIN — LEVOTHYROXINE SODIUM 137 MCG: 137 TABLET ORAL at 06:51

## 2019-10-21 RX ADMIN — PRAMIPEXOLE DIHYDROCHLORIDE 1 MG: 1 TABLET ORAL at 08:42

## 2019-10-21 RX ADMIN — APIXABAN 2.5 MG: 2.5 TABLET, FILM COATED ORAL at 08:42

## 2019-10-21 NOTE — DISCHARGE PLACEMENT REQUEST
"Pumphrey, Marilyn J (73 y.o. Female)     Date of Birth Social Security Number Address Home Phone MRN    1945  [id number]  [place] [address] [phone] [id number]    Sikh Marital Status          Cheondoism        Admission Date Admission Type Admitting Provider Attending Provider Department, Room/Bed    10/19/19 Urgent Chai Koo MD Masden, Troy Andrew, MD [address], [address]    Discharge Date Discharge Disposition Discharge Destination         Home-Health Care [facility]              Attending Provider:  Kirby Valencia MD    Allergies:  Codeine    Isolation:  None   Infection:  None   Code Status:  CPR    Ht:  170.2 cm (67\")   Wt:  85.2 kg (187 lb 13.3 oz)    Admission Cmt:  None   Principal Problem:  Acute respiratory failure with hypoxia (CMS/HCC) [J96.01]                 Active Insurance as of 10/19/2019     Primary Coverage     Payor Plan Insurance Group Employer/Plan Group    MEDICARE MEDICARE A & B      Payor Plan Address Payor Plan Phone Number Payor Plan Fax Number Effective Dates    [address] [phone]  10/1/2011 - None Entered    [facility] [id number]       Subscriber Name Subscriber Birth Date Member ID       PUMPHREY,MARILYN J 1945 [id number]           Secondary Coverage     Payor Plan Insurance Group Employer/Plan Group           Payor Plan Address Payor Plan Phone Number Payor Plan Fax Number Effective Dates    [address] [phone]  3/1/2012 - None Entered    [place] [address]       Subscriber Name Subscriber Birth Date Member ID       PUMPHREY,MARILYN J 1945 [id number]                 Emergency Contacts      (Rel.) Home Phone Work Phone Mobile Phone    MICHELLE VYASRINA (Daughter) [phone] -- [phone]    JASMINA SLAUGHTER (Sister) -- -- [phone]              "

## 2019-10-21 NOTE — PROGRESS NOTES
LOS: 1 day     Subjective :   Complains of pain and anxiety and hallucinations with pain meds.    Objective :    Vital signs in last 24 hours:  Vitals:    10/21/19 0459 10/21/19 0705 10/21/19 0754 10/21/19 0755   BP:  138/67     BP Location:  Right arm     Patient Position:  Sitting     Pulse:  88 89 87   Resp:  18 18 18   Temp:  99.3 °F (37.4 °C)     TempSrc:  Oral     SpO2:  93% 94% 100%   Weight: 85.2 kg (187 lb 13.3 oz)      Height:           PHYSICAL EXAM:  Patient is agitated and shaking, awake and oriented x 3.  Incision is clean, dry and intact.  No signs of infection.  ROM 0-90.  Swelling is appropriate in amount.  Ecchymosis is appropriate in amount.  Homans test is negative.  Patient is neurovascularly intact distally.    LABS:  Results from last 7 days   Lab Units 10/21/19  0428   WBC 10*3/mm3 7.77   HEMOGLOBIN g/dL 11.4*   HEMATOCRIT % 35.8   PLATELETS 10*3/mm3 240     Results from last 7 days   Lab Units 10/20/19  0510   SODIUM mmol/L 137   POTASSIUM mmol/L 4.6   CHLORIDE mmol/L 93*   CO2 mmol/L 31.9*   BUN mg/dL 13   CREATININE mg/dL 0.89   GLUCOSE mg/dL 86   CALCIUM mg/dL 10.2     Results from last 7 days   Lab Units 10/19/19  1308   INR  1.19*         ASSESSMENT:  Status post Left TKA  Anxiety    Plan:  D/C percocets, switch to hydrocodone  Continue Physical Therapy, increase mobility and range of motion as tolerated.  Continue SCDs, Continue DVT prophylaxis.  Continue BID after discharge.  Dispo planning for home today.    Sanjay Collado MD    Date: 10/21/2019  Time: 9:18 AM

## 2019-10-21 NOTE — DISCHARGE SUMMARY
Discharge Summary    Date of Admission: 10/19/2019  5:20 PM  Date of Discharge:  10/21/2019    Discharge Diagnosis:   Hypoxia  S/p left TKA      PMHX:   Past Medical History:   Diagnosis Date   • Anxiety    • Arthritis    • Bone pain    • Cancer (CMS/[facility]) 09/2016    THYROID   • Depression    • Disease of thyroid gland    • DM type 2 (diabetes mellitus, type 2) (CMS/[facility])     TYPE 2   • GERD (gastroesophageal reflux disease)    • Hypertension    • Irregular heart beat    • Osteoarthritis    • PONV (postoperative nausea and vomiting)     DOES WELL WITH PROPOFOL   • Sleep apnea     C-PAP IN USE       Discharge Disposition  Home-Health Care Svc    Procedures Performed  none         Indication for Admission  Patient is a 73 y.o. female admitted with shortness of breath requiring increased oxygen demands.  It was likely combination of metabolic with immobility and narcotics.   Her workup was negative for infection. They progressed with physical therapy.  She was weaned off O2.  They were deemed stable for discharge.      Consults:   Consults     Date and Time Order Name Status Description    10/19/2019 2112 Inpatient ENT Consult      10/19/2019 1814 Inpatient Orthopedic Surgery Consult Completed     10/19/2019 1519 Ortho (on-call MD unless specified) Completed     10/14/2019 0858 Inpatient Internal Medicine Consult Completed           Discharge Instructions:  Patient is weight bearing as tolerated on the operative leg.  Patient is to progress range of motion and ambulation as tolerated.  Use walker as needed for stability and gait.  May progress to cane as tolerated.  The dressing may be removed.  Patient may shower.  Use ace wrap as needed for swelling.  Patient will follow-up in the office in 1 weeks.  Call the office at [phone] for any questions or concerns.      Discharge Medications     Discharge Medications      New Medications      Instructions Start Date   HYDROcodone-acetaminophen  MG per  tablet  Commonly known as:  NORCO   1 tablet, Oral, Every 4 Hours PRN         Changes to Medications      Instructions Start Date   insulin aspart 100 UNIT/ML solution pen-injector sc pen  Commonly known as:  NOVOLOG FLEXPEN  What changed:    · how much to take  · additional instructions   Inject 10 units before each meal      lidocaine 5 %  Commonly known as:  LIDODERM  What changed:    · when to take this  · reasons to take this  · additional instructions   1 patch, Transdermal, Every 24 Hours, Remove & Discard patch within 12 hours or as directed by MD         Continue These Medications      Instructions Start Date   amLODIPine-benazepril 10-20 MG per capsule  Commonly known as:  LOTREL   1 capsule, Oral, Every Evening      apixaban 2.5 MG tablet tablet  Commonly known as:  ELIQUIS   2.5 mg, Oral, Every 12 Hours Scheduled      aspirin 81 MG tablet   81 mg, Oral, Daily      calcitriol 0.25 MCG capsule  Commonly known as:  ROCALTROL   0.25 mcg, Oral, 2 Times Daily      CALCIUM CARBONATE-VIT D-MIN PO   1,200 mg, Oral, 2 Times Daily      chlorthalidone 25 MG tablet  Commonly known as:  HYGROTON   25 mg, Oral, Daily      clorazepate 7.5 MG tablet  Commonly known as:  TRANXENE   7.5 mg, Oral, 2 Times Daily      esomeprazole 40 MG capsule  Commonly known as:  nexIUM   40 mg, Oral, Every Evening      insulin glargine 100 UNIT/ML injection  Commonly known as:  LANTUS   50 Units, Subcutaneous, Daily      JARDIANCE 10 MG tablet  Generic drug:  Empagliflozin   1 tablet, Oral, Every Morning      LEXAPRO 10 MG tablet  Generic drug:  escitalopram   10 mg, Oral, Every Evening      magnesium oxide 400 tablet tablet  Commonly known as:  MAG-OX   400 mg, Oral, 2 Times Daily      metFORMIN 1000 MG tablet  Commonly known as:  GLUCOPHAGE   1,000 mg, Oral, 2 Times Daily With Meals      metoprolol tartrate 100 MG tablet  Commonly known as:  LOPRESSOR   100 mg, Oral, 2 Times Daily      pramipexole 1 MG tablet  Commonly known as:   MIRAPEX   1 mg, Oral, 2 Times Daily      promethazine 25 MG tablet  Commonly known as:  PHENERGAN   1 tablet, Oral, As Needed      rosuvastatin 20 MG tablet  Commonly known as:  CRESTOR   20 mg, Oral, Nightly      sennosides-docusate sodium 8.6-50 MG tablet  Commonly known as:  SENOKOT-S   2 tablets, Oral, 2 Times Daily      SITagliptin 50 MG tablet  Commonly known as:  JANUVIA   50 mg, Oral, 2 Times Daily      SYNTHROID 137 MCG tablet  Generic drug:  levothyroxine   137 mcg, Oral, Daily      Vitamin D3 12512 units capsule   50,000 Units, Oral, Every 7 Days, SUNDAY          Stop These Medications    oxyCODONE-acetaminophen  MG per tablet  Commonly known as:  PERCOCET            Discharge Diet:   Diet Instructions     Advance Diet As Tolerated      Resume your normal diet.          Activity at Discharge:   Activity Instructions     Activity as Tolerated            Follow-up Appointments  Future Appointments   Date Time Provider Department Center   11/13/2019 10:00 AM Lisa Wells MD [facility] PAIN  [facility] None   11/22/2019  8:30 AM LAB  [facility] None   12/6/2019  1:50 PM Gege Ford MD [facility] END [facility] None     Additional Instructions for the Follow-ups that You Need to Schedule     Discharge Follow-up with Specified Provider: Dr. Garza office.  An appointment has already been made at time of surgery scheduling.  Call office if you need to verify appointment time or date.  [phone]; 2 Weeks   As directed      To:  Dr. Garza office.  An appointment has already been made at time of surgery scheduling.  Call office if you need to verify appointment time or date.  [phone]    Follow Up:  2 Weeks         Referral to Home Health   As directed      Face to Face Visit Date:  10/21/2019    Follow-up provider for Plan of Care?:  I will be treating the patient on an ongoing basis.  Please send me the Plan of Care for signature.    Follow-up provider:  NIMESH GARZA [[id number]]    Reason/Clinical  Findings:  s/p TKA    Describe mobility limitations that make leaving home difficult:  taxing effort    Nursing/Therapeutic Services Requested:  Physical Therapy    PT orders:  Total joint pathway    Frequency:  1 Week 1               Test Results Pending at Discharge  none     Sanjay Collado MD  10/21/19,  9:26 AM

## 2019-10-21 NOTE — THERAPY TREATMENT NOTE
Patient Name: Marilyn J Pumphrey  : 1945    MRN: [id number]                              Today's Date: 10/21/2019       Admit Date: 10/19/2019    Visit Dx:     ICD-10-CM ICD-9-CM   1. Status post left knee replacement Z96.652 V43.65     Patient Active Problem List   Diagnosis   • Chronic pain   • Other long term (current) drug therapy   • Rheumatoid arthritis (CMS/[facility])   • Acute bronchitis   • Arm pain, right   • CAD (coronary artery disease)   • Chest pain   • Depression   • Diabetes 1.5, managed as type 1 (CMS/[facility])   • Edema of nasopharynx   • FH: thromboembolic disease   • Gastroesophageal reflux disease   • History of pulmonary embolism   • Hyperlipidemia   • Hypertension, benign   • Hypokalemia   • Hypothyroidism, postsurgical   • Injury of costal cartilage   • Malignant neoplasm of thyroid gland (CMS/[facility])   • XAVI (obstructive sleep apnea)   • Osteoarthritis   • Polyarthralgia   • Postsurgical hypoparathyroidism (CMS/[facility])   • Restless legs syndrome (RLS)   • Type 2 diabetes mellitus with hyperglycemia (CMS/[facility])   • Type 2 diabetes mellitus without complications (CMS/[facility])   • Wrist sprain   • Osteoarthritis of left knee   • Sepsis (CMS/[facility])   • Impaired swallowing associated with throat pain   • Status post left knee replacement   • Abnormal LFTs   • Hx of total knee arthroplasty   • Acute respiratory failure with hypoxia (CMS/[facility])   • Atelectasis     Past Medical History:   Diagnosis Date   • Anxiety    • Arthritis    • Bone pain    • Cancer (CMS/HCC) 2016    THYROID   • Depression    • Disease of thyroid gland    • DM type 2 (diabetes mellitus, type 2) (CMS/[facility])     TYPE 2   • GERD (gastroesophageal reflux disease)    • Hypertension    • Irregular heart beat    • Osteoarthritis    • PONV (postoperative nausea and vomiting)     DOES WELL WITH PROPOFOL   • Sleep apnea     C-PAP IN USE     Past Surgical History:   Procedure Laterality Date   • CARPAL TUNNEL RELEASE     • CHOLECYSTECTOMY     •  HYSTERECTOMY  1993    partial abdominal hysterectomy   • OOPHORECTOMY Bilateral 2015   • THYROID SURGERY      2 thyroid surgery   • TONSILLECTOMY     • TOTAL KNEE ARTHROPLASTY Left 10/14/2019    Procedure: TOTAL KNEE ARTHROPLASTY;  Surgeon: Sanjay Collado MD;  Location: [facility] OR;  Service: Orthopedics   • VEIN LIGATION AND STRIPPING BILATERAL       General Information     Row Name 10/21/19 0922          PT Evaluation Time/Intention    Document Type  therapy note (daily note)  -     Mode of Treatment  physical therapy  -     Row Name 10/21/19 0922          General Information    Existing Precautions/Restrictions  fall;oxygen therapy device and L/min  -     Row Name 10/21/19 0922          Cognitive Assessment/Intervention- PT/OT    Orientation Status (Cognition)  oriented x 3  -       User Key  (r) = Recorded By, (t) = Taken By, (c) = Cosigned By    Initials Name Provider Type     Ashley Fontana PTA Physical Therapy Assistant        Mobility     Row Name 10/21/19 0923          Bed Mobility Assessment/Treatment    Bed Mobility Assessment/Treatment  supine-sit;sit-supine  -     Supine-Sit [place] (Bed Mobility)  supervision  -     Sit-Supine [place] (Bed Mobility)  supervision  -     Assistive Device (Bed Mobility)  bed rails;head of bed elevated  -     Row Name 10/21/19 0923          Sit-Stand Transfer    Sit-Stand [place] (Transfers)  stand by assist  -     Assistive Device (Sit-Stand Transfers)  walker, front-wheeled  -     Row Name 10/21/19 0923          Gait/Stairs Assessment/Training    [place] Level (Gait)  contact guard  -     Assistive Device (Gait)  walker, front-wheeled  -     Distance in Feet (Gait)  80  -SM     Pattern (Gait)  step-to  -     Deviations/Abnormal Patterns (Gait)  antalgic;pipe decreased;stride length decreased  -     Bilateral Gait Deviations  forward flexed posture  -     Left Sided Gait Deviations  weight shift ability  decreased;heel strike decreased  -     Comment (Gait/Stairs)  L knee flexed  -       User Key  (r) = Recorded By, (t) = Taken By, (c) = Cosigned By    Initials Name Provider Type    Ashley Lemus PTA Physical Therapy Assistant        Obj/Interventions     Row Name 10/21/19 0925          Therapeutic Exercise    Lower Extremity (Therapeutic Exercise)  quad sets, bilateral  -SM     Exercise Type (Therapeutic Exercise)  AROM (active range of motion)  -SM     Position (Therapeutic Exercise)  supine  -SM     Sets/Reps (Therapeutic Exercise)  10 reps  -     Comment (Therapeutic Exercise)  pt in severe pain, stated she will perform other exercises later when having less pain  -       User Key  (r) = Recorded By, (t) = Taken By, (c) = Cosigned By    Initials Name Provider Type    Ashley Lemus PTA Physical Therapy Assistant        Goals/Plan    No documentation.       Clinical Impression     Row Name 10/21/19 0927          Pain Assessment    Additional Documentation  Pain Scale: Numbers Pre/Post-Treatment (Group)  -[facility] Name 10/21/19 0927          Pain Scale: Numbers Pre/Post-Treatment    Pain Scale: Numbers, Pretreatment  6/10  -SM     Pain Scale: Numbers, Post-Treatment  8/10  -SM     Pain Location - Side  Left  -SM     Pain Location  knee  -SM     Pain Intervention(s)  Repositioned;Ambulation/increased activity;Rest  -     Row Name 10/21/19 0927          Positioning and Restraints    Pre-Treatment Position  in bed  -SM     Post Treatment Position  bed  -SM     In Bed  supine;call light within reach;encouraged to call for assist;with family/caregiver  -       User Key  (r) = Recorded By, (t) = Taken By, (c) = Cosigned By    Initials Name Provider Type    Ashley Lemus PTA Physical Therapy Assistant        Outcome Measures     Row Name 10/21/19 5956          How much help from another person do you currently need...    Turning from your back to your side while in flat bed  without using bedrails?  4  -SM     Moving from lying on back to sitting on the side of a flat bed without bedrails?  3  -SM     Moving to and from a bed to a chair (including a wheelchair)?  3  -SM     Standing up from a chair using your arms (e.g., wheelchair, bedside chair)?  3  -SM     Climbing 3-5 steps with a railing?  3  -SM     To walk in hospital room?  3  -SM     AM-PAC 6 Clicks Score (PT)  19  -     Row Name 10/21/19 0930          Functional Assessment    Outcome Measure Options  AM-PAC 6 Clicks Basic Mobility (PT)  -       User Key  (r) = Recorded By, (t) = Taken By, (c) = Cosigned By    Initials Name Provider Type     Ashley Fontana PTA Physical Therapy Assistant        Physical Therapy Education     Title: PT OT SLP Therapies (Done)     Topic: Physical Therapy (Done)     Point: Mobility training (Done)     Learning Progress Summary           Patient Acceptance, E,TB,D, VU,NR by  at 10/21/2019  9:27 AM    Acceptance, E,TB, VU,NR by  at 10/20/2019  9:24 AM                   Point: Home exercise program (Done)     Learning Progress Summary           Patient Acceptance, E,TB,D, VU,NR by  at 10/21/2019  9:27 AM    Acceptance, E,TB, VU,NR by  at 10/20/2019  9:24 AM                   Point: Body mechanics (Done)     Learning Progress Summary           Patient Acceptance, E,TB,D, VU,NR by  at 10/21/2019  9:27 AM    Acceptance, E,TB, VU,NR by  at 10/20/2019  9:24 AM                   Point: Precautions (Done)     Learning Progress Summary           Patient Acceptance, E,TB,D, VU,NR by  at 10/21/2019  9:27 AM    Acceptance, E,TB, VU,NR by  at 10/20/2019  9:24 AM                               User Key     Initials Effective Dates Name Provider Type Discipline     03/07/18 -  Ashley Fontana PTA Physical Therapy Assistant PT     03/13/19 -  Paula Garner PT Physical Therapist PT              PT Recommendation and Plan     Outcome Summary/Treatment Plan (PT)  Anticipated  Discharge Disposition (PT): home with assist, home with home health  Plan of Care Reviewed With: patient  Progress: improving  Outcome Summary: Pt tolerated treatment fair this date. Increased gait distance to 80ft w/ Rw and CGA. Pt having increased pain and shakes when up. Reviewed L knee protocol, though pt agreed to perform on own when pain decreased. PT will continue to address functional mobility deficits as tolerated.     Time Calculation:   PT Charges     Row Name 10/21/19 0931             Time Calculation    Start Time  0900  -      Stop Time  0924  -      Time Calculation (min)  24 min  -      PT Received On  10/21/19  -      PT - Next Appointment  10/22/19  -SM        User Key  (r) = Recorded By, (t) = Taken By, (c) = Cosigned By    Initials Name Provider Type    Ashley Lemus PTA Physical Therapy Assistant        Therapy Charges for Today     Code Description Service Date Service Provider Modifiers Qty    99146665874 HC PT THERAPEUTIC ACT EA 15 MIN 10/21/2019 Ashley Fontana PTA GP 1    77684219611 HC PT THER PROC EA 15 MIN 10/21/2019 Ashley Fontana PTA GP 1          PT G-Codes  Outcome Measure Options: AM-PAC 6 Clicks Basic Mobility (PT)  AM-PAC 6 Clicks Score (PT): 19    Ashley Fontana PTA  10/21/2019

## 2019-10-21 NOTE — DISCHARGE SUMMARY
See dictation per Dr. Collado for specifics and discharge med rec as he had evaluated this patient earlier in the day and has already written discharge orders.   -Remains afebrile and CRP trended down with normal CBC    Atelectasis - resolved and counseled importance of I-S post discharge    Pain medication regimen adjusted per orthopedics -agreed/seems less sedated    DM2 stable continue current regimen    ENT consulted at admission -patient not seen at this point in consult canceled -patient tolerating p.o. intake without issue and if has further persistence upon return home she will see her long-standing ENT in an outpatient setting    Doppler leg negative.  CTA performed at [facility] negative    Answered all questions to patient with family member x1 at bedside      Agree with discharge already performed by orthopedics

## 2019-10-21 NOTE — PLAN OF CARE
Problem: Patient Care Overview  Goal: Plan of Care Review  Outcome: Ongoing (interventions implemented as appropriate)   10/21/19 7106   Coping/Psychosocial   Plan of Care Reviewed With patient   Plan of Care Review   Progress improving   OTHER   Outcome Summary Pt tolerated treatment fair this date. Increased gait distance to 80ft w/ Rw and CGA. Pt having increased pain and shakes when up. Reviewed L knee protocol, though pt agreed to perform on own when pain decreased. PT will continue to address functional mobility deficits as tolerated.

## 2019-10-21 NOTE — PROGRESS NOTES
Discharge Planning Assessment  [facility]     Patient Name: Marilyn J Pumphrey  MRN: [id number]  Today's Date: 10/21/2019    Admit Date: 10/19/2019    Discharge Needs Assessment     Row Name 10/21/19 1027       Discharge Needs Assessment    Concerns to be Addressed  denies needs/concerns at this time    Equipment Currently Used at Home  shower chair;walker, rolling;cane, straight;wheelchair;commode;bipap/cpap;nebulizer    Row Name 10/21/19 1022       Living Environment    Lives With  sibling(s)    Current Living Arrangements  home/apartment/condo    Primary Care Provided by  self;other (see comments) sister Nini    Family Caregiver if Needed  sibling(s);child(deborah), adult    Quality of Family Relationships  helpful;involved;supportive    Able to Return to Prior Arrangements  yes       Transition Planning    Patient/Family Anticipates Transition to  home with family;home with help/services    Transportation Anticipated  family or friend will provide        Discharge Plan     Row Name 10/21/19 1028       Plan    Plan  Home with her sister and continue with [facility].     Plan Comments  Spoke with pt and her sister Nini at bedside.  Facesheet and pharmacy info confirmed.  Pt is currently staying with her sister who is assisting as needed, including with managing her meds and providing transportation.  See list of home DME.  She does have a nebulizer at home.  Pt states she is current with [facility].  Referral to [facility]/ [facility].  No hx of SNF.  Pt plans to return home with her sister and continue with [facility].  She did enroll in meds to Bed.         Destination      No service coordination in this encounter.      Durable Medical Equipment      No service coordination in this encounter.      Dialysis/Infusion      No service coordination in this encounter.      Home Medical Care      Service Provider Request Status Selected Services Address Phone Number Fax Number    [facility]  [facility] [address] [address] [phone]      Therapy      No service coordination in this encounter.      Community Resources      No service coordination in this encounter.        Expected Discharge Date and Time     Expected Discharge Date Expected Discharge Time    Oct 21, 2019         Demographic Summary     Row Name 10/21/19 1021       General Information    Admission Type  observation    Arrived From  home    Required Notices Provided  Observation Status Notice    Referral Source  admission list    Reason for Consult  discharge planning    Preferred Language  English        Functional Status     Row Name 10/21/19 1021       Functional Status    Usual Activity Tolerance  good    Current Activity Tolerance  moderate       Functional Status, IADL    Medications  assistive person    Meal Preparation  assistive person    Housekeeping  assistive person    Laundry  assistive person    Shopping  assistive person       Mental Status    General Appearance WDL  WDL       Employment/    Employment Status  retired        Psychosocial    No documentation.       Abuse/Neglect    No documentation.       Legal    No documentation.       Substance Abuse    No documentation.       Patient Forms    No documentation.           Kerri Mccall RN

## 2019-10-21 NOTE — PROGRESS NOTES
Case Management Discharge Note    Final Note: Pt DC'd home with [facility]    Destination      No service has been selected for the patient.      Durable Medical Equipment      No service has been selected for the patient.      Dialysis/Infusion      No service has been selected for the patient.      Home Medical Care - Selection Complete      Service Provider Request Status Selected Services Address Phone Number Fax Number    [facility] [address] [address] [phone]      Therapy      No service has been selected for the patient.      Community Resources      No service has been selected for the patient.             Final Discharge Disposition Code: 06 - home with home health care([facility] )

## 2019-10-22 ENCOUNTER — READMISSION MANAGEMENT (OUTPATIENT)
Dept: CALL CENTER | Facility: HOSPITAL | Age: 74
End: 2019-10-22

## 2019-10-22 NOTE — OUTREACH NOTE
Prep Survey      Responses   Facility patient discharged from?  [facility]   Is patient eligible?  Yes   Discharge diagnosis  Acute resp failure   Does the patient have one of the following disease processes/diagnoses(primary or secondary)?  Other   Does the patient have Home health ordered?  Yes   What is the Home health agency?   [facility]   Is there a DME ordered?  No   Prep survey completed?  Yes          Anita Roger RN

## 2019-10-24 LAB
BACTERIA SPEC AEROBE CULT: NORMAL
BACTERIA SPEC AEROBE CULT: NORMAL

## 2019-10-25 ENCOUNTER — READMISSION MANAGEMENT (OUTPATIENT)
Dept: CALL CENTER | Facility: HOSPITAL | Age: 74
End: 2019-10-25

## 2019-10-25 NOTE — OUTREACH NOTE
Medical Week 1 Survey      Responses   Facility patient discharged from?  [facility]   Does the patient have one of the following disease processes/diagnoses(primary or secondary)?  Other   Is there a successful TCM telephone encounter documented?  No   Week 1 attempt successful?  No   Unsuccessful attempts  Attempt 1          Donny Torres RN

## 2019-10-28 ENCOUNTER — READMISSION MANAGEMENT (OUTPATIENT)
Dept: CALL CENTER | Facility: HOSPITAL | Age: 74
End: 2019-10-28

## 2019-10-28 NOTE — OUTREACH NOTE
Medical Week 1 Survey      Responses   Facility patient discharged from?  [facility]   Does the patient have one of the following disease processes/diagnoses(primary or secondary)?  Other   Is there a successful TCM telephone encounter documented?  No   Week 1 attempt successful?  No   Unsuccessful attempts  Attempt 2          Rubina Chavez RN

## 2019-10-30 ENCOUNTER — READMISSION MANAGEMENT (OUTPATIENT)
Dept: CALL CENTER | Facility: HOSPITAL | Age: 74
End: 2019-10-30

## 2019-10-30 NOTE — OUTREACH NOTE
Medical Week 2 Survey      Responses   Facility patient discharged from?  [facility]   Does the patient have one of the following disease processes/diagnoses(primary or secondary)?  Other   Week 2 attempt successful?  Yes   Call start time  1012   Discharge diagnosis  Acute resp failure   Call end time  1013   Is patient permission given to speak with other caregiver?  Yes   List who call center can speak with  daughter- Shannon   Person spoke with today (if not patient) and relationship  daughter- Shannon   What is the patient's perception of their health status since discharge?  Improving   Additional teach back comments  Per daughter, pt is doing good, no questions or concerns at this time.   Week 2 Call Completed?  Yes   Wrap up additional comments  limited information from daughter          Michelle Elizabeth RN

## 2019-11-11 ENCOUNTER — READMISSION MANAGEMENT (OUTPATIENT)
Dept: CALL CENTER | Facility: HOSPITAL | Age: 74
End: 2019-11-11

## 2019-11-11 NOTE — OUTREACH NOTE
Medical Week 3 Survey      Responses   Facility patient discharged from?  [facility]   Does the patient have one of the following disease processes/diagnoses(primary or secondary)?  Other   Week 3 attempt successful?  No   Unsuccessful attempts  Attempt 1          Marsha Wiley RN

## 2019-11-12 ENCOUNTER — READMISSION MANAGEMENT (OUTPATIENT)
Dept: CALL CENTER | Facility: HOSPITAL | Age: 74
End: 2019-11-12

## 2019-11-12 NOTE — OUTREACH NOTE
Medical Week 3 Survey      Responses   Facility patient discharged from?  [facility]   Does the patient have one of the following disease processes/diagnoses(primary or secondary)?  Other   Week 3 attempt successful?  No   Unsuccessful attempts  Attempt 2   Rescheduled  Revoked   Revoke  Decline to participate          Marsha Wiley RN

## 2019-11-13 ENCOUNTER — APPOINTMENT (OUTPATIENT)
Dept: PAIN MEDICINE | Facility: CLINIC | Age: 74
End: 2019-11-13

## 2019-11-22 ENCOUNTER — LAB (OUTPATIENT)
Dept: LAB | Facility: HOSPITAL | Age: 74
End: 2019-11-22

## 2019-11-22 DIAGNOSIS — E89.0 HYPOTHYROIDISM, POSTSURGICAL: Primary | ICD-10-CM

## 2019-11-22 DIAGNOSIS — E89.2 POSTSURGICAL HYPOPARATHYROIDISM (HCC): ICD-10-CM

## 2019-11-22 DIAGNOSIS — Z79.4 TYPE 2 DIABETES MELLITUS WITH HYPERGLYCEMIA, WITH LONG-TERM CURRENT USE OF INSULIN (HCC): ICD-10-CM

## 2019-11-22 DIAGNOSIS — I10 HYPERTENSION, BENIGN: ICD-10-CM

## 2019-11-22 DIAGNOSIS — E78.5 HYPERLIPIDEMIA, UNSPECIFIED HYPERLIPIDEMIA TYPE: ICD-10-CM

## 2019-11-22 DIAGNOSIS — C73 MALIGNANT NEOPLASM OF THYROID GLAND (HCC): ICD-10-CM

## 2019-11-22 DIAGNOSIS — E89.0 HYPOTHYROIDISM, POSTSURGICAL: ICD-10-CM

## 2019-11-22 DIAGNOSIS — E11.65 TYPE 2 DIABETES MELLITUS WITH HYPERGLYCEMIA, WITH LONG-TERM CURRENT USE OF INSULIN (HCC): ICD-10-CM

## 2019-11-22 LAB
ALBUMIN SERPL-MCNC: 4.1 G/DL (ref 3.5–5.2)
ALBUMIN/GLOB SERPL: 1.2 G/DL
ALP SERPL-CCNC: 93 U/L (ref 39–117)
ALT SERPL W P-5'-P-CCNC: 8 U/L (ref 1–33)
ANION GAP SERPL CALCULATED.3IONS-SCNC: 13.4 MMOL/L (ref 5–15)
AST SERPL-CCNC: 13 U/L (ref 1–32)
BILIRUB SERPL-MCNC: 0.3 MG/DL (ref 0.2–1.2)
BUN BLD-MCNC: 13 MG/DL (ref 8–23)
BUN/CREAT SERPL: 13.4 (ref 7–25)
CALCIUM SPEC-SCNC: 8.4 MG/DL (ref 8.6–10.5)
CHLORIDE SERPL-SCNC: 100 MMOL/L (ref 98–107)
CHOLEST SERPL-MCNC: 112 MG/DL (ref 0–200)
CO2 SERPL-SCNC: 29.6 MMOL/L (ref 22–29)
CREAT BLD-MCNC: 0.97 MG/DL (ref 0.57–1)
GFR SERPL CREATININE-BSD FRML MDRD: 56 ML/MIN/1.73
GLOBULIN UR ELPH-MCNC: 3.4 GM/DL
GLUCOSE BLD-MCNC: 125 MG/DL (ref 65–99)
HBA1C MFR BLD: 6.6 % (ref 3.5–5.6)
HDLC SERPL-MCNC: 36 MG/DL (ref 40–60)
LDLC SERPL CALC-MCNC: 50 MG/DL (ref 0–100)
LDLC/HDLC SERPL: 1.38 {RATIO}
POTASSIUM BLD-SCNC: 4.1 MMOL/L (ref 3.5–5.2)
PROT SERPL-MCNC: 7.5 G/DL (ref 6–8.5)
SODIUM BLD-SCNC: 143 MMOL/L (ref 136–145)
T4 FREE SERPL-MCNC: 1.47 NG/DL (ref 0.93–1.7)
TRIGL SERPL-MCNC: 132 MG/DL (ref 0–150)
TSH SERPL DL<=0.05 MIU/L-ACNC: 0.15 UIU/ML (ref 0.27–4.2)
VLDLC SERPL-MCNC: 26.4 MG/DL (ref 5–40)

## 2019-11-22 PROCEDURE — 80061 LIPID PANEL: CPT

## 2019-11-22 PROCEDURE — 80053 COMPREHEN METABOLIC PANEL: CPT

## 2019-11-22 PROCEDURE — 84443 ASSAY THYROID STIM HORMONE: CPT

## 2019-11-22 PROCEDURE — 84439 ASSAY OF FREE THYROXINE: CPT

## 2019-11-22 PROCEDURE — 36415 COLL VENOUS BLD VENIPUNCTURE: CPT

## 2019-11-22 PROCEDURE — 86800 THYROGLOBULIN ANTIBODY: CPT

## 2019-11-22 PROCEDURE — 84432 ASSAY OF THYROGLOBULIN: CPT

## 2019-11-22 PROCEDURE — 83036 HEMOGLOBIN GLYCOSYLATED A1C: CPT

## 2019-11-22 RX ORDER — HYDROGEN PEROXIDE 2.65 ML/100ML
81 LIQUID ORAL; TOPICAL DAILY
Refills: 0 | COMMUNITY
Start: 2019-10-15 | End: 2019-12-06

## 2019-11-29 LAB
THYROGLOB AB SERPL-ACNC: 3.9 IU/ML
THYROGLOB SERPL-MCNC: ABNORMAL NG/ML
THYROGLOBULIN (TG-RIA): <2 NG/ML

## 2019-12-06 ENCOUNTER — OFFICE VISIT (OUTPATIENT)
Dept: ENDOCRINOLOGY | Facility: CLINIC | Age: 74
End: 2019-12-06

## 2019-12-06 VITALS
WEIGHT: 173 LBS | SYSTOLIC BLOOD PRESSURE: 140 MMHG | OXYGEN SATURATION: 98 % | HEIGHT: 67 IN | DIASTOLIC BLOOD PRESSURE: 80 MMHG | HEART RATE: 69 BPM | BODY MASS INDEX: 27.15 KG/M2

## 2019-12-06 DIAGNOSIS — E78.2 MIXED HYPERLIPIDEMIA: ICD-10-CM

## 2019-12-06 DIAGNOSIS — E89.2 POSTSURGICAL HYPOPARATHYROIDISM (HCC): ICD-10-CM

## 2019-12-06 DIAGNOSIS — Z79.4 TYPE 2 DIABETES MELLITUS WITH HYPERGLYCEMIA, WITH LONG-TERM CURRENT USE OF INSULIN (HCC): ICD-10-CM

## 2019-12-06 DIAGNOSIS — I10 HYPERTENSION, BENIGN: ICD-10-CM

## 2019-12-06 DIAGNOSIS — C73 MALIGNANT NEOPLASM OF THYROID GLAND (HCC): Primary | ICD-10-CM

## 2019-12-06 DIAGNOSIS — E11.65 TYPE 2 DIABETES MELLITUS WITH HYPERGLYCEMIA, WITH LONG-TERM CURRENT USE OF INSULIN (HCC): ICD-10-CM

## 2019-12-06 DIAGNOSIS — E89.0 HYPOTHYROIDISM, POSTSURGICAL: ICD-10-CM

## 2019-12-06 PROCEDURE — 99214 OFFICE O/P EST MOD 30 MIN: CPT | Performed by: INTERNAL MEDICINE

## 2019-12-06 RX ORDER — HYDROCODONE BITARTRATE AND ACETAMINOPHEN 7.5; 325 MG/1; MG/1
1 TABLET ORAL EVERY 6 HOURS PRN
COMMUNITY
End: 2019-12-09

## 2019-12-06 NOTE — PATIENT INSTRUCTIONS
Continue current medications  Follow-up in 6 months with labs  Always keep glucose source in case of low blood sugar  Always get annual eye exam and consider taking flu shots

## 2019-12-06 NOTE — PROGRESS NOTES
Endocrine Progress Note Outpatient     Patient Care Team:  Suma Campbell APRN as PCP - General  Lisa Wells MD as PCP - Claims Attributed  Suma Campbell APRN as PCP - Family Medicine    Chief Complaint: Follow up type 2 diabetes and thyroid cancer    HPI: 74-year-old female with history of thyroid cancer, postsurgical hypothyroidism, postsurgical hypoparathyroidism, type 2 diabetes, hyperlipidemia hypertension is here for follow-up.  For thyroid cancer she has undergone total thyroidectomy and did not receive radioactive iodine treatment.  Her pathology with 1.6 cm papillary thyroid cancer with margins were clear and no lymphatic or vascular invasion was noted.  She is currently on levothyroxine 37 mcg p.o. daily.  She is tolerating well.  TG antibodies are now at 3.9 with less than 2.0 TG level.    Surgical hypothyroidism: On levothyroxine 137 mcg p.o. daily    Postsurgical hypoparathyroidism: She is currently on calcium with vitamin D and calcitriol to 5 mcg twice a day.    Type 2 diabetes: She is currently on Janumet XR 50/1000, 2 tablets daily, Jardiance 25 mg once a day and Lantus 50 units daily.  She is off glimepiride due to low blood sugars.  Blood sugar frequency has improved.  Does not get flu vaccine but she did get her eye exam done.    Hyperlipidemia: On Crestor    Hypertension: Well-controlled.    Past Medical History:   Diagnosis Date   • Anxiety    • Arthritis    • Bone pain    • Cancer (CMS/[facility]) 09/2016    THYROID   • Depression    • Disease of thyroid gland    • DM type 2 (diabetes mellitus, type 2) (CMS/[facility])     TYPE 2   • GERD (gastroesophageal reflux disease)    • Hypertension    • Irregular heart beat    • Osteoarthritis    • PONV (postoperative nausea and vomiting)     DOES WELL WITH PROPOFOL   • Sleep apnea     C-PAP IN USE       Social History     Socioeconomic History   • Marital status:      Spouse name: Not on file   • Number of children: Not on file   •  Years of education: Not on file   • Highest education level: Not on file   Tobacco Use   • Smoking status: Never Smoker   Substance and Sexual Activity   • Alcohol use: No     Frequency: Never   • Drug use: No   • Sexual activity: Defer       Family History   Problem Relation Age of Onset   • Diabetes Father    • Heart disease Father    • Hypertension Father    • Hyperlipidemia Father    • Thyroid disease Father    • Cancer Sister         lung cancer   • Thyroid disease Sister    • Diabetes Brother    • Heart disease Brother    • Cancer Brother         lung cancer       Allergies   Allergen Reactions   • Codeine Shortness Of Breath       ROS:   Constitutional:  Denies fatigue, tiredness.    Eyes:  Denies change in visual acuity   HENT:  Denies nasal congestion or sore throat   Respiratory: denies cough, shortness of breath.   Cardiovascular:  denies chest pain, edema   GI:  Denies abdominal pain, nausea, vomiting.   Musculoskeletal:  Denies back pain or joint pain   Integument:  Denies dry skin and rash   Neurologic:  Denies headache, focal weakness or sensory changes   Endocrine:  Denies polyuria or polydipsia   Psychiatric:  Denies depression or anxiety      Vitals:    12/06/19 1332   BP: 140/80   Pulse: 69   SpO2: 98%       Physical Exam:  GEN: NAD, conversant  EYES: EOMI, PERRL, no conjunctival erythema  NECK: no thyromegaly, full ROM   CV: RRR, no murmurs/rubs/gallops, no peripheral edema  LUNG: CTAB, no wheezes/rales/ronchi  SKIN: no rashes, no acanthosis  MSK: no deformities, full ROM of all extremities  NEURO: no tremors, DTR normal  PSYCH: AOX3, appropriate mood, affect normal      Results Review:     I reviewed the patient's new clinical results.    Lab Results   Component Value Date    HGBA1C 6.6 (H) 11/22/2019    HGBA1C 8.30 (H) 10/14/2019    HGBA1C 8.30 (H) 10/02/2019      Lab Results   Component Value Date    GLUCOSE 125 (H) 11/22/2019    BUN 13 11/22/2019    CREATININE 0.97 11/22/2019    EGFRIFNONA 56  (L) 11/22/2019    EGFRIFAFRI >60 02/14/2018    BCR 13.4 11/22/2019    K 4.1 11/22/2019    CO2 29.6 (H) 11/22/2019    CALCIUM 8.4 (L) 11/22/2019    ALBUMIN 4.10 11/22/2019    LABIL2 1.2 09/11/2019    AST 13 11/22/2019    ALT 8 11/22/2019    CHOL 112 11/22/2019    TRIG 132 11/22/2019    LDL 50 11/22/2019    HDL 36 (L) 11/22/2019     Lab Results   Component Value Date    TSH 0.151 (L) 11/22/2019    FREET4 1.47 11/22/2019    THYROIDAB 24 (H) 06/11/2017     Labs in November 2019 showed TG antibodies at 3.9 with TG level less than 2.0, TSH 0.151, free T4 1.47, A1c 6.6%, LDL 50, triglycerides 132, BUN 13, creatinine 0.9, calcium 8.4.    CT Soft Tissue Neck With Contrast [[id number]] (Order [id number])   Order   Status: Final result   Study Notes      Vargas Barbosa W on 10/19/2019  2:17 PM   Chest pain/ soa/ difficulty swallowing/ PT had LT knee SX recently      Appointment Information     PACS Images      Radiology Images   Study Result        DATE OF EXAM:   10/19/2019 2:16 PM     PROCEDURE:   CT SOFT TISSUE NECK W CONTRAST-     INDICATIONS:   pain and difficulty swallowing     COMPARISON:  CT soft tissue neck 8/4/2017, cervical spine CT 9/3/2019     TECHNIQUE:   After the intravenous administration of  75 mL of Isovue 370, contiguous  axial images were obtained through the neck. Coronal and sagittal  reconstructions were performed. Automated exposure control and iterative  reconstruction methods were used.      FINDINGS:   There are postsurgical changes of prior thyroidectomy. No abnormal soft  tissue at the thyroid bed. The oropharyngeal soft tissues are within  normal limits. Streak artifact from dental amalgam partially limits  complete assessment. Parotid glands are symmetric. Submandibular glands  are symmetric. Tiny amount of fluid at the right fossa of Rosenmuller  similar to prior study on image 33. The hypopharynx, larynx, subglottic  airway appear within normal limits. Pharyngeal mucosal enhancement is  within  normal limits.     Lung apices clear, better assessed on the concurrently performed chest  CT. The carotid arteries are patent bilaterally. Vertebral arteries are  patent bilaterally. Visualized portions of the intracranial structures  are unremarkable. Globes are symmetric. Orbits intact. No retro-orbital  abnormality. Mastoid air cells well-aerated. Paranasal sinuses well  aerated. The mandibles intact. No acute osseous abnormality of the  cervical spine. No new adenopathy in the neck.      IMPRESSION:  IMPRESSION :   1. No acute soft tissue abnormality in the neck.  2. Postsurgical changes of prior thyroidectomy.     Electronically Signed By-Anthony Lerma On:10/19/2019 3:07 PM  This report was finalized on [id number] by  Anthony Lerma, .         Medication Review: Reviewed.       Current Outpatient Medications:   •  amLODIPine-benazepril (LOTREL) 10-20 MG per capsule, Take 1 capsule by mouth Every Evening., Disp: , Rfl:   •  aspirin 81 MG tablet, Take 1 tablet by mouth Daily., Disp: 30 tablet, Rfl: 0  •  calcitriol (ROCALTROL) 0.25 MCG capsule, Take 0.25 mcg by mouth 2 (Two) Times a Day., Disp: , Rfl:   •  CALCIUM CARBONATE-VIT D-MIN PO, Take 1,200 mg by mouth 2 (Two) Times a Day., Disp: , Rfl:   •  Cholecalciferol (VITAMIN D3) 95007 units capsule, Take 50,000 Units by mouth Every 7 (Seven) Days. SUNDAY, Disp: , Rfl:   •  clorazepate (TRANXENE) 7.5 MG tablet, Take 7.5 mg by mouth 2 (Two) Times a Day., Disp: , Rfl: 3  •  Empagliflozin (JARDIANCE) 10 MG tablet, Take 1 tablet by mouth Every Morning., Disp: , Rfl:   •  escitalopram (LEXAPRO) 10 MG tablet, Take 10 mg by mouth Every Evening., Disp: , Rfl:   •  esomeprazole (nexIUM) 40 MG capsule, Take 40 mg by mouth Every Evening., Disp: , Rfl:   •  HYDROcodone-acetaminophen (NORCO) 7.5-325 MG per tablet, Take 1 tablet by mouth Every 6 (Six) Hours As Needed for Moderate Pain ., Disp: , Rfl:   •  insulin aspart (NOVOLOG FLEXPEN) 100 UNIT/ML solution pen-injector sc pen,  Inject 10 units before each meal (Patient taking differently: 10 Units. Inject 10 units before each meal), Disp: 5 pen, Rfl: 4  •  insulin glargine (LANTUS) 100 UNIT/ML injection, Inject 50 Units under the skin into the appropriate area as directed Daily., Disp: , Rfl:   •  levothyroxine (SYNTHROID) 137 MCG tablet, Take 137 mcg by mouth Daily., Disp: , Rfl:   •  lidocaine (LIDODERM) 5 %, Place 1 patch on the skin as directed by provider Daily. Remove & Discard patch within 12 hours or as directed by MD (Patient taking differently: Place 1 patch on the skin as directed by provider As Needed. Remove & Discard patch within 12 hours or as directed by MD), Disp: 30 patch, Rfl: 0  •  magnesium oxide (MAG-OX) 400 tablet tablet, Take 400 mg by mouth 2 (Two) Times a Day., Disp: , Rfl:   •  metFORMIN (GLUCOPHAGE) 1000 MG tablet, Take 1,000 mg by mouth 2 (Two) Times a Day With Meals., Disp: , Rfl:   •  metoprolol tartrate (LOPRESSOR) 100 MG tablet, Take 100 mg by mouth 2 (Two) Times a Day., Disp: , Rfl:   •  pramipexole (MIRAPEX) 1 MG tablet, Take 1 mg by mouth 2 (Two) Times a Day., Disp: , Rfl:   •  promethazine (PHENERGAN) 25 MG tablet, Take 1 tablet by mouth As Needed., Disp: , Rfl: 1  •  rosuvastatin (CRESTOR) 20 MG tablet, Take 20 mg by mouth Every Night., Disp: , Rfl:   •  sennosides-docusate sodium (SENOKOT-S) 8.6-50 MG tablet, Take 2 tablets by mouth 2 (Two) Times a Day., Disp: 60 tablet, Rfl: 1      Assessment/Plan   1.  Thyroid cancer: Status post thyroid surgery in September 2016.  She did not receive radioactive iodine treatment.  Tumor was 1.6 cm papillary thyroid cancer with margins clear.  Her CT scan of the neck in October 2000 1119 did not show any lymphadenopathy.  TG antibodies are declining.  TSH is 0.15 at that the free T4 1.47.  We will continue levothyroxine 137 mcg suppressive therapy and follow labs.  2.  Hypothyroidism: Secondary to thyroid surgery, on suppressive therapy with levothyroxine and TSH at  target.  Continue current dose.  3.  Postsurgical hypoparathyroidism: She is currently on Rocaltrol with calcium and vitamin D and her calcium is reasonable at 8.4.  She is asymptomatic.  We will continue current management.  4.  Diabetes mellitus type 2: Excellent control, continue current medications.  5.  Hyperlipidemia: Excellent control, continue current medications  6.  Hypertension: Excellent control, continue current medications.            Gege Ford MD FACE.    Much of the above report is an electronic transcription/translation of the spoken language to printed text using Dragon Software. As such, the subtleties and finesse of the spoken language may permit erroneous, or at times, nonsensical words or phrases to be inadvertently transcribed; thus changes may be made at a later date to rectify these errors.

## 2019-12-09 ENCOUNTER — OFFICE VISIT (OUTPATIENT)
Dept: PAIN MEDICINE | Facility: CLINIC | Age: 74
End: 2019-12-09

## 2019-12-09 VITALS
BODY MASS INDEX: 27.15 KG/M2 | RESPIRATION RATE: 16 BRPM | HEART RATE: 73 BPM | SYSTOLIC BLOOD PRESSURE: 150 MMHG | DIASTOLIC BLOOD PRESSURE: 75 MMHG | TEMPERATURE: 97.8 F | WEIGHT: 173 LBS | OXYGEN SATURATION: 97 % | HEIGHT: 67 IN

## 2019-12-09 DIAGNOSIS — Z79.899 HIGH RISK MEDICATION USE: ICD-10-CM

## 2019-12-09 DIAGNOSIS — M06.9 RHEUMATOID ARTHRITIS, INVOLVING UNSPECIFIED SITE, UNSPECIFIED RHEUMATOID FACTOR PRESENCE: ICD-10-CM

## 2019-12-09 DIAGNOSIS — M47.817 LUMBOSACRAL SPONDYLOSIS WITHOUT MYELOPATHY: ICD-10-CM

## 2019-12-09 DIAGNOSIS — G89.4 CHRONIC PAIN SYNDROME: Primary | ICD-10-CM

## 2019-12-09 DIAGNOSIS — M25.50 POLYARTHRALGIA: ICD-10-CM

## 2019-12-09 PROCEDURE — 99214 OFFICE O/P EST MOD 30 MIN: CPT | Performed by: ANESTHESIOLOGY

## 2019-12-09 PROCEDURE — G0463 HOSPITAL OUTPT CLINIC VISIT: HCPCS | Performed by: ANESTHESIOLOGY

## 2019-12-09 RX ORDER — HYDROCODONE BITARTRATE AND ACETAMINOPHEN 10; 325 MG/1; MG/1
1 TABLET ORAL 3 TIMES DAILY PRN
Qty: 90 TABLET | Refills: 0 | Status: SHIPPED | OUTPATIENT
Start: 2019-12-09 | End: 2019-12-09 | Stop reason: SDUPTHER

## 2019-12-09 RX ORDER — HYDROCODONE BITARTRATE AND ACETAMINOPHEN 10; 325 MG/1; MG/1
1 TABLET ORAL 3 TIMES DAILY PRN
Qty: 90 TABLET | Refills: 0 | Status: SHIPPED | OUTPATIENT
Start: 2019-12-09 | End: 2020-02-05 | Stop reason: SDUPTHER

## 2019-12-09 NOTE — PROGRESS NOTES
Subjective    CC joints pain left knee pain  Marilyn J Pumphrey is a 74 y.o. female with multiple comorbidities including DM, polyarthralgia from  inflammatory osteoarthritis here for f/u.  Since last visit had left TKA-2019, recovering well.  Was prescribed oxycodone postop but developed side effect/hallucinations.  Chronic polyarthralgia/left knee pain,  and generalized myofascial pain interfering with daily activity, sleep,mood.    Tried methotrexate and  Celebrex without relief.  Tried Cymbalta, sevella, tramadol without relief.    Keeps regular follow-up with Rheumatology. and ortho. considering right knee replecement.   Good releif with knee injections by ortho.      Utilizes hydrocodone 7.5/325  twice daily with good relief functional benefit denies side effects.    Pain Assessment   Location of Pain: Lower Back, R Hip, L Hip, L Leg, neck pain, joint  Description of Pain: Dull/Aching, Throbbing, Stabbing  Previous Pain Rating :9  Current Pain Ratin  Aggravating Factors: Activity  Alleviating Factors: Rest, Medication  PEG Assessment   What number best describes your pain on average in the past week?9  What number best describes how, during the past week, pain has interfered with your enjoyment of life?10  What number best describes how, during the past week, pain has interfered with your general activity? 10     The following portions of the patient's history were reviewed and updated as appropriate: allergies, current medications, past family history, past medical history, past social history, past surgical history and problem list.   has a past medical history of Anxiety, Arthritis, Bone pain, Cancer (CMS/[facility]) (2016), Depression, Disease of thyroid gland, DM type 2 (diabetes mellitus, type 2) (CMS/[facility]), GERD (gastroesophageal reflux disease), Hypertension, Irregular heart beat, Knee pain, Low back pain, Osteoarthritis, PONV (postoperative nausea and vomiting), and Sleep apnea.   has a past  surgical history that includes Tonsillectomy; Thyroid surgery; Oophorectomy (Bilateral, 2015); Hysterectomy (1993); Cholecystectomy (1974); Carpal tunnel release; vein ligation and stripping bilateral; and Total knee arthroplasty (Left, 10/14/2019).  Social History     Tobacco Use   • Smoking status: Never Smoker   • Smokeless tobacco: Never Used   Substance Use Topics   • Alcohol use: No     Frequency: Never     Review of Systems   Constitutional: Negative for chills, fatigue and fever.   HENT: Negative for swollen glands and trouble swallowing.    Respiratory: Negative.  Negative for shortness of breath.    Cardiovascular: Negative for chest pain, palpitations and leg swelling.   Gastrointestinal: Negative for nausea and vomiting.   Musculoskeletal: Positive for arthralgias, back pain and myalgias. Negative for gait problem, joint swelling, neck pain and neck stiffness.        Left knee pain, status post TKA   Skin: Negative for color change, dry skin, rash and skin lesions.   Neurological: Negative for dizziness, weakness, light-headedness and headache.   Hematological: Negative for adenopathy. Does not bruise/bleed easily.   Psychiatric/Behavioral: Negative for behavioral problems, suicidal ideas and depressed mood.   All other systems reviewed and are negative.    Objective   Physical Exam   Constitutional: She is oriented to person, place, and time. She appears well-developed and well-nourished. No distress.   Eyes: Pupils are equal, round, and reactive to light. Conjunctivae are normal.   Neck: Muscular tenderness present. Decreased range of motion present. No Kernig's sign noted.   Cardiovascular: Normal heart sounds and normal pulses.   Pulmonary/Chest: Effort normal and breath sounds normal.   Musculoskeletal:        Lumbar back: She exhibits decreased range of motion and tenderness.   Back: Paraspinal tenderness, negative leg raise.  Pain with extension/side rotation.      Vascular Status -  Her right  "foot exhibits no edema. Her left foot exhibits no edema.  Lymphadenopathy:     She has no cervical adenopathy.   Neurological: She is alert and oriented to person, place, and time. She has normal strength and normal reflexes. No sensory deficit. Coordination and gait normal.   Skin: Skin is warm and dry. Capillary refill takes less than 2 seconds.   Psychiatric: She has a normal mood and affect. Her behavior is normal.   Vitals reviewed.    /75   Pulse 73   Temp 97.8 °F (36.6 °C)   Resp 16   Ht 170.2 cm (67\")   Wt 78.5 kg (173 lb)   SpO2 97%   BMI 27.10 kg/m²     PHQ 9 on chart  Opioid risk tool low risk    Assessment/Plan    Shaila was seen today for knee pain, back pain, generalized body aches and follow-up.    Diagnoses and all orders for this visit:    Chronic pain syndrome  -     Discontinue: HYDROcodone-acetaminophen (NORCO)  MG per tablet; Take 1 tablet by mouth 3 (Three) Times a Day As Needed for Moderate Pain .  -     HYDROcodone-acetaminophen (NORCO)  MG per tablet; Take 1 tablet by mouth 3 (Three) Times a Day As Needed for Moderate Pain .    Polyarthralgia  -     Discontinue: HYDROcodone-acetaminophen (NORCO)  MG per tablet; Take 1 tablet by mouth 3 (Three) Times a Day As Needed for Moderate Pain .  -     HYDROcodone-acetaminophen (NORCO)  MG per tablet; Take 1 tablet by mouth 3 (Three) Times a Day As Needed for Moderate Pain .    Rheumatoid arthritis, involving unspecified site, unspecified rheumatoid factor presence (CMS/HCC)  -     Discontinue: HYDROcodone-acetaminophen (NORCO)  MG per tablet; Take 1 tablet by mouth 3 (Three) Times a Day As Needed for Moderate Pain .  -     HYDROcodone-acetaminophen (NORCO)  MG per tablet; Take 1 tablet by mouth 3 (Three) Times a Day As Needed for Moderate Pain .    Lumbosacral spondylosis without myelopathy  -     Discontinue: HYDROcodone-acetaminophen (NORCO)  MG per tablet; Take 1 tablet by mouth 3 (Three) " Times a Day As Needed for Moderate Pain .  -     HYDROcodone-acetaminophen (NORCO)  MG per tablet; Take 1 tablet by mouth 3 (Three) Times a Day As Needed for Moderate Pain .    High risk medication use    Summary  73 y.o. female with multiple comorbidities including DM, polyarthralgia from  inflammatory osteoarthritis here for f/u.  Chronic polyarthralgia/left knee pain,  and generalized myofascial pain interfering with daily activity, sleep,mood.    Status post left TKA October 2019.  Recovering well.  Prescribe oxycodone postop hallucination and side effects.  Functional benefit with hydrocodone.  Will change to 10/325 3 times daily as needed to cover acute left knee pain.  Consider dose reduction in 2 months.    Continue Hydrocodone changed to 10/325 3 times daily as needed for severe pain.  UDS and  Inspect reviewed.    Discussed risk of tolerance, dependence, respiratory depression, coma and death associated with use of oral opioids for treatment of chronic nonmalignant pain.      Discussed increased risk with combining opioid and benzo. On clorazepate prn anxiety.     RTC in 2 months

## 2020-02-05 ENCOUNTER — RESULTS ENCOUNTER (OUTPATIENT)
Dept: PAIN MEDICINE | Facility: HOSPITAL | Age: 75
End: 2020-02-05

## 2020-02-05 ENCOUNTER — OFFICE VISIT (OUTPATIENT)
Dept: PAIN MEDICINE | Facility: CLINIC | Age: 75
End: 2020-02-05

## 2020-02-05 VITALS
SYSTOLIC BLOOD PRESSURE: 129 MMHG | TEMPERATURE: 98 F | RESPIRATION RATE: 16 BRPM | WEIGHT: 173 LBS | HEIGHT: 67 IN | DIASTOLIC BLOOD PRESSURE: 74 MMHG | BODY MASS INDEX: 27.15 KG/M2 | OXYGEN SATURATION: 96 % | HEART RATE: 71 BPM

## 2020-02-05 DIAGNOSIS — G89.4 CHRONIC PAIN SYNDROME: Primary | ICD-10-CM

## 2020-02-05 DIAGNOSIS — M25.50 POLYARTHRALGIA: ICD-10-CM

## 2020-02-05 DIAGNOSIS — M06.9 RHEUMATOID ARTHRITIS, INVOLVING UNSPECIFIED SITE, UNSPECIFIED RHEUMATOID FACTOR PRESENCE: ICD-10-CM

## 2020-02-05 DIAGNOSIS — F19.90 CURRENT DRUG USE: ICD-10-CM

## 2020-02-05 DIAGNOSIS — Z79.899 HIGH RISK MEDICATION USE: ICD-10-CM

## 2020-02-05 DIAGNOSIS — F19.90 CURRENT DRUG USE: Primary | ICD-10-CM

## 2020-02-05 DIAGNOSIS — M47.817 LUMBOSACRAL SPONDYLOSIS WITHOUT MYELOPATHY: ICD-10-CM

## 2020-02-05 PROCEDURE — G0463 HOSPITAL OUTPT CLINIC VISIT: HCPCS | Performed by: ANESTHESIOLOGY

## 2020-02-05 PROCEDURE — 99214 OFFICE O/P EST MOD 30 MIN: CPT | Performed by: ANESTHESIOLOGY

## 2020-02-05 RX ORDER — HYDROCODONE BITARTRATE AND ACETAMINOPHEN 10; 325 MG/1; MG/1
1 TABLET ORAL 3 TIMES DAILY PRN
Qty: 90 TABLET | Refills: 0 | Status: SHIPPED | OUTPATIENT
Start: 2020-02-05 | End: 2020-02-05 | Stop reason: SDUPTHER

## 2020-02-05 RX ORDER — HYDROCODONE BITARTRATE AND ACETAMINOPHEN 10; 325 MG/1; MG/1
1 TABLET ORAL 3 TIMES DAILY PRN
Qty: 90 TABLET | Refills: 0 | Status: SHIPPED | OUTPATIENT
Start: 2020-02-05 | End: 2020-04-09 | Stop reason: SDUPTHER

## 2020-02-05 NOTE — PROGRESS NOTES
Subjective    CC joints pain left knee pain  Marilyn J Pumphrey is a 74 y.o. female with multiple comorbidities including DM, polyarthralgia from  inflammatory osteoarthritis, bilateral knee pain, status post left TKA 2019, here for f/u.  Reports worsening polyarthralgia and myofascial pain in the last 2 to 3 weeks.  No further recommendation from rheumatology.  Trying ibuprofen from PCP...  Chronic polyarthralgia/left knee pain,  and generalized myofascial pain interfering with daily activity, sleep,mood.    Tried methotrexate and  Celebrex without relief.  Tried Cymbalta, sevella, tramadol without relief.    Keeps regular follow-up with Rheumatology. and ortho. considering right knee replecement.   Good releif with knee injections by ortho.      Utilizes hydrocodone 7.5/325  twice daily with good relief functional benefit denies side effects.    Pain Assessment   Location of Pain: Lower Back, R Hip, L Hip, L Leg, neck pain, joint  Description of Pain: Dull/Aching, Throbbing, Stabbing  Previous Pain Rating :9  Current Pain Rating: 10  Aggravating Factors: Activity  Alleviating Factors: Rest, Medication  PEG Assessment   What number best describes your pain on average in the past week?10  What number best describes how, during the past week, pain has interfered with your enjoyment of life?10  What number best describes how, during the past week, pain has interfered with your general activity? 10     The following portions of the patient's history were reviewed and updated as appropriate: allergies, current medications, past family history, past medical history, past social history, past surgical history and problem list.   has a past medical history of Anxiety, Arthritis, Bone pain, Cancer (CMS/[facility]) (09/2016), Depression, Disease of thyroid gland, DM type 2 (diabetes mellitus, type 2) (CMS/[facility]), GERD (gastroesophageal reflux disease), Hypertension, Irregular heart beat, Knee pain, Low back pain, Osteoarthritis, PONV  (postoperative nausea and vomiting), and Sleep apnea.   has a past surgical history that includes Tonsillectomy; Thyroid surgery; Oophorectomy (Bilateral, 2015); Hysterectomy (1993); Cholecystectomy (1974); Carpal tunnel release; vein ligation and stripping bilateral; and Total knee arthroplasty (Left, 10/14/2019).  Social History     Tobacco Use   • Smoking status: Never Smoker   • Smokeless tobacco: Never Used   Substance Use Topics   • Alcohol use: No     Frequency: Never     Review of Systems   Constitutional: Negative for chills, fatigue and fever.   HENT: Negative for swollen glands and trouble swallowing.    Respiratory: Negative.  Negative for shortness of breath.    Cardiovascular: Negative for chest pain, palpitations and leg swelling.   Gastrointestinal: Negative for nausea and vomiting.   Musculoskeletal: Positive for arthralgias, back pain and myalgias. Negative for gait problem, joint swelling, neck pain and neck stiffness.        Left knee pain, status post TKA   Skin: Negative for color change, dry skin, rash and skin lesions.   Neurological: Negative for dizziness, weakness, light-headedness and headache.   Hematological: Negative for adenopathy. Does not bruise/bleed easily.   Psychiatric/Behavioral: Negative for behavioral problems, suicidal ideas and depressed mood.   All other systems reviewed and are negative.    Objective   Physical Exam   Constitutional: She is oriented to person, place, and time. She appears well-developed and well-nourished. No distress.   Eyes: Pupils are equal, round, and reactive to light. Conjunctivae are normal.   Neck: Muscular tenderness present. Decreased range of motion present. No Kernig's sign noted.   Cardiovascular: Normal heart sounds and normal pulses.   Pulmonary/Chest: Effort normal and breath sounds normal.   Musculoskeletal:        Lumbar back: She exhibits decreased range of motion and tenderness.   Back: Paraspinal tenderness, negative leg raise.  Pain  "with extension/side rotation.      Vascular Status -  Her right foot exhibits no edema. Her left foot exhibits no edema.  Lymphadenopathy:     She has no cervical adenopathy.   Neurological: She is alert and oriented to person, place, and time. She has normal strength and normal reflexes. No sensory deficit. Coordination and gait normal.   Skin: Skin is warm and dry. Capillary refill takes less than 2 seconds.   Psychiatric: She has a normal mood and affect. Her behavior is normal.   Vitals reviewed.    /74   Pulse 71   Temp 98 °F (36.7 °C)   Resp 16   Ht 170.2 cm (67\")   Wt 78.5 kg (173 lb)   SpO2 96%   BMI 27.10 kg/m²     PHQ 9 on chart  Opioid risk tool low risk    Assessment/Plan   Shaila was seen today for arthritis and follow-up.    Diagnoses and all orders for this visit:    Chronic pain syndrome  -     Discontinue: HYDROcodone-acetaminophen (NORCO)  MG per tablet; Take 1 tablet by mouth 3 (Three) Times a Day As Needed for Moderate Pain .  -     HYDROcodone-acetaminophen (NORCO)  MG per tablet; Take 1 tablet by mouth 3 (Three) Times a Day As Needed for Moderate Pain .    Lumbosacral spondylosis without myelopathy  -     Discontinue: HYDROcodone-acetaminophen (NORCO)  MG per tablet; Take 1 tablet by mouth 3 (Three) Times a Day As Needed for Moderate Pain .  -     HYDROcodone-acetaminophen (NORCO)  MG per tablet; Take 1 tablet by mouth 3 (Three) Times a Day As Needed for Moderate Pain .    Polyarthralgia  -     Discontinue: HYDROcodone-acetaminophen (NORCO)  MG per tablet; Take 1 tablet by mouth 3 (Three) Times a Day As Needed for Moderate Pain .  -     HYDROcodone-acetaminophen (NORCO)  MG per tablet; Take 1 tablet by mouth 3 (Three) Times a Day As Needed for Moderate Pain .    High risk medication use    Rheumatoid arthritis, involving unspecified site, unspecified rheumatoid factor presence (CMS/HCC)  -     Discontinue: HYDROcodone-acetaminophen (NORCO) "  MG per tablet; Take 1 tablet by mouth 3 (Three) Times a Day As Needed for Moderate Pain .  -     HYDROcodone-acetaminophen (NORCO)  MG per tablet; Take 1 tablet by mouth 3 (Three) Times a Day As Needed for Moderate Pain .       Summary  74 y.o. female with multiple comorbidities including DM, polyarthralgia from  inflammatory osteoarthritis, knee pain status post left TKA, here for f/u.  Chronic polyarthralgia/left knee pain,  and generalized myofascial pain interfering with daily activity, sleep,mood.      Worsening inflammatory polyarthralgia.  Has follow-up with rheumatology.  Attempted dose reduction with hydrocodone but failed.  Continue Hydrocodone  10/325 3 times daily as needed for severe pain.  UDS and  Inspect reviewed.    Discussed risk of tolerance, dependence, respiratory depression, coma and death associated with use of oral opioids for treatment of chronic nonmalignant pain.      Discussed increased risk with combining opioid and benzo. On clorazepate prn anxiety.     RTC in 2 months

## 2020-03-26 RX ORDER — CALCITRIOL 0.25 UG/1
CAPSULE, LIQUID FILLED ORAL
Qty: 180 CAPSULE | Refills: 0 | Status: SHIPPED | OUTPATIENT
Start: 2020-03-26 | End: 2020-06-30

## 2020-03-26 RX ORDER — CALCITRIOL 0.25 UG/1
CAPSULE, LIQUID FILLED ORAL
Qty: 180 CAPSULE | Refills: 0 | OUTPATIENT
Start: 2020-03-26

## 2020-04-08 ENCOUNTER — APPOINTMENT (OUTPATIENT)
Dept: PAIN MEDICINE | Facility: CLINIC | Age: 75
End: 2020-04-08

## 2020-04-09 ENCOUNTER — OFFICE VISIT (OUTPATIENT)
Dept: PAIN MEDICINE | Facility: CLINIC | Age: 75
End: 2020-04-09

## 2020-04-09 ENCOUNTER — TELEPHONE (OUTPATIENT)
Dept: PAIN MEDICINE | Facility: CLINIC | Age: 75
End: 2020-04-09

## 2020-04-09 VITALS — HEIGHT: 67 IN | BODY MASS INDEX: 29.03 KG/M2 | WEIGHT: 185 LBS

## 2020-04-09 DIAGNOSIS — M25.50 POLYARTHRALGIA: ICD-10-CM

## 2020-04-09 DIAGNOSIS — Z79.899 HIGH RISK MEDICATION USE: ICD-10-CM

## 2020-04-09 DIAGNOSIS — G89.4 CHRONIC PAIN SYNDROME: Primary | ICD-10-CM

## 2020-04-09 DIAGNOSIS — M79.18 MYOFASCIAL PAIN SYNDROME: ICD-10-CM

## 2020-04-09 DIAGNOSIS — M47.817 LUMBOSACRAL SPONDYLOSIS WITHOUT MYELOPATHY: ICD-10-CM

## 2020-04-09 PROCEDURE — 99213 OFFICE O/P EST LOW 20 MIN: CPT | Performed by: ANESTHESIOLOGY

## 2020-04-09 RX ORDER — HYDROCODONE BITARTRATE AND ACETAMINOPHEN 10; 325 MG/1; MG/1
1 TABLET ORAL 3 TIMES DAILY PRN
Qty: 90 TABLET | Refills: 0 | Status: SHIPPED | OUTPATIENT
Start: 2020-04-09 | End: 2020-06-02 | Stop reason: SDUPTHER

## 2020-04-09 NOTE — PROGRESS NOTES
Subjective    CC joints pain left knee pain  Marilyn J Pumphrey is a 74 y.o. female with multiple comorbidities including DM, polyarthralgia from  inflammatory osteoarthritis, bilateral knee pain, status post left TKA 2019, here for f/u by telephone (coronavirus epidemic).  Chronic polyarthralgia/left knee pain,  and generalized myofascial pain interfering with daily activity, sleep,mood.    Tried methotrexate and  Celebrex without relief.  Tried Cymbalta, sevella, tramadol without relief.    Keeps regular follow-up with Rheumatology. and ortho. considering right knee replecement.   Good releif with knee injections by ortho.      Utilizes hydrocodone 7.5/325  twice daily with good relief functional benefit denies side effects.    Pain Assessment   Location of Pain: Lower Back, R Hip, L Hip, L Leg, neck pain, joint  Description of Pain: Dull/Aching, Throbbing, Stabbing  Previous Pain Rating :9  Current Pain Rating: 10  Aggravating Factors: Activity  Alleviating Factors: Rest, Medication  PEG Assessment   What number best describes your pain on average in the past week?10  What number best describes how, during the past week, pain has interfered with your enjoyment of life?10  What number best describes how, during the past week, pain has interfered with your general activity? 10     The following portions of the patient's history were reviewed and updated as appropriate: allergies, current medications, past family history, past medical history, past social history, past surgical history and problem list.   has a past medical history of Anxiety, Arthritis, Bone pain, Cancer (CMS/HCC) (09/2016), Depression, Disease of thyroid gland, DM type 2 (diabetes mellitus, type 2) (CMS/[facility]), GERD (gastroesophageal reflux disease), Hypertension, Irregular heart beat, Knee pain, Low back pain, Osteoarthritis, PONV (postoperative nausea and vomiting), and Sleep apnea.   has a past surgical history that includes Tonsillectomy;  "Thyroid surgery; Oophorectomy (Bilateral, 2015); Hysterectomy (1993); Cholecystectomy (1974); Carpal tunnel release; vein ligation and stripping bilateral; and Total knee arthroplasty (Left, 10/14/2019).  Social History     Tobacco Use   • Smoking status: Never Smoker   • Smokeless tobacco: Never Used   Substance Use Topics   • Alcohol use: No     Frequency: Never     Review of Systems   Constitutional: Negative for chills, fatigue and fever.   HENT: Negative for swollen glands and trouble swallowing.    Respiratory: Negative.  Negative for shortness of breath.    Cardiovascular: Negative for chest pain, palpitations and leg swelling.   Gastrointestinal: Negative for nausea and vomiting.   Musculoskeletal: Positive for arthralgias, back pain and myalgias. Negative for gait problem, joint swelling, neck pain and neck stiffness.        Left knee pain, status post TKA   Skin: Negative for color change, dry skin, rash and skin lesions.   Neurological: Negative for dizziness, weakness, light-headedness and headache.   Hematological: Negative for adenopathy. Does not bruise/bleed easily.   Psychiatric/Behavioral: Negative for behavioral problems, suicidal ideas and depressed mood.   All other systems reviewed and are negative.    Objective   Physical Exam   Constitutional: No distress.   Psychiatric: Her speech is normal and behavior is normal.     Ht 170.2 cm (67\")   Wt 83.9 kg (185 lb)   BMI 28.98 kg/m²     PHQ 9 on chart  Opioid risk tool low risk    Assessment/Plan   Shaila was seen today for joint pain and follow-up.    Diagnoses and all orders for this visit:    Chronic pain syndrome  -     HYDROcodone-acetaminophen (NORCO)  MG per tablet; Take 1 tablet by mouth 3 (Three) Times a Day As Needed for Moderate Pain .    Polyarthralgia  -     HYDROcodone-acetaminophen (NORCO)  MG per tablet; Take 1 tablet by mouth 3 (Three) Times a Day As Needed for Moderate Pain .    Myofascial pain " syndrome    Lumbosacral spondylosis without myelopathy  -     HYDROcodone-acetaminophen (NORCO)  MG per tablet; Take 1 tablet by mouth 3 (Three) Times a Day As Needed for Moderate Pain .    High risk medication use      Summary  74 y.o. female with multiple comorbidities including DM, polyarthralgia from  inflammatory osteoarthritis, knee pain status post left TKA, here for f/u by telephone.  Chronic polyarthralgia/left knee pain,  and generalized myofascial pain interfering with daily activity, sleep,mood.      Intermittent flair-ups of  inflammatory polyarthralgia.    Continue Hydrocodone  10/325 3 times daily as needed for severe pain.  UDS and  Inspect reviewed.    Discussed risk of tolerance, dependence, respiratory depression, coma and death associated with use of oral opioids for treatment of chronic nonmalignant pain.      Discussed increased risk with combining opioid and benzo. On clorazepate prn anxiety.     Telemedicine done to avoid coronavirus exposure.  Discussed CDC guidelines and precaution regarding current epidemic.  Unable to complete visit using a video connection to the patient. A phone visit was used to complete this visits. Total time of discussion was 21 minutes.    RTC in 2 months

## 2020-06-01 ENCOUNTER — APPOINTMENT (OUTPATIENT)
Dept: PAIN MEDICINE | Facility: CLINIC | Age: 75
End: 2020-06-01

## 2020-06-02 ENCOUNTER — OFFICE VISIT (OUTPATIENT)
Dept: PAIN MEDICINE | Facility: CLINIC | Age: 75
End: 2020-06-02

## 2020-06-02 VITALS — WEIGHT: 184 LBS | BODY MASS INDEX: 28.88 KG/M2 | HEIGHT: 67 IN

## 2020-06-02 DIAGNOSIS — Z79.899 HIGH RISK MEDICATION USE: ICD-10-CM

## 2020-06-02 DIAGNOSIS — G89.4 CHRONIC PAIN SYNDROME: Primary | ICD-10-CM

## 2020-06-02 DIAGNOSIS — M25.50 POLYARTHRALGIA: ICD-10-CM

## 2020-06-02 DIAGNOSIS — M47.817 LUMBOSACRAL SPONDYLOSIS WITHOUT MYELOPATHY: ICD-10-CM

## 2020-06-02 DIAGNOSIS — M79.18 MYOFASCIAL PAIN SYNDROME: ICD-10-CM

## 2020-06-02 PROCEDURE — 99443 PR PHYS/QHP TELEPHONE EVALUATION 21-30 MIN: CPT | Performed by: ANESTHESIOLOGY

## 2020-06-02 RX ORDER — HYDROCODONE BITARTRATE AND ACETAMINOPHEN 10; 325 MG/1; MG/1
1 TABLET ORAL 3 TIMES DAILY PRN
Qty: 90 TABLET | Refills: 0 | Status: SHIPPED | OUTPATIENT
Start: 2020-07-03 | End: 2020-06-12

## 2020-06-02 RX ORDER — BACLOFEN 10 MG/1
TABLET ORAL
COMMUNITY
Start: 2020-05-26 | End: 2021-09-30 | Stop reason: SDUPTHER

## 2020-06-02 RX ORDER — HYDROCODONE BITARTRATE AND ACETAMINOPHEN 10; 325 MG/1; MG/1
1 TABLET ORAL 3 TIMES DAILY PRN
Qty: 90 TABLET | Refills: 0 | Status: SHIPPED | OUTPATIENT
Start: 2020-06-02 | End: 2020-08-03 | Stop reason: SDUPTHER

## 2020-06-02 NOTE — PROGRESS NOTES
Subjective    CC joints pain left knee pain  Marilyn J Pumphrey is a 74 y.o. female with multiple comorbidities including DM, polyarthralgia from  inflammatory osteoarthritis, bilateral knee pain, status post left TKA 2019, here for f/u by telephone.  Continued worsening generalized myofascial pain and polyarthralgia.  Good relief with hydrocodone with functional benefit.  Tried methotrexate and  Celebrex without relief.  Tried Cymbalta, sevella, tramadol without relief.    Keeps regular follow-up with Rheumatology. and ortho. considering right knee replecement.   Good releif with knee injections by ortho.      Utilizes hydrocodone 7.5/325  twice daily with good relief functional benefit denies side effects.    Pain Assessment   Location of Pain: Lower Back, R Hip, L Hip, L Leg, neck pain, joint  Description of Pain: Dull/Aching, Throbbing, Stabbing  Previous Pain Rating :9  Current Pain Rating: 10  Aggravating Factors: Activity  Alleviating Factors: Rest, Medication  PEG Assessment   What number best describes your pain on average in the past week?9  What number best describes how, during the past week, pain has interfered with your enjoyment of life?8  What number best describes how, during the past week, pain has interfered with your general activity? 9     The following portions of the patient's history were reviewed and updated as appropriate: allergies, current medications, past family history, past medical history, past social history, past surgical history and problem list.   has a past medical history of Anxiety, Arthritis, Bone pain, Cancer (CMS/[facility]) (09/2016), Depression, Disease of thyroid gland, DM type 2 (diabetes mellitus, type 2) (CMS/[facility]), GERD (gastroesophageal reflux disease), Hypertension, Irregular heart beat, Knee pain, Low back pain, Osteoarthritis, PONV (postoperative nausea and vomiting), and Sleep apnea.   has a past surgical history that includes Tonsillectomy; Thyroid surgery;  "Oophorectomy (Bilateral, 2015); Hysterectomy (1993); Cholecystectomy (1974); Carpal tunnel release; vein ligation and stripping bilateral; and Total knee arthroplasty (Left, 10/14/2019).  Social History     Tobacco Use   • Smoking status: Never Smoker   • Smokeless tobacco: Never Used   Substance Use Topics   • Alcohol use: No     Frequency: Never     Review of Systems   Constitutional: Negative for chills, fatigue and fever.   HENT: Negative for swollen glands and trouble swallowing.    Respiratory: Negative.  Negative for shortness of breath.    Cardiovascular: Negative for chest pain, palpitations and leg swelling.   Gastrointestinal: Negative for nausea and vomiting.   Musculoskeletal: Positive for arthralgias, back pain and myalgias. Negative for gait problem, joint swelling, neck pain and neck stiffness.        Left knee pain, status post TKA   Skin: Negative for color change, dry skin, rash and skin lesions.   Neurological: Negative for dizziness, weakness, light-headedness and headache.   Hematological: Negative for adenopathy. Does not bruise/bleed easily.   Psychiatric/Behavioral: Negative for behavioral problems, suicidal ideas and depressed mood.   All other systems reviewed and are negative.    Objective   Physical Exam   Constitutional: No distress.   Psychiatric: Her speech is normal and behavior is normal.     Ht 170.2 cm (67\")   Wt 83.5 kg (184 lb)   BMI 28.82 kg/m²     PHQ 9 on chart  Opioid risk tool low risk    Assessment/Plan   Shaila was seen today for extremity pain, arthritis, back pain and follow-up.    Diagnoses and all orders for this visit:    Chronic pain syndrome  -     HYDROcodone-acetaminophen (NORCO)  MG per tablet; Take 1 tablet by mouth 3 (Three) Times a Day As Needed for Severe Pain .  -     HYDROcodone-acetaminophen (NORCO)  MG per tablet; Take 1 tablet by mouth 3 (Three) Times a Day As Needed for Severe Pain . DNF before 7/3/2020    Lumbosacral spondylosis without " myelopathy    Polyarthralgia    Myofascial pain syndrome    High risk medication use      Summary  74 y.o. female with multiple comorbidities including DM, polyarthralgia from  inflammatory osteoarthritis, knee pain status post left TKA, here for f/u by telephone.  Chronic polyarthralgia/left knee pain,  and generalized myofascial pain interfering with daily activity, sleep,mood.      Continue Hydrocodone  10/325 3 times daily as needed for severe pain.  UDS and  Inspect reviewed.    Discussed risk of tolerance, dependence, respiratory depression, coma and death associated with use of oral opioids for treatment of chronic nonmalignant pain.      Discussed increased risk with combining opioid and benzo. On clorazepate prn anxiety.     telemedicine done to avoid coronavirus exposure.  Discussed CDC guidelines and precaution regarding current epidemic.  Unable to complete visit using a video connection to the patient. A phone visit was used to complete visit. Total time  23 minutes    RTC in 2 months

## 2020-06-03 ENCOUNTER — LAB (OUTPATIENT)
Dept: LAB | Facility: HOSPITAL | Age: 75
End: 2020-06-03

## 2020-06-03 DIAGNOSIS — Z79.4 TYPE 2 DIABETES MELLITUS WITH HYPERGLYCEMIA, WITH LONG-TERM CURRENT USE OF INSULIN (HCC): ICD-10-CM

## 2020-06-03 DIAGNOSIS — E89.2 POSTSURGICAL HYPOPARATHYROIDISM (HCC): ICD-10-CM

## 2020-06-03 DIAGNOSIS — E11.65 TYPE 2 DIABETES MELLITUS WITH HYPERGLYCEMIA, WITH LONG-TERM CURRENT USE OF INSULIN (HCC): ICD-10-CM

## 2020-06-03 DIAGNOSIS — E89.0 HYPOTHYROIDISM, POSTSURGICAL: ICD-10-CM

## 2020-06-03 DIAGNOSIS — E78.2 MIXED HYPERLIPIDEMIA: ICD-10-CM

## 2020-06-03 DIAGNOSIS — C73 MALIGNANT NEOPLASM OF THYROID GLAND (HCC): ICD-10-CM

## 2020-06-03 DIAGNOSIS — I10 HYPERTENSION, BENIGN: ICD-10-CM

## 2020-06-03 LAB
ALBUMIN SERPL-MCNC: 4.3 G/DL (ref 3.5–5.2)
ALBUMIN UR-MCNC: <1.2 MG/DL
ALBUMIN/GLOB SERPL: 1.5 G/DL
ALP SERPL-CCNC: 72 U/L (ref 39–117)
ALT SERPL W P-5'-P-CCNC: 12 U/L (ref 1–33)
ANION GAP SERPL CALCULATED.3IONS-SCNC: 12 MMOL/L (ref 5–15)
AST SERPL-CCNC: 14 U/L (ref 1–32)
BILIRUB SERPL-MCNC: 0.3 MG/DL (ref 0.2–1.2)
BUN BLD-MCNC: 16 MG/DL (ref 8–23)
BUN/CREAT SERPL: 16.3 (ref 7–25)
CALCIUM SPEC-SCNC: 8.5 MG/DL (ref 8.6–10.5)
CHLORIDE SERPL-SCNC: 101 MMOL/L (ref 98–107)
CHOLEST SERPL-MCNC: 133 MG/DL (ref 0–200)
CO2 SERPL-SCNC: 29 MMOL/L (ref 22–29)
CREAT BLD-MCNC: 0.98 MG/DL (ref 0.57–1)
CREAT UR-MCNC: 92.3 MG/DL
GFR SERPL CREATININE-BSD FRML MDRD: 56 ML/MIN/1.73
GLOBULIN UR ELPH-MCNC: 2.9 GM/DL
GLUCOSE BLD-MCNC: 118 MG/DL (ref 65–99)
HBA1C MFR BLD: 8 % (ref 3.5–5.6)
HDLC SERPL-MCNC: 51 MG/DL (ref 40–60)
HOLD SPECIMEN: NORMAL
LDLC SERPL CALC-MCNC: 62 MG/DL (ref 0–100)
LDLC/HDLC SERPL: 1.22 {RATIO}
MICROALBUMIN/CREAT UR: NORMAL MG/G{CREAT}
POTASSIUM BLD-SCNC: 4.5 MMOL/L (ref 3.5–5.2)
PROT SERPL-MCNC: 7.2 G/DL (ref 6–8.5)
SODIUM BLD-SCNC: 142 MMOL/L (ref 136–145)
T4 FREE SERPL-MCNC: 1.6 NG/DL (ref 0.93–1.7)
TRIGL SERPL-MCNC: 99 MG/DL (ref 0–150)
TSH SERPL DL<=0.05 MIU/L-ACNC: 0.76 UIU/ML (ref 0.27–4.2)
VLDLC SERPL-MCNC: 19.8 MG/DL (ref 5–40)

## 2020-06-03 PROCEDURE — 80061 LIPID PANEL: CPT

## 2020-06-03 PROCEDURE — 82043 UR ALBUMIN QUANTITATIVE: CPT

## 2020-06-03 PROCEDURE — 84443 ASSAY THYROID STIM HORMONE: CPT

## 2020-06-03 PROCEDURE — 84439 ASSAY OF FREE THYROXINE: CPT

## 2020-06-03 PROCEDURE — 82570 ASSAY OF URINE CREATININE: CPT

## 2020-06-03 PROCEDURE — 83036 HEMOGLOBIN GLYCOSYLATED A1C: CPT

## 2020-06-03 PROCEDURE — 86800 THYROGLOBULIN ANTIBODY: CPT

## 2020-06-03 PROCEDURE — 36415 COLL VENOUS BLD VENIPUNCTURE: CPT

## 2020-06-03 PROCEDURE — 80053 COMPREHEN METABOLIC PANEL: CPT

## 2020-06-03 PROCEDURE — 84432 ASSAY OF THYROGLOBULIN: CPT

## 2020-06-08 LAB
THYROGLOB AB SERPL-ACNC: 3.1 IU/ML
THYROGLOB SERPL-MCNC: ABNORMAL NG/ML
THYROGLOBULIN (TG-RIA): <2 NG/ML

## 2020-06-12 ENCOUNTER — TELEMEDICINE (OUTPATIENT)
Dept: ENDOCRINOLOGY | Facility: CLINIC | Age: 75
End: 2020-06-12

## 2020-06-12 VITALS
BODY MASS INDEX: 28.88 KG/M2 | WEIGHT: 184 LBS | DIASTOLIC BLOOD PRESSURE: 60 MMHG | HEIGHT: 67 IN | SYSTOLIC BLOOD PRESSURE: 121 MMHG

## 2020-06-12 DIAGNOSIS — E89.2 POSTSURGICAL HYPOPARATHYROIDISM (HCC): ICD-10-CM

## 2020-06-12 DIAGNOSIS — C73 MALIGNANT NEOPLASM OF THYROID GLAND (HCC): Primary | ICD-10-CM

## 2020-06-12 DIAGNOSIS — E89.0 HYPOTHYROIDISM, POSTSURGICAL: ICD-10-CM

## 2020-06-12 DIAGNOSIS — E11.65 TYPE 2 DIABETES MELLITUS WITH HYPERGLYCEMIA, WITH LONG-TERM CURRENT USE OF INSULIN (HCC): ICD-10-CM

## 2020-06-12 DIAGNOSIS — E78.2 MIXED HYPERLIPIDEMIA: ICD-10-CM

## 2020-06-12 DIAGNOSIS — I10 HYPERTENSION, BENIGN: ICD-10-CM

## 2020-06-12 DIAGNOSIS — Z79.4 TYPE 2 DIABETES MELLITUS WITH HYPERGLYCEMIA, WITH LONG-TERM CURRENT USE OF INSULIN (HCC): ICD-10-CM

## 2020-06-12 PROCEDURE — 99214 OFFICE O/P EST MOD 30 MIN: CPT | Performed by: INTERNAL MEDICINE

## 2020-06-12 NOTE — PATIENT INSTRUCTIONS
Increase NovoLog to 12 units before each meal  Please send blood sugar records for review  Always keep glucose source with you in case of low blood sugar  Follow-up in 4 months with labs.  Please get regular eye exam and flu vaccine.

## 2020-06-12 NOTE — PROGRESS NOTES
Endocrine Progress Note Outpatient     Patient Care Team:  Suma Campbell APRN as PCP - General  Suma Campbell APRN as PCP - Family Medicine  You have chosen to receive care through a telehealth visit.  Do you consent to use a video/audio connection for your medical care today? Yes.     Chief Complaint: Follow up type 2 diabetes and thyroid cancer: Visit conducted through audio and video conference utilizing [employer]ak 10    HPI: 74-year-old female with history of thyroid cancer, postsurgical hypothyroidism, postsurgical hypoparathyroidism, type 2 diabetes, hyperlipidemia hypertension is followed through telehealth.  For thyroid cancer she has undergone total thyroidectomy and did not receive radioactive iodine treatment.  Her pathology with 1.6 cm papillary thyroid cancer with margins were clear and no lymphatic or vascular invasion was noted.  She is currently on levothyroxine 37 mcg p.o. daily.  She is tolerating well.      Surgical hypothyroidism: On levothyroxine 137 mcg p.o. daily    Postsurgical hypoparathyroidism: She is currently on calcium with vitamin D and calcitriol to 5 mcg twice a day.    Type 2 diabetes: She is currently on Janumet XR 50/1000, 2 tablets daily, Jardiance 25 mg once a day and Lantus 50 units daily and NovoLog 10 units with each meal.  No blood sugar records available at this time.  She has not been following diet.    Hyperlipidemia: On Crestor    Hypertension: Well-controlled.    Past Medical History:   Diagnosis Date   • Anxiety    • Arthritis    • Bone pain    • Cancer (CMS/[facility]) 09/2016    THYROID   • Depression    • Disease of thyroid gland    • DM type 2 (diabetes mellitus, type 2) (CMS/[facility])     TYPE 2   • GERD (gastroesophageal reflux disease)    • Hypertension    • Irregular heart beat    • Knee pain    • Low back pain    • Osteoarthritis    • PONV (postoperative nausea and vomiting)     DOES WELL WITH PROPOFOL   • Sleep apnea     C-PAP IN USE       Social History      Socioeconomic History   • Marital status:      Spouse name: Not on file   • Number of children: Not on file   • Years of education: Not on file   • Highest education level: Not on file   Tobacco Use   • Smoking status: Never Smoker   • Smokeless tobacco: Never Used   Substance and Sexual Activity   • Alcohol use: No     Frequency: Never   • Drug use: No   • Sexual activity: Defer       Family History   Problem Relation Age of Onset   • Diabetes Father    • Heart disease Father    • Hypertension Father    • Hyperlipidemia Father    • Thyroid disease Father    • Cancer Sister         lung cancer   • Thyroid disease Sister    • Diabetes Brother    • Heart disease Brother    • Cancer Brother         lung cancer       Allergies   Allergen Reactions   • Codeine Shortness Of Breath   • Oxycodone Hallucinations       ROS:   Constitutional:  Denies fatigue, tiredness.    Eyes:  Denies change in visual acuity   HENT:  Denies nasal congestion or sore throat   Respiratory: denies cough, shortness of breath.   Cardiovascular:  denies chest pain, edema   GI:  Denies abdominal pain, nausea, vomiting.   Musculoskeletal:  Denies back pain or joint pain   Integument:  Denies dry skin and rash   Neurologic:  Denies headache, focal weakness or sensory changes   Endocrine:  Denies polyuria or polydipsia   Psychiatric:  Denies depression or anxiety      Vitals:    06/12/20 1344   BP: 121/60       Physical Exam:  GEN: NAD, looked healthy on video.  Alert and oriented and able to carry on conversation.  Breathing effort was normal.  Mood and affect was normal.      Results Review:     I reviewed the patient's new clinical results.    Lab Results   Component Value Date    HGBA1C 8.0 (H) 06/03/2020    HGBA1C 6.6 (H) 11/22/2019    HGBA1C 8.30 (H) 10/14/2019      Lab Results   Component Value Date    GLUCOSE 118 (H) 06/03/2020    BUN 16 06/03/2020    CREATININE 0.98 06/03/2020    EGFRIFNONA 56 (L) 06/03/2020    EGFRIFAFRI >60  02/14/2018    BCR 16.3 06/03/2020    K 4.5 06/03/2020    CO2 29.0 06/03/2020    CALCIUM 8.5 (L) 06/03/2020    ALBUMIN 4.30 06/03/2020    LABIL2 1.2 09/11/2019    AST 14 06/03/2020    ALT 12 06/03/2020    CHOL 133 06/03/2020    TRIG 99 06/03/2020    LDL 62 06/03/2020    HDL 51 06/03/2020     Lab Results   Component Value Date    TSH 0.757 06/03/2020    FREET4 1.60 06/03/2020    THYROIDAB 24 (H) 06/11/2017     Labs from [facility], 2020 showed a TG antibody of 3.1, TG level less than 2.0, calcium 8.5, TSH 0.75, free T4 1.60, LDL 62, triglycerides 99, A1c 8%, sodium 142, potassium 4.5, chloride 101, CO 29, glucose 118, BUN 16, creatinine 0.9, AST 14 and ALT 12 with urine microalbumin creatinine ratio was less than 2.        CT Soft Tissue Neck With Contrast [[id number]] (Order [id number])   Order   Status: Final result   Study Notes      Vargas Barbosa W on 10/19/2019  2:17 PM   Chest pain/ soa/ difficulty swallowing/ PT had LT knee SX recently      Appointment Information     PACS Images      Radiology Images   Study Result        DATE OF EXAM:   10/19/2019 2:16 PM     PROCEDURE:   CT SOFT TISSUE NECK W CONTRAST-     INDICATIONS:   pain and difficulty swallowing     COMPARISON:  CT soft tissue neck 8/4/2017, cervical spine CT 9/3/2019     TECHNIQUE:   After the intravenous administration of  75 mL of Isovue 370, contiguous  axial images were obtained through the neck. Coronal and sagittal  reconstructions were performed. Automated exposure control and iterative  reconstruction methods were used.      FINDINGS:   There are postsurgical changes of prior thyroidectomy. No abnormal soft  tissue at the thyroid bed. The oropharyngeal soft tissues are within  normal limits. Streak artifact from dental amalgam partially limits  complete assessment. Parotid glands are symmetric. Submandibular glands  are symmetric. Tiny amount of fluid at the right fossa of Rosenmuller  similar to prior study on image 33. The hypopharynx, larynx,  subglottic  airway appear within normal limits. Pharyngeal mucosal enhancement is  within normal limits.     Lung apices clear, better assessed on the concurrently performed chest  CT. The carotid arteries are patent bilaterally. Vertebral arteries are  patent bilaterally. Visualized portions of the intracranial structures  are unremarkable. Globes are symmetric. Orbits intact. No retro-orbital  abnormality. Mastoid air cells well-aerated. Paranasal sinuses well  aerated. The mandibles intact. No acute osseous abnormality of the  cervical spine. No new adenopathy in the neck.      IMPRESSION:  IMPRESSION :   1. No acute soft tissue abnormality in the neck.  2. Postsurgical changes of prior thyroidectomy.     Electronically Signed By-Anthony Lerma On:10/19/2019 3:07 PM  This report was finalized on [id number] by  Anthony Lerma, .         Medication Review: Reviewed.       Current Outpatient Medications:   •  amLODIPine-benazepril (LOTREL 5-10) 5-10 MG per capsule, Take 1 capsule by mouth Every Evening., Disp: , Rfl:   •  aspirin 81 MG tablet, Take 1 tablet by mouth Daily. (Patient taking differently: Take 365 mg by mouth Daily.), Disp: 30 tablet, Rfl: 0  •  baclofen (LIORESAL) 10 MG tablet, TAKE 1 TABLET BY MOUTH 4 TIMES DAILY WITH FOOD, Disp: , Rfl:   •  calcitriol (ROCALTROL) 0.25 MCG capsule, Take 1 capsule by mouth twice daily, Disp: 180 capsule, Rfl: 0  •  CALCIUM CARBONATE-VIT D-MIN PO, Take 1,200 mg by mouth 2 (Two) Times a Day., Disp: , Rfl:   •  Cholecalciferol (VITAMIN D3) 48200 units capsule, Take 50,000 Units by mouth Every 7 (Seven) Days. SUNDAY, Disp: , Rfl:   •  clorazepate (TRANXENE) 7.5 MG tablet, Take 7.5 mg by mouth 2 (Two) Times a Day., Disp: , Rfl: 3  •  diclofenac (VOLTAREN) 1 % gel gel, APPLY 4 GRAMS TOPICALLY TWICE DAILY, Disp: , Rfl:   •  Empagliflozin (JARDIANCE) 25 MG tablet, Take 25 mg by mouth Daily., Disp: 90 tablet, Rfl: 2  •  escitalopram (LEXAPRO) 10 MG tablet, Take 10 mg by mouth  Every Evening., Disp: , Rfl:   •  esomeprazole (nexIUM) 40 MG capsule, Take 40 mg by mouth Every Evening., Disp: , Rfl:   •  HYDROcodone-acetaminophen (NORCO)  MG per tablet, Take 1 tablet by mouth 3 (Three) Times a Day As Needed for Severe Pain ., Disp: 90 tablet, Rfl: 0  •  insulin aspart (NOVOLOG FLEXPEN) 100 UNIT/ML solution pen-injector sc pen, Inject 10 units before each meal (Patient taking differently: 10 Units. Inject 10 units before each meal), Disp: 5 pen, Rfl: 4  •  insulin glargine (LANTUS) 100 UNIT/ML injection, Inject 50 Units under the skin into the appropriate area as directed Daily., Disp: , Rfl:   •  levothyroxine (SYNTHROID) 137 MCG tablet, Take 137 mcg by mouth Daily., Disp: , Rfl:   •  magnesium oxide (MAG-OX) 400 tablet tablet, Take 400 mg by mouth 2 (Two) Times a Day., Disp: , Rfl:   •  metFORMIN (GLUCOPHAGE) 1000 MG tablet, Take 1,000 mg by mouth 2 (Two) Times a Day With Meals., Disp: , Rfl:   •  metoprolol tartrate (LOPRESSOR) 100 MG tablet, Take 100 mg by mouth 2 (Two) Times a Day., Disp: , Rfl:   •  pramipexole (MIRAPEX) 1 MG tablet, Take 1 mg by mouth 2 (Two) Times a Day., Disp: , Rfl:   •  promethazine (PHENERGAN) 25 MG tablet, Take 1 tablet by mouth As Needed., Disp: , Rfl: 1  •  rosuvastatin (CRESTOR) 20 MG tablet, Take 20 mg by mouth Every Night., Disp: , Rfl:   •  sennosides-docusate sodium (SENOKOT-S) 8.6-50 MG tablet, Take 2 tablets by mouth 2 (Two) Times a Day., Disp: 60 tablet, Rfl: 1  •  SITagliptin-metFORMIN HCl (JANUMET PO), Take  by mouth., Disp: , Rfl:       Assessment/Plan   1.  Thyroid cancer: Status post thyroid surgery in September 2016.  She did not receive radioactive iodine treatment.  Tumor was 1.6 cm papillary thyroid cancer with margins clear.  Her CT scan of the neck in October 2019 did not show any lymphadenopathy.  TG antibodies are declining.  TSH is 0.75 with free T4 1.60.  We will continue levothyroxine 137 mcg p.o. Daily.    2.  Hypothyroidism:  Secondary to thyroid surgery, TSH is at target, continue current dose of levothyroxine 137 mcg p.o. Daily.    3.  Postsurgical hypoparathyroidism: She is currently on Rocaltrol with calcium and vitamin D and her calcium is reasonable at 8.5.  She is asymptomatic.  We will continue current management.    4.  Diabetes mellitus type 2: Uncontrolled with A1c 8%.  Will increase NovoLog to 12 units with each meal and she needs to work on her diet and always keep glucose source in case of low blood sugar.  Patient verbalized understanding.  Also advised to get eye exam.    5.  Hyperlipidemia: Excellent control, continue current medications    6.  Hypertension: Excellent control, continue current medications.            Gege Ford MD FACE.    Much of the above report is an electronic transcription/translation of the spoken language to printed text using Dragon Software. As such, the subtleties and finesse of the spoken language may permit erroneous, or at times, nonsensical words or phrases to be inadvertently transcribed; thus changes may be made at a later date to rectify these errors.

## 2020-06-30 RX ORDER — CALCITRIOL 0.25 UG/1
CAPSULE, LIQUID FILLED ORAL
Qty: 180 CAPSULE | Refills: 0 | Status: SHIPPED | OUTPATIENT
Start: 2020-06-30 | End: 2020-09-28

## 2020-07-08 ENCOUNTER — TELEPHONE (OUTPATIENT)
Dept: PAIN MEDICINE | Facility: CLINIC | Age: 75
End: 2020-07-08

## 2020-07-27 ENCOUNTER — TELEPHONE (OUTPATIENT)
Dept: ENDOCRINOLOGY | Facility: CLINIC | Age: 75
End: 2020-07-27

## 2020-07-27 NOTE — TELEPHONE ENCOUNTER
Patient called complaining about her weight. She stated last time you saw her you told her you could prescribe something to help her with weight loss. She stated she feels like she's constantly gaining. Please advise.

## 2020-07-28 NOTE — TELEPHONE ENCOUNTER
Start Ozempic 0.25 mg subcu weekly for 4 weeks and if able to tolerate then increase the dose to 0.5 mg subcu weekly.  Watch for low blood sugars and if the blood sugar starts going less than 100 then she needs to call us.

## 2020-08-03 ENCOUNTER — OFFICE VISIT (OUTPATIENT)
Dept: PAIN MEDICINE | Facility: CLINIC | Age: 75
End: 2020-08-03

## 2020-08-03 ENCOUNTER — RESULTS ENCOUNTER (OUTPATIENT)
Dept: PAIN MEDICINE | Facility: CLINIC | Age: 75
End: 2020-08-03

## 2020-08-03 VITALS
SYSTOLIC BLOOD PRESSURE: 182 MMHG | OXYGEN SATURATION: 99 % | TEMPERATURE: 97.3 F | HEART RATE: 67 BPM | WEIGHT: 195 LBS | HEIGHT: 67 IN | BODY MASS INDEX: 30.61 KG/M2 | RESPIRATION RATE: 16 BRPM | DIASTOLIC BLOOD PRESSURE: 94 MMHG

## 2020-08-03 DIAGNOSIS — Z79.899 HIGH RISK MEDICATION USE: ICD-10-CM

## 2020-08-03 DIAGNOSIS — M25.50 POLYARTHRALGIA: ICD-10-CM

## 2020-08-03 DIAGNOSIS — G89.4 CHRONIC PAIN SYNDROME: Primary | ICD-10-CM

## 2020-08-03 DIAGNOSIS — M47.817 LUMBOSACRAL SPONDYLOSIS WITHOUT MYELOPATHY: ICD-10-CM

## 2020-08-03 PROCEDURE — G0463 HOSPITAL OUTPT CLINIC VISIT: HCPCS | Performed by: ANESTHESIOLOGY

## 2020-08-03 PROCEDURE — 99214 OFFICE O/P EST MOD 30 MIN: CPT | Performed by: ANESTHESIOLOGY

## 2020-08-03 RX ORDER — HYDROCODONE BITARTRATE AND ACETAMINOPHEN 10; 325 MG/1; MG/1
1 TABLET ORAL 3 TIMES DAILY PRN
Qty: 90 TABLET | Refills: 0 | Status: SHIPPED | OUTPATIENT
Start: 2020-08-03 | End: 2020-10-06

## 2020-08-03 RX ORDER — HYDROCODONE BITARTRATE AND ACETAMINOPHEN 10; 325 MG/1; MG/1
1 TABLET ORAL 3 TIMES DAILY PRN
Qty: 90 TABLET | Refills: 0 | Status: SHIPPED | OUTPATIENT
Start: 2020-09-03 | End: 2020-10-07 | Stop reason: SDUPTHER

## 2020-08-03 NOTE — PROGRESS NOTES
Subjective    CC joints pain left knee pain  Marilyn J Pumphrey is a 74 y.o. female with multiple comorbidities including DM, polyarthralgia from  inflammatory osteoarthritis, bilateral knee pain, status post left TKA 2019, here for f/u.  Chronic polyarthralgia/left knee pain,  and generalized myofascial pain interfering with daily activity, sleep,mood.    Tried methotrexate and  Celebrex without relief.  Tried Cymbalta, sevella, tramadol without relief.    Keeps regular follow-up with Rheumatology. and ortho. considering right knee replecement.   Good releif with knee injections by ortho.      Utilizes hydrocodone 7.5/325  twice daily with good relief functional benefit denies side effects.    Pain Assessment   Location of Pain: Lower Back, R Hip, L Hip, L Leg, neck pain, joint  Description of Pain: Dull/Aching, Throbbing, Stabbing  Previous Pain Rating :9  Current Pain Ratin  Aggravating Factors: Activity  Alleviating Factors: Rest, Medication  PEG Assessment   What number best describes your pain on average in the past week?6  What number best describes how, during the past week, pain has interfered with your enjoyment of life?7  What number best describes how, during the past week, pain has interfered with your general activity? 8    The following portions of the patient's history were reviewed and updated as appropriate: allergies, current medications, past family history, past medical history, past social history, past surgical history and problem list.   has a past medical history of Anxiety, Arthritis, Bone pain, Cancer (CMS/[facility]) (2016), Depression, Disease of thyroid gland, DM type 2 (diabetes mellitus, type 2) (CMS/[facility]), GERD (gastroesophageal reflux disease), Hypertension, Irregular heart beat, Knee pain, Low back pain, Osteoarthritis, PONV (postoperative nausea and vomiting), and Sleep apnea.   has a past surgical history that includes Tonsillectomy; Thyroid surgery; Oophorectomy (Bilateral,  2015); Hysterectomy (1993); Cholecystectomy (1974); Carpal tunnel release; vein ligation and stripping bilateral; and Total knee arthroplasty (Left, 10/14/2019).  Social History     Tobacco Use   • Smoking status: Never Smoker   • Smokeless tobacco: Never Used   Substance Use Topics   • Alcohol use: No     Frequency: Never     Review of Systems   Constitutional: Negative for chills, fatigue and fever.   HENT: Negative for swollen glands and trouble swallowing.    Respiratory: Negative.  Negative for shortness of breath.    Cardiovascular: Negative for chest pain, palpitations and leg swelling.   Gastrointestinal: Negative for nausea and vomiting.   Musculoskeletal: Positive for arthralgias, back pain and myalgias. Negative for gait problem, joint swelling, neck pain and neck stiffness.        Left knee pain, status post TKA   Skin: Negative for color change, dry skin, rash and skin lesions.   Neurological: Negative for dizziness, weakness, light-headedness and headache.   Hematological: Negative for adenopathy. Does not bruise/bleed easily.   Psychiatric/Behavioral: Negative for behavioral problems, suicidal ideas and depressed mood.   All other systems reviewed and are negative.    Objective   Physical Exam   Constitutional: She is oriented to person, place, and time. She appears well-developed and well-nourished. No distress.   Eyes: Pupils are equal, round, and reactive to light. Conjunctivae are normal.   Neck: Muscular tenderness present. Decreased range of motion present. No Kernig's sign noted.   Cardiovascular: Normal heart sounds and normal pulses.   Pulmonary/Chest: Effort normal and breath sounds normal.   Musculoskeletal:        Lumbar back: She exhibits decreased range of motion and tenderness.   Back: Paraspinal tenderness, negative leg raise.  Pain with extension/side rotation.      Vascular Status -  Her right foot exhibits no edema. Her left foot exhibits no edema.  Lymphadenopathy:     She has no  "cervical adenopathy.   Neurological: She is alert and oriented to person, place, and time. She has normal strength and normal reflexes. No sensory deficit. Gait abnormal. Coordination normal.   Antalgic gait   Skin: Skin is warm and dry. Capillary refill takes less than 2 seconds.   Psychiatric: She has a normal mood and affect. Her behavior is normal.   Vitals reviewed.    BP (!) 182/94   Pulse 67   Temp 97.3 °F (36.3 °C)   Resp 16   Ht 170.2 cm (67\")   Wt 88.5 kg (195 lb)   SpO2 99%   BMI 30.54 kg/m²     PHQ 9 on chart  Opioid risk tool low risk    Assessment/Plan   Shaila was seen today for knee pain, hand pain and follow-up.    Diagnoses and all orders for this visit:    Chronic pain syndrome  -     HYDROcodone-acetaminophen (NORCO)  MG per tablet; Take 1 tablet by mouth 3 (Three) Times a Day As Needed for Severe Pain . DNF before 9/3/2020  -     HYDROcodone-acetaminophen (NORCO)  MG per tablet; Take 1 tablet by mouth 3 (Three) Times a Day As Needed for Severe Pain .    Polyarthralgia    Lumbosacral spondylosis without myelopathy    High risk medication use  -     Urine Drug Screen - Urine, Clean Catch; Future       Summary  74 y.o. female with multiple comorbidities including DM, polyarthralgia from  inflammatory osteoarthritis, knee pain status post left TKA, here for f/u.  Chronic polyarthralgia/left knee pain,  and generalized myofascial pain interfering with daily activity, sleep,mood.      Continue Hydrocodone  10/325 3 times daily as needed for severe pain.  UDS and  Inspect reviewed.    Discussed risk of tolerance, dependence, respiratory depression, coma and death associated with use of oral opioids for treatment of chronic nonmalignant pain.      Discussed increased risk with combining opioid and benzo. On clorazepate prn anxiety.     RTC in 2 months                   "

## 2020-08-13 ENCOUNTER — TRANSCRIBE ORDERS (OUTPATIENT)
Dept: ADMINISTRATIVE | Facility: HOSPITAL | Age: 75
End: 2020-08-13

## 2020-08-13 DIAGNOSIS — M25.572 LEFT ANKLE PAIN, UNSPECIFIED CHRONICITY: Primary | ICD-10-CM

## 2020-08-21 ENCOUNTER — APPOINTMENT (OUTPATIENT)
Dept: MRI IMAGING | Facility: HOSPITAL | Age: 75
End: 2020-08-21

## 2020-08-28 ENCOUNTER — HOSPITAL ENCOUNTER (OUTPATIENT)
Dept: MRI IMAGING | Facility: HOSPITAL | Age: 75
Discharge: HOME | End: 2020-08-28
Admitting: ORTHOPAEDIC SURGERY

## 2020-08-28 DIAGNOSIS — M25.572 LEFT ANKLE PAIN, UNSPECIFIED CHRONICITY: ICD-10-CM

## 2020-08-28 PROCEDURE — 73721 MRI JNT OF LWR EXTRE W/O DYE: CPT

## 2020-09-28 DIAGNOSIS — Z79.4 TYPE 2 DIABETES MELLITUS WITH HYPERGLYCEMIA, WITH LONG-TERM CURRENT USE OF INSULIN (HCC): Primary | ICD-10-CM

## 2020-09-28 DIAGNOSIS — E11.65 TYPE 2 DIABETES MELLITUS WITH HYPERGLYCEMIA, WITH LONG-TERM CURRENT USE OF INSULIN (HCC): Primary | ICD-10-CM

## 2020-09-28 DIAGNOSIS — E89.0 HYPOTHYROIDISM, POSTSURGICAL: ICD-10-CM

## 2020-09-28 RX ORDER — CALCITRIOL 0.25 UG/1
CAPSULE, LIQUID FILLED ORAL
Qty: 180 CAPSULE | Refills: 0 | Status: SHIPPED | OUTPATIENT
Start: 2020-09-28 | End: 2020-12-28

## 2020-09-29 ENCOUNTER — LAB (OUTPATIENT)
Dept: LAB | Facility: HOSPITAL | Age: 75
End: 2020-09-29

## 2020-09-29 DIAGNOSIS — Z79.4 TYPE 2 DIABETES MELLITUS WITH HYPERGLYCEMIA, WITH LONG-TERM CURRENT USE OF INSULIN (HCC): ICD-10-CM

## 2020-09-29 DIAGNOSIS — E11.65 TYPE 2 DIABETES MELLITUS WITH HYPERGLYCEMIA, WITH LONG-TERM CURRENT USE OF INSULIN (HCC): ICD-10-CM

## 2020-09-29 DIAGNOSIS — E89.0 HYPOTHYROIDISM, POSTSURGICAL: ICD-10-CM

## 2020-09-29 LAB
ALBUMIN SERPL-MCNC: 4.3 G/DL (ref 3.5–5.2)
ALBUMIN UR-MCNC: 2.1 MG/DL
ALBUMIN/GLOB SERPL: 1.3 G/DL
ALP SERPL-CCNC: 92 U/L (ref 39–117)
ALT SERPL W P-5'-P-CCNC: 17 U/L (ref 1–33)
ANION GAP SERPL CALCULATED.3IONS-SCNC: 15 MMOL/L (ref 5–15)
AST SERPL-CCNC: 10 U/L (ref 1–32)
BILIRUB SERPL-MCNC: 0.3 MG/DL (ref 0–1.2)
BUN SERPL-MCNC: 15 MG/DL (ref 8–23)
BUN/CREAT SERPL: 16.5 (ref 7–25)
CALCIUM SPEC-SCNC: 8.7 MG/DL (ref 8.6–10.5)
CHLORIDE SERPL-SCNC: 100 MMOL/L (ref 98–107)
CHOLEST SERPL-MCNC: 160 MG/DL (ref 0–200)
CO2 SERPL-SCNC: 28 MMOL/L (ref 22–29)
CREAT SERPL-MCNC: 0.91 MG/DL (ref 0.57–1)
CREAT UR-MCNC: 86 MG/DL
GFR SERPL CREATININE-BSD FRML MDRD: 61 ML/MIN/1.73
GLOBULIN UR ELPH-MCNC: 3.3 GM/DL
GLUCOSE SERPL-MCNC: 84 MG/DL (ref 65–99)
HBA1C MFR BLD: 8.7 % (ref 3.5–5.6)
HDLC SERPL-MCNC: 56 MG/DL (ref 40–60)
LDLC SERPL CALC-MCNC: 71 MG/DL (ref 0–100)
LDLC/HDLC SERPL: 1.26 {RATIO}
MICROALBUMIN/CREAT UR: 24.4 MG/G
POTASSIUM SERPL-SCNC: 5.2 MMOL/L (ref 3.5–5.2)
PROT SERPL-MCNC: 7.6 G/DL (ref 6–8.5)
SODIUM SERPL-SCNC: 143 MMOL/L (ref 136–145)
T4 FREE SERPL-MCNC: 1.6 NG/DL (ref 0.93–1.7)
TRIGL SERPL-MCNC: 167 MG/DL (ref 0–150)
TSH SERPL DL<=0.05 MIU/L-ACNC: 0.23 UIU/ML (ref 0.27–4.2)
VLDLC SERPL-MCNC: 33.4 MG/DL (ref 5–40)

## 2020-09-29 PROCEDURE — 84443 ASSAY THYROID STIM HORMONE: CPT

## 2020-09-29 PROCEDURE — 80053 COMPREHEN METABOLIC PANEL: CPT

## 2020-09-29 PROCEDURE — 82570 ASSAY OF URINE CREATININE: CPT

## 2020-09-29 PROCEDURE — 82043 UR ALBUMIN QUANTITATIVE: CPT

## 2020-09-29 PROCEDURE — 84439 ASSAY OF FREE THYROXINE: CPT

## 2020-09-29 PROCEDURE — 83036 HEMOGLOBIN GLYCOSYLATED A1C: CPT

## 2020-09-29 PROCEDURE — 36415 COLL VENOUS BLD VENIPUNCTURE: CPT

## 2020-09-29 PROCEDURE — 80061 LIPID PANEL: CPT

## 2020-10-06 ENCOUNTER — OFFICE VISIT (OUTPATIENT)
Dept: ENDOCRINOLOGY | Facility: CLINIC | Age: 75
End: 2020-10-06

## 2020-10-06 VITALS
DIASTOLIC BLOOD PRESSURE: 75 MMHG | HEIGHT: 67 IN | OXYGEN SATURATION: 98 % | TEMPERATURE: 97.3 F | WEIGHT: 199 LBS | BODY MASS INDEX: 31.23 KG/M2 | HEART RATE: 72 BPM | SYSTOLIC BLOOD PRESSURE: 150 MMHG

## 2020-10-06 DIAGNOSIS — Z79.4 TYPE 2 DIABETES MELLITUS WITH HYPERGLYCEMIA, WITH LONG-TERM CURRENT USE OF INSULIN (HCC): ICD-10-CM

## 2020-10-06 DIAGNOSIS — E89.0 HYPOTHYROIDISM, POSTSURGICAL: ICD-10-CM

## 2020-10-06 DIAGNOSIS — R73.9 HYPERGLYCEMIA: ICD-10-CM

## 2020-10-06 DIAGNOSIS — C73 MALIGNANT NEOPLASM OF THYROID GLAND (HCC): Primary | ICD-10-CM

## 2020-10-06 DIAGNOSIS — E11.65 TYPE 2 DIABETES MELLITUS WITH HYPERGLYCEMIA, WITH LONG-TERM CURRENT USE OF INSULIN (HCC): ICD-10-CM

## 2020-10-06 DIAGNOSIS — I10 HYPERTENSION, BENIGN: ICD-10-CM

## 2020-10-06 DIAGNOSIS — E89.2 POSTSURGICAL HYPOPARATHYROIDISM (HCC): ICD-10-CM

## 2020-10-06 DIAGNOSIS — E78.2 MIXED HYPERLIPIDEMIA: ICD-10-CM

## 2020-10-06 LAB
GLUCOSE BLDC GLUCOMTR-MCNC: 100 MG/DL (ref 70–105)
GLUCOSE BLDC GLUCOMTR-MCNC: 100 MG/DL (ref 70–130)

## 2020-10-06 PROCEDURE — 99214 OFFICE O/P EST MOD 30 MIN: CPT | Performed by: INTERNAL MEDICINE

## 2020-10-06 PROCEDURE — 82962 GLUCOSE BLOOD TEST: CPT | Performed by: INTERNAL MEDICINE

## 2020-10-06 RX ORDER — NEBIVOLOL 10 MG/1
10 TABLET ORAL 2 TIMES DAILY
COMMUNITY
End: 2021-09-30

## 2020-10-06 RX ORDER — INSULIN GLARGINE 100 [IU]/ML
40 INJECTION, SOLUTION SUBCUTANEOUS DAILY
Qty: 30 ML | Refills: 6 | Status: SHIPPED | OUTPATIENT
Start: 2020-10-06 | End: 2021-05-24 | Stop reason: SDUPTHER

## 2020-10-06 RX ORDER — DULAGLUTIDE 0.75 MG/.5ML
0.75 INJECTION, SOLUTION SUBCUTANEOUS
Qty: 12 PEN | Refills: 6 | Status: SHIPPED | OUTPATIENT
Start: 2020-10-06 | End: 2020-12-02 | Stop reason: SDUPTHER

## 2020-10-06 RX ORDER — ERGOCALCIFEROL 1.25 MG/1
50000 CAPSULE ORAL WEEKLY
COMMUNITY
Start: 2020-08-04 | End: 2020-12-30 | Stop reason: SDUPTHER

## 2020-10-06 NOTE — PATIENT INSTRUCTIONS
AYUSH Vincentumet  Start metformin 1000 mg twice a day  Decrease Lantus to 40 units subcu daily  Start Trulicity 0.75 mg subcu weekly  If your sugar starts running below 100 then please call me to adjust insulin doses  Always give glucose dose in case of low blood sugar  Annual eye exam  Continue rest of the medications

## 2020-10-06 NOTE — PROGRESS NOTES
Endocrine Progress Note Outpatient     Patient Care Team:  Suma Campbell APRN as PCP - General  Suma Campbell APRN as PCP - Family Medicine  Suma Campbell APRN as PCP - Claims Attributed      Chief Complaint: Follow up type 2 diabetes and thyroid cancer:    HPI: 74-year-old female with history of thyroid cancer, postsurgical hypothyroidism, postsurgical hypoparathyroidism, type 2 diabetes, hyperlipidemia hypertension is here for follow-up.    For thyroid cancer she has undergone total thyroidectomy and did not receive radioactive iodine treatment.  Her pathology with 1.6 cm papillary thyroid cancer with margins were clear and no lymphatic or vascular invasion was noted.  She is currently on levothyroxine 137 mcg p.o. daily.  She is tolerating well.      Surgical hypothyroidism: On levothyroxine 137 mcg p.o. daily    Postsurgical hypoparathyroidism: She is currently on calcium with vitamin D and calcitriol to .25 mcg twice a day.    Type 2 diabetes: She is currently on Janumet XR 50/1000, 2 tablets daily, Jardiance 25 mg once a day and Lantus 50 units daily and NovoLog 12 units with each meal.  No blood sugar records available at this time.  She has not been following diet.  She tells me usually blood sugar runs less than 200 but she does have some low blood sugars when she does not eat.  She has not required any assistance for low blood sugar since last seen.    Hyperlipidemia: On Crestor    Hypertension: Blood pressure is high today.    Past Medical History:   Diagnosis Date   • Anxiety    • Arthritis    • Bone pain    • Cancer (CMS/[facility]) 09/2016    THYROID   • Depression    • Disease of thyroid gland    • DM type 2 (diabetes mellitus, type 2) (CMS/[facility])     TYPE 2   • GERD (gastroesophageal reflux disease)    • Hypertension    • Irregular heart beat    • Knee pain    • Low back pain    • Osteoarthritis    • PONV (postoperative nausea and vomiting)     DOES WELL WITH PROPOFOL   • Sleep  apnea     C-PAP IN USE       Social History     Socioeconomic History   • Marital status:      Spouse name: Not on file   • Number of children: Not on file   • Years of education: Not on file   • Highest education level: Not on file   Tobacco Use   • Smoking status: Never Smoker   • Smokeless tobacco: Never Used   Substance and Sexual Activity   • Alcohol use: No     Frequency: Never   • Drug use: No   • Sexual activity: Defer       Family History   Problem Relation Age of Onset   • Diabetes Father    • Heart disease Father    • Hypertension Father    • Hyperlipidemia Father    • Thyroid disease Father    • Cancer Sister         lung cancer   • Thyroid disease Sister    • Diabetes Brother    • Heart disease Brother    • Cancer Brother         lung cancer       Allergies   Allergen Reactions   • Codeine Shortness Of Breath   • Oxycodone Hallucinations       ROS:   Constitutional:  Denies fatigue, tiredness.    Eyes:  Denies change in visual acuity   HENT:  Denies nasal congestion or sore throat   Respiratory: denies cough, shortness of breath.   Cardiovascular:  denies chest pain, edema   GI:  Denies abdominal pain, nausea, vomiting.   Musculoskeletal:  Denies back pain or joint pain   Integument:  Denies dry skin and rash   Neurologic:  Denies headache, focal weakness or sensory changes   Endocrine:  Denies polyuria or polydipsia   Psychiatric:  Denies depression or anxiety      Vitals:    10/06/20 1335   BP: 150/75   Pulse: 72   Temp: 97.3 °F (36.3 °C)   SpO2: 98%       Physical Exam:  GEN: NAD, conversant  EYES: EOMI, PERRL, no conjunctival erythema  NECK: no thyromegaly, full ROM   CV: RRR, no murmurs/rubs/gallops, no peripheral edema  LUNG: CTAB, no wheezes/rales/ronchi  SKIN: no rashes, no acanthosis  MSK: no deformities, full ROM of all extremities  NEURO: no tremors, DTR normal  PSYCH: AOX3, appropriate mood, affect normal      Results Review:     I reviewed the patient's new clinical results.    Lab  Results   Component Value Date    HGBA1C 8.7 (H) 09/29/2020    HGBA1C 8.0 (H) 06/03/2020    HGBA1C 6.6 (H) 11/22/2019      Lab Results   Component Value Date    GLUCOSE 84 09/29/2020    BUN 15 09/29/2020    CREATININE 0.91 09/29/2020    EGFRIFNONA 61 09/29/2020    EGFRIFAFRI >60 02/14/2018    BCR 16.5 09/29/2020    K 5.2 09/29/2020    CO2 28.0 09/29/2020    CALCIUM 8.7 09/29/2020    ALBUMIN 4.30 09/29/2020    LABIL2 1.2 09/11/2019    AST 10 09/29/2020    ALT 17 09/29/2020    CHOL 160 09/29/2020    TRIG 167 (H) 09/29/2020    LDL 71 09/29/2020    HDL 56 09/29/2020     Lab Results   Component Value Date    TSH 0.229 (L) 09/29/2020    FREET4 1.60 09/29/2020    THYROIDAB 24 (H) 06/11/2017     Labs from [facility], 2020 showed a TG antibody of 3.1, TG level less than 2.0, calcium 8.5, TSH 0.75, free T4 1.60, LDL 62, triglycerides 99, A1c 8%, sodium 142, potassium 4.5, chloride 101, CO 29, glucose 118, BUN 16, creatinine 0.9, AST 14 and ALT 12 with urine microalbumin creatinine ratio was less than 2.        CT Soft Tissue Neck With Contrast [[id number]] (Order [id number])   Order   Status: Final result   Study Notes      Vargas Barbosa W on 10/19/2019  2:17 PM   Chest pain/ soa/ difficulty swallowing/ PT had LT knee SX recently      Appointment Information     PACS Images      Radiology Images   Study Result        DATE OF EXAM:   10/19/2019 2:16 PM     PROCEDURE:   CT SOFT TISSUE NECK W CONTRAST-     INDICATIONS:   pain and difficulty swallowing     COMPARISON:  CT soft tissue neck 8/4/2017, cervical spine CT 9/3/2019     TECHNIQUE:   After the intravenous administration of  75 mL of Isovue 370, contiguous  axial images were obtained through the neck. Coronal and sagittal  reconstructions were performed. Automated exposure control and iterative  reconstruction methods were used.      FINDINGS:   There are postsurgical changes of prior thyroidectomy. No abnormal soft  tissue at the thyroid bed. The oropharyngeal soft tissues are  within  normal limits. Streak artifact from dental amalgam partially limits  complete assessment. Parotid glands are symmetric. Submandibular glands  are symmetric. Tiny amount of fluid at the right fossa of Rosenmuller  similar to prior study on image 33. The hypopharynx, larynx, subglottic  airway appear within normal limits. Pharyngeal mucosal enhancement is  within normal limits.     Lung apices clear, better assessed on the concurrently performed chest  CT. The carotid arteries are patent bilaterally. Vertebral arteries are  patent bilaterally. Visualized portions of the intracranial structures  are unremarkable. Globes are symmetric. Orbits intact. No retro-orbital  abnormality. Mastoid air cells well-aerated. Paranasal sinuses well  aerated. The mandibles intact. No acute osseous abnormality of the  cervical spine. No new adenopathy in the neck.      IMPRESSION:  IMPRESSION :   1. No acute soft tissue abnormality in the neck.  2. Postsurgical changes of prior thyroidectomy.     Electronically Signed By-Anthony Lerma On:10/19/2019 3:07 PM  This report was finalized on [id number] by  Anthony Lerma, .         Medication Review: Reviewed.       Current Outpatient Medications:   •  amLODIPine-benazepril (LOTREL) 10-40 MG per capsule, Take 1 capsule by mouth Every Evening., Disp: , Rfl:   •  aspirin 81 MG tablet, Take 1 tablet by mouth Daily. (Patient taking differently: Take 325 mg by mouth Daily.), Disp: 30 tablet, Rfl: 0  •  baclofen (LIORESAL) 10 MG tablet, TAKE 1 TABLET BY MOUTH 4 TIMES DAILY WITH FOOD, Disp: , Rfl:   •  calcitriol (ROCALTROL) 0.25 MCG capsule, Take 1 capsule by mouth twice daily, Disp: 180 capsule, Rfl: 0  •  CALCIUM CARBONATE-VIT D-MIN PO, Take 1,200 mg by mouth 2 (Two) Times a Day., Disp: , Rfl:   •  Cholecalciferol (VITAMIN D3) 36466 units capsule, Take 50,000 Units by mouth Every 7 (Seven) Days. SUNDAY, Disp: , Rfl:   •  clorazepate (TRANXENE) 7.5 MG tablet, Take 7.5 mg by mouth 2 (Two)  Times a Day., Disp: , Rfl: 3  •  diclofenac (VOLTAREN) 1 % gel gel, APPLY 4 GRAMS TOPICALLY TWICE DAILY, Disp: , Rfl:   •  Empagliflozin (JARDIANCE) 25 MG tablet, Take 25 mg by mouth Daily., Disp: 90 tablet, Rfl: 2  •  escitalopram (LEXAPRO) 10 MG tablet, Take 10 mg by mouth Every Evening., Disp: , Rfl:   •  esomeprazole (nexIUM) 40 MG capsule, Take 40 mg by mouth Every Evening., Disp: , Rfl:   •  HYDROcodone-acetaminophen (NORCO)  MG per tablet, Take 1 tablet by mouth 3 (Three) Times a Day As Needed for Severe Pain . DNF before 9/3/2020, Disp: 90 tablet, Rfl: 0  •  insulin aspart (NOVOLOG FLEXPEN) 100 UNIT/ML solution pen-injector sc pen, Inject 10 units before each meal (Patient taking differently: 10 Units. Inject 10 units before each meal), Disp: 5 pen, Rfl: 4  •  insulin glargine (LANTUS) 100 UNIT/ML injection, Inject 50 Units under the skin into the appropriate area as directed Daily., Disp: , Rfl:   •  levothyroxine (SYNTHROID) 137 MCG tablet, Take 137 mcg by mouth Daily., Disp: , Rfl:   •  magnesium oxide (MAG-OX) 400 tablet tablet, Take 400 mg by mouth 2 (Two) Times a Day., Disp: , Rfl:   •  metFORMIN (GLUCOPHAGE) 1000 MG tablet, Take 1,000 mg by mouth 2 (Two) Times a Day With Meals., Disp: , Rfl:   •  nebivolol (BYSTOLIC) 10 MG tablet, Take 10 mg by mouth 2 (two) times a day., Disp: , Rfl:   •  pramipexole (MIRAPEX) 1 MG tablet, Take 1 mg by mouth 2 (Two) Times a Day., Disp: , Rfl:   •  promethazine (PHENERGAN) 25 MG tablet, Take 1 tablet by mouth As Needed., Disp: , Rfl: 1  •  rosuvastatin (CRESTOR) 20 MG tablet, Take 20 mg by mouth Every Night., Disp: , Rfl:   •  sitaGLIPtin-metFORMIN (Janumet)  MG per tablet, Take  by mouth. 1 daily, Disp: , Rfl:   •  vitamin D (ERGOCALCIFEROL) 1.25 MG (68264 UT) capsule capsule, Take 50,000 Units by mouth 1 (One) Time Per Week., Disp: , Rfl:       Assessment/Plan   1.  Thyroid cancer: Status post thyroid surgery in September 2016.  She did not receive  radioactive iodine treatment.  Tumor was 1.6 cm papillary thyroid cancer with margins clear.  Her CT scan of the neck in October 2019 did not show any lymphadenopathy.  TG antibodies are declining.  TSH 0.229 with free T4 1.60.  We will continue levothyroxine 137 mcg p.o. Daily.    2.  Hypothyroidism: Secondary to thyroid surgery,  continue current dose of levothyroxine 137 mcg p.o. Daily.    3.  Postsurgical hypoparathyroidism: She is currently on Rocaltrol with calcium and vitamin D and her calcium is reasonable at 8.7.  She is asymptomatic.  We will continue current management.    4.  Diabetes mellitus type 2: Uncontrolled with A1c 8.7%.  At this time I will DC Janumet, add metformin and add Trulicity 0.75 mg subcu weekly. PLz call if develop any abdominal pain, nausea or vomiting. She will continue Jardiance along with insulins.  I will reduce Lantus to 40 units daily.  She is advised to watch for low blood sugars, always give glucose dose in case of low blood sugar and bring the blood sugar records for review at each visit.    5.  Hyperlipidemia: Excellent control, continue current medications    6.  Hypertension:Better, continue current medications.            Gege Ford MD FACE.    Much of the above report is an electronic transcription/translation of the spoken language to printed text using Dragon Software. As such, the subtleties and finesse of the spoken language may permit erroneous, or at times, nonsensical words or phrases to be inadvertently transcribed; thus changes may be made at a later date to rectify these errors.

## 2020-10-07 ENCOUNTER — OFFICE VISIT (OUTPATIENT)
Dept: PAIN MEDICINE | Facility: CLINIC | Age: 75
End: 2020-10-07

## 2020-10-07 VITALS
WEIGHT: 194 LBS | HEART RATE: 74 BPM | HEIGHT: 67 IN | TEMPERATURE: 97.3 F | BODY MASS INDEX: 30.45 KG/M2 | DIASTOLIC BLOOD PRESSURE: 93 MMHG | RESPIRATION RATE: 16 BRPM | OXYGEN SATURATION: 100 % | SYSTOLIC BLOOD PRESSURE: 172 MMHG

## 2020-10-07 DIAGNOSIS — G89.4 CHRONIC PAIN SYNDROME: Primary | ICD-10-CM

## 2020-10-07 DIAGNOSIS — M25.50 POLYARTHRALGIA: ICD-10-CM

## 2020-10-07 DIAGNOSIS — M47.817 LUMBOSACRAL SPONDYLOSIS WITHOUT MYELOPATHY: ICD-10-CM

## 2020-10-07 DIAGNOSIS — Z79.899 HIGH RISK MEDICATION USE: ICD-10-CM

## 2020-10-07 DIAGNOSIS — M79.18 MYOFASCIAL PAIN SYNDROME: ICD-10-CM

## 2020-10-07 PROCEDURE — 99214 OFFICE O/P EST MOD 30 MIN: CPT | Performed by: ANESTHESIOLOGY

## 2020-10-07 PROCEDURE — G0463 HOSPITAL OUTPT CLINIC VISIT: HCPCS | Performed by: ANESTHESIOLOGY

## 2020-10-07 RX ORDER — HYDROCODONE BITARTRATE AND ACETAMINOPHEN 10; 325 MG/1; MG/1
1 TABLET ORAL 3 TIMES DAILY PRN
Qty: 90 TABLET | Refills: 0 | Status: SHIPPED | OUTPATIENT
Start: 2020-11-13 | End: 2020-11-05 | Stop reason: SDUPTHER

## 2020-10-07 RX ORDER — HYDROCODONE BITARTRATE AND ACETAMINOPHEN 10; 325 MG/1; MG/1
1 TABLET ORAL 3 TIMES DAILY PRN
Qty: 90 TABLET | Refills: 0 | Status: SHIPPED | OUTPATIENT
Start: 2020-10-13 | End: 2020-12-08 | Stop reason: SDUPTHER

## 2020-10-07 NOTE — PROGRESS NOTES
Subjective    CC joints pain left knee pain  Marilyn J Pumphrey is a 74 y.o. female with multiple comorbidities including DM, polyarthralgia from  inflammatory osteoarthritis, bilateral knee pain, status post left TKA , here for f/u.  Continued chronic polyarthralgia/left knee pain,  and generalized myofascial pain interfering with daily activity, sleep,mood.    Tried methotrexate and  Celebrex without relief.  Tried Cymbalta, sevella, tramadol without relief.  Seen Rheumatology. and ortho. considering right knee replecement.   Good releif with knee injections by ortho.      Utilizes hydrocodone 42903  3 times daily with good relief functional benefit denies side effects.    Pain Assessment   Location of Pain: Lower Back, R Hip, L Hip, L Leg, neck pain, joint  Description of Pain: Dull/Aching, Throbbing, Stabbing  Previous Pain Rating :9  Current Pain Ratin  Aggravating Factors: Activity  Alleviating Factors: Rest, Medication  PEG Assessment   What number best describes your pain on average in the past week?7  What number best describes how, during the past week, pain has interfered with your enjoyment of life?7  What number best describes how, during the past week, pain has interfered with your general activity? 8     The following portions of the patient's history were reviewed and updated as appropriate: allergies, current medications, past family history, past medical history, past social history, past surgical history and problem list.   has a past medical history of Anxiety, Arthritis, Bone pain, Cancer (CMS/HCC) (2016), Depression, Disease of thyroid gland, DM type 2 (diabetes mellitus, type 2) (CMS/[facility]), GERD (gastroesophageal reflux disease), Hypertension, Irregular heart beat, Knee pain, Low back pain, Osteoarthritis, PONV (postoperative nausea and vomiting), and Sleep apnea.   has a past surgical history that includes Tonsillectomy; Thyroid surgery; Oophorectomy (Bilateral, ); Hysterectomy  (1993); Cholecystectomy (1974); Carpal tunnel release; vein ligation and stripping bilateral; and Total knee arthroplasty (Left, 10/14/2019).  Social History     Tobacco Use   • Smoking status: Never Smoker   • Smokeless tobacco: Never Used   Substance Use Topics   • Alcohol use: No     Frequency: Never     Review of Systems   Constitutional: Negative for chills, fatigue and fever.   HENT: Negative for swollen glands and trouble swallowing.    Respiratory: Negative.  Negative for shortness of breath.    Cardiovascular: Negative for chest pain, palpitations and leg swelling.   Gastrointestinal: Negative for nausea and vomiting.   Musculoskeletal: Positive for arthralgias, back pain and myalgias. Negative for gait problem, joint swelling, neck pain and neck stiffness.        Left knee pain, status post TKA   Skin: Negative for color change, dry skin, rash and skin lesions.   Neurological: Negative for dizziness, weakness, light-headedness and headache.   Hematological: Negative for adenopathy. Does not bruise/bleed easily.   Psychiatric/Behavioral: Negative for behavioral problems, suicidal ideas and depressed mood.   All other systems reviewed and are negative.    Objective   Physical Exam   Constitutional: She is oriented to person, place, and time. She appears well-developed and well-nourished. No distress.   Eyes: Pupils are equal, round, and reactive to light. Conjunctivae are normal.   Neck: Muscular tenderness present. Decreased range of motion present. No Kernig's sign noted.   Cardiovascular: Normal heart sounds and normal pulses.   Pulmonary/Chest: Effort normal and breath sounds normal.   Musculoskeletal:      Lumbar back: She exhibits decreased range of motion and tenderness.      Comments: Back: Paraspinal tenderness, negative leg raise.  Pain with extension/side rotation.      Vascular Status -  Her right foot exhibits no edema. Her left foot exhibits no edema.  Lymphadenopathy:     She has no cervical  "adenopathy.   Neurological: She is alert and oriented to person, place, and time. She has normal reflexes. No sensory deficit. Gait abnormal. Coordination normal.   Antalgic gait   Skin: Skin is warm and dry. Capillary refill takes less than 2 seconds.   Psychiatric: Her behavior is normal.   Vitals reviewed.    /93   Pulse 74   Temp 97.3 °F (36.3 °C)   Resp 16   Ht 170.2 cm (67\")   Wt 88 kg (194 lb)   SpO2 100%   BMI 30.38 kg/m²     PHQ 9 on chart  Opioid risk tool low risk    Assessment/Plan   Shaila was seen today for joint pain, fall, fatigue and follow-up.    Diagnoses and all orders for this visit:    Chronic pain syndrome  -     HYDROcodone-acetaminophen (NORCO)  MG per tablet; Take 1 tablet by mouth 3 (Three) Times a Day As Needed for Severe Pain .  -     HYDROcodone-acetaminophen (NORCO)  MG per tablet; Take 1 tablet by mouth 3 (Three) Times a Day As Needed for Severe Pain . DNF before 11/13/2020    Lumbosacral spondylosis without myelopathy    Polyarthralgia    Myofascial pain syndrome    High risk medication use       Summary  74 y.o. female with multiple comorbidities including DM, polyarthralgia from  inflammatory osteoarthritis, knee pain S/P left TKA, here for f/u.  Chronic polyarthralgia/left knee pain,  and generalized myofascial pain interfering with daily activity, sleep,mood.      Continue Hydrocodone  10/325 3 times daily as needed for severe pain.  UDS and  Inspect reviewed.    Discussed risk of tolerance, dependence, respiratory depression, coma and death associated with use of oral opioids for treatment of chronic nonmalignant pain.      Discussed increased risk with combining opioid and benzo. On clorazepate prn anxiety.     RTC in 2 months                   "

## 2020-11-02 RX ORDER — EMPAGLIFLOZIN 25 MG/1
25 TABLET, FILM COATED ORAL DAILY
Qty: 90 TABLET | Refills: 2 | Status: SHIPPED | OUTPATIENT
Start: 2020-11-02 | End: 2021-08-25 | Stop reason: SDUPTHER

## 2020-11-05 ENCOUNTER — OFFICE VISIT (OUTPATIENT)
Dept: CARDIOLOGY | Facility: CLINIC | Age: 75
End: 2020-11-05

## 2020-11-05 VITALS
WEIGHT: 187 LBS | OXYGEN SATURATION: 97 % | BODY MASS INDEX: 29.35 KG/M2 | HEART RATE: 81 BPM | HEIGHT: 67 IN | DIASTOLIC BLOOD PRESSURE: 79 MMHG | TEMPERATURE: 97.8 F | SYSTOLIC BLOOD PRESSURE: 127 MMHG

## 2020-11-05 DIAGNOSIS — I10 ESSENTIAL HYPERTENSION: ICD-10-CM

## 2020-11-05 DIAGNOSIS — G47.33 OBSTRUCTIVE SLEEP APNEA: ICD-10-CM

## 2020-11-05 DIAGNOSIS — R06.02 SHORTNESS OF BREATH: ICD-10-CM

## 2020-11-05 DIAGNOSIS — E78.00 PURE HYPERCHOLESTEROLEMIA: ICD-10-CM

## 2020-11-05 DIAGNOSIS — I34.0 NONRHEUMATIC MITRAL VALVE REGURGITATION: ICD-10-CM

## 2020-11-05 DIAGNOSIS — E11.9 TYPE 2 DIABETES MELLITUS WITHOUT COMPLICATION, WITHOUT LONG-TERM CURRENT USE OF INSULIN (HCC): ICD-10-CM

## 2020-11-05 DIAGNOSIS — I25.118 CORONARY ARTERY DISEASE OF NATIVE ARTERY OF NATIVE HEART WITH STABLE ANGINA PECTORIS (HCC): Primary | ICD-10-CM

## 2020-11-05 PROCEDURE — 99214 OFFICE O/P EST MOD 30 MIN: CPT | Performed by: INTERNAL MEDICINE

## 2020-11-05 PROCEDURE — 93000 ELECTROCARDIOGRAM COMPLETE: CPT | Performed by: INTERNAL MEDICINE

## 2020-11-05 RX ORDER — SUCRALFATE 1 G/1
1 TABLET ORAL 4 TIMES DAILY
COMMUNITY

## 2020-11-05 NOTE — PROGRESS NOTES
Subjective:     Encounter Date:11/05/2020      Patient ID: Marilyn J Pumphrey is a 75 y.o. female.    Chief Complaint:  History of Present Illness 75-year-old white female with history of coronary disease diabetes hypertension hyperlipidemia presents to my office for follow-up.  Patient is currently having symptoms of shortness of breath and occasional chest discomfort also.  Patient describes chest discomfort as a substernal discomfort without any radiation but some shortness of breath.  No complains of any PND orthopnea.  She has been having some palpitation without any dizziness or syncope or swelling of the feet.  She is taking her meds regularly.  She had a echocardiogram which showed moderate to severe mitral gestation with normal LV systolic function.    The following portions of the patient's history were reviewed and updated as appropriate: allergies, current medications, past family history, past medical history, past social history, past surgical history and problem list.  Past Medical History:   Diagnosis Date   • Anxiety    • Arthritis    • Bone pain    • Cancer (CMS/[facility]) 09/2016    THYROID   • Depression    • Disease of thyroid gland    • DM type 2 (diabetes mellitus, type 2) (CMS/[facility])     TYPE 2   • GERD (gastroesophageal reflux disease)    • Hypertension    • Irregular heart beat    • Knee pain    • Low back pain    • Osteoarthritis    • PONV (postoperative nausea and vomiting)     DOES WELL WITH PROPOFOL   • Sleep apnea     C-PAP IN USE     Past Surgical History:   Procedure Laterality Date   • CARPAL TUNNEL RELEASE     • CHOLECYSTECTOMY  1974   • HYSTERECTOMY  1993    partial abdominal hysterectomy   • OOPHORECTOMY Bilateral 2015   • THYROID SURGERY      2 thyroid surgery   • TONSILLECTOMY     • TOTAL KNEE ARTHROPLASTY Left 10/14/2019    Procedure: TOTAL KNEE ARTHROPLASTY;  Surgeon: Sanjay Collado MD;  Location: [facility];  Service: Orthopedics   • VEIN LIGATION AND STRIPPING BILATERAL    "    /79 (BP Location: Left arm, Patient Position: Sitting)   Pulse 81   Temp 97.8 °F (36.6 °C)   Ht 170.2 cm (67\")   Wt 84.8 kg (187 lb)   SpO2 97%   BMI 29.29 kg/m²   Family History   Problem Relation Age of Onset   • Diabetes Father    • Heart disease Father    • Hypertension Father    • Hyperlipidemia Father    • Thyroid disease Father    • Cancer Sister         lung cancer   • Thyroid disease Sister    • Diabetes Brother    • Heart disease Brother    • Cancer Brother         lung cancer       Current Outpatient Medications:   •  amLODIPine-benazepril (LOTREL) 10-40 MG per capsule, Take 1 capsule by mouth Every Evening., Disp: , Rfl:   •  aspirin 81 MG tablet, Take 1 tablet by mouth Daily. (Patient taking differently: Take 325 mg by mouth Daily.), Disp: 30 tablet, Rfl: 0  •  baclofen (LIORESAL) 10 MG tablet, TAKE 1 TABLET BY MOUTH 4 TIMES DAILY WITH FOOD, Disp: , Rfl:   •  calcitriol (ROCALTROL) 0.25 MCG capsule, Take 1 capsule by mouth twice daily, Disp: 180 capsule, Rfl: 0  •  CALCIUM CARBONATE-VIT D-MIN PO, Take 1,200 mg by mouth 2 (Two) Times a Day., Disp: , Rfl:   •  Cholecalciferol (VITAMIN D3) 82479 units capsule, Take 50,000 Units by mouth Every 7 (Seven) Days. SUNDAY, Disp: , Rfl:   •  clorazepate (TRANXENE) 7.5 MG tablet, Take 7.5 mg by mouth 2 (Two) Times a Day., Disp: , Rfl: 3  •  diclofenac (VOLTAREN) 1 % gel gel, Apply 4 g topically to the appropriate area as directed 4 (Four) Times a Day As Needed., Disp: , Rfl:   •  Dulaglutide (Trulicity) 0.75 MG/0.5ML solution pen-injector, Inject 0.75 mg under the skin into the appropriate area as directed Every 7 (Seven) Days., Disp: 12 pen, Rfl: 6  •  Empagliflozin (Jardiance) 25 MG tablet, Take 25 mg by mouth Daily., Disp: 90 tablet, Rfl: 2  •  escitalopram (LEXAPRO) 10 MG tablet, Take 10 mg by mouth Every Evening., Disp: , Rfl:   •  esomeprazole (nexIUM) 40 MG capsule, Take 40 mg by mouth Every Evening., Disp: , Rfl:   •  " HYDROcodone-acetaminophen (NORCO)  MG per tablet, Take 1 tablet by mouth 3 (Three) Times a Day As Needed for Severe Pain ., Disp: 90 tablet, Rfl: 0  •  insulin aspart (NOVOLOG FLEXPEN) 100 UNIT/ML solution pen-injector sc pen, Inject 10 units before each meal (Patient taking differently: 10 Units. Inject 10 units before each meal), Disp: 5 pen, Rfl: 4  •  insulin glargine (LANTUS) 100 UNIT/ML injection, Inject 40 Units under the skin into the appropriate area as directed Daily., Disp: 30 mL, Rfl: 6  •  levothyroxine (SYNTHROID) 137 MCG tablet, Take 137 mcg by mouth Daily., Disp: , Rfl:   •  magnesium oxide (MAG-OX) 400 tablet tablet, Take 400 mg by mouth 2 (Two) Times a Day., Disp: , Rfl:   •  metFORMIN (GLUCOPHAGE) 1000 MG tablet, Take 1 tablet by mouth 2 (Two) Times a Day With Meals., Disp: 180 tablet, Rfl: 4  •  nebivolol (BYSTOLIC) 10 MG tablet, Take 10 mg by mouth 2 (two) times a day., Disp: , Rfl:   •  pramipexole (MIRAPEX) 1 MG tablet, Take 1 mg by mouth 2 (Two) Times a Day., Disp: , Rfl:   •  promethazine (PHENERGAN) 25 MG tablet, Take 1 tablet by mouth As Needed., Disp: , Rfl: 1  •  Rosuvastatin Calcium 40 MG capsule sprinkle, Take 20 mg by mouth Every Night., Disp: , Rfl:   •  sucralfate (CARAFATE) 1 g tablet, Take 1 g by mouth 4 (Four) Times a Day., Disp: , Rfl:   •  vitamin D (ERGOCALCIFEROL) 1.25 MG (57311 UT) capsule capsule, Take 50,000 Units by mouth 1 (One) Time Per Week., Disp: , Rfl:   Allergies   Allergen Reactions   • Codeine Shortness Of Breath   • Oxycodone Hallucinations     Social History     Socioeconomic History   • Marital status:      Spouse name: Not on file   • Number of children: Not on file   • Years of education: Not on file   • Highest education level: Not on file   Tobacco Use   • Smoking status: Never Smoker   • Smokeless tobacco: Never Used   Substance and Sexual Activity   • Alcohol use: No     Frequency: Never   • Drug use: No   • Sexual activity: Defer     Review  of Systems   Constitution: Positive for malaise/fatigue. Negative for fever.   HENT: Negative for ear pain and nosebleeds.    Eyes: Negative for blurred vision and double vision.   Cardiovascular: Positive for chest pain, leg swelling and palpitations. Negative for dyspnea on exertion.   Respiratory: Positive for shortness of breath. Negative for cough.    Skin: Negative for rash.   Musculoskeletal: Negative for joint pain.   Gastrointestinal: Positive for nausea. Negative for abdominal pain and vomiting.   Neurological: Negative for focal weakness, headaches and light-headedness.   Psychiatric/Behavioral: Negative for depression. The patient is not nervous/anxious.    All other systems reviewed and are negative.             Objective:     Constitutional:       Appearance: Well-developed.   Eyes:      General: No scleral icterus.     Conjunctiva/sclera: Conjunctivae normal.      Pupils: Pupils are equal, round, and reactive to light.   HENT:      Head: Normocephalic and atraumatic.   Neck:      Musculoskeletal: Normal range of motion and neck supple.      Vascular: No carotid bruit or JVD.   Pulmonary:      Effort: Pulmonary effort is normal.      Breath sounds: Normal breath sounds. No wheezing. No rales.   Cardiovascular:      Normal rate. Regular rhythm.      Murmurs: There is a systolic murmur.   Pulses:     Intact distal pulses.   Abdominal:      General: Bowel sounds are normal.      Palpations: Abdomen is soft.   Musculoskeletal: Normal range of motion.   Skin:     General: Skin is warm and dry.      Findings: No rash.   Neurological:      Mental Status: Alert.      Comments: No focal deficits         ECG 12 Lead    Date/Time: 11/5/2020 4:17 PM  Performed by: Heath Musa MD  Authorized by: Heath Musa MD   Comments: Sinus rhythm with nonspecific ST segment abnormality            Lab Review:       Assessment:          Diagnosis Plan   1. Coronary artery disease of native artery of native heart with  stable angina pectoris (CMS/[facility])     2. Essential hypertension     3. Pure hypercholesterolemia     4. Obstructive sleep apnea     5. Type 2 diabetes mellitus without complication, without long-term current use of insulin (CMS/[facility])     6. Shortness of breath     7. Nonrheumatic mitral valve regurgitation            Plan:     Patient has history of coronary disease but has shortness of breath and chest pains  Patient had an echocardiogram which showed normal function but has moderate to severe mitral regurgitation   patient will have a stress Myoview to rule out ischemia  Patient blood pressure and heart rate stable  Patient's lipid levels are followed by the primary care doctor  Patient also has sleep apnea and using a CPAP machine  Patient has diabetes and is on oral medicines  Continue current medicines and follow her with the test is

## 2020-12-03 RX ORDER — DULAGLUTIDE 0.75 MG/.5ML
0.75 INJECTION, SOLUTION SUBCUTANEOUS
Qty: 12 PEN | Refills: 6 | Status: SHIPPED | OUTPATIENT
Start: 2020-12-03 | End: 2021-01-27

## 2020-12-04 ENCOUNTER — HOSPITAL ENCOUNTER (OUTPATIENT)
Dept: CARDIOLOGY | Facility: HOSPITAL | Age: 75
Discharge: HOME | End: 2020-12-04

## 2020-12-04 DIAGNOSIS — I34.0 NONRHEUMATIC MITRAL VALVE REGURGITATION: ICD-10-CM

## 2020-12-04 DIAGNOSIS — R06.02 SHORTNESS OF BREATH: ICD-10-CM

## 2020-12-04 DIAGNOSIS — I10 ESSENTIAL HYPERTENSION: ICD-10-CM

## 2020-12-04 DIAGNOSIS — E11.9 TYPE 2 DIABETES MELLITUS WITHOUT COMPLICATION, WITHOUT LONG-TERM CURRENT USE OF INSULIN (HCC): ICD-10-CM

## 2020-12-04 DIAGNOSIS — E78.00 PURE HYPERCHOLESTEROLEMIA: ICD-10-CM

## 2020-12-04 DIAGNOSIS — I25.118 CORONARY ARTERY DISEASE OF NATIVE ARTERY OF NATIVE HEART WITH STABLE ANGINA PECTORIS (HCC): ICD-10-CM

## 2020-12-04 DIAGNOSIS — G47.33 OBSTRUCTIVE SLEEP APNEA: ICD-10-CM

## 2020-12-04 LAB
BH CV STRESS COMMENTS STAGE 1: NORMAL
BH CV STRESS DOSE REGADENOSON STAGE 1: 0.4
BH CV STRESS DURATION MIN STAGE 1: 0
BH CV STRESS DURATION SEC STAGE 1: 10
BH CV STRESS PROTOCOL 1: NORMAL
BH CV STRESS RECOVERY BP: NORMAL MMHG
BH CV STRESS RECOVERY HR: 100 BPM
BH CV STRESS STAGE 1: 1
LV EF NUC BP: 58 %
MAXIMAL PREDICTED HEART RATE: 146 BPM
STRESS BASELINE BP: NORMAL MMHG
STRESS BASELINE HR: 82 BPM
STRESS TARGET HR: 124 BPM

## 2020-12-04 PROCEDURE — 93018 CV STRESS TEST I&R ONLY: CPT | Performed by: INTERNAL MEDICINE

## 2020-12-04 PROCEDURE — 0 TECHNETIUM SESTAMIBI: Performed by: INTERNAL MEDICINE

## 2020-12-04 PROCEDURE — 25010000002 REGADENOSON 0.4 MG/5ML SOLUTION: Performed by: INTERNAL MEDICINE

## 2020-12-04 PROCEDURE — 93016 CV STRESS TEST SUPVJ ONLY: CPT | Performed by: INTERNAL MEDICINE

## 2020-12-04 PROCEDURE — A9500 TC99M SESTAMIBI: HCPCS | Performed by: INTERNAL MEDICINE

## 2020-12-04 PROCEDURE — 93017 CV STRESS TEST TRACING ONLY: CPT

## 2020-12-04 PROCEDURE — 78452 HT MUSCLE IMAGE SPECT MULT: CPT | Performed by: INTERNAL MEDICINE

## 2020-12-04 PROCEDURE — 78452 HT MUSCLE IMAGE SPECT MULT: CPT

## 2020-12-04 RX ADMIN — TECHNETIUM TC 99M SESTAMIBI 1 DOSE: 1 INJECTION INTRAVENOUS at 13:45

## 2020-12-04 RX ADMIN — REGADENOSON 0.4 MG: 0.08 INJECTION, SOLUTION INTRAVENOUS at 15:45

## 2020-12-08 ENCOUNTER — OFFICE VISIT (OUTPATIENT)
Dept: PAIN MEDICINE | Facility: CLINIC | Age: 75
End: 2020-12-08

## 2020-12-08 VITALS — WEIGHT: 187 LBS | HEIGHT: 67 IN | BODY MASS INDEX: 29.35 KG/M2 | RESPIRATION RATE: 16 BRPM

## 2020-12-08 DIAGNOSIS — M79.18 MYOFASCIAL PAIN SYNDROME: ICD-10-CM

## 2020-12-08 DIAGNOSIS — M25.50 POLYARTHRALGIA: ICD-10-CM

## 2020-12-08 DIAGNOSIS — G89.4 CHRONIC PAIN SYNDROME: Primary | ICD-10-CM

## 2020-12-08 DIAGNOSIS — M47.817 LUMBOSACRAL SPONDYLOSIS WITHOUT MYELOPATHY: ICD-10-CM

## 2020-12-08 DIAGNOSIS — Z79.899 HIGH RISK MEDICATION USE: ICD-10-CM

## 2020-12-08 PROCEDURE — 99443 PR PHYS/QHP TELEPHONE EVALUATION 21-30 MIN: CPT | Performed by: ANESTHESIOLOGY

## 2020-12-08 RX ORDER — HYDROCODONE BITARTRATE AND ACETAMINOPHEN 10; 325 MG/1; MG/1
1 TABLET ORAL 3 TIMES DAILY PRN
Qty: 90 TABLET | Refills: 0 | Status: SHIPPED | OUTPATIENT
Start: 2020-12-23 | End: 2021-03-30 | Stop reason: SDUPTHER

## 2020-12-08 RX ORDER — HYDROCODONE BITARTRATE AND ACETAMINOPHEN 10; 325 MG/1; MG/1
1 TABLET ORAL 3 TIMES DAILY PRN
Qty: 90 TABLET | Refills: 0 | Status: SHIPPED | OUTPATIENT
Start: 2021-01-22 | End: 2021-02-22 | Stop reason: SDUPTHER

## 2020-12-09 NOTE — PROGRESS NOTES
Subjective    CC joints pain left knee pain  Marilyn J Pumphrey is a 75 y.o. female with multiple comorbidities including DM, polyarthralgia from  inflammatory osteoarthritis, bilateral knee pain, status post left TKA , here for f/u by tel.  For worsening jose raul knee pain with cold weather.  Continued chronic polyarthralgia/left knee pain,  and generalized myofascial pain interfering with daily activity, sleep,mood.    Tried methotrexate and  Celebrex without relief.  Tried Cymbalta, sevella, tramadol without relief.  Seen Rheumatology. and ortho. considering right knee replecement.   Good releif with knee injections by ortho.      Utilizes hydrocodone 04605  3 times daily with good relief functional benefit denies side effects.    Pain Assessment   Location of Pain: Lower Back, R Hip, L Hip, L Leg, neck pain, joint  Description of Pain: Dull/Aching, Throbbing, Stabbing  Previous Pain Rating :9  Current Pain Ratin  Aggravating Factors: Activity  Alleviating Factors: Rest, Medication  PEG Assessment   What number best describes your pain on average in the past week?7  What number best describes how, during the past week, pain has interfered with your enjoyment of life?7  What number best describes how, during the past week, pain has interfered with your general activity? 9     The following portions of the patient's history were reviewed and updated as appropriate: allergies, current medications, past family history, past medical history, past social history, past surgical history and problem list.   has a past medical history of Anxiety, Arthritis, Bone pain, Cancer (CMS/[facility]) (2016), Depression, Disease of thyroid gland, DM type 2 (diabetes mellitus, type 2) (CMS/[facility]), GERD (gastroesophageal reflux disease), Hypertension, Irregular heart beat, Knee pain, Leaky heart valve, Low back pain, Osteoarthritis, PONV (postoperative nausea and vomiting), and Sleep apnea.   has a past surgical history that includes  "Tonsillectomy; Thyroid surgery; Oophorectomy (Bilateral, 2015); Hysterectomy (1993); Cholecystectomy (1974); Carpal tunnel release; vein ligation and stripping bilateral; and Total knee arthroplasty (Left, 10/14/2019).  Social History     Tobacco Use   • Smoking status: Never Smoker   • Smokeless tobacco: Never Used   Substance Use Topics   • Alcohol use: No     Frequency: Never     Review of Systems   Constitutional: Negative for chills, fatigue and fever.   HENT: Negative for swollen glands and trouble swallowing.    Respiratory: Negative.  Negative for shortness of breath.    Cardiovascular: Negative for chest pain, palpitations and leg swelling.   Gastrointestinal: Negative for nausea and vomiting.   Musculoskeletal: Positive for arthralgias, back pain and myalgias. Negative for gait problem, joint swelling, neck pain and neck stiffness.        Left knee pain, status post TKA   Skin: Negative for color change, dry skin, rash and skin lesions.   Neurological: Negative for dizziness, weakness, light-headedness and headache.   Hematological: Negative for adenopathy. Does not bruise/bleed easily.   Psychiatric/Behavioral: Negative for behavioral problems, suicidal ideas and depressed mood.   All other systems reviewed and are negative.    Objective   Physical Exam   Constitutional: No distress.     Resp 16   Ht 170.2 cm (67\")   Wt 84.8 kg (187 lb)   BMI 29.29 kg/m²     PHQ 9 on chart  Opioid risk tool low risk    Assessment/Plan   Diagnoses and all orders for this visit:    1. Chronic pain syndrome (Primary)  -     HYDROcodone-acetaminophen (NORCO)  MG per tablet; Take 1 tablet by mouth 3 (Three) Times a Day As Needed for Severe Pain .  Dispense: 90 tablet; Refill: 0  -     HYDROcodone-acetaminophen (NORCO)  MG per tablet; Take 1 tablet by mouth 3 (Three) Times a Day As Needed for Severe Pain . DNF before 1/22/2021  Dispense: 90 tablet; Refill: 0    2. Lumbosacral spondylosis without myelopathy    3. " Polyarthralgia    4. Myofascial pain syndrome    5. High risk medication use       Summary  75 y.o. female with multiple comorbidities including DM, polyarthralgia from  inflammatory osteoarthritis, knee pain S/P left TKA, here for f/u.  Chronic polyarthralgia/left knee pain,  and generalized myofascial pain interfering with daily activity, sleep,mood.      Continue Hydrocodone  10/325 3 times daily as needed for severe pain.  UDS and  Inspect reviewed.    Discussed risk of tolerance, dependence, respiratory depression, coma and death associated with use of oral opioids for treatment of chronic nonmalignant pain.      Discussed increased risk with combining opioid and benzo. On clorazepate prn anxiety.    telemedicine done to avoid coronavirus exposure.  A phone visit was used to complete visit. Total time  21 minutes     RTC in 2 months

## 2020-12-10 ENCOUNTER — APPOINTMENT (OUTPATIENT)
Dept: CARDIOLOGY | Facility: HOSPITAL | Age: 75
End: 2020-12-10

## 2020-12-30 RX ORDER — CALCITRIOL 0.25 UG/1
CAPSULE, LIQUID FILLED ORAL
Qty: 180 CAPSULE | Refills: 2 | Status: SHIPPED | OUTPATIENT
Start: 2020-12-30 | End: 2021-10-20

## 2021-01-14 ENCOUNTER — TELEPHONE (OUTPATIENT)
Dept: ENDOCRINOLOGY | Facility: CLINIC | Age: 76
End: 2021-01-14

## 2021-01-14 DIAGNOSIS — I10 HYPERTENSION, BENIGN: Primary | ICD-10-CM

## 2021-01-14 NOTE — TELEPHONE ENCOUNTER
Patient states her mouth and chin have been quivering. Her PCP is asking if we can order and draw a calcium level.

## 2021-01-14 NOTE — TELEPHONE ENCOUNTER
I spoke with patient and gave her this information. She verbalized understanding. I advised her if she wants to get the lab done here to call us after while and speak to the .

## 2021-01-26 ENCOUNTER — LAB (OUTPATIENT)
Dept: LAB | Facility: HOSPITAL | Age: 76
End: 2021-01-26

## 2021-01-26 DIAGNOSIS — E11.65 TYPE 2 DIABETES MELLITUS WITH HYPERGLYCEMIA, WITH LONG-TERM CURRENT USE OF INSULIN (HCC): ICD-10-CM

## 2021-01-26 DIAGNOSIS — C73 MALIGNANT NEOPLASM OF THYROID GLAND (HCC): ICD-10-CM

## 2021-01-26 DIAGNOSIS — E89.2 POSTSURGICAL HYPOPARATHYROIDISM (HCC): ICD-10-CM

## 2021-01-26 DIAGNOSIS — E89.0 HYPOTHYROIDISM, POSTSURGICAL: ICD-10-CM

## 2021-01-26 DIAGNOSIS — I10 HYPERTENSION, BENIGN: ICD-10-CM

## 2021-01-26 DIAGNOSIS — Z79.4 TYPE 2 DIABETES MELLITUS WITH HYPERGLYCEMIA, WITH LONG-TERM CURRENT USE OF INSULIN (HCC): ICD-10-CM

## 2021-01-26 LAB
ALBUMIN SERPL-MCNC: 4.2 G/DL (ref 3.5–5.2)
ALBUMIN UR-MCNC: 3 MG/DL
ALBUMIN/GLOB SERPL: 1.7 G/DL
ALP SERPL-CCNC: 71 U/L (ref 39–117)
ALT SERPL W P-5'-P-CCNC: 14 U/L (ref 1–33)
ANION GAP SERPL CALCULATED.3IONS-SCNC: 12.8 MMOL/L (ref 5–15)
AST SERPL-CCNC: 20 U/L (ref 1–32)
BILIRUB SERPL-MCNC: 0.2 MG/DL (ref 0–1.2)
BUN SERPL-MCNC: 11 MG/DL (ref 8–23)
BUN/CREAT SERPL: 14.1 (ref 7–25)
CALCIUM SPEC-SCNC: 7.8 MG/DL (ref 8.6–10.5)
CHLORIDE SERPL-SCNC: 101 MMOL/L (ref 98–107)
CHOLEST SERPL-MCNC: 118 MG/DL (ref 0–200)
CO2 SERPL-SCNC: 28.2 MMOL/L (ref 22–29)
CREAT SERPL-MCNC: 0.78 MG/DL (ref 0.57–1)
CREAT UR-MCNC: 39.2 MG/DL
GFR SERPL CREATININE-BSD FRML MDRD: 72 ML/MIN/1.73
GLOBULIN UR ELPH-MCNC: 2.5 GM/DL
GLUCOSE SERPL-MCNC: 97 MG/DL (ref 65–99)
HBA1C MFR BLD: 8.5 % (ref 3.5–5.6)
HDLC SERPL-MCNC: 42 MG/DL (ref 40–60)
LDLC SERPL CALC-MCNC: 51 MG/DL (ref 0–100)
LDLC/HDLC SERPL: 1.13 {RATIO}
MICROALBUMIN/CREAT UR: 76.5 MG/G
POTASSIUM SERPL-SCNC: 4.7 MMOL/L (ref 3.5–5.2)
PROT SERPL-MCNC: 6.7 G/DL (ref 6–8.5)
SODIUM SERPL-SCNC: 142 MMOL/L (ref 136–145)
TRIGL SERPL-MCNC: 142 MG/DL (ref 0–150)
VLDLC SERPL-MCNC: 25 MG/DL (ref 5–40)

## 2021-01-26 PROCEDURE — 80053 COMPREHEN METABOLIC PANEL: CPT

## 2021-01-26 PROCEDURE — 36415 COLL VENOUS BLD VENIPUNCTURE: CPT

## 2021-01-26 PROCEDURE — 83036 HEMOGLOBIN GLYCOSYLATED A1C: CPT

## 2021-01-26 PROCEDURE — 80061 LIPID PANEL: CPT

## 2021-01-26 PROCEDURE — 82043 UR ALBUMIN QUANTITATIVE: CPT

## 2021-01-26 PROCEDURE — 82570 ASSAY OF URINE CREATININE: CPT

## 2021-01-27 DIAGNOSIS — E11.65 TYPE 2 DIABETES MELLITUS WITH HYPERGLYCEMIA, WITH LONG-TERM CURRENT USE OF INSULIN (HCC): Primary | ICD-10-CM

## 2021-01-27 DIAGNOSIS — Z79.4 TYPE 2 DIABETES MELLITUS WITH HYPERGLYCEMIA, WITH LONG-TERM CURRENT USE OF INSULIN (HCC): Primary | ICD-10-CM

## 2021-01-27 RX ORDER — DULAGLUTIDE 1.5 MG/.5ML
1.5 INJECTION, SOLUTION SUBCUTANEOUS WEEKLY
Qty: 12 PEN | Refills: 4 | Status: SHIPPED | OUTPATIENT
Start: 2021-01-27 | End: 2021-05-24

## 2021-02-03 ENCOUNTER — TELEPHONE (OUTPATIENT)
Dept: CARDIOLOGY | Facility: CLINIC | Age: 76
End: 2021-02-03

## 2021-02-15 ENCOUNTER — APPOINTMENT (OUTPATIENT)
Dept: PAIN MEDICINE | Facility: CLINIC | Age: 76
End: 2021-02-15

## 2021-02-22 ENCOUNTER — OFFICE VISIT (OUTPATIENT)
Dept: PAIN MEDICINE | Facility: CLINIC | Age: 76
End: 2021-02-22

## 2021-02-22 VITALS — WEIGHT: 187 LBS | TEMPERATURE: 96.7 F | RESPIRATION RATE: 16 BRPM | HEIGHT: 67 IN | BODY MASS INDEX: 29.35 KG/M2

## 2021-02-22 DIAGNOSIS — G89.4 CHRONIC PAIN SYNDROME: Primary | ICD-10-CM

## 2021-02-22 DIAGNOSIS — Z79.899 HIGH RISK MEDICATION USE: ICD-10-CM

## 2021-02-22 DIAGNOSIS — M47.817 LUMBOSACRAL SPONDYLOSIS WITHOUT MYELOPATHY: ICD-10-CM

## 2021-02-22 DIAGNOSIS — M79.18 MYOFASCIAL PAIN SYNDROME: ICD-10-CM

## 2021-02-22 DIAGNOSIS — M25.50 POLYARTHRALGIA: ICD-10-CM

## 2021-02-22 PROCEDURE — 99214 OFFICE O/P EST MOD 30 MIN: CPT | Performed by: ANESTHESIOLOGY

## 2021-02-22 RX ORDER — HYDROCODONE BITARTRATE AND ACETAMINOPHEN 10; 325 MG/1; MG/1
1 TABLET ORAL 3 TIMES DAILY PRN
Qty: 90 TABLET | Refills: 0 | Status: SHIPPED | OUTPATIENT
Start: 2021-03-01 | End: 2021-03-30 | Stop reason: SDUPTHER

## 2021-02-22 NOTE — PROGRESS NOTES
Subjective    CC joints pain left knee pain  Marilyn J Pumphrey is a 75 y.o. female with multiple comorbidities including DM, polyarthralgia from  inflammatory osteoarthritis, bilateral knee pain, status post left TKA 2019, here for f/u.  Denies new issues.    Chronic polyarthralgia/left knee pain,  and generalized myofascial pain interfering with daily activity, sleep,mood.    Tried methotrexate and  Celebrex without relief.  Tried Cymbalta, sevella, tramadol without relief.  Seen Rheumatology. and ortho. considering right knee replecement.   Good releif with knee injections by ortho.      Utilizes hydrocodone 36176  3 times daily with good relief functional benefit denies side effects.    Pain Assessment   Location of Pain: Lower Back, R Hip, L Hip, L Leg, neck pain, joint  Description of Pain: Dull/Aching, Throbbing, Stabbing  Previous Pain Rating :9  Current Pain Ratin  Aggravating Factors: Activity  Alleviating Factors: Rest, Medication  PEG Assessment   What number best describes your pain on average in the past week?7  What number best describes how, during the past week, pain has interfered with your enjoyment of life?7  What number best describes how, during the past week, pain has interfered with your general activity? 9     The following portions of the patient's history were reviewed and updated as appropriate: allergies, current medications, past family history, past medical history, past social history, past surgical history and problem list.   has a past medical history of Anxiety, Arthritis, Bone pain, Cancer (CMS/[facility]) (2016), Depression, Disease of thyroid gland, DM type 2 (diabetes mellitus, type 2) (CMS/[facility]), GERD (gastroesophageal reflux disease), Hypertension, Irregular heart beat, Knee pain, Leaky heart valve, Low back pain, Osteoarthritis, PONV (postoperative nausea and vomiting), and Sleep apnea.   has a past surgical history that includes Tonsillectomy; Thyroid surgery; Oophorectomy  "(Bilateral, 2015); Hysterectomy (1993); Cholecystectomy (1974); Carpal tunnel release; vein ligation and stripping bilateral; and Total knee arthroplasty (Left, 10/14/2019).  Social History     Tobacco Use   • Smoking status: Never Smoker   • Smokeless tobacco: Never Used   Substance Use Topics   • Alcohol use: No     Frequency: Never     Review of Systems   Musculoskeletal: Positive for arthralgias, back pain, joint swelling, myalgias and neck pain.   All other systems reviewed and are negative.    Objective   Physical Exam   Constitutional: No distress.   Pulmonary/Chest: Effort normal.   Musculoskeletal:      Right knee: Tenderness found.      Left knee: Tenderness found.      Cervical back: She exhibits decreased range of motion and tenderness.      Lumbar back: She exhibits decreased range of motion and tenderness.      Comments: Lumbar loading positive, pain on extension of low back past 5 degrees.  TTP on the lumbar facets noted.   Psychiatric: Her behavior is normal.     Temp 96.7 °F (35.9 °C)   Resp 16   Ht 170.2 cm (67\")   Wt 84.8 kg (187 lb)   BMI 29.29 kg/m²     PHQ 9 on chart  Opioid risk tool low risk    Assessment/Plan   Diagnoses and all orders for this visit:    1. Chronic pain syndrome (Primary)  -     HYDROcodone-acetaminophen (NORCO)  MG per tablet; Take 1 tablet by mouth 3 (Three) Times a Day As Needed for Severe Pain . DNF before 3/1/2021  Dispense: 90 tablet; Refill: 0    2. Lumbosacral spondylosis without myelopathy    3. Polyarthralgia    4. Myofascial pain syndrome    5. High risk medication use  -     Urine Drug Screen - Urine, Clean Catch; Future       Summary  75 y.o. female with multiple comorbidities including DM, polyarthralgia from  inflammatory osteoarthritis, knee pain S/P left TKA, here for f/u.  Chronic polyarthralgia/left knee pain,  and generalized myofascial pain interfering with daily activity, sleep,mood.      Continue Hydrocodone  10/325 3 times daily as needed " for severe pain.  UDS and  Inspect reviewed.    Discussed risk of tolerance, dependence, respiratory depression, coma and death associated with use of oral opioids for treatment of chronic nonmalignant pain.      Discussed increased risk with combining opioid and benzo. On clorazepate prn anxiety.     RTC in 2 months                    No

## 2021-03-30 ENCOUNTER — OFFICE VISIT (OUTPATIENT)
Dept: PAIN MEDICINE | Facility: CLINIC | Age: 76
End: 2021-03-30

## 2021-03-30 VITALS — HEIGHT: 67 IN | RESPIRATION RATE: 16 BRPM | BODY MASS INDEX: 27.15 KG/M2 | WEIGHT: 173 LBS

## 2021-03-30 DIAGNOSIS — G89.4 CHRONIC PAIN SYNDROME: Primary | ICD-10-CM

## 2021-03-30 DIAGNOSIS — M79.18 MYOFASCIAL PAIN SYNDROME: ICD-10-CM

## 2021-03-30 DIAGNOSIS — M25.50 POLYARTHRALGIA: ICD-10-CM

## 2021-03-30 DIAGNOSIS — M47.817 LUMBOSACRAL SPONDYLOSIS WITHOUT MYELOPATHY: ICD-10-CM

## 2021-03-30 DIAGNOSIS — Z79.899 HIGH RISK MEDICATION USE: ICD-10-CM

## 2021-03-30 PROCEDURE — 99443 PR PHYS/QHP TELEPHONE EVALUATION 21-30 MIN: CPT | Performed by: ANESTHESIOLOGY

## 2021-03-30 RX ORDER — HYDROCODONE BITARTRATE AND ACETAMINOPHEN 10; 325 MG/1; MG/1
1 TABLET ORAL 3 TIMES DAILY PRN
Qty: 90 TABLET | Refills: 0 | Status: SHIPPED | OUTPATIENT
Start: 2021-05-01 | End: 2021-05-27 | Stop reason: SDUPTHER

## 2021-03-30 RX ORDER — HYDROCODONE BITARTRATE AND ACETAMINOPHEN 10; 325 MG/1; MG/1
1 TABLET ORAL 3 TIMES DAILY PRN
Qty: 90 TABLET | Refills: 0 | Status: SHIPPED | OUTPATIENT
Start: 2021-03-30 | End: 2021-05-27 | Stop reason: SDUPTHER

## 2021-03-30 NOTE — PROGRESS NOTES
Subjective    CC joints pain left knee pain  Marilyn J Pumphrey is a 75 y.o. female with multiple comorbidities including DM, polyarthralgia from  inflammatory osteoarthritis, bilateral knee pain, status post left TKA 2019, here for f/u  by tel.  Continued chronic polyarthralgia/left knee pain,  and generalized myofascial pain interfering with daily activity, sleep,mood.    Tried methotrexate and  Celebrex without relief.  Tried Cymbalta, sevella, tramadol without relief.  Seen Rheumatology. and ortho. considering right knee replecement.   Good releif with knee injections by ortho.      Utilizes hydrocodone 77762  3 times daily with good relief functional benefit denies side effects.    Pain Assessment   Location of Pain: Lower Back, R Hip, L Hip, L Leg, neck pain, joint  Description of Pain: Dull/Aching, Throbbing, Stabbing  Previous Pain Rating :8  Current Pain Ratin  Aggravating Factors: Activity  Alleviating Factors: Rest, Medication  PEG Assessment   What number best describes your pain on average in the past week?8  What number best describes how, during the past week, pain has interfered with your enjoyment of life?7  What number best describes how, during the past week, pain has interfered with your general activity? 9     The following portions of the patient's history were reviewed and updated as appropriate: allergies, current medications, past family history, past medical history, past social history, past surgical history and problem list.   has a past medical history of Anxiety, Arthritis, Bone pain, Cancer (CMS/[facility]) (2016), Depression, Disease of thyroid gland, DM type 2 (diabetes mellitus, type 2) (CMS/[facility]), Fall, GERD (gastroesophageal reflux disease), Hypertension, Irregular heart beat, Knee pain, Leaky heart valve, Low back pain, Osteoarthritis, PONV (postoperative nausea and vomiting), and Sleep apnea.   has a past surgical history that includes Tonsillectomy; Thyroid surgery; Oophorectomy  "(Bilateral, 2015); Hysterectomy (1993); Cholecystectomy (1974); Carpal tunnel release; vein ligation and stripping bilateral; and Total knee arthroplasty (Left, 10/14/2019).  Social History     Tobacco Use   • Smoking status: Never Smoker   • Smokeless tobacco: Never Used   Substance Use Topics   • Alcohol use: No     Review of Systems   Musculoskeletal: Positive for arthralgias, back pain, joint swelling, myalgias and neck pain.   All other systems reviewed and are negative.    Objective   Physical Exam   Constitutional: No distress.     Resp 16   Ht 170.2 cm (67\")   Wt 78.5 kg (173 lb)   BMI 27.10 kg/m²     PHQ 9 on chart  Opioid risk tool low risk    Assessment/Plan   Diagnoses and all orders for this visit:    1. Chronic pain syndrome (Primary)  -     HYDROcodone-acetaminophen (NORCO)  MG per tablet; Take 1 tablet by mouth 3 (Three) Times a Day As Needed for Severe Pain .  Dispense: 90 tablet; Refill: 0  -     HYDROcodone-acetaminophen (NORCO)  MG per tablet; Take 1 tablet by mouth 3 (Three) Times a Day As Needed for Severe Pain . DNF before 5/1/2021  Dispense: 90 tablet; Refill: 0    2. Lumbosacral spondylosis without myelopathy    3. Polyarthralgia    4. Myofascial pain syndrome    5. High risk medication use       Summary  75 y.o. female with multiple comorbidities including DM, polyarthralgia from  inflammatory osteoarthritis, knee pain S/P left TKA, here for f/u.  Chronic polyarthralgia/left knee pain,  and generalized myofascial pain interfering with daily activity, sleep,mood.      Continue Hydrocodone  10/325 3 times daily as needed for severe pain.  UDS and  Inspect reviewed.    Discussed risk of tolerance, dependence, respiratory depression, coma and death associated with use of oral opioids for treatment of chronic nonmalignant pain.      Discussed increased risk with combining opioid and benzo. On clorazepate prn anxiety.     telemedicine done to avoid coronavirus exposure.  A phone " visit was used to complete visit. Total time  21 minutes    RTC in 2 months

## 2021-05-04 ENCOUNTER — TRANSCRIBE ORDERS (OUTPATIENT)
Dept: ADMINISTRATIVE | Facility: HOSPITAL | Age: 76
End: 2021-05-04

## 2021-05-04 ENCOUNTER — HOSPITAL ENCOUNTER (OUTPATIENT)
Dept: GENERAL RADIOLOGY | Facility: HOSPITAL | Age: 76
Discharge: HOME | End: 2021-05-04

## 2021-05-04 DIAGNOSIS — R52 PAIN: ICD-10-CM

## 2021-05-04 DIAGNOSIS — H57.02 UNEQUAL PUPILS: ICD-10-CM

## 2021-05-04 DIAGNOSIS — H57.02 UNEQUAL PUPILS: Primary | ICD-10-CM

## 2021-05-04 PROCEDURE — 73552 X-RAY EXAM OF FEMUR 2/>: CPT

## 2021-05-04 PROCEDURE — 73502 X-RAY EXAM HIP UNI 2-3 VIEWS: CPT

## 2021-05-05 ENCOUNTER — TRANSCRIBE ORDERS (OUTPATIENT)
Dept: ADMINISTRATIVE | Facility: HOSPITAL | Age: 76
End: 2021-05-05

## 2021-05-05 DIAGNOSIS — H57.02 UNEQUAL PUPILS: Primary | ICD-10-CM

## 2021-05-17 ENCOUNTER — HOSPITAL ENCOUNTER (OUTPATIENT)
Dept: MRI IMAGING | Facility: HOSPITAL | Age: 76
Discharge: HOME | End: 2021-05-17
Admitting: NURSE PRACTITIONER

## 2021-05-17 DIAGNOSIS — H57.02 UNEQUAL PUPILS: ICD-10-CM

## 2021-05-17 LAB — CREAT BLDA-MCNC: 0.7 MG/DL (ref 0.6–1.3)

## 2021-05-17 PROCEDURE — 25010000002 GADOTERIDOL PER 1 ML: Performed by: NURSE PRACTITIONER

## 2021-05-17 PROCEDURE — 82565 ASSAY OF CREATININE: CPT

## 2021-05-17 PROCEDURE — A9579 GAD-BASE MR CONTRAST NOS,1ML: HCPCS | Performed by: NURSE PRACTITIONER

## 2021-05-17 PROCEDURE — 70553 MRI BRAIN STEM W/O & W/DYE: CPT

## 2021-05-17 RX ADMIN — GADOTERIDOL 20 ML: 279.3 INJECTION, SOLUTION INTRAVENOUS at 13:38

## 2021-05-18 ENCOUNTER — LAB (OUTPATIENT)
Dept: LAB | Facility: HOSPITAL | Age: 76
End: 2021-05-18

## 2021-05-18 DIAGNOSIS — E89.2 POSTSURGICAL HYPOPARATHYROIDISM (HCC): ICD-10-CM

## 2021-05-18 DIAGNOSIS — E89.0 HYPOTHYROIDISM, POSTSURGICAL: ICD-10-CM

## 2021-05-18 DIAGNOSIS — C73 MALIGNANT NEOPLASM OF THYROID GLAND (HCC): ICD-10-CM

## 2021-05-18 DIAGNOSIS — E11.65 TYPE 2 DIABETES MELLITUS WITH HYPERGLYCEMIA, WITH LONG-TERM CURRENT USE OF INSULIN (HCC): ICD-10-CM

## 2021-05-18 DIAGNOSIS — Z79.4 TYPE 2 DIABETES MELLITUS WITH HYPERGLYCEMIA, WITH LONG-TERM CURRENT USE OF INSULIN (HCC): ICD-10-CM

## 2021-05-18 LAB
ALBUMIN SERPL-MCNC: 4 G/DL (ref 3.5–5.2)
ALBUMIN/GLOB SERPL: 1.3 G/DL
ALP SERPL-CCNC: 73 U/L (ref 39–117)
ALT SERPL W P-5'-P-CCNC: 8 U/L (ref 1–33)
ANION GAP SERPL CALCULATED.3IONS-SCNC: 12.5 MMOL/L (ref 5–15)
AST SERPL-CCNC: 14 U/L (ref 1–32)
BILIRUB SERPL-MCNC: 0.3 MG/DL (ref 0–1.2)
BUN SERPL-MCNC: 12 MG/DL (ref 8–23)
BUN/CREAT SERPL: 18.8 (ref 7–25)
CALCIUM SPEC-SCNC: 9 MG/DL (ref 8.6–10.5)
CHLORIDE SERPL-SCNC: 100 MMOL/L (ref 98–107)
CO2 SERPL-SCNC: 28.5 MMOL/L (ref 22–29)
CREAT SERPL-MCNC: 0.64 MG/DL (ref 0.57–1)
GFR SERPL CREATININE-BSD FRML MDRD: 91 ML/MIN/1.73
GLOBULIN UR ELPH-MCNC: 3.1 GM/DL
GLUCOSE SERPL-MCNC: 114 MG/DL (ref 65–99)
HBA1C MFR BLD: 7.7 % (ref 3.5–5.6)
POTASSIUM SERPL-SCNC: 3.7 MMOL/L (ref 3.5–5.2)
PROT SERPL-MCNC: 7.1 G/DL (ref 6–8.5)
SODIUM SERPL-SCNC: 141 MMOL/L (ref 136–145)
T4 FREE SERPL-MCNC: 1.55 NG/DL (ref 0.93–1.7)
TSH SERPL DL<=0.05 MIU/L-ACNC: 0.03 UIU/ML (ref 0.27–4.2)

## 2021-05-18 PROCEDURE — 84443 ASSAY THYROID STIM HORMONE: CPT

## 2021-05-18 PROCEDURE — 84439 ASSAY OF FREE THYROXINE: CPT

## 2021-05-18 PROCEDURE — 80053 COMPREHEN METABOLIC PANEL: CPT

## 2021-05-18 PROCEDURE — 84432 ASSAY OF THYROGLOBULIN: CPT

## 2021-05-18 PROCEDURE — 36415 COLL VENOUS BLD VENIPUNCTURE: CPT

## 2021-05-18 PROCEDURE — 83036 HEMOGLOBIN GLYCOSYLATED A1C: CPT

## 2021-05-18 PROCEDURE — 86800 THYROGLOBULIN ANTIBODY: CPT

## 2021-05-18 RX ORDER — PHENAZOPYRIDINE HYDROCHLORIDE 100 MG/1
TABLET, FILM COATED ORAL
COMMUNITY
Start: 2021-04-09 | End: 2021-05-24

## 2021-05-18 RX ORDER — CIPROFLOXACIN 500 MG/1
TABLET, FILM COATED ORAL
COMMUNITY
Start: 2021-04-09 | End: 2021-05-24

## 2021-05-18 RX ORDER — ERGOCALCIFEROL 1.25 MG/1
50000 CAPSULE ORAL WEEKLY
COMMUNITY
Start: 2021-04-23

## 2021-05-19 ENCOUNTER — APPOINTMENT (OUTPATIENT)
Dept: MRI IMAGING | Facility: HOSPITAL | Age: 76
End: 2021-05-19

## 2021-05-24 ENCOUNTER — OFFICE VISIT (OUTPATIENT)
Dept: ENDOCRINOLOGY | Facility: CLINIC | Age: 76
End: 2021-05-24

## 2021-05-24 VITALS
OXYGEN SATURATION: 98 % | SYSTOLIC BLOOD PRESSURE: 140 MMHG | DIASTOLIC BLOOD PRESSURE: 90 MMHG | BODY MASS INDEX: 26.68 KG/M2 | WEIGHT: 170 LBS | HEART RATE: 79 BPM | HEIGHT: 67 IN | TEMPERATURE: 97.1 F

## 2021-05-24 DIAGNOSIS — Z79.4 TYPE 2 DIABETES MELLITUS WITH HYPERGLYCEMIA, WITH LONG-TERM CURRENT USE OF INSULIN (HCC): ICD-10-CM

## 2021-05-24 DIAGNOSIS — E89.0 HYPOTHYROIDISM, POSTSURGICAL: ICD-10-CM

## 2021-05-24 DIAGNOSIS — I10 HYPERTENSION, BENIGN: ICD-10-CM

## 2021-05-24 DIAGNOSIS — C73 MALIGNANT NEOPLASM OF THYROID GLAND (HCC): Primary | ICD-10-CM

## 2021-05-24 DIAGNOSIS — E78.2 MIXED HYPERLIPIDEMIA: ICD-10-CM

## 2021-05-24 DIAGNOSIS — E89.2 POSTSURGICAL HYPOPARATHYROIDISM (HCC): ICD-10-CM

## 2021-05-24 DIAGNOSIS — E11.65 TYPE 2 DIABETES MELLITUS WITH HYPERGLYCEMIA, WITH LONG-TERM CURRENT USE OF INSULIN (HCC): ICD-10-CM

## 2021-05-24 LAB — GLUCOSE BLDC GLUCOMTR-MCNC: 166 MG/DL (ref 70–105)

## 2021-05-24 PROCEDURE — 82962 GLUCOSE BLOOD TEST: CPT | Performed by: INTERNAL MEDICINE

## 2021-05-24 PROCEDURE — 99214 OFFICE O/P EST MOD 30 MIN: CPT | Performed by: INTERNAL MEDICINE

## 2021-05-24 RX ORDER — INSULIN GLARGINE 100 [IU]/ML
30 INJECTION, SOLUTION SUBCUTANEOUS DAILY
Qty: 30 ML | Refills: 6 | Status: SHIPPED | OUTPATIENT
Start: 2021-05-24

## 2021-05-24 RX ORDER — DULAGLUTIDE 3 MG/.5ML
3 INJECTION, SOLUTION SUBCUTANEOUS WEEKLY
Qty: 12 PEN | Refills: 4 | Status: SHIPPED | OUTPATIENT
Start: 2021-05-24 | End: 2022-05-05 | Stop reason: SDUPTHER

## 2021-05-24 RX ORDER — DIAZEPAM 10 MG/1
TABLET ORAL
COMMUNITY
Start: 2021-05-06 | End: 2021-09-30

## 2021-05-24 RX ORDER — LEVOTHYROXINE SODIUM 0.12 MG/1
TABLET ORAL
Qty: 90 TABLET | Refills: 4 | Status: SHIPPED | OUTPATIENT
Start: 2021-05-24 | End: 2021-07-16

## 2021-05-24 NOTE — PATIENT INSTRUCTIONS
Increase Trulicity to 3.0 mg subcu weekly  Decrease levothyroxine to 125 mcg p.o. daily  Continue rest of the medications  Decrease Lantus to 30 units subcu nightly  Always keep glucose source in case of low blood sugar  Check TSH and free T4 in 6 weeks  Rest of the labs before follow-up in 6 months  Annual eye exam and flu vaccine.

## 2021-05-24 NOTE — PROGRESS NOTES
Endocrine Progress Note Outpatient     Patient Care Team:  Suma Campbell APRN as PCP - General  Suma Campbell APRN as PCP - Family Medicine  Heath Musa MD as Consulting Physician (Cardiology)      Chief Complaint: Follow up type 2 diabetes and thyroid cancer:    HPI: 75-year-old female with history of thyroid cancer, postsurgical hypothyroidism, postsurgical hypoparathyroidism, type 2 diabetes, hyperlipidemia hypertension is here for follow-up.    For thyroid cancer she has undergone total thyroidectomy and did not receive radioactive iodine treatment.  Her pathology with 1.6 cm papillary thyroid cancer with margins were clear and no lymphatic or vascular invasion was noted.  She is currently on levothyroxine 137 mcg p.o. daily.  She is tolerating well.      Surgical hypothyroidism: On levothyroxine 137 mcg p.o. daily    Postsurgical hypoparathyroidism: She is currently on calcium with vitamin D and calcitriol to .25 mcg twice a day.    Type 2 diabetes: She is currently on Trulicity 1.5 mg subcu weekly, Metformin 1000 mg twice a day, Jardiance 25 mg once a day and Lantus 40 units daily and NovoLog 12 units with each meal.  She did bring in some blood sugar records for review mostly running less than 140.  She has not required any assistance or hospitalization for low blood sugar since last seen.  Trying to work on her diet the best she can.    Hyperlipidemia: On Crestor    Hypertension: Well-controlled.    Past Medical History:   Diagnosis Date   • Anxiety    • Arthritis    • Bone pain    • Cancer (CMS/[facility]) 09/2016    THYROID   • Depression    • Disease of thyroid gland    • DM type 2 (diabetes mellitus, type 2) (CMS/[facility])     TYPE 2   • Fall     2/2021 FELL ON ICE   • GERD (gastroesophageal reflux disease)    • Hypertension    • Irregular heart beat    • Knee pain    • Leaky heart valve     11/2020   • Leg cramps    • Low back pain    • Osteoarthritis    • PONV (postoperative nausea and  vomiting)     DOES WELL WITH PROPOFOL   • Sleep apnea     C-PAP IN USE       Social History     Socioeconomic History   • Marital status:      Spouse name: Not on file   • Number of children: Not on file   • Years of education: Not on file   • Highest education level: Not on file   Tobacco Use   • Smoking status: Never Smoker   • Smokeless tobacco: Never Used   Vaping Use   • Vaping Use: Never used   Substance and Sexual Activity   • Alcohol use: No   • Drug use: No   • Sexual activity: Defer       Family History   Problem Relation Age of Onset   • Diabetes Father    • Heart disease Father    • Hypertension Father    • Hyperlipidemia Father    • Thyroid disease Father    • Cancer Sister         lung cancer   • Thyroid disease Sister    • Diabetes Brother    • Heart disease Brother    • Cancer Brother         lung cancer       Allergies   Allergen Reactions   • Codeine Shortness Of Breath   • Oxycodone Hallucinations       ROS:   Constitutional:  Admit fatigue, tiredness.    Eyes:  Denies change in visual acuity   HENT:  Denies nasal congestion or sore throat   Respiratory: denies cough, shortness of breath.   Cardiovascular:  denies chest pain, edema   GI:  Denies abdominal pain, nausea, vomiting.   Musculoskeletal:  Denies back pain or joint pain   Integument:  Denies dry skin and rash   Neurologic:  Denies headache, focal weakness or sensory changes   Endocrine:  Denies polyuria or polydipsia   Psychiatric:  Denies depression or anxiety      Vitals:    05/24/21 0800   BP: 140/90   Pulse: 79   Temp: 97.1 °F (36.2 °C)   SpO2: 98%       Physical Exam:  GEN: NAD, conversant  EYES: EOMI, PERRL, no conjunctival erythema  NECK: no thyromegaly, full ROM   CV: RRR, no murmurs/rubs/gallops, no peripheral edema  LUNG: CTAB, no wheezes/rales/ronchi  SKIN: no rashes, no acanthosis  MSK: no deformities, full ROM of all extremities  NEURO: no tremors, DTR normal  PSYCH: AOX3, appropriate mood, affect normal      Results  Review:     I reviewed the patient's new clinical results.    Lab Results   Component Value Date    HGBA1C 7.7 (H) 05/18/2021    HGBA1C 8.5 (H) 01/26/2021    HGBA1C 8.7 (H) 09/29/2020      Lab Results   Component Value Date    GLUCOSE 114 (H) 05/18/2021    BUN 12 05/18/2021    CREATININE 0.64 05/18/2021    EGFRIFNONA 91 05/18/2021    EGFRIFAFRI >60 02/14/2018    BCR 18.8 05/18/2021    K 3.7 05/18/2021    CO2 28.5 05/18/2021    CALCIUM 9.0 05/18/2021    ALBUMIN 4.00 05/18/2021    LABIL2 1.2 09/11/2019    AST 14 05/18/2021    ALT 8 05/18/2021    CHOL 118 01/26/2021    TRIG 142 01/26/2021    LDL 51 01/26/2021    HDL 42 01/26/2021     Lab Results   Component Value Date    TSH 0.033 (L) 05/18/2021    FREET4 1.55 05/18/2021    THYROIDAB 24 (H) 06/11/2017       TG panel from May 18, 2021 is pending.    Labs from [facility], 2020 showed a TG antibody of 3.1, TG level less than 2.0, calcium 8.5, TSH 0.75, free T4 1.60, LDL 62, triglycerides 99, A1c 8%, sodium 142, potassium 4.5, chloride 101, CO 29, glucose 118, BUN 16, creatinine 0.9, AST 14 and ALT 12 with urine microalbumin creatinine ratio was less than 2.        Medication Review: Reviewed.       Current Outpatient Medications:   •  amLODIPine-benazepril (LOTREL 5-20) 5-20 MG per capsule, Take 1 capsule by mouth 2 (two) times a day., Disp: , Rfl:   •  aspirin 81 MG tablet, Take 1 tablet by mouth Daily. (Patient taking differently: Take 325 mg by mouth Daily.), Disp: 30 tablet, Rfl: 0  •  baclofen (LIORESAL) 10 MG tablet, TAKE 1 TABLET BY MOUTH 4 TIMES DAILY WITH FOOD, Disp: , Rfl:   •  calcitriol (ROCALTROL) 0.25 MCG capsule, Take 1 capsule by mouth twice daily, Disp: 180 capsule, Rfl: 2  •  CALCIUM CARBONATE-VIT D-MIN PO, Take 1,200 mg by mouth 2 (Two) Times a Day., Disp: , Rfl:   •  clorazepate (TRANXENE) 7.5 MG tablet, Take 7.5 mg by mouth 2 (Two) Times a Day., Disp: , Rfl: 3  •  diazePAM (VALIUM) 10 MG tablet, TAKE 1 TABLET BY MOUTH ONCE DAILY 30 MINUTES BEFORE ELECTIVE  PROCEDURE, Disp: , Rfl:   •  diclofenac (VOLTAREN) 1 % gel gel, Apply 4 g topically to the appropriate area as directed 4 (Four) Times a Day As Needed., Disp: , Rfl:   •  Dulaglutide (Trulicity) 1.5 MG/0.5ML solution pen-injector, Inject 1.5 mg under the skin into the appropriate area as directed 1 (One) Time Per Week., Disp: 12 pen, Rfl: 4  •  Empagliflozin (Jardiance) 25 MG tablet, Take 25 mg by mouth Daily., Disp: 90 tablet, Rfl: 2  •  escitalopram (LEXAPRO) 10 MG tablet, Take 10 mg by mouth Every Evening., Disp: , Rfl:   •  esomeprazole (nexIUM) 40 MG capsule, Take 40 mg by mouth Every Evening., Disp: , Rfl:   •  HYDROcodone-acetaminophen (NORCO)  MG per tablet, Take 1 tablet by mouth 3 (Three) Times a Day As Needed for Severe Pain ., Disp: 90 tablet, Rfl: 0  •  HYDROcodone-acetaminophen (NORCO)  MG per tablet, Take 1 tablet by mouth 3 (Three) Times a Day As Needed for Severe Pain . DNF before 5/1/2021, Disp: 90 tablet, Rfl: 0  •  insulin aspart (NOVOLOG FLEXPEN) 100 UNIT/ML solution pen-injector sc pen, Inject 10 units before each meal (Patient taking differently: 10 Units. Inject 10 units before each meal), Disp: 5 pen, Rfl: 4  •  insulin glargine (LANTUS) 100 UNIT/ML injection, Inject 40 Units under the skin into the appropriate area as directed Daily., Disp: 30 mL, Rfl: 6  •  levothyroxine (SYNTHROID) 137 MCG tablet, Take 137 mcg by mouth Daily., Disp: , Rfl:   •  magnesium oxide (MAG-OX) 400 tablet tablet, Take 400 mg by mouth 2 (Two) Times a Day., Disp: , Rfl:   •  metFORMIN (GLUCOPHAGE) 1000 MG tablet, Take 1 tablet by mouth 2 (Two) Times a Day With Meals., Disp: 180 tablet, Rfl: 4  •  nebivolol (BYSTOLIC) 10 MG tablet, Take 10 mg by mouth 2 (two) times a day., Disp: , Rfl:   •  promethazine (PHENERGAN) 25 MG tablet, Take 1 tablet by mouth As Needed., Disp: , Rfl: 1  •  Rosuvastatin Calcium 40 MG capsule sprinkle, Take 20 mg by mouth Every Night., Disp: , Rfl:   •  sucralfate (CARAFATE) 1 g  tablet, Take 1 g by mouth 4 (Four) Times a Day., Disp: , Rfl:   •  vitamin D (ERGOCALCIFEROL) 1.25 MG (43176 UT) capsule capsule, Take 50,000 Units by mouth 1 (One) Time Per Week., Disp: , Rfl:       Assessment/Plan   1.  Thyroid cancer: Status post thyroid surgery in September 2016.  She did not receive radioactive iodine treatment.  Tumor was 1.6 cm papillary thyroid cancer with margins clear.  Her CT scan of the neck in October 2019 did not show any lymphadenopathy.  TG panel is pending, her TSH is low at 0.03 with free T4 1.55, will reduce levothyroxine to 125 mcg p.o. daily and follow labs.    2.  Hypothyroidism: Secondary to thyroid surgery, controlled with low TSH, will change levothyroxine to 125 mcg p.o. daily and follow TSH and free T4.    3.  Postsurgical hypoparathyroidism: She is currently on Rocaltrol with calcium and vitamin D and her calcium is good at 9.0.  She is asymptomatic.  We will continue current management.    4.  Diabetes mellitus type 2: Uncontrolled with A1c of 7.7%.  Will increase Trulicity to 3.0 mg subcu weekly.  We will decrease the Lantus to 30 units subcu nightly, continue Jardiance along with Metformin and NovoLog.  She is advised to watch for low blood sugars, always give glucose dose in case of low blood sugar and bring the blood sugar records for review at each visit.    5.  Hyperlipidemia: Excellent control, continue current medications    6.  Hypertension:Better, continue current medications.            Gege Ford MD FACE.    Much of the above report is an electronic transcription/translation of the spoken language to printed text using Dragon Software. As such, the subtleties and finesse of the spoken language may permit erroneous, or at times, nonsensical words or phrases to be inadvertently transcribed; thus changes may be made at a later date to rectify these errors.

## 2021-05-25 LAB
THYROGLOB AB SERPL-ACNC: 1.9 IU/ML
THYROGLOB SERPL-MCNC: <2 NG/ML
THYROGLOB SERPL-MCNC: ABNORMAL NG/ML

## 2021-05-27 ENCOUNTER — OFFICE VISIT (OUTPATIENT)
Dept: PAIN MEDICINE | Facility: CLINIC | Age: 76
End: 2021-05-27

## 2021-05-27 ENCOUNTER — TELEPHONE (OUTPATIENT)
Dept: PAIN MEDICINE | Facility: CLINIC | Age: 76
End: 2021-05-27

## 2021-05-27 VITALS — WEIGHT: 170 LBS | HEIGHT: 67 IN | RESPIRATION RATE: 16 BRPM | BODY MASS INDEX: 26.68 KG/M2

## 2021-05-27 DIAGNOSIS — M79.18 MYOFASCIAL PAIN SYNDROME: ICD-10-CM

## 2021-05-27 DIAGNOSIS — M25.50 POLYARTHRALGIA: ICD-10-CM

## 2021-05-27 DIAGNOSIS — M47.817 LUMBOSACRAL SPONDYLOSIS WITHOUT MYELOPATHY: ICD-10-CM

## 2021-05-27 DIAGNOSIS — G89.4 CHRONIC PAIN SYNDROME: Primary | ICD-10-CM

## 2021-05-27 DIAGNOSIS — Z79.899 HIGH RISK MEDICATION USE: ICD-10-CM

## 2021-05-27 PROCEDURE — 99443 PR PHYS/QHP TELEPHONE EVALUATION 21-30 MIN: CPT | Performed by: ANESTHESIOLOGY

## 2021-05-27 RX ORDER — HYDROCODONE BITARTRATE AND ACETAMINOPHEN 10; 325 MG/1; MG/1
1 TABLET ORAL 3 TIMES DAILY PRN
Qty: 90 TABLET | Refills: 0 | Status: SHIPPED | OUTPATIENT
Start: 2021-05-27 | End: 2021-07-22 | Stop reason: SDUPTHER

## 2021-05-27 RX ORDER — HYDROCODONE BITARTRATE AND ACETAMINOPHEN 10; 325 MG/1; MG/1
1 TABLET ORAL 3 TIMES DAILY PRN
Qty: 90 TABLET | Refills: 0 | Status: SHIPPED | OUTPATIENT
Start: 2021-06-27 | End: 2021-07-22 | Stop reason: SDUPTHER

## 2021-05-27 NOTE — PROGRESS NOTES
Subjective    CC joints pain left knee pain  Marilyn J Pumphrey is a 75 y.o. female with multiple comorbidities including DM, polyarthralgia from  inflammatory osteoarthritis, bilateral knee pain, status post left TKA 2019, here for f/u  by tel.  Denies new issues but noticed worsening generalized body aches. Has follow up with rheumatology for eval.   Chronic polyarthralgia/left knee pain,  and generalized myofascial pain interfering with daily activity, sleep,mood.    Tried methotrexate and  Celebrex without relief.  Tried Cymbalta, sevella, tramadol without relief.  Seen Rheumatology. and ortho. considering right knee replecement.   Good releif with knee injections by ortho.      Utilizes hydrocodone 81768  3 times daily with good relief functional benefit denies side effects.    Pain Assessment   Location of Pain: Lower Back, R Hip, L Hip, L Leg, neck pain, joint  Description of Pain: Dull/Aching, Throbbing, Stabbing  Previous Pain Rating :8  Current Pain Ratin  Aggravating Factors: Activity  Alleviating Factors: Rest, Medication  PEG Assessment   What number best describes your pain on average in the past week?8  What number best describes how, during the past week, pain has interfered with your enjoyment of life?6  What number best describes how, during the past week, pain has interfered with your general activity? 10     The following portions of the patient's history were reviewed and updated as appropriate: allergies, current medications, past family history, past medical history, past social history, past surgical history and problem list.   has a past medical history of Anxiety, Arthritis, Bone pain, Cancer (CMS/[facility]) (2016), Depression, Disease of thyroid gland, DM type 2 (diabetes mellitus, type 2) (CMS/[facility]), Fall, GERD (gastroesophageal reflux disease), Hypertension, Irregular heart beat, Knee pain, Leaky heart valve, Leg cramps, Low back pain, Osteoarthritis, PONV (postoperative nausea and  "vomiting), and Sleep apnea.   has a past surgical history that includes Tonsillectomy; Thyroid surgery; Oophorectomy (Bilateral, 2015); Hysterectomy (1993); Cholecystectomy (1974); Carpal tunnel release; vein ligation and stripping bilateral; and Total knee arthroplasty (Left, 10/14/2019).  Social History     Tobacco Use   • Smoking status: Never Smoker   • Smokeless tobacco: Never Used   Substance Use Topics   • Alcohol use: No     Review of Systems   Musculoskeletal: Positive for arthralgias, back pain, joint swelling, myalgias and neck pain.   All other systems reviewed and are negative.    Objective   Physical Exam   Constitutional: No distress.     Resp 16   Ht 170.2 cm (67\")   Wt 77.1 kg (170 lb)   BMI 26.63 kg/m²     PHQ 9 on chart  Opioid risk tool low risk    Assessment/Plan   Diagnoses and all orders for this visit:    1. Chronic pain syndrome (Primary)  -     HYDROcodone-acetaminophen (NORCO)  MG per tablet; Take 1 tablet by mouth 3 (Three) Times a Day As Needed for Severe Pain . DNF before 6/27/2021  Dispense: 90 tablet; Refill: 0  -     HYDROcodone-acetaminophen (NORCO)  MG per tablet; Take 1 tablet by mouth 3 (Three) Times a Day As Needed for Severe Pain .  Dispense: 90 tablet; Refill: 0    2. Lumbosacral spondylosis without myelopathy    3. Polyarthralgia    4. Myofascial pain syndrome    5. High risk medication use       Summary  75 y.o. female with multiple comorbidities including DM, polyarthralgia from  inflammatory osteoarthritis, knee pain S/P left TKA, here for f/u.  Chronic polyarthralgia/left knee pain,  and generalized myofascial pain interfering with daily activity, sleep,mood.      Denies new issues but noticed worsening generalized body aches. Has follow up with rheumatology for eval.     Continue Hydrocodone  10/325 3 times daily as needed for severe pain.  UDS and  Inspect reviewed.    Discussed risk of tolerance, dependence, respiratory depression, coma and death " associated with use of oral opioids for treatment of chronic nonmalignant pain.      Discussed increased risk with combining opioid and benzo. On clorazepate prn anxiety.     telemedicine done to avoid coronavirus exposure.  A phone visit was used to complete visit. Total time  23 minutes    RTC in 2 months

## 2021-06-02 ENCOUNTER — TELEPHONE (OUTPATIENT)
Dept: ENDOCRINOLOGY | Facility: CLINIC | Age: 76
End: 2021-06-02

## 2021-06-02 DIAGNOSIS — Z79.899 HIGH RISK MEDICATION USE: Primary | ICD-10-CM

## 2021-06-02 NOTE — TELEPHONE ENCOUNTER
You gave her a RX Glumetza and its not available through [facility] and she can not  afford it at local pharmacy. Is there a alternative she can use, Also wants to stay on the Trulicity. Please advise

## 2021-07-07 ENCOUNTER — LAB (OUTPATIENT)
Dept: LAB | Facility: HOSPITAL | Age: 76
End: 2021-07-07

## 2021-07-07 DIAGNOSIS — E89.0 HYPOTHYROIDISM, POSTSURGICAL: ICD-10-CM

## 2021-07-07 DIAGNOSIS — E11.65 TYPE 2 DIABETES MELLITUS WITH HYPERGLYCEMIA, WITH LONG-TERM CURRENT USE OF INSULIN (HCC): ICD-10-CM

## 2021-07-07 DIAGNOSIS — Z79.4 TYPE 2 DIABETES MELLITUS WITH HYPERGLYCEMIA, WITH LONG-TERM CURRENT USE OF INSULIN (HCC): ICD-10-CM

## 2021-07-07 DIAGNOSIS — C73 MALIGNANT NEOPLASM OF THYROID GLAND (HCC): ICD-10-CM

## 2021-07-07 LAB
ALBUMIN SERPL-MCNC: 4.2 G/DL (ref 3.5–5.2)
ALBUMIN UR-MCNC: 1.3 MG/DL
ALBUMIN/GLOB SERPL: 1.5 G/DL
ALP SERPL-CCNC: 82 U/L (ref 39–117)
ALT SERPL W P-5'-P-CCNC: 18 U/L (ref 1–33)
ANION GAP SERPL CALCULATED.3IONS-SCNC: 12.7 MMOL/L (ref 5–15)
AST SERPL-CCNC: 16 U/L (ref 1–32)
BILIRUB SERPL-MCNC: 0.3 MG/DL (ref 0–1.2)
BUN SERPL-MCNC: 9 MG/DL (ref 8–23)
BUN/CREAT SERPL: 12.3 (ref 7–25)
CALCIUM SPEC-SCNC: 8.7 MG/DL (ref 8.6–10.5)
CHLORIDE SERPL-SCNC: 98 MMOL/L (ref 98–107)
CHOLEST SERPL-MCNC: 109 MG/DL (ref 0–200)
CO2 SERPL-SCNC: 30.3 MMOL/L (ref 22–29)
CREAT SERPL-MCNC: 0.73 MG/DL (ref 0.57–1)
CREAT UR-MCNC: 52.2 MG/DL
GFR SERPL CREATININE-BSD FRML MDRD: 78 ML/MIN/1.73
GLOBULIN UR ELPH-MCNC: 2.8 GM/DL
GLUCOSE SERPL-MCNC: 89 MG/DL (ref 65–99)
HBA1C MFR BLD: 7.9 % (ref 3.5–5.6)
HDLC SERPL-MCNC: 39 MG/DL (ref 40–60)
LDLC SERPL CALC-MCNC: 49 MG/DL (ref 0–100)
LDLC/HDLC SERPL: 1.19 {RATIO}
MICROALBUMIN/CREAT UR: 24.9 MG/G
POTASSIUM SERPL-SCNC: 3.7 MMOL/L (ref 3.5–5.2)
PROT SERPL-MCNC: 7 G/DL (ref 6–8.5)
SODIUM SERPL-SCNC: 141 MMOL/L (ref 136–145)
T4 FREE SERPL-MCNC: 1.23 NG/DL (ref 0.93–1.7)
TRIGL SERPL-MCNC: 117 MG/DL (ref 0–150)
TSH SERPL DL<=0.05 MIU/L-ACNC: 0.04 UIU/ML (ref 0.27–4.2)
VLDLC SERPL-MCNC: 21 MG/DL (ref 5–40)

## 2021-07-07 PROCEDURE — 86800 THYROGLOBULIN ANTIBODY: CPT

## 2021-07-07 PROCEDURE — 84432 ASSAY OF THYROGLOBULIN: CPT

## 2021-07-07 PROCEDURE — 82043 UR ALBUMIN QUANTITATIVE: CPT

## 2021-07-07 PROCEDURE — 84443 ASSAY THYROID STIM HORMONE: CPT

## 2021-07-07 PROCEDURE — 80053 COMPREHEN METABOLIC PANEL: CPT

## 2021-07-07 PROCEDURE — 82570 ASSAY OF URINE CREATININE: CPT

## 2021-07-07 PROCEDURE — 83036 HEMOGLOBIN GLYCOSYLATED A1C: CPT

## 2021-07-07 PROCEDURE — 36415 COLL VENOUS BLD VENIPUNCTURE: CPT

## 2021-07-07 PROCEDURE — 84439 ASSAY OF FREE THYROXINE: CPT

## 2021-07-07 PROCEDURE — 80061 LIPID PANEL: CPT

## 2021-07-15 LAB
THYROGLOB AB SERPL-ACNC: 3.4 IU/ML
THYROGLOB SERPL-MCNC: <2 NG/ML
THYROGLOB SERPL-MCNC: ABNORMAL NG/ML

## 2021-07-16 DIAGNOSIS — E89.0 HYPOTHYROIDISM, POSTSURGICAL: Primary | ICD-10-CM

## 2021-07-16 RX ORDER — LEVOTHYROXINE SODIUM 112 UG/1
112 TABLET ORAL DAILY
Qty: 90 TABLET | Refills: 1 | Status: SHIPPED | OUTPATIENT
Start: 2021-07-16 | End: 2021-09-17 | Stop reason: SDUPTHER

## 2021-07-22 ENCOUNTER — OFFICE VISIT (OUTPATIENT)
Dept: PAIN MEDICINE | Facility: CLINIC | Age: 76
End: 2021-07-22

## 2021-07-22 VITALS
DIASTOLIC BLOOD PRESSURE: 75 MMHG | HEIGHT: 67 IN | RESPIRATION RATE: 16 BRPM | WEIGHT: 170 LBS | HEART RATE: 84 BPM | OXYGEN SATURATION: 100 % | BODY MASS INDEX: 26.68 KG/M2 | SYSTOLIC BLOOD PRESSURE: 127 MMHG

## 2021-07-22 DIAGNOSIS — G89.4 CHRONIC PAIN SYNDROME: Primary | ICD-10-CM

## 2021-07-22 DIAGNOSIS — M79.18 MYOFASCIAL PAIN SYNDROME: ICD-10-CM

## 2021-07-22 DIAGNOSIS — Z79.899 HIGH RISK MEDICATION USE: ICD-10-CM

## 2021-07-22 DIAGNOSIS — M25.50 POLYARTHRALGIA: ICD-10-CM

## 2021-07-22 DIAGNOSIS — M47.817 LUMBOSACRAL SPONDYLOSIS WITHOUT MYELOPATHY: ICD-10-CM

## 2021-07-22 PROCEDURE — 99214 OFFICE O/P EST MOD 30 MIN: CPT | Performed by: ANESTHESIOLOGY

## 2021-07-22 RX ORDER — HYDROCODONE BITARTRATE AND ACETAMINOPHEN 10; 325 MG/1; MG/1
1 TABLET ORAL 3 TIMES DAILY PRN
Qty: 90 TABLET | Refills: 0 | Status: SHIPPED | OUTPATIENT
Start: 2021-08-25 | End: 2021-09-30 | Stop reason: SDUPTHER

## 2021-07-22 RX ORDER — HYDROCODONE BITARTRATE AND ACETAMINOPHEN 10; 325 MG/1; MG/1
1 TABLET ORAL 3 TIMES DAILY PRN
Qty: 90 TABLET | Refills: 0 | Status: SHIPPED | OUTPATIENT
Start: 2021-07-26 | End: 2021-09-30 | Stop reason: SDUPTHER

## 2021-07-22 RX ORDER — GABAPENTIN 300 MG/1
300 CAPSULE ORAL NIGHTLY PRN
Qty: 90 CAPSULE | Refills: 5 | Status: SHIPPED | OUTPATIENT
Start: 2021-07-22 | End: 2021-11-23

## 2021-07-22 NOTE — PROGRESS NOTES
Subjective    CC joints pain left knee pain  Marilyn J Pumphrey is a 75 y.o. female with multiple comorbidities including DM, polyarthralgia from  inflammatory osteoarthritis, bilateral knee pain, status post left TKA , here for f/u.  continued back pain and left lower extremity radicular pain.  Seen neurosurgery, L-spine MRI pending.  Considering surgery.    Chronic polyarthralgia/left knee pain,  and generalized myofascial pain interfering with daily activity, sleep,mood.    Tried methotrexate and  Celebrex without relief.  Tried Cymbalta, sevella, tramadol without relief.  Seen Rheumatology. and ortho. considering right knee replecement.   Good releif with knee injections by ortho.      Utilizes hydrocodone [id number]  3 times daily with good relief functional benefit denies side effects.    Pain Assessment   Location of Pain: Lower Back, R Hip, L Hip, L Leg, neck pain, joint  Description of Pain: Dull/Aching, Throbbing, Stabbing  Previous Pain Rating :8  Current Pain Ratin  Aggravating Factors: Activity  Alleviating Factors: Rest, Medication  PEG Assessment   What number best describes your pain on average in the past week?8  What number best describes how, during the past week, pain has interfered with your enjoyment of life?6  What number best describes how, during the past week, pain has interfered with your general activity? 10     The following portions of the patient's history were reviewed and updated as appropriate: allergies, current medications, past family history, past medical history, past social history, past surgical history and problem list.   has a past medical history of Anxiety, Arthritis, Bone pain, Cancer (CMS/[facility]) (2016), Depression, Disease of thyroid gland, DM type 2 (diabetes mellitus, type 2) (CMS/[facility]), Fall, GERD (gastroesophageal reflux disease), Hypertension, Irregular heart beat, Knee pain, Leaky heart valve, Leg cramps, Low back pain, Osteoarthritis, PONV (postoperative  "nausea and vomiting), and Sleep apnea.   has a past surgical history that includes Tonsillectomy; Thyroid surgery; Oophorectomy (Bilateral, 2015); Hysterectomy (1993); Cholecystectomy (1974); Carpal tunnel release; vein ligation and stripping bilateral; and Total knee arthroplasty (Left, 10/14/2019).  Social History     Tobacco Use   • Smoking status: Never Smoker   • Smokeless tobacco: Never Used   Substance Use Topics   • Alcohol use: No     Review of Systems   Musculoskeletal: Positive for arthralgias, back pain, joint swelling, myalgias and neck pain.   All other systems reviewed and are negative.    Objective   Physical Exam   Constitutional: No distress.   Pulmonary/Chest: Effort normal.   Musculoskeletal:      Right knee: Tenderness found.      Left knee: Tenderness found.      Cervical back: She exhibits decreased range of motion and tenderness.      Lumbar back: She exhibits decreased range of motion and tenderness.      Comments: Lumbar loading positive, pain on extension of low back past 5 degrees.  TTP on the lumbar facets noted.  Leg raise left   Psychiatric: Her behavior is normal.     /75   Pulse 84   Resp 16   Ht 170.2 cm (67\")   Wt 77.1 kg (170 lb)   SpO2 100%   BMI 26.63 kg/m²     PHQ 9 on chart  Opioid risk tool low risk    Assessment/Plan   Diagnoses and all orders for this visit:    1. Chronic pain syndrome (Primary)  -     gabapentin (NEURONTIN) 300 MG capsule; Take 1 capsule by mouth At Night As Needed (pain).  Dispense: 90 capsule; Refill: 5  -     HYDROcodone-acetaminophen (NORCO)  MG per tablet; Take 1 tablet by mouth 3 (Three) Times a Day As Needed for Severe Pain . DNF before 8/25/2021  Dispense: 90 tablet; Refill: 0  -     HYDROcodone-acetaminophen (NORCO)  MG per tablet; Take 1 tablet by mouth 3 (Three) Times a Day As Needed for Severe Pain .  Dispense: 90 tablet; Refill: 0    2. Lumbosacral spondylosis without myelopathy    3. Polyarthralgia    4. Myofascial " pain syndrome    5. High risk medication use       Summary  75 y.o. female with multiple comorbidities including DM, polyarthralgia from  inflammatory osteoarthritis, knee pain S/P left TKA, here for f/u.  Chronic polyarthralgia/left knee pain,  and generalized myofascial pain interfering with daily activity, sleep,mood.      continued and worsening back pain and left lower extremity radicular pain.  Seen neurosurgery, L-spine MRI pending.  Considering surgery.    Declines injection at this time.   Start gabapentin at bedtime    Continue Hydrocodone  10/325 3 times daily as needed for severe pain.  UDS and  Inspect reviewed.    Discussed risk of tolerance, dependence, respiratory depression, coma and death associated with use of oral opioids for treatment of chronic nonmalignant pain.      Discussed increased risk with combining opioid and benzo. On clorazepate prn anxiety.    RTC in 2 months

## 2021-08-27 ENCOUNTER — LAB (OUTPATIENT)
Dept: LAB | Facility: HOSPITAL | Age: 76
End: 2021-08-27

## 2021-08-27 DIAGNOSIS — C73 MALIGNANT NEOPLASM OF THYROID GLAND (HCC): ICD-10-CM

## 2021-08-27 DIAGNOSIS — E89.0 HYPOTHYROIDISM, POSTSURGICAL: ICD-10-CM

## 2021-08-27 DIAGNOSIS — E11.65 TYPE 2 DIABETES MELLITUS WITH HYPERGLYCEMIA, WITH LONG-TERM CURRENT USE OF INSULIN (HCC): ICD-10-CM

## 2021-08-27 DIAGNOSIS — Z79.4 TYPE 2 DIABETES MELLITUS WITH HYPERGLYCEMIA, WITH LONG-TERM CURRENT USE OF INSULIN (HCC): ICD-10-CM

## 2021-08-27 LAB
T4 FREE SERPL-MCNC: 1.42 NG/DL (ref 0.93–1.7)
TSH SERPL DL<=0.05 MIU/L-ACNC: 0.03 UIU/ML (ref 0.27–4.2)

## 2021-08-27 PROCEDURE — 84432 ASSAY OF THYROGLOBULIN: CPT

## 2021-08-27 PROCEDURE — 36415 COLL VENOUS BLD VENIPUNCTURE: CPT

## 2021-08-27 PROCEDURE — 84439 ASSAY OF FREE THYROXINE: CPT

## 2021-08-27 PROCEDURE — 84443 ASSAY THYROID STIM HORMONE: CPT

## 2021-09-01 LAB — THYROGLOB SERPL-MCNC: <2 NG/ML

## 2021-09-07 ENCOUNTER — TELEPHONE (OUTPATIENT)
Dept: ENDOCRINOLOGY | Facility: CLINIC | Age: 76
End: 2021-09-07

## 2021-09-07 NOTE — TELEPHONE ENCOUNTER
Pt called to check the status of her refill request, I advised her it was sent and we received a receipt of confirmation from the pharmacy on 8/25/21. She is completely out. I signed out 2 boxes for her and advised her to call Med[facility] by mail to see how long it may be for delivery. I told her to call me back if it will be longer than 2 weeks.

## 2021-09-17 DIAGNOSIS — E78.2 MIXED HYPERLIPIDEMIA: Primary | ICD-10-CM

## 2021-09-17 DIAGNOSIS — E89.0 HYPOTHYROIDISM, POSTSURGICAL: ICD-10-CM

## 2021-09-17 DIAGNOSIS — I10 HYPERTENSION, BENIGN: ICD-10-CM

## 2021-09-17 RX ORDER — LEVOTHYROXINE SODIUM 0.1 MG/1
100 TABLET ORAL DAILY
Qty: 90 TABLET | Refills: 2 | Status: SHIPPED | OUTPATIENT
Start: 2021-09-17 | End: 2022-06-02 | Stop reason: SDUPTHER

## 2021-09-30 ENCOUNTER — DOCUMENTATION (OUTPATIENT)
Dept: PAIN MEDICINE | Facility: CLINIC | Age: 76
End: 2021-09-30

## 2021-09-30 ENCOUNTER — OFFICE VISIT (OUTPATIENT)
Dept: PAIN MEDICINE | Facility: CLINIC | Age: 76
End: 2021-09-30

## 2021-09-30 VITALS
WEIGHT: 160 LBS | DIASTOLIC BLOOD PRESSURE: 81 MMHG | BODY MASS INDEX: 25.11 KG/M2 | SYSTOLIC BLOOD PRESSURE: 134 MMHG | HEIGHT: 67 IN | OXYGEN SATURATION: 97 % | RESPIRATION RATE: 16 BRPM | HEART RATE: 84 BPM

## 2021-09-30 DIAGNOSIS — M25.50 POLYARTHRALGIA: ICD-10-CM

## 2021-09-30 DIAGNOSIS — G89.4 CHRONIC PAIN SYNDROME: Primary | ICD-10-CM

## 2021-09-30 DIAGNOSIS — M79.18 MYOFASCIAL PAIN SYNDROME: ICD-10-CM

## 2021-09-30 DIAGNOSIS — Z79.899 HIGH RISK MEDICATION USE: Primary | ICD-10-CM

## 2021-09-30 DIAGNOSIS — M47.817 LUMBOSACRAL SPONDYLOSIS WITHOUT MYELOPATHY: ICD-10-CM

## 2021-09-30 DIAGNOSIS — Z79.899 HIGH RISK MEDICATION USE: ICD-10-CM

## 2021-09-30 PROCEDURE — 99214 OFFICE O/P EST MOD 30 MIN: CPT | Performed by: ANESTHESIOLOGY

## 2021-09-30 RX ORDER — BACLOFEN 20 MG/1
10 TABLET ORAL 4 TIMES DAILY
Status: ON HOLD | COMMUNITY
Start: 2021-07-26 | End: 2022-05-12 | Stop reason: SDUPTHER

## 2021-09-30 RX ORDER — HYDROCODONE BITARTRATE AND ACETAMINOPHEN 10; 325 MG/1; MG/1
1 TABLET ORAL 3 TIMES DAILY PRN
Qty: 90 TABLET | Refills: 0 | Status: SHIPPED | OUTPATIENT
Start: 2021-10-30 | End: 2021-11-01 | Stop reason: SDUPTHER

## 2021-09-30 RX ORDER — HYDROCODONE BITARTRATE AND ACETAMINOPHEN 10; 325 MG/1; MG/1
1 TABLET ORAL 3 TIMES DAILY PRN
Qty: 90 TABLET | Refills: 0 | Status: SHIPPED | OUTPATIENT
Start: 2021-09-30 | End: 2022-01-20 | Stop reason: SDUPTHER

## 2021-09-30 RX ORDER — METOPROLOL TARTRATE 100 MG/1
100 TABLET ORAL 2 TIMES DAILY
COMMUNITY

## 2021-09-30 NOTE — PROGRESS NOTES
Subjective    CC joints pain left knee pain  Marilyn J Pumphrey is a 75 y.o. female with multiple comorbidities including DM, polyarthralgia from  inflammatory osteoarthritis, bilateral knee pain, status post left TKA , here for f/u.  Had L-spine MRI completed showing radiculopathy and had lumbar injection from neurosurgery at [facility] with good relief.        Continued back pain radiating to bilateral hips worse with prolonged activity.  Chronic polyarthralgia/left knee pain,  and generalized myofascial pain interfering with daily activity, sleep,mood.  Considering right knee TKA.  Tried methotrexate and  Celebrex without relief.  Tried Cymbalta, sevella, tramadol without relief.  Seen Rheumatology. and ortho. considering right knee replecement.   Good releif with knee injections by ortho.      Utilizes hydrocodone 83489  3 times daily with good relief functional benefit denies side effects.    Pain Assessment   Location of Pain: Lower Back, R Hip, L Hip, L Leg, neck pain, joint  Description of Pain: Dull/Aching, Throbbing, Stabbing  Previous Pain Rating :8  Current Pain Ratin  Aggravating Factors: Activity  Alleviating Factors: Rest, Medication  PEG Assessment   What number best describes your pain on average in the past week?8  What number best describes how, during the past week, pain has interfered with your enjoyment of life?7  What number best describes how, during the past week, pain has interfered with your general activity? 10     The following portions of the patient's history were reviewed and updated as appropriate: allergies, current medications, past family history, past medical history, past social history, past surgical history and problem list.   has a past medical history of Anxiety, Arthritis, Bone pain, Cancer (CMS/[facility]) (2016), Depression, Disease of thyroid gland, DM type 2 (diabetes mellitus, type 2) (CMS/[facility]), Fall, GERD (gastroesophageal reflux disease), Hypertension,  "Irregular heart beat, Knee pain, Leaky heart valve, Leg cramps, Low back pain, Osteoarthritis, PONV (postoperative nausea and vomiting), and Sleep apnea.   has a past surgical history that includes Tonsillectomy; Thyroid surgery; Oophorectomy (Bilateral, 2015); Hysterectomy (1993); Cholecystectomy (1974); Carpal tunnel release; vein ligation and stripping bilateral; and Total knee arthroplasty (Left, 10/14/2019).  Social History     Tobacco Use   • Smoking status: Never Smoker   • Smokeless tobacco: Never Used   Substance Use Topics   • Alcohol use: No     Review of Systems   Musculoskeletal: Positive for arthralgias, back pain, joint swelling, myalgias and neck pain.   All other systems reviewed and are negative.    Objective   Physical Exam   Constitutional: No distress.   Pulmonary/Chest: Effort normal.   Musculoskeletal:      Right knee: Tenderness found.      Left knee: Tenderness found.      Cervical back: She exhibits decreased range of motion and tenderness.      Lumbar back: She exhibits decreased range of motion and tenderness.      Comments: Lumbar loading positive, pain on extension of low back past 5 degrees.  TTP on the lumbar facets noted.  Leg raise left   Vitals reviewed.    /81   Pulse 84   Resp 16   Ht 170.2 cm (67\")   Wt 72.6 kg (160 lb)   SpO2 97%   BMI 25.06 kg/m²     PHQ 9 on chart  Opioid risk tool low risk    Assessment/Plan   Diagnoses and all orders for this visit:    1. Chronic pain syndrome (Primary)  -     HYDROcodone-acetaminophen (NORCO)  MG per tablet; Take 1 tablet by mouth 3 (Three) Times a Day As Needed for Severe Pain . DNF before 10/30/2021  Dispense: 90 tablet; Refill: 0  -     HYDROcodone-acetaminophen (NORCO)  MG per tablet; Take 1 tablet by mouth 3 (Three) Times a Day As Needed for Severe Pain .  Dispense: 90 tablet; Refill: 0    2. Lumbosacral spondylosis without myelopathy    3. Polyarthralgia    4. Myofascial pain syndrome    5. High risk " medication use       Summary  75 y.o. female with multiple comorbidities including DM, polyarthralgia from  inflammatory osteoarthritis, knee pain S/P left TKA, here for f/u.  Chronic polyarthralgia/left knee pain,  and generalized myofascial pain interfering with daily activity, sleep,mood.      Had L-spine MRI completed showing radiculopathy and had lumbar injection from neurosurgery at [facility] with good relief.    We will obtain records and MRI results.       Continue gabapentin at bedtime    Continue Hydrocodone  10/325 3 times daily as needed for severe pain.  UDS and  Inspect reviewed.    Discussed risk of tolerance, dependence, respiratory depression, coma and death associated with use of oral opioids for treatment of chronic nonmalignant pain.      Discussed increased risk with combining opioid and benzo. On clorazepate prn anxiety.    RTC in 2 months

## 2021-09-30 NOTE — PROGRESS NOTES
L-spine MRI 8/2021.  Multiple level degenerative disc disease and degenerative changes.  Small left paracentral disc protrusion at L3-L4 which appears to abut the exiting nerve root with mild encroachment on neural foramina on the left.

## 2021-10-20 RX ORDER — CALCITRIOL 0.25 UG/1
CAPSULE, LIQUID FILLED ORAL
Qty: 180 CAPSULE | Refills: 0 | Status: SHIPPED | OUTPATIENT
Start: 2021-10-20 | End: 2022-01-07

## 2021-10-25 RX ORDER — CALCITRIOL 0.25 UG/1
CAPSULE, LIQUID FILLED ORAL
Qty: 180 CAPSULE | Refills: 0 | OUTPATIENT
Start: 2021-10-25

## 2021-11-01 DIAGNOSIS — G89.4 CHRONIC PAIN SYNDROME: ICD-10-CM

## 2021-11-01 RX ORDER — HYDROCODONE BITARTRATE AND ACETAMINOPHEN 10; 325 MG/1; MG/1
1 TABLET ORAL 3 TIMES DAILY PRN
Qty: 90 TABLET | Refills: 0 | Status: SHIPPED | OUTPATIENT
Start: 2021-11-01 | End: 2022-01-20 | Stop reason: SDUPTHER

## 2021-11-16 ENCOUNTER — LAB (OUTPATIENT)
Dept: LAB | Facility: HOSPITAL | Age: 76
End: 2021-11-16
Attending: INTERNAL MEDICINE

## 2021-11-16 DIAGNOSIS — E89.0 HYPOTHYROIDISM, POSTSURGICAL: Primary | ICD-10-CM

## 2021-11-16 DIAGNOSIS — E78.2 MIXED HYPERLIPIDEMIA: ICD-10-CM

## 2021-11-16 DIAGNOSIS — I10 HYPERTENSION, BENIGN: ICD-10-CM

## 2021-11-16 LAB
T4 FREE SERPL-MCNC: 1.41 NG/DL (ref 0.93–1.7)
TSH SERPL DL<=0.05 MIU/L-ACNC: 0.35 UIU/ML (ref 0.27–4.2)

## 2021-11-16 PROCEDURE — 86800 THYROGLOBULIN ANTIBODY: CPT

## 2021-11-16 PROCEDURE — 36415 COLL VENOUS BLD VENIPUNCTURE: CPT

## 2021-11-16 PROCEDURE — 84443 ASSAY THYROID STIM HORMONE: CPT

## 2021-11-16 PROCEDURE — 84432 ASSAY OF THYROGLOBULIN: CPT

## 2021-11-16 PROCEDURE — 84439 ASSAY OF FREE THYROXINE: CPT

## 2021-11-23 ENCOUNTER — TELEMEDICINE (OUTPATIENT)
Dept: ENDOCRINOLOGY | Facility: CLINIC | Age: 76
End: 2021-11-23

## 2021-11-23 VITALS — BODY MASS INDEX: 25.11 KG/M2 | HEIGHT: 67 IN | WEIGHT: 160 LBS

## 2021-11-23 DIAGNOSIS — I10 HYPERTENSION, BENIGN: ICD-10-CM

## 2021-11-23 DIAGNOSIS — E89.0 HYPOTHYROIDISM, POSTSURGICAL: ICD-10-CM

## 2021-11-23 DIAGNOSIS — C73 MALIGNANT NEOPLASM OF THYROID GLAND (HCC): ICD-10-CM

## 2021-11-23 DIAGNOSIS — E11.65 TYPE 2 DIABETES MELLITUS WITH HYPERGLYCEMIA, WITH LONG-TERM CURRENT USE OF INSULIN (HCC): Primary | ICD-10-CM

## 2021-11-23 DIAGNOSIS — E78.2 MIXED HYPERLIPIDEMIA: ICD-10-CM

## 2021-11-23 DIAGNOSIS — Z79.4 TYPE 2 DIABETES MELLITUS WITH HYPERGLYCEMIA, WITH LONG-TERM CURRENT USE OF INSULIN (HCC): Primary | ICD-10-CM

## 2021-11-23 DIAGNOSIS — E89.2 POSTSURGICAL HYPOPARATHYROIDISM (HCC): ICD-10-CM

## 2021-11-23 PROCEDURE — 99214 OFFICE O/P EST MOD 30 MIN: CPT | Performed by: INTERNAL MEDICINE

## 2021-11-23 NOTE — PROGRESS NOTES
Endocrine Progress Note Outpatient     Patient Care Team:  Suma Campbell APRN as PCP - General  Suma Campbell APRN as PCP - Family Medicine  Heath Musa MD as Consulting Physician (Cardiology)  You have chosen to receive care through a telehealth visit.  Do you consent to use a video/audio connection for your medical care today? Yes.     Chief Complaint: Follow up type 2 diabetes and thyroid cancer:    HPI: 76-year-old female with history of thyroid cancer, postsurgical hypothyroidism, postsurgical hypoparathyroidism, type 2 diabetes, hyperlipidemia hypertension is here for follow-up.    For thyroid cancer she has undergone total thyroidectomy and did not receive radioactive iodine treatment.  Her pathology with 1.6 cm papillary thyroid cancer with margins were clear and no lymphatic or vascular invasion was noted.  She is currently on levothyroxine 100 mcg p.o. daily.  She is tolerating well.      Surgical hypothyroidism: On levothyroxine 100 mcg p.o. daily    Postsurgical hypoparathyroidism: She is currently on calcium with vitamin D and calcitriol to .25 mcg twice a day.    Type 2 diabetes: She is currently on Trulicity 3.0 mg subcu weekly, Metformin 1000 mg twice a day, Jardiance 25 mg once a day and Lantus 30 units daily and NovoLog 10 units with each meal.  She has not required any assistance or hospitalization for low blood sugar since last seen.  Trying to work on her diet the best she can.    Hyperlipidemia: On Crestor    Hypertension: Well-controlled.    Past Medical History:   Diagnosis Date   • Anxiety    • Arthritis    • Bone pain    • Cancer (HCC) 09/2016    THYROID   • Depression    • Disease of thyroid gland    • DM type 2 (diabetes mellitus, type 2) ([facility])     TYPE 2   • Fall     2/2021 FELL ON ICE   • GERD (gastroesophageal reflux disease)    • Hypertension    • Irregular heart beat    • Knee pain    • Leaky heart valve     11/2020   • Leg cramps    • Low back pain    •  Osteoarthritis    • PONV (postoperative nausea and vomiting)     DOES WELL WITH PROPOFOL   • Sleep apnea     C-PAP IN USE       Social History     Socioeconomic History   • Marital status:    Tobacco Use   • Smoking status: Never Smoker   • Smokeless tobacco: Never Used   Vaping Use   • Vaping Use: Never used   Substance and Sexual Activity   • Alcohol use: No   • Drug use: No   • Sexual activity: Defer       Family History   Problem Relation Age of Onset   • Diabetes Father    • Heart disease Father    • Hypertension Father    • Hyperlipidemia Father    • Thyroid disease Father    • Cancer Sister         lung cancer   • Thyroid disease Sister    • Diabetes Brother    • Heart disease Brother    • Cancer Brother         lung cancer       Allergies   Allergen Reactions   • Codeine Shortness Of Breath   • Oxycodone Hallucinations       ROS:   Constitutional:  Admit fatigue, tiredness.    Eyes:  Denies change in visual acuity   HENT:  Denies nasal congestion or sore throat   Respiratory: denies cough, shortness of breath.   Cardiovascular:  denies chest pain, edema   GI:  Denies abdominal pain, nausea, vomiting.   Musculoskeletal:  Denies back pain or joint pain   Integument:  Denies dry skin and rash   Neurologic:  Denies headache, focal weakness or sensory changes   Endocrine:  Denies polyuria or polydipsia   Psychiatric:  Denies depression or anxiety      There were no vitals filed for this visit.    Physical Exam:  GEN: NAD, conversant  PSYCH: AOX3, appropriate mood, affect normal      Results Review:     I reviewed the patient's new clinical results.    Lab Results   Component Value Date    HGBA1C 7.9 (H) 07/07/2021    HGBA1C 7.7 (H) 05/18/2021    HGBA1C 8.5 (H) 01/26/2021      Lab Results   Component Value Date    GLUCOSE 89 07/07/2021    BUN 9 07/07/2021    CREATININE 0.73 07/07/2021    EGFRIFNONA 78 07/07/2021    EGFRIFAFRI >60 02/14/2018    BCR 12.3 07/07/2021    K 3.7 07/07/2021    CO2 30.3 (H)  07/07/2021    CALCIUM 8.7 07/07/2021    ALBUMIN 4.20 07/07/2021    LABIL2 1.2 09/11/2019    AST 16 07/07/2021    ALT 18 07/07/2021    CHOL 109 07/07/2021    TRIG 117 07/07/2021    LDL 49 07/07/2021    HDL 39 (L) 07/07/2021     Lab Results   Component Value Date    TSH 0.352 11/16/2021    FREET4 1.41 11/16/2021    THYROIDAB 24 (H) 06/11/2017       TG panel from May 18, 2021 is pending.    Labs from [facility], 2020 showed a TG antibody of 3.1, TG level less than 2.0, calcium 8.5, TSH 0.75, free T4 1.60, LDL 62, triglycerides 99, A1c 8%, sodium 142, potassium 4.5, chloride 101, CO 29, glucose 118, BUN 16, creatinine 0.9, AST 14 and ALT 12 with urine microalbumin creatinine ratio was less than 2.        Medication Review: Reviewed.       Current Outpatient Medications:   •  amLODIPine-benazepril (LOTREL 5-20) 5-20 MG per capsule, Take 1 capsule by mouth 2 (two) times a day., Disp: , Rfl:   •  aspirin 81 MG tablet, Take 1 tablet by mouth Daily. (Patient taking differently: Take 325 mg by mouth Daily.), Disp: 30 tablet, Rfl: 0  •  baclofen (LIORESAL) 20 MG tablet, Take 20 mg by mouth 4 (Four) Times a Day., Disp: , Rfl:   •  calcitriol (ROCALTROL) 0.25 MCG capsule, Take 1 capsule by mouth twice daily, Disp: 180 capsule, Rfl: 0  •  CALCIUM CARBONATE-VIT D-MIN PO, Take 1,200 mg by mouth 2 (Two) Times a Day., Disp: , Rfl:   •  clorazepate (TRANXENE) 7.5 MG tablet, Take 7.5 mg by mouth 2 (Two) Times a Day., Disp: , Rfl: 3  •  diclofenac (VOLTAREN) 1 % gel gel, Apply 4 g topically to the appropriate area as directed 4 (Four) Times a Day As Needed., Disp: , Rfl:   •  Dulaglutide (Trulicity) 3 MG/0.5ML solution pen-injector, Inject 3 mg under the skin into the appropriate area as directed 1 (One) Time Per Week., Disp: 12 pen, Rfl: 4  •  empagliflozin (Jardiance) 25 MG tablet tablet, Take 1 tablet by mouth Daily., Disp: 90 tablet, Rfl: 3  •  escitalopram (LEXAPRO) 10 MG tablet, Take 10 mg by mouth Every Evening., Disp: , Rfl:   •   esomeprazole (nexIUM) 40 MG capsule, Take 40 mg by mouth Every Evening., Disp: , Rfl:   •  HYDROcodone-acetaminophen (NORCO)  MG per tablet, Take 1 tablet by mouth 3 (Three) Times a Day As Needed for Severe Pain ., Disp: 90 tablet, Rfl: 0  •  HYDROcodone-acetaminophen (NORCO)  MG per tablet, Take 1 tablet by mouth 3 (Three) Times a Day As Needed for Severe Pain ., Disp: 90 tablet, Rfl: 0  •  insulin aspart (NOVOLOG FLEXPEN) 100 UNIT/ML solution pen-injector sc pen, Inject 10 units before each meal (Patient taking differently: 10 Units. Inject 10 units before each meal), Disp: 5 pen, Rfl: 4  •  insulin glargine (LANTUS, SEMGLEE) 100 UNIT/ML injection, Inject 30 Units under the skin into the appropriate area as directed Daily., Disp: 30 mL, Rfl: 6  •  levothyroxine (SYNTHROID, LEVOTHROID) 100 MCG tablet, Take 1 tablet by mouth Daily., Disp: 90 tablet, Rfl: 2  •  magnesium oxide (MAG-OX) 400 tablet tablet, Take 400 mg by mouth 2 (Two) Times a Day., Disp: , Rfl:   •  metFORMIN (GLUCOPHAGE) 1000 MG tablet, Take 1 tablet by mouth 2 (Two) Times a Day With Meals., Disp: 180 tablet, Rfl: 4  •  metoprolol tartrate (LOPRESSOR) 100 MG tablet, Take 100 mg by mouth 2 (Two) Times a Day., Disp: , Rfl:   •  promethazine (PHENERGAN) 25 MG tablet, Take 1 tablet by mouth As Needed., Disp: , Rfl: 1  •  Rosuvastatin Calcium 40 MG capsule sprinkle, Take 20 mg by mouth Every Night., Disp: , Rfl:   •  sucralfate (CARAFATE) 1 g tablet, Take 1 g by mouth 4 (Four) Times a Day., Disp: , Rfl:   •  vitamin D (ERGOCALCIFEROL) 1.25 MG (19851 UT) capsule capsule, Take 50,000 Units by mouth 1 (One) Time Per Week., Disp: , Rfl:       Assessment/Plan   1.  Thyroid cancer: Status post thyroid surgery in September 2016.  She did not receive radioactive iodine treatment.  Tumor was 1.6 cm papillary thyroid cancer with margins clear.  Her CT scan of the neck in October 2019 did not show any lymphadenopathy.  TG panel is pending.  Will check  neck ultrasound.    2.  Hypothyroidism: Secondary to thyroid surgery, well-controlled, TSH and free T4 normal.  We will continue levothyroxine 100 mcg p.o. daily and follow labs.    3.  Postsurgical hypoparathyroidism: She is currently on Rocaltrol with calcium and vitamin D and her calcium is normal.  She is asymptomatic.  We will continue current management.    4.  Diabetes mellitus type 2: Uncontrolled with high blood sugars, no A1c, at this time I will continue Lantus to 30 units subcu nightly, continue Jardiance along with Metformin and NovoLog.  She is advised to watch for low blood sugars, always give glucose dose in case of low blood sugar and bring the blood sugar records for review at each visit.    5.  Hyperlipidemia: Excellent control, continue current medications    6.  Hypertension:Better, continue current medications.            Gege Ford MD FACE.    Much of the above report is an electronic transcription/translation of the spoken language to printed text using Dragon Software. As such, the subtleties and finesse of the spoken language may permit erroneous, or at times, nonsensical words or phrases to be inadvertently transcribed; thus changes may be made at a later date to rectify these errors.

## 2021-11-25 LAB
THYROGLOB AB SERPL-ACNC: 2.4 IU/ML
THYROGLOB SERPL-MCNC: <2 NG/ML
THYROGLOB SERPL-MCNC: ABNORMAL NG/ML

## 2021-12-03 ENCOUNTER — HOSPITAL ENCOUNTER (OUTPATIENT)
Dept: ULTRASOUND IMAGING | Facility: HOSPITAL | Age: 76
Discharge: HOME | End: 2021-12-03
Admitting: INTERNAL MEDICINE

## 2021-12-03 DIAGNOSIS — Z79.4 TYPE 2 DIABETES MELLITUS WITH HYPERGLYCEMIA, WITH LONG-TERM CURRENT USE OF INSULIN (HCC): ICD-10-CM

## 2021-12-03 DIAGNOSIS — E89.0 HYPOTHYROIDISM, POSTSURGICAL: ICD-10-CM

## 2021-12-03 DIAGNOSIS — C73 MALIGNANT NEOPLASM OF THYROID GLAND (HCC): ICD-10-CM

## 2021-12-03 DIAGNOSIS — E11.65 TYPE 2 DIABETES MELLITUS WITH HYPERGLYCEMIA, WITH LONG-TERM CURRENT USE OF INSULIN (HCC): ICD-10-CM

## 2021-12-03 PROCEDURE — 76536 US EXAM OF HEAD AND NECK: CPT

## 2021-12-10 DIAGNOSIS — C73 MALIGNANT NEOPLASM OF THYROID GLAND (HCC): Primary | ICD-10-CM

## 2021-12-21 ENCOUNTER — OFFICE VISIT (OUTPATIENT)
Dept: PAIN MEDICINE | Facility: CLINIC | Age: 76
End: 2021-12-21

## 2021-12-21 VITALS
HEIGHT: 67 IN | BODY MASS INDEX: 24.33 KG/M2 | RESPIRATION RATE: 16 BRPM | SYSTOLIC BLOOD PRESSURE: 140 MMHG | DIASTOLIC BLOOD PRESSURE: 73 MMHG | HEART RATE: 79 BPM | OXYGEN SATURATION: 97 % | WEIGHT: 155 LBS

## 2021-12-21 DIAGNOSIS — G89.4 CHRONIC PAIN SYNDROME: Primary | ICD-10-CM

## 2021-12-21 DIAGNOSIS — Z79.899 HIGH RISK MEDICATION USE: ICD-10-CM

## 2021-12-21 DIAGNOSIS — M25.50 POLYARTHRALGIA: ICD-10-CM

## 2021-12-21 DIAGNOSIS — M47.817 LUMBOSACRAL SPONDYLOSIS WITHOUT MYELOPATHY: ICD-10-CM

## 2021-12-21 DIAGNOSIS — M79.18 MYOFASCIAL PAIN SYNDROME: ICD-10-CM

## 2021-12-21 PROCEDURE — 99214 OFFICE O/P EST MOD 30 MIN: CPT | Performed by: ANESTHESIOLOGY

## 2021-12-21 RX ORDER — OXYCODONE AND ACETAMINOPHEN 7.5; 325 MG/1; MG/1
1 TABLET ORAL 3 TIMES DAILY PRN
Qty: 90 TABLET | Refills: 0 | Status: SHIPPED | OUTPATIENT
Start: 2021-12-21 | End: 2022-01-20 | Stop reason: SINTOL

## 2021-12-21 NOTE — PROGRESS NOTES
Subjective    CC joints pain left knee pain  Marilyn J Pumphrey is a 76 y.o. female with multiple comorbidities including DM, polyarthralgia from  inflammatory osteoarthritis, bilateral knee pain, status post left TKA , here for f/u.  States she has been noticing sore throat/choking every time she took her hydrocodone might be developing allergic reaction.      Chronic back pain radiating to bilateral hips worse with prolonged activity.  Chronic polyarthralgia/left knee pain,  and generalized myofascial pain interfering with daily activity, sleep,mood.  Considering right knee TKA.  Tried methotrexate and  Celebrex without relief.  Tried Cymbalta, sevella, tramadol without relief.  Seen Rheumatology. and ortho. considering right knee replecement.   Good releif with knee injections by ortho.      Utilizes hydrocodone 93152  3 times daily with good relief functional benefit denies side effects.    L-spine MRI 2021.  Multiple level degenerative disc disease and degenerative changes.  Small left paracentral disc protrusion at L3-L4 which appears to abut the exiting nerve root with mild encroachment on neural foramina on the left.    Pain Assessment   Location of Pain: Lower Back, R Hip, L Hip, L Leg, neck pain, joint  Description of Pain: Dull/Aching, Throbbing, Stabbing  Previous Pain Rating :8  Current Pain Ratin  Aggravating Factors: Activity  Alleviating Factors: Rest, Medication  PEG Assessment   What number best describes your pain on average in the past week?8  What number best describes how, during the past week, pain has interfered with your enjoyment of life?7  What number best describes how, during the past week, pain has interfered with your general activity? 10     The following portions of the patient's history were reviewed and updated as appropriate: allergies, current medications, past family history, past medical history, past social history, past surgical history and problem list.   has a  "past medical history of Anxiety, Arthritis, Bone pain, Cancer (HCC) (09/2016), Depression, Disease of thyroid gland, DM type 2 (diabetes mellitus, type 2) ([facility]), Fall, GERD (gastroesophageal reflux disease), Hypertension, Irregular heart beat, Knee pain, Leaky heart valve, Leg cramps, Low back pain, Osteoarthritis, PONV (postoperative nausea and vomiting), Sleep apnea, and UTI (urinary tract infection).   has a past surgical history that includes Tonsillectomy; Thyroid surgery; Oophorectomy (Bilateral, 2015); Hysterectomy (1993); Cholecystectomy (1974); Carpal tunnel release; vein ligation and stripping bilateral; and Total knee arthroplasty (Left, 10/14/2019).  Social History     Tobacco Use   • Smoking status: Never Smoker   • Smokeless tobacco: Never Used   Substance Use Topics   • Alcohol use: No     Review of Systems   Musculoskeletal: Positive for arthralgias, back pain, joint swelling, myalgias and neck pain.   All other systems reviewed and are negative.    Objective   Physical Exam   Constitutional: No distress.   Pulmonary/Chest: Effort normal.   Musculoskeletal:      Right knee: Tenderness found.      Left knee: Tenderness found.      Cervical back: She exhibits decreased range of motion and tenderness.      Lumbar back: She exhibits decreased range of motion and tenderness.      Comments: Lumbar loading positive, pain on extension of low back past 5 degrees.  TTP on the lumbar facets noted.  Leg raise left   Vitals reviewed.    /73   Pulse 79   Resp 16   Ht 170.2 cm (67\")   Wt 70.3 kg (155 lb)   SpO2 97%   BMI 24.28 kg/m²     PHQ 9 on chart  Opioid risk tool low risk    Assessment/Plan   Diagnoses and all orders for this visit:    1. Chronic pain syndrome (Primary)  -     oxyCODONE-acetaminophen (PERCOCET) 7.5-325 MG per tablet; Take 1 tablet by mouth 3 (Three) Times a Day As Needed for Severe Pain .  Dispense: 90 tablet; Refill: 0    2. Lumbosacral spondylosis without myelopathy    3. " Polyarthralgia    4. Myofascial pain syndrome    5. High risk medication use       Summary  76 y.o. female with multiple comorbidities including DM, polyarthralgia from  inflammatory osteoarthritis, knee pain S/P left TKA, here for f/u.  Chronic polyarthralgia/left knee pain,  and generalized myofascial pain interfering with daily activity, sleep,mood.      States she has been noticing sore throat/choking every time she took her hydrocodone might be developing allergic reaction.      We will stop hydrocodone and change to oxycodone 7.5/325 2-3 times as needed.  Dates she is not allergic to oxycodone but cannot tolerate a higher dose when she was given more than 10 mg.  He has taken low-dose with no issues.    Start oxycodone 7.5/325 2-3 times daily as needed for severe pain.  UDS and  Inspect reviewed.    Discussed risk of tolerance, dependence, respiratory depression, coma and death associated with use of oral opioids for treatment of chronic nonmalignant pain.      Discussed increased risk with combining opioid and benzo. On clorazepate prn anxiety.    RTC in 1 months

## 2022-01-07 RX ORDER — CALCITRIOL 0.25 UG/1
CAPSULE, LIQUID FILLED ORAL
Qty: 180 CAPSULE | Refills: 3 | Status: SHIPPED | OUTPATIENT
Start: 2022-01-07 | End: 2022-01-13 | Stop reason: SDUPTHER

## 2022-01-11 ENCOUNTER — HOSPITAL ENCOUNTER (EMERGENCY)
Facility: HOSPITAL | Age: 77
Discharge: LEFT BEFORE DISCHARGE | End: 2022-01-11

## 2022-01-11 PROCEDURE — 99211 OFF/OP EST MAY X REQ PHY/QHP: CPT

## 2022-01-11 NOTE — ED NOTES
Pt name called once for triage assessment with no answer.     Chinyere Dougherty, RN  01/11/22 1721

## 2022-01-11 NOTE — ED NOTES
Pt name called second time for triage completion with no answer.     Chinyere Dougherty, RN  01/11/22 [id number]

## 2022-01-13 RX ORDER — CALCITRIOL 0.25 UG/1
CAPSULE, LIQUID FILLED ORAL
Qty: 180 CAPSULE | Refills: 3 | Status: ON HOLD | OUTPATIENT
Start: 2022-01-13 | End: 2022-05-11

## 2022-01-20 ENCOUNTER — OFFICE VISIT (OUTPATIENT)
Dept: PAIN MEDICINE | Facility: CLINIC | Age: 77
End: 2022-01-20

## 2022-01-20 VITALS
DIASTOLIC BLOOD PRESSURE: 82 MMHG | HEART RATE: 84 BPM | BODY MASS INDEX: 24.33 KG/M2 | HEIGHT: 67 IN | RESPIRATION RATE: 16 BRPM | WEIGHT: 155 LBS | OXYGEN SATURATION: 99 % | SYSTOLIC BLOOD PRESSURE: 144 MMHG

## 2022-01-20 DIAGNOSIS — Z79.899 HIGH RISK MEDICATION USE: ICD-10-CM

## 2022-01-20 DIAGNOSIS — M79.18 MYOFASCIAL PAIN SYNDROME: ICD-10-CM

## 2022-01-20 DIAGNOSIS — M25.50 POLYARTHRALGIA: ICD-10-CM

## 2022-01-20 DIAGNOSIS — M47.817 LUMBOSACRAL SPONDYLOSIS WITHOUT MYELOPATHY: ICD-10-CM

## 2022-01-20 DIAGNOSIS — G89.4 CHRONIC PAIN SYNDROME: Primary | ICD-10-CM

## 2022-01-20 PROCEDURE — 99214 OFFICE O/P EST MOD 30 MIN: CPT | Performed by: ANESTHESIOLOGY

## 2022-01-20 RX ORDER — HYDROCODONE BITARTRATE AND ACETAMINOPHEN 10; 325 MG/1; MG/1
1 TABLET ORAL 3 TIMES DAILY PRN
Qty: 90 TABLET | Refills: 0 | Status: SHIPPED | OUTPATIENT
Start: 2022-02-19 | End: 2022-03-14 | Stop reason: SDUPTHER

## 2022-01-20 RX ORDER — HYDROCODONE BITARTRATE AND ACETAMINOPHEN 10; 325 MG/1; MG/1
1 TABLET ORAL 3 TIMES DAILY PRN
Qty: 90 TABLET | Refills: 0 | Status: SHIPPED | OUTPATIENT
Start: 2022-01-20 | End: 2022-03-14 | Stop reason: SDUPTHER

## 2022-01-20 NOTE — PROGRESS NOTES
Subjective    CC joints pain left knee pain  Marilyn J Pumphrey is a 76 y.o. female with multiple comorbidities including DM, polyarthralgia from  inflammatory osteoarthritis, bilateral knee pain, status post left TKA , here for f/u.  Tried oxycodone but could not tolerate due to GI intolerance.  Restarted hydrocodone without issues, denies choking throat symptoms she was experiencing before.   Chronic back pain radiating to bilateral hips worse with prolonged activity.  Chronic polyarthralgia/left knee pain,  and generalized myofascial pain interfering with daily activity, sleep,mood.  Considering right knee TKA.  Tried methotrexate and  Celebrex without relief.  Tried Cymbalta, sevella, tramadol without relief.  Seen Rheumatology. and ortho. considering right knee replecement.   Good releif with knee injections by ortho.      Utilizes hydrocodone 04124  3 times daily with good relief functional benefit denies side effects.    L-spine MRI 2021.  Multiple level degenerative disc disease and degenerative changes.  Small left paracentral disc protrusion at L3-L4 which appears to abut the exiting nerve root with mild encroachment on neural foramina on the left.    Pain Assessment   Location of Pain: Lower Back, R Hip, L Hip, L Leg, neck pain, joint  Description of Pain: Dull/Aching, Throbbing, Stabbing  Previous Pain Rating :8  Current Pain Ratin  Aggravating Factors: Activity  Alleviating Factors: Rest, Medication  PEG Assessment   What number best describes your pain on average in the past week?8  What number best describes how, during the past week, pain has interfered with your enjoyment of life?5  What number best describes how, during the past week, pain has interfered with your general activity? 8     The following portions of the patient's history were reviewed and updated as appropriate: allergies, current medications, past family history, past medical history, past social history, past surgical  "history and problem list.   has a past medical history of Anxiety, Arthritis, Bone pain, Cancer (HCC) (09/2016), Depression, Disease of thyroid gland, DM type 2 (diabetes mellitus, type 2) ([facility]), Fall, GERD (gastroesophageal reflux disease), Hypertension, Irregular heart beat, Knee pain, Leaky heart valve, Leg cramps, Low back pain, Osteoarthritis, PONV (postoperative nausea and vomiting), Sleep apnea, and UTI (urinary tract infection).   has a past surgical history that includes Tonsillectomy; Thyroid surgery; Oophorectomy (Bilateral, 2015); Hysterectomy (1993); Cholecystectomy (1974); Carpal tunnel release; vein ligation and stripping bilateral; and Total knee arthroplasty (Left, 10/14/2019).  Social History     Tobacco Use   • Smoking status: Never Smoker   • Smokeless tobacco: Never Used   Substance Use Topics   • Alcohol use: No     Review of Systems   Musculoskeletal: Positive for arthralgias, back pain, joint swelling, myalgias and neck pain.   All other systems reviewed and are negative.    Objective   Physical Exam   Constitutional: No distress.   Pulmonary/Chest: Effort normal.   Musculoskeletal:      Right knee: Tenderness found.      Left knee: Tenderness found.      Cervical back: She exhibits decreased range of motion and tenderness.      Lumbar back: She exhibits decreased range of motion and tenderness.      Comments: Lumbar loading positive, pain on extension of low back past 5 degrees.  TTP on the lumbar facets noted.  Leg raise left   Vitals reviewed.    /82   Pulse 84   Resp 16   Ht 170.2 cm (67\")   Wt 70.3 kg (155 lb)   SpO2 99%   BMI 24.28 kg/m²     PHQ 9 on chart  Opioid risk tool low risk    Assessment/Plan   Diagnoses and all orders for this visit:    1. Chronic pain syndrome (Primary)  -     HYDROcodone-acetaminophen (NORCO)  MG per tablet; Take 1 tablet by mouth 3 (Three) Times a Day As Needed for Severe Pain . DNF before 2/19/2022  Dispense: 90 tablet; Refill: 0  -     " HYDROcodone-acetaminophen (NORCO)  MG per tablet; Take 1 tablet by mouth 3 (Three) Times a Day As Needed for Severe Pain .  Dispense: 90 tablet; Refill: 0    2. Lumbosacral spondylosis without myelopathy    3. Polyarthralgia    4. Myofascial pain syndrome    5. High risk medication use       Summary  76 y.o. female with multiple comorbidities including DM, polyarthralgia from  inflammatory osteoarthritis, knee pain S/P left TKA, here for f/u.  Chronic polyarthralgia/left knee pain,  and generalized myofascial pain interfering with daily activity, sleep,mood.      Tried oxycodone but could not tolerate due to GI intolerance.  Restarted hydrocodone without issues, denies choking throat symptoms she was experiencing before.   Stop oxycodone.    Continue hydrocodone 10/325 2-3 times daily as needed for severe pain.  UDS and  Inspect reviewed.    Discussed risk of tolerance, dependence, respiratory depression, coma and death associated with use of oral opioids for treatment of chronic nonmalignant pain.      Discussed increased risk with combining opioid and benzo. On clorazepate prn anxiety.    RTC in 2 months

## 2022-02-18 ENCOUNTER — TRANSCRIBE ORDERS (OUTPATIENT)
Dept: ADMINISTRATIVE | Facility: HOSPITAL | Age: 77
End: 2022-02-18

## 2022-02-18 DIAGNOSIS — Z12.39 BREAST SCREENING: Primary | ICD-10-CM

## 2022-02-21 ENCOUNTER — TRANSCRIBE ORDERS (OUTPATIENT)
Dept: ADMINISTRATIVE | Facility: HOSPITAL | Age: 77
End: 2022-02-21

## 2022-02-21 DIAGNOSIS — Z12.31 ENCOUNTER FOR SCREENING MAMMOGRAM FOR MALIGNANT NEOPLASM OF BREAST: Primary | ICD-10-CM

## 2022-03-05 ENCOUNTER — HOSPITAL ENCOUNTER (OUTPATIENT)
Dept: MAMMOGRAPHY | Facility: HOSPITAL | Age: 77
Discharge: HOME | End: 2022-03-05
Admitting: NURSE PRACTITIONER

## 2022-03-05 DIAGNOSIS — Z12.39 BREAST SCREENING: ICD-10-CM

## 2022-03-05 PROCEDURE — 77067 SCR MAMMO BI INCL CAD: CPT

## 2022-03-05 PROCEDURE — 77063 BREAST TOMOSYNTHESIS BI: CPT

## 2022-03-07 ENCOUNTER — TRANSCRIBE ORDERS (OUTPATIENT)
Dept: ADMINISTRATIVE | Facility: HOSPITAL | Age: 77
End: 2022-03-07

## 2022-03-14 ENCOUNTER — OFFICE VISIT (OUTPATIENT)
Dept: PAIN MEDICINE | Facility: CLINIC | Age: 77
End: 2022-03-14

## 2022-03-14 VITALS
WEIGHT: 160 LBS | BODY MASS INDEX: 25.11 KG/M2 | SYSTOLIC BLOOD PRESSURE: 156 MMHG | HEART RATE: 74 BPM | HEIGHT: 67 IN | RESPIRATION RATE: 16 BRPM | OXYGEN SATURATION: 97 % | DIASTOLIC BLOOD PRESSURE: 75 MMHG

## 2022-03-14 DIAGNOSIS — G89.4 CHRONIC PAIN SYNDROME: Primary | ICD-10-CM

## 2022-03-14 DIAGNOSIS — M79.18 MYOFASCIAL PAIN SYNDROME: ICD-10-CM

## 2022-03-14 DIAGNOSIS — M47.817 LUMBOSACRAL SPONDYLOSIS WITHOUT MYELOPATHY: ICD-10-CM

## 2022-03-14 DIAGNOSIS — Z79.899 HIGH RISK MEDICATION USE: ICD-10-CM

## 2022-03-14 DIAGNOSIS — M25.50 POLYARTHRALGIA: ICD-10-CM

## 2022-03-14 PROCEDURE — 99214 OFFICE O/P EST MOD 30 MIN: CPT | Performed by: ANESTHESIOLOGY

## 2022-03-14 RX ORDER — HYDROCODONE BITARTRATE AND ACETAMINOPHEN 10; 325 MG/1; MG/1
1 TABLET ORAL 3 TIMES DAILY PRN
Qty: 90 TABLET | Refills: 0 | Status: SHIPPED | OUTPATIENT
Start: 2022-05-02 | End: 2022-06-09 | Stop reason: SDUPTHER

## 2022-03-14 RX ORDER — HYDROCODONE BITARTRATE AND ACETAMINOPHEN 10; 325 MG/1; MG/1
1 TABLET ORAL 3 TIMES DAILY PRN
Qty: 90 TABLET | Refills: 0 | Status: SHIPPED | OUTPATIENT
Start: 2022-04-03 | End: 2022-05-12 | Stop reason: HOSPADM

## 2022-03-15 NOTE — PROGRESS NOTES
Subjective    CC joints pain left knee pain  Marilyn J Pumphrey is a 76 y.o. female with multiple comorbidities including DM, polyarthralgia from  inflammatory osteoarthritis, bilateral knee pain, status post left TKA , here for f/u.  Denies any new complaints today, doing well with hydrocodone 1-2 as needed daily.  Chronic back pain radiating to bilateral hips worse with prolonged activity.  Chronic polyarthralgia/left knee pain,  and generalized myofascial pain interfering with daily activity, sleep,mood.  Considering right knee TKA.  Tried methotrexate and  Celebrex without relief.  Tried Cymbalta, sevella, tramadol without relief.  Seen Rheumatology. and ortho. considering right knee replecement.   Good releif with knee injections by ortho.      Utilizes hydrocodone 10/325  with good relief functional benefit denies side effects.    L-spine MRI 2021.  Multiple level degenerative disc disease and degenerative changes.  Small left paracentral disc protrusion at L3-L4 which appears to abut the exiting nerve root with mild encroachment on neural foramina on the left.    Pain Assessment   Location of Pain: Lower Back, R Hip, L Hip, L Leg, neck pain, joint  Description of Pain: Dull/Aching, Throbbing, Stabbing  Previous Pain Rating :5  Current Pain Ratin  Aggravating Factors: Activity  Alleviating Factors: Rest, Medication  PEG Assessment   What number best describes your pain on average in the past week?8  What number best describes how, during the past week, pain has interfered with your enjoyment of life?5  What number best describes how, during the past week, pain has interfered with your general activity? 8     The following portions of the patient's history were reviewed and updated as appropriate: allergies, current medications, past family history, past medical history, past social history, past surgical history and problem list.   has a past medical history of Anxiety, Arthritis, Bone pain, Cancer  "([facility]) (09/2016), Depression, Disease of thyroid gland, DM type 2 (diabetes mellitus, type 2) ([facility]), Fall, GERD (gastroesophageal reflux disease), Hypertension, Irregular heart beat, Knee pain, Leaky heart valve, Leg cramps, Low back pain, Osteoarthritis, PONV (postoperative nausea and vomiting), Sleep apnea, and UTI (urinary tract infection).   has a past surgical history that includes Tonsillectomy; Thyroid surgery; Oophorectomy (Bilateral, 2015); Hysterectomy (1993); Cholecystectomy (1974); Carpal tunnel release; vein ligation and stripping bilateral; and Total knee arthroplasty (Left, 10/14/2019).  Social History     Tobacco Use   • Smoking status: Never Smoker   • Smokeless tobacco: Never Used   Substance Use Topics   • Alcohol use: No     Review of Systems   Musculoskeletal: Positive for arthralgias, back pain, joint swelling, myalgias and neck pain.   All other systems reviewed and are negative.    Objective   Physical Exam   Constitutional: No distress.   Pulmonary/Chest: Effort normal.   Musculoskeletal:      Right knee: Tenderness found.      Left knee: Tenderness found.      Cervical back: She exhibits decreased range of motion and tenderness.      Lumbar back: She exhibits decreased range of motion and tenderness.      Comments: Lumbar loading positive, pain on extension of low back past 5 degrees.  TTP on the lumbar facets noted.  Leg raise left   Vitals reviewed.    /75   Pulse 74   Resp 16   Ht 170.2 cm (67\")   Wt 72.6 kg (160 lb)   SpO2 97%   BMI 25.06 kg/m²     PHQ 9 on chart  Opioid risk tool low risk    Assessment/Plan   Diagnoses and all orders for this visit:    1. Chronic pain syndrome (Primary)  -     HYDROcodone-acetaminophen (NORCO)  MG per tablet; Take 1 tablet by mouth 3 (Three) Times a Day As Needed for Severe Pain . DNF before 5/2/2022  Dispense: 90 tablet; Refill: 0  -     HYDROcodone-acetaminophen (NORCO)  MG per tablet; Take 1 tablet by mouth 3 (Three) Times a " Day As Needed for Severe Pain . DNF before 4/3/2022  Dispense: 90 tablet; Refill: 0    2. Lumbosacral spondylosis without myelopathy    3. Polyarthralgia    4. Myofascial pain syndrome    5. High risk medication use  -     Urine Drug Screen - Urine, Clean Catch; Future      Summary  76 y.o. female with multiple comorbidities including DM, polyarthralgia from  inflammatory osteoarthritis, knee pain S/P left TKA, here for f/u.  Chronic polyarthralgia/left knee pain,  and generalized myofascial pain interfering with daily activity, sleep,mood.      Denies any new complaints today, doing well with hydrocodone 1-2 as needed daily.  Denies any side effects.    Continue hydrocodone 10/325 2-3 times daily as needed for severe pain.  UDS and  Inspect reviewed.    Discussed risk of tolerance, dependence, respiratory depression, coma and death associated with use of oral opioids for treatment of chronic nonmalignant pain.      Discussed increased risk with combining opioid and benzo. On clorazepate prn anxiety.    RTC in 2 months

## 2022-03-16 ENCOUNTER — TRANSCRIBE ORDERS (OUTPATIENT)
Dept: ADMINISTRATIVE | Facility: HOSPITAL | Age: 77
End: 2022-03-16

## 2022-03-29 ENCOUNTER — TRANSCRIBE ORDERS (OUTPATIENT)
Dept: ADMINISTRATIVE | Facility: HOSPITAL | Age: 77
End: 2022-03-29

## 2022-03-29 DIAGNOSIS — R22.1 NECK NODULE: Primary | ICD-10-CM

## 2022-04-06 ENCOUNTER — HOSPITAL ENCOUNTER (OUTPATIENT)
Dept: CT IMAGING | Facility: HOSPITAL | Age: 77
Discharge: HOME | End: 2022-04-06
Admitting: NURSE PRACTITIONER

## 2022-04-06 DIAGNOSIS — R22.1 NECK NODULE: ICD-10-CM

## 2022-04-06 LAB
CREAT BLDA-MCNC: 0.7 MG/DL (ref 0.6–1.3)
EGFRCR SERPLBLD CKD-EPI 2021: 90.3 ML/MIN/1.73

## 2022-04-06 PROCEDURE — 0 IOPAMIDOL PER 1 ML: Performed by: NURSE PRACTITIONER

## 2022-04-06 PROCEDURE — 70491 CT SOFT TISSUE NECK W/DYE: CPT

## 2022-04-06 PROCEDURE — 82565 ASSAY OF CREATININE: CPT

## 2022-04-06 RX ADMIN — IOPAMIDOL 100 ML: 755 INJECTION, SOLUTION INTRAVENOUS at 18:08

## 2022-05-05 RX ORDER — DULAGLUTIDE 3 MG/.5ML
3 INJECTION, SOLUTION SUBCUTANEOUS WEEKLY
Qty: 12 PEN | Refills: 4 | Status: SHIPPED | OUTPATIENT
Start: 2022-05-05 | End: 2022-05-12 | Stop reason: HOSPADM

## 2022-05-11 ENCOUNTER — APPOINTMENT (OUTPATIENT)
Dept: CT IMAGING | Facility: HOSPITAL | Age: 77
End: 2022-05-11

## 2022-05-11 ENCOUNTER — APPOINTMENT (OUTPATIENT)
Dept: GENERAL RADIOLOGY | Facility: HOSPITAL | Age: 77
End: 2022-05-11

## 2022-05-11 ENCOUNTER — HOSPITAL ENCOUNTER (OUTPATIENT)
Facility: HOSPITAL | Age: 77
Setting detail: OBSERVATION
Discharge: HOME | End: 2022-05-12
Attending: EMERGENCY MEDICINE | Admitting: INTERNAL MEDICINE

## 2022-05-11 ENCOUNTER — HOSPITAL ENCOUNTER (EMERGENCY)
Facility: HOSPITAL | Age: 77
Discharge: LEFT BEFORE DISCHARGE | End: 2022-05-11

## 2022-05-11 VITALS
TEMPERATURE: 97.9 F | RESPIRATION RATE: 18 BRPM | SYSTOLIC BLOOD PRESSURE: 155 MMHG | DIASTOLIC BLOOD PRESSURE: 77 MMHG | BODY MASS INDEX: 24.33 KG/M2 | HEART RATE: 97 BPM | HEIGHT: 67 IN | OXYGEN SATURATION: 96 % | WEIGHT: 155 LBS

## 2022-05-11 DIAGNOSIS — I63.9 CEREBROVASCULAR ACCIDENT (CVA), UNSPECIFIED MECHANISM: Primary | ICD-10-CM

## 2022-05-11 DIAGNOSIS — K75.9 HEPATITIS: ICD-10-CM

## 2022-05-11 DIAGNOSIS — R74.8 ELEVATED LIVER ENZYMES: ICD-10-CM

## 2022-05-11 DIAGNOSIS — R53.1 WEAKNESS: ICD-10-CM

## 2022-05-11 PROBLEM — Z79.4 TYPE 2 DIABETES MELLITUS WITH HYPERGLYCEMIA, WITH LONG-TERM CURRENT USE OF INSULIN (HCC): Chronic | Status: ACTIVE | Noted: 2017-06-29

## 2022-05-11 PROBLEM — K21.9 GASTROESOPHAGEAL REFLUX DISEASE: Chronic | Status: ACTIVE | Noted: 2019-01-24

## 2022-05-11 PROBLEM — Z86.711 HISTORY OF PULMONARY EMBOLISM: Chronic | Status: ACTIVE | Noted: 2019-01-24

## 2022-05-11 PROBLEM — G25.81 RESTLESS LEGS SYNDROME (RLS): Chronic | Status: ACTIVE | Noted: 2019-01-24

## 2022-05-11 PROBLEM — C73 MALIGNANT NEOPLASM OF THYROID GLAND: Chronic | Status: ACTIVE | Noted: 2017-06-29

## 2022-05-11 PROBLEM — M19.90 OSTEOARTHRITIS: Chronic | Status: ACTIVE | Noted: 2018-04-25

## 2022-05-11 PROBLEM — M06.9 RHEUMATOID ARTHRITIS: Chronic | Status: ACTIVE | Noted: 2019-06-24

## 2022-05-11 PROBLEM — F32.A DEPRESSION: Chronic | Status: ACTIVE | Noted: 2019-08-08

## 2022-05-11 PROBLEM — R79.89 ABNORMAL LFTS: Chronic | Status: ACTIVE | Noted: 2019-10-19

## 2022-05-11 PROBLEM — I25.10 CAD (CORONARY ARTERY DISEASE): Chronic | Status: ACTIVE | Noted: 2019-01-24

## 2022-05-11 PROBLEM — R29.810 FACIAL DROOP: Status: ACTIVE | Noted: 2022-05-11

## 2022-05-11 PROBLEM — E89.0 HYPOTHYROIDISM, POSTSURGICAL: Chronic | Status: ACTIVE | Noted: 2017-08-11

## 2022-05-11 PROBLEM — G47.33 OSA (OBSTRUCTIVE SLEEP APNEA): Chronic | Status: ACTIVE | Noted: 2019-01-24

## 2022-05-11 PROBLEM — I10 HYPERTENSION, BENIGN: Chronic | Status: ACTIVE | Noted: 2017-06-29

## 2022-05-11 PROBLEM — E78.2 MIXED HYPERLIPIDEMIA: Chronic | Status: ACTIVE | Noted: 2017-06-29

## 2022-05-11 PROBLEM — E11.65 TYPE 2 DIABETES MELLITUS WITH HYPERGLYCEMIA, WITH LONG-TERM CURRENT USE OF INSULIN (HCC): Chronic | Status: ACTIVE | Noted: 2017-06-29

## 2022-05-11 LAB
ABO GROUP BLD: NORMAL
ALBUMIN SERPL-MCNC: 4.3 G/DL (ref 3.5–5.2)
ALBUMIN/GLOB SERPL: 1.3 G/DL
ALP SERPL-CCNC: 129 U/L (ref 39–117)
ALT SERPL W P-5'-P-CCNC: 184 U/L (ref 1–33)
AMYLASE SERPL-CCNC: 86 U/L (ref 28–100)
ANION GAP SERPL CALCULATED.3IONS-SCNC: 17 MMOL/L (ref 5–15)
APTT PPP: 29.6 SECONDS (ref 24–31)
AST SERPL-CCNC: 438 U/L (ref 1–32)
BASOPHILS # BLD AUTO: 0.1 10*3/MM3 (ref 0–0.2)
BASOPHILS NFR BLD AUTO: 1.2 % (ref 0–1.5)
BILIRUB SERPL-MCNC: 0.6 MG/DL (ref 0–1.2)
BLD GP AB SCN SERPL QL: NEGATIVE
BUN SERPL-MCNC: 13 MG/DL (ref 8–23)
BUN/CREAT SERPL: 15.1 (ref 7–25)
CALCIUM SPEC-SCNC: 9.9 MG/DL (ref 8.6–10.5)
CHLORIDE SERPL-SCNC: 97 MMOL/L (ref 98–107)
CHOLEST SERPL-MCNC: 120 MG/DL (ref 0–200)
CO2 SERPL-SCNC: 24 MMOL/L (ref 22–29)
CREAT SERPL-MCNC: 0.86 MG/DL (ref 0.57–1)
DEPRECATED RDW RBC AUTO: 47.7 FL (ref 37–54)
EGFRCR SERPLBLD CKD-EPI 2021: 70.6 ML/MIN/1.73
EOSINOPHIL # BLD AUTO: 0.1 10*3/MM3 (ref 0–0.4)
EOSINOPHIL NFR BLD AUTO: 0.9 % (ref 0.3–6.2)
ERYTHROCYTE [DISTWIDTH] IN BLOOD BY AUTOMATED COUNT: 17.6 % (ref 12.3–15.4)
GLOBULIN UR ELPH-MCNC: 3.4 GM/DL
GLUCOSE BLDC GLUCOMTR-MCNC: 138 MG/DL (ref 70–105)
GLUCOSE BLDC GLUCOMTR-MCNC: 98 MG/DL (ref 70–105)
GLUCOSE SERPL-MCNC: 134 MG/DL (ref 65–99)
HCT VFR BLD AUTO: 41.8 % (ref 34–46.6)
HDLC SERPL-MCNC: 44 MG/DL (ref 40–60)
HGB BLD-MCNC: 13.3 G/DL (ref 12–15.9)
HOLD SPECIMEN: NORMAL
INR PPP: 1.08 (ref 0.93–1.1)
LDLC SERPL CALC-MCNC: 55 MG/DL (ref 0–100)
LDLC/HDLC SERPL: 1.2 {RATIO}
LYMPHOCYTES # BLD AUTO: 2.6 10*3/MM3 (ref 0.7–3.1)
LYMPHOCYTES NFR BLD AUTO: 21.5 % (ref 19.6–45.3)
MCH RBC QN AUTO: 24.2 PG (ref 26.6–33)
MCHC RBC AUTO-ENTMCNC: 31.7 G/DL (ref 31.5–35.7)
MCV RBC AUTO: 76.5 FL (ref 79–97)
MONOCYTES # BLD AUTO: 0.9 10*3/MM3 (ref 0.1–0.9)
MONOCYTES NFR BLD AUTO: 7.6 % (ref 5–12)
NEUTROPHILS NFR BLD AUTO: 68.8 % (ref 42.7–76)
NEUTROPHILS NFR BLD AUTO: 8.3 10*3/MM3 (ref 1.7–7)
NRBC BLD AUTO-RTO: 0.1 /100 WBC (ref 0–0.2)
PLATELET # BLD AUTO: 224 10*3/MM3 (ref 140–450)
PMV BLD AUTO: 8.7 FL (ref 6–12)
POTASSIUM SERPL-SCNC: 3.8 MMOL/L (ref 3.5–5.2)
PROT SERPL-MCNC: 7.7 G/DL (ref 6–8.5)
PROTHROMBIN TIME: 11.1 SECONDS (ref 9.6–11.7)
QT INTERVAL: 380 MS
RBC # BLD AUTO: 5.47 10*6/MM3 (ref 3.77–5.28)
RH BLD: NEGATIVE
SARS-COV-2 RNA PNL SPEC NAA+PROBE: NOT DETECTED
SODIUM SERPL-SCNC: 138 MMOL/L (ref 136–145)
T&S EXPIRATION DATE: NORMAL
T4 FREE SERPL-MCNC: 1.44 NG/DL (ref 0.93–1.7)
TRIGL SERPL-MCNC: 117 MG/DL (ref 0–150)
TROPONIN T SERPL-MCNC: <0.01 NG/ML (ref 0–0.03)
TSH SERPL DL<=0.05 MIU/L-ACNC: 0.71 UIU/ML (ref 0.27–4.2)
VLDLC SERPL-MCNC: 21 MG/DL (ref 5–40)
WBC NRBC COR # BLD: 12.1 10*3/MM3 (ref 3.4–10.8)

## 2022-05-11 PROCEDURE — 80074 ACUTE HEPATITIS PANEL: CPT | Performed by: NURSE PRACTITIONER

## 2022-05-11 PROCEDURE — 80061 LIPID PANEL: CPT | Performed by: NURSE PRACTITIONER

## 2022-05-11 PROCEDURE — 36415 COLL VENOUS BLD VENIPUNCTURE: CPT | Performed by: EMERGENCY MEDICINE

## 2022-05-11 PROCEDURE — 82150 ASSAY OF AMYLASE: CPT | Performed by: NURSE PRACTITIONER

## 2022-05-11 PROCEDURE — 99285 EMERGENCY DEPT VISIT HI MDM: CPT

## 2022-05-11 PROCEDURE — 84443 ASSAY THYROID STIM HORMONE: CPT | Performed by: NURSE PRACTITIONER

## 2022-05-11 PROCEDURE — G0378 HOSPITAL OBSERVATION PER HR: HCPCS

## 2022-05-11 PROCEDURE — 87635 SARS-COV-2 COVID-19 AMP PRB: CPT | Performed by: FAMILY MEDICINE

## 2022-05-11 PROCEDURE — 85610 PROTHROMBIN TIME: CPT | Performed by: EMERGENCY MEDICINE

## 2022-05-11 PROCEDURE — 86900 BLOOD TYPING SEROLOGIC ABO: CPT

## 2022-05-11 PROCEDURE — 82962 GLUCOSE BLOOD TEST: CPT

## 2022-05-11 PROCEDURE — 99211 OFF/OP EST MAY X REQ PHY/QHP: CPT

## 2022-05-11 PROCEDURE — 84481 FREE ASSAY (FT-3): CPT | Performed by: NURSE PRACTITIONER

## 2022-05-11 PROCEDURE — 83036 HEMOGLOBIN GLYCOSYLATED A1C: CPT | Performed by: NURSE PRACTITIONER

## 2022-05-11 PROCEDURE — 70498 CT ANGIOGRAPHY NECK: CPT

## 2022-05-11 PROCEDURE — 93005 ELECTROCARDIOGRAM TRACING: CPT

## 2022-05-11 PROCEDURE — 86850 RBC ANTIBODY SCREEN: CPT | Performed by: EMERGENCY MEDICINE

## 2022-05-11 PROCEDURE — 85730 THROMBOPLASTIN TIME PARTIAL: CPT | Performed by: EMERGENCY MEDICINE

## 2022-05-11 PROCEDURE — 80053 COMPREHEN METABOLIC PANEL: CPT | Performed by: EMERGENCY MEDICINE

## 2022-05-11 PROCEDURE — 93005 ELECTROCARDIOGRAM TRACING: CPT | Performed by: EMERGENCY MEDICINE

## 2022-05-11 PROCEDURE — 0 IOPAMIDOL PER 1 ML: Performed by: EMERGENCY MEDICINE

## 2022-05-11 PROCEDURE — 99219 PR INITIAL OBSERVATION CARE/DAY 50 MINUTES: CPT | Performed by: NURSE PRACTITIONER

## 2022-05-11 PROCEDURE — 86901 BLOOD TYPING SEROLOGIC RH(D): CPT

## 2022-05-11 PROCEDURE — 84439 ASSAY OF FREE THYROXINE: CPT | Performed by: NURSE PRACTITIONER

## 2022-05-11 PROCEDURE — 84484 ASSAY OF TROPONIN QUANT: CPT | Performed by: EMERGENCY MEDICINE

## 2022-05-11 PROCEDURE — 86900 BLOOD TYPING SEROLOGIC ABO: CPT | Performed by: EMERGENCY MEDICINE

## 2022-05-11 PROCEDURE — 74176 CT ABD & PELVIS W/O CONTRAST: CPT

## 2022-05-11 PROCEDURE — 70496 CT ANGIOGRAPHY HEAD: CPT

## 2022-05-11 PROCEDURE — C9803 HOPD COVID-19 SPEC COLLECT: HCPCS

## 2022-05-11 PROCEDURE — 85025 COMPLETE CBC W/AUTO DIFF WBC: CPT | Performed by: EMERGENCY MEDICINE

## 2022-05-11 PROCEDURE — 71045 X-RAY EXAM CHEST 1 VIEW: CPT

## 2022-05-11 PROCEDURE — 86901 BLOOD TYPING SEROLOGIC RH(D): CPT | Performed by: EMERGENCY MEDICINE

## 2022-05-11 PROCEDURE — 70450 CT HEAD/BRAIN W/O DYE: CPT

## 2022-05-11 RX ORDER — INSULIN LISPRO 100 [IU]/ML
0-14 INJECTION, SOLUTION INTRAVENOUS; SUBCUTANEOUS AS NEEDED
Status: DISCONTINUED | OUTPATIENT
Start: 2022-05-11 | End: 2022-05-12 | Stop reason: HOSPADM

## 2022-05-11 RX ORDER — BISACODYL 10 MG
10 SUPPOSITORY, RECTAL RECTAL DAILY PRN
Status: DISCONTINUED | OUTPATIENT
Start: 2022-05-11 | End: 2022-05-12 | Stop reason: HOSPADM

## 2022-05-11 RX ORDER — INSULIN LISPRO 100 [IU]/ML
0-14 INJECTION, SOLUTION INTRAVENOUS; SUBCUTANEOUS
Status: DISCONTINUED | OUTPATIENT
Start: 2022-05-12 | End: 2022-05-12 | Stop reason: HOSPADM

## 2022-05-11 RX ORDER — POLYETHYLENE GLYCOL 3350 17 G/17G
17 POWDER, FOR SOLUTION ORAL DAILY PRN
Status: DISCONTINUED | OUTPATIENT
Start: 2022-05-11 | End: 2022-05-12 | Stop reason: HOSPADM

## 2022-05-11 RX ORDER — ASPIRIN 300 MG/1
300 SUPPOSITORY RECTAL DAILY
Status: DISCONTINUED | OUTPATIENT
Start: 2022-05-12 | End: 2022-05-12

## 2022-05-11 RX ORDER — AMOXICILLIN 250 MG
2 CAPSULE ORAL 2 TIMES DAILY
Status: DISCONTINUED | OUTPATIENT
Start: 2022-05-11 | End: 2022-05-12 | Stop reason: HOSPADM

## 2022-05-11 RX ORDER — INSULIN LISPRO 100 [IU]/ML
10 INJECTION, SOLUTION INTRAVENOUS; SUBCUTANEOUS
Status: DISCONTINUED | OUTPATIENT
Start: 2022-05-12 | End: 2022-05-12 | Stop reason: HOSPADM

## 2022-05-11 RX ORDER — ATORVASTATIN CALCIUM 40 MG/1
80 TABLET, FILM COATED ORAL NIGHTLY
Status: DISCONTINUED | OUTPATIENT
Start: 2022-05-11 | End: 2022-05-11

## 2022-05-11 RX ORDER — ASPIRIN 300 MG/1
300 SUPPOSITORY RECTAL ONCE
Status: COMPLETED | OUTPATIENT
Start: 2022-05-11 | End: 2022-05-11

## 2022-05-11 RX ORDER — DEXTROSE MONOHYDRATE 25 G/50ML
25 INJECTION, SOLUTION INTRAVENOUS
Status: DISCONTINUED | OUTPATIENT
Start: 2022-05-11 | End: 2022-05-12 | Stop reason: HOSPADM

## 2022-05-11 RX ORDER — SODIUM CHLORIDE 0.9 % (FLUSH) 0.9 %
10 SYRINGE (ML) INJECTION AS NEEDED
Status: DISCONTINUED | OUTPATIENT
Start: 2022-05-11 | End: 2022-05-12 | Stop reason: HOSPADM

## 2022-05-11 RX ORDER — NICOTINE POLACRILEX 4 MG
15 LOZENGE BUCCAL
Status: DISCONTINUED | OUTPATIENT
Start: 2022-05-11 | End: 2022-05-12 | Stop reason: HOSPADM

## 2022-05-11 RX ORDER — OLANZAPINE 10 MG/2ML
1 INJECTION, POWDER, LYOPHILIZED, FOR SOLUTION INTRAMUSCULAR AS NEEDED
Status: DISCONTINUED | OUTPATIENT
Start: 2022-05-11 | End: 2022-05-12 | Stop reason: HOSPADM

## 2022-05-11 RX ORDER — CALCITRIOL 0.25 UG/1
0.25 CAPSULE, LIQUID FILLED ORAL 2 TIMES DAILY
COMMUNITY
End: 2022-05-12 | Stop reason: HOSPADM

## 2022-05-11 RX ORDER — BISACODYL 5 MG/1
5 TABLET, DELAYED RELEASE ORAL DAILY PRN
Status: DISCONTINUED | OUTPATIENT
Start: 2022-05-11 | End: 2022-05-12 | Stop reason: HOSPADM

## 2022-05-11 RX ADMIN — ASPIRIN 300 MG: 300 SUPPOSITORY RECTAL at 20:05

## 2022-05-11 RX ADMIN — IOPAMIDOL 100 ML: 755 INJECTION, SOLUTION INTRAVENOUS at 17:25

## 2022-05-11 NOTE — H&P
[facility] Medicine Services      Patient Name: Marilyn J Pumphrey  : 1945  MRN: [id number]  Primary Care Physician:  Suma Campbell APRN  Date of admission: 2022      Subjective      Chief Complaint: Left facial drooping and weakness     History of Present Illness: Marilyn J Pumphrey is a 76 y.o. female w/PMH of 2 diabetes mellitus, RA, RLS, hyperparathyroidism, osteoarthritis, XAIV, HLD, thyroid cancer, hypothyroidism, history of PE ED, GERD, pression, chronic pain, CAD, who presented to [facility] on 2022 complaining of several month history of increasing weakness, was seen at PCP earlier today where she was advised to report to the emergency department due to noted left facial droop.  Patient reports that she has had increasing weakness over the last several days that she describes as overwhelming fatigue that is interfering with her ability to participate in her normal daily activities.  She was noted to have left facial droop in the emergency department earlier today, she states the left side of her face was drawn pretty significantly, and was complicated by discomfort in her left jaw and neck.  She does report previous Bell's palsy with resolved symptoms with no lasting effects.  Patient also reports hyperparathyroidism, and hypothyroidism both secondary to malignant neoplasm of thyroid gland, her replacement is managed by endocrine Dr. Donovan, she was very concerned that her calcium levels was possibly elevated causing her facial symptoms.  She denies any other associated neurological deficits, her speech is clear she is alert and oriented, there is no noted unilateral weakness, but does have generalized weakness that she reports over the last several days has required assistance from her family to even ambulate.  Patient denies any acute distress, chest pain, shortness of breath, palpitations, syncope, fever, chills, vomiting, diarrhea or   complaints.  Patient does report chronic constipation, and some intermittent nausea over the last several weeks with abdominal discomfort.    Initial evaluation in the emergency department showed   elevated , , and alkaline phosphatase, 129 in the emergency department she does report ongoing acetaminophen use as well as rosuvastatin daily for HLD.  CT scan was completed showing no acute abnormalities in the abdomen or pelvis there is a indeterminate 1.2 cm right lower pole renal lesion immediately adjacent to the cyst.  There is a large stool in the colon suggestive of constipation.  Which the patient reports chronic ongoing constipation.  Elevated glucose at 134, increased anion gap at 17, her TSH was normal at 0.712 which is increased from previous levels of 0.35 to in November 2021, evaded WBCs 12.10.  Respiratory panel collected negative for COVID-19 chest x-ray completed shows no acute cardiopulmonary processes.  CT of the head was pleated showing major intracranial vasculature no large vessel occlusions, patent bilateral carotid arteries, no significant stenosis, patent bilateral vertebral arteries, asymmetric soft tissue fullness at the base of the tongue on the left which was described in the prior's neck CT from April 6, 2022.  EKG completed shows sinus rhythm heart rate 87 with AZ interval of 242.    Patient was administered 300 mg ASA while in the emergency department.    Patient will be admitted to the hospitalist group for further evaluation and treatment of left-sided facial droop, elevated LFTs, and other acute and or chronic conditions with neurology following.    Review of Systems   Constitutional: Positive for malaise/fatigue.   Eyes: Negative.    Cardiovascular: Positive for leg swelling.   Respiratory: Negative.    Endocrine: Negative.    Hematologic/Lymphatic: Negative.    Skin: Negative.    Musculoskeletal: Positive for joint swelling and myalgias.        Knee swelling/pain  secondary to osteoarthritis   Gastrointestinal: Positive for constipation.   Genitourinary: Negative.    Neurological: Positive for headaches, numbness and paresthesias.        Reports fever prior to having CT of the head completed, that has since resolved without treatment.   Psychiatric/Behavioral: Negative.    Allergic/Immunologic: Negative.    All other systems reviewed and are negative.       Personal History     Past Medical History:   Diagnosis Date   • Anxiety    • Arthritis    • Bone pain    • Cancer (HCC) 09/2016    THYROID   • Depression    • Disease of thyroid gland    • DM type 2 (diabetes mellitus, type 2) (HCC)     TYPE 2   • Fall     2/2021 FELL ON ICE   • GERD (gastroesophageal reflux disease)    • Hypertension    • Irregular heart beat    • Knee pain    • Leaky heart valve     11/2020   • Leg cramps    • Low back pain    • Osteoarthritis    • PONV (postoperative nausea and vomiting)     DOES WELL WITH PROPOFOL   • Sleep apnea     C-PAP IN USE   • UTI (urinary tract infection)        Past Surgical History:   Procedure Laterality Date   • CARPAL TUNNEL RELEASE     • CHOLECYSTECTOMY  1974   • HYSTERECTOMY  1993    partial abdominal hysterectomy   • OOPHORECTOMY Bilateral 2015   • THYROID SURGERY      2 thyroid surgery   • TONSILLECTOMY     • TOTAL KNEE ARTHROPLASTY Left 10/14/2019    Procedure: TOTAL KNEE ARTHROPLASTY;  Surgeon: Sanjay Collado MD;  [facility];  Service: Orthopedics   • VEIN LIGATION AND STRIPPING BILATERAL         Family History: family history includes Cancer in her brother and sister; Diabetes in her brother and father; Heart disease in her brother and father; Hyperlipidemia in her father; Hypertension in her father; Thyroid disease in her father and sister.   Social History:  reports that she has never smoked. She has never used smokeless tobacco. She reports that she does not drink alcohol and does not use drugs.    Home Medications:  Prior to Admission Medications      Prescriptions Last Dose Informant Patient Reported? Taking?    amLODIPine-benazepril (LOTREL 5-20) 5-20 MG per capsule  Self Yes No    Take 1 capsule by mouth 2 (two) times a day.    aspirin 81 MG tablet   No No    Take 1 tablet by mouth Daily.    Patient taking differently:  Take 325 mg by mouth Daily.    baclofen (LIORESAL) 20 MG tablet   Yes No    Take 20 mg by mouth 4 (Four) Times a Day.    calcitriol (ROCALTROL) 0.25 MCG capsule   No No    Take 1 capsule by mouth twice daily    CALCIUM CARBONATE-VIT D-MIN PO   Yes No    Take 1,200 mg by mouth 2 (Two) Times a Day.    clorazepate (TRANXENE) 7.5 MG tablet   Yes No    Take 7.5 mg by mouth 2 (Two) Times a Day.    diclofenac (VOLTAREN) 1 % gel gel   Yes No    Apply 4 g topically to the appropriate area as directed 4 (Four) Times a Day As Needed.    Dulaglutide (Trulicity) 3 MG/0.5ML solution pen-injector   No No    Inject 0.5 mL under the skin into the appropriate area as directed 1 (One) Time Per Week.    empagliflozin (Jardiance) 25 MG tablet tablet   No No    Take 1 tablet by mouth Daily.    escitalopram (LEXAPRO) 10 MG tablet   Yes No    Take 10 mg by mouth Every Evening.    esomeprazole (nexIUM) 40 MG capsule   Yes No    Take 40 mg by mouth Every Evening.    HYDROcodone-acetaminophen (NORCO)  MG per tablet   No No    Take 1 tablet by mouth 3 (Three) Times a Day As Needed for Severe Pain . DNF before 5/2/2022    HYDROcodone-acetaminophen (NORCO)  MG per tablet   No No    Take 1 tablet by mouth 3 (Three) Times a Day As Needed for Severe Pain . DNF before 4/3/2022    insulin aspart (NOVOLOG FLEXPEN) 100 UNIT/ML solution pen-injector sc pen   No No    Inject 10 units before each meal    Patient taking differently:  10 Units. Inject 10 units before each meal    insulin glargine (LANTUS, SEMGLEE) 100 UNIT/ML injection   No No    Inject 30 Units under the skin into the appropriate area as directed Daily.    levothyroxine (SYNTHROID, LEVOTHROID) 100 MCG  tablet   No No    Take 1 tablet by mouth Daily.    magnesium oxide (MAG-OX) 400 tablet tablet   Yes No    Take 400 mg by mouth 2 (Two) Times a Day.    metFORMIN (GLUCOPHAGE) 1000 MG tablet   No No    Take 1 tablet by mouth 2 (Two) Times a Day With Meals.    metoprolol tartrate (LOPRESSOR) 100 MG tablet   Yes No    Take 100 mg by mouth 2 (Two) Times a Day.    promethazine (PHENERGAN) 25 MG tablet   Yes No    Take 1 tablet by mouth As Needed.    Rosuvastatin Calcium 40 MG capsule sprinkle   Yes No    Take 20 mg by mouth Every Night.    sucralfate (CARAFATE) 1 g tablet   Yes No    Take 1 g by mouth 4 (Four) Times a Day.    Sulfamethoxazole-Trimethoprim (SMZ-TMP DS PO)   Yes No    Take  by mouth.    vitamin D (ERGOCALCIFEROL) 1.25 MG (94821 UT) capsule capsule   Yes No    Take 50,000 Units by mouth 1 (One) Time Per Week.            Allergies:  Allergies   Allergen Reactions   • Codeine Shortness Of Breath   • Oxycodone Hallucinations     Terrible nausea and vomiting       Objective      Vitals:   Temp:  [97.9 °F (36.6 °C)-98.1 °F (36.7 °C)] 98.1 °F (36.7 °C)  Heart Rate:  [81-97] 83  Resp:  [18-20] 20  BP: (142-159)/(73-88) 142/74    Physical Exam  Vitals and nursing note reviewed.   Constitutional:       Appearance: Normal appearance. She is obese.   HENT:      Head: Normocephalic and atraumatic.      Right Ear: External ear normal.      Left Ear: External ear normal.      Nose: Nose normal.      Mouth/Throat:      Mouth: Mucous membranes are dry.   Eyes:      Conjunctiva/sclera: Conjunctivae normal.      Pupils: Pupils are equal, round, and reactive to light.   Cardiovascular:      Rate and Rhythm: Normal rate and regular rhythm.      Pulses:           Carotid pulses are 2+ on the right side and 2+ on the left side.       Radial pulses are 2+ on the right side and 2+ on the left side.        Dorsalis pedis pulses are 1+ on the right side and 1+ on the left side.        Posterior tibial pulses are 1+ on the right side  and 1+ on the left side.      Heart sounds: Normal heart sounds.   Pulmonary:      Effort: Pulmonary effort is normal.      Breath sounds: Normal breath sounds.   Abdominal:      General: Bowel sounds are normal.      Palpations: Abdomen is soft.   Musculoskeletal:         General: Normal range of motion.      Cervical back: Normal range of motion and neck supple.      Right lower leg: No edema.      Left lower leg: No edema.   Skin:     General: Skin is warm and dry.      Capillary Refill: Capillary refill takes less than 2 seconds.   Neurological:      Mental Status: She is alert and oriented to person, place, and time.      Cranial Nerves: Facial asymmetry present.      Comments: Slight left facial droop still noted   Psychiatric:         Mood and Affect: Mood normal.         Behavior: Behavior normal.         Judgment: Judgment normal.         Result Review    Result Review:  I have personally reviewed the results from the time of this admission to 5/11/2022 19:48 EDT and agree with these findings:  [x]  Laboratory  [x]  Microbiology  [x]  Radiology  [x]  EKG/Telemetry   []  Cardiology/Vascular   []  Pathology  []  Old records  []  Other:  Most notable findings include:   Last echocardiogram completed in July 2018 showed normal LV systolic function EF of 65%, there was a diastolic LV dysfunction noted with RV function within normal limits, left atrium enlargement, there was mild thickening and sclerosis of the mitral, aortic, and tricuspid leaflets.  There was trace mitral and tricuspid insufficiency noted but no aortic stenosis appreciated.  Last stress test completed November 4, 2020 showed LVEF 50%, myocardial perfusion imaging indicated normal myocardial perfusion study with no evidence of ischemia.  Assessment & Plan        Active Hospital Problems:  Active Hospital Problems    Diagnosis    • Facial droop      Plan:   Facial droop  Weakness  Previous Bell's palsy  Symptoms resolving  NIHSS as  needed  Neurochecks per policy  Safety precautions  CTA, head and neck negative  MRI of the head ordered for a.m.  Echocardiogram ordered  Neurology consult ordered  Patient is not candidate for tPA-symptoms ongoing for multiple days to weeks  ASA ordered  Patient is on home statin noted elevated LFTs we will hold for now consider risk versus benefit of continuation  Cardene as needed gtt. for blood pressure control  Stroke coordinator consulted  N.p.o. for now  SLP, OT, PT ordered per stroke protocol  Inpatient case management  consulted per stroke protocol  Monitor CBC, CMP  Continuous cardiac monitoring pulse oximetry  Supplemental oxygen as needed for hypoxia/respiratory distress maintain oxygen saturation greater than 90%  Strict I's and O's  Daily weights  Vital signs per policy  Avoid all over sedating medications    Abnormal LFTs  Acute  Patient on rosuvastatin Norco and reports OTC acetaminophen use   Avoid hepatotoxic medications  Check hepatitis panel  CT abdomen pelvis completed and reviewed  Check pancreatic enzymes    Type 2 diabetes mellitus  Check hemoglobin A1c  Last A1c 7.9 on 7-7 2021  Accu-Cheks AC at bedtime  Continue home insulin regimen  Hold home metformin, Trulicity, Jardiance-risk of medication interaction during hospitalization  SSI ordered hypoglycemia  Hypoglycemia protocol ordered    Benign  hypertension  Mixed hyperlipidemia  Chronic  Lipid panel  Patient on rosuvastatin at home-hold for now secondary to elevated LFTs resume when clinically appropriate  Consider heart healthy no concentrated sweets diet when patient cleared for diet  Vital signs per policy  Continue home amlodipine- benazepril, metoprolol tartrate      Coronary artery disease  Chronic  Patient free of chest pain at time of admission  Continue home ASA, perindopril, metoprolol tartrate, pending home med rec verification  As needed nitro for chest pain per policy    Hypothyroidism,  postsurgical  Hypoparathyroidism  Chronic  Check TSH, T3, T4  Continue home levothyroxine  Patient followed by  outpatient  Consider consulting endocrinology if clinically indicated  Continue  home calcitriol, and calcium supplements pending home med rec verification    History of pulmonary embolism  Chronic  On ASA no other antiplatelet or blood thinner at this time.    Gastroesophageal reflux disease  Chronic  Continue home Nexium, and Carafate pending home med rec verification    Depression/anxiety  Chronic/stable  Continue home clorazepate, and lexapro     XAVI (obstructive sleep apnea)  Chronic patient not on CPAP at home  Continuous pulse oximetry  Supplemental oxygen as needed for hypoxia/respiratory distress maintain oxygen saturation greater than 90%.    Rheumatoid arthritis   Osteoarthritis  Chronic  Patient is followed by pain management Dr Wells patient  Continue home Norco, baclofen, topical Voltaren pending med rec verification    Patient's home medication list has not been verified we will need further evaluation pending completion.      DVT prophylaxis:  Mechanical DVT prophylaxis orders are present.    CODE STATUS:    Code Status (Patient has no pulse and is not breathing): CPR (Attempt to Resuscitate)  Medical Interventions (Patient has pulse or is breathing): Full Support    Admission Status:  I believe this patient meets observation  status.    I discussed the patient's findings and my recommendations with patient.    This patient has been examined wearing appropriate Personal Protective Equipment . 05/11/22      Signature: Electronically signed by LYUDMILA Tavares, 05/11/22, 7:50 PM EDT.

## 2022-05-11 NOTE — ED PROVIDER NOTES
Subjective   Chief complaint: Patient is a pleasant 76-year-old.  She came in today because for the last few weeks she has had a generalized weakness.  She has been shaky.  She states that Sunday on Mother's Day she went [place] the zoo and had to go home and she developed perioral numbness.  She has had electrolyte abnormalities in the past and she was concerned that maybe this was the case today.  No leg or arm numbness or weakness.    Context:    Duration: Facial droop 30 minutes prior to arrival.  4:30 PM.  The rest of symptoms have been progressive over the last couple of weeks.    Timing:    Severity: No pain but symptoms are becoming more severe.  Debilitating to her daily activities.    Associated Symptoms:        PCP:  LMP:          Review of Systems   Constitutional: Positive for fatigue.   HENT: Negative.    Eyes: Positive for visual disturbance.   Respiratory: Negative.    Cardiovascular: Negative.    Gastrointestinal: Negative for abdominal pain.   Genitourinary: Negative.    Musculoskeletal: Negative.    Skin: Negative.    Neurological: Positive for syncope, facial asymmetry, weakness and numbness.   Psychiatric/Behavioral: Negative for confusion.   All other systems reviewed and are negative.      Past Medical History:   Diagnosis Date   • Anxiety    • Arthritis    • Bone pain    • Cancer (HCC) 09/2016    THYROID   • Depression    • Disease of thyroid gland    • DM type 2 (diabetes mellitus, type 2) ([facility])     TYPE 2   • Fall     2/2021 FELL ON ICE   • GERD (gastroesophageal reflux disease)    • Hypertension    • Irregular heart beat    • Knee pain    • Leaky heart valve     11/2020   • Leg cramps    • Low back pain    • Osteoarthritis    • PONV (postoperative nausea and vomiting)     DOES WELL WITH PROPOFOL   • Sleep apnea     C-PAP IN USE   • UTI (urinary tract infection)        Allergies   Allergen Reactions   • Codeine Shortness Of Breath   • Oxycodone Hallucinations     Terrible nausea and vomiting        Past Surgical History:   Procedure Laterality Date   • CARPAL TUNNEL RELEASE     • CHOLECYSTECTOMY  1974   • HYSTERECTOMY  1993    partial abdominal hysterectomy   • OOPHORECTOMY Bilateral 2015   • THYROID SURGERY      2 thyroid surgery   • TONSILLECTOMY     • TOTAL KNEE ARTHROPLASTY Left 10/14/2019    Procedure: TOTAL KNEE ARTHROPLASTY;  Surgeon: Sanjay Collado MD;  Location: [facility] OR;  Service: Orthopedics   • VEIN LIGATION AND STRIPPING BILATERAL         Family History   Problem Relation Age of Onset   • Diabetes Father    • Heart disease Father    • Hypertension Father    • Hyperlipidemia Father    • Thyroid disease Father    • Cancer Sister         lung cancer   • Thyroid disease Sister    • Diabetes Brother    • Heart disease Brother    • Cancer Brother         lung cancer       Social History     Socioeconomic History   • Marital status:    Tobacco Use   • Smoking status: Never Smoker   • Smokeless tobacco: Never Used   Vaping Use   • Vaping Use: Never used   Substance and Sexual Activity   • Alcohol use: No   • Drug use: No   • Sexual activity: Defer           Objective   Physical Exam  Vitals and nursing note reviewed.   HENT:      Head: Normocephalic and atraumatic.   Eyes:      Extraocular Movements: Extraocular movements intact.      Pupils: Pupils are equal, round, and reactive to light.   Cardiovascular:      Rate and Rhythm: Normal rate and regular rhythm.      Pulses: Normal pulses.   Pulmonary:      Effort: Pulmonary effort is normal.      Breath sounds: Normal breath sounds.   Abdominal:      Tenderness: There is no abdominal tenderness.   Musculoskeletal:         General: No swelling.      Cervical back: Normal range of motion.   Skin:     General: Skin is warm and dry.      Capillary Refill: Capillary refill takes less than 2 seconds.   Neurological:      Mental Status: She is alert.      Cranial Nerves: Cranial nerve deficit present.      Sensory: Sensory deficit present.       "Comments: Patient with NIH of 4.  Patient has drift left lower extremity some numbness facial droop.   Psychiatric:         Thought Content: Thought content normal.      Comments: Anxious           Procedures           ED Course  ED Course as of 05/12/22 0107   Wed May 11, 2022   1729 Originally time of onset half hour prior to arrival.  However after further discussing with the patient she really for the last 3 days has not been at her baseline.  She states her kids are having to help her walk around.  She feels \"wobbly on my feet\".  I am concerned that she has had neuro symptoms for the last 3 days that has been persistent with this being a progression.  I discussed with Dr. Sánchez.  He agrees that we really do not have a last known well.  We need to hold and not give tPA at this point. [LH]   1948 ABO Type: O [MS]      ED Course User Index  [LH] Marciano Kaye DO  [MS] Shanell Abraham, Dominic            Results for orders placed or performed during the hospital encounter of 05/11/22   Comprehensive Metabolic Panel    Specimen: Blood   Result Value Ref Range    Glucose 134 (H) 65 - 99 mg/dL    BUN 13 8 - 23 mg/dL    Creatinine 0.86 0.57 - 1.00 mg/dL    Sodium 138 136 - 145 mmol/L    Potassium 3.8 3.5 - 5.2 mmol/L    Chloride 97 (L) 98 - 107 mmol/L    CO2 24.0 22.0 - 29.0 mmol/L    Calcium 9.9 8.6 - 10.5 mg/dL    Total Protein 7.7 6.0 - 8.5 g/dL    Albumin 4.30 3.50 - 5.20 g/dL    ALT (SGPT) 184 (H) 1 - 33 U/L    AST (SGOT) 438 (H) 1 - 32 U/L    Alkaline Phosphatase 129 (H) 39 - 117 U/L    Total Bilirubin 0.6 0.0 - 1.2 mg/dL    Globulin 3.4 gm/dL    A/G Ratio 1.3 g/dL    BUN/Creatinine Ratio 15.1 7.0 - 25.0    Anion Gap 17.0 (H) 5.0 - 15.0 mmol/L    eGFR 70.6 >60.0 mL/min/1.73   Protime-INR    Specimen: Blood   Result Value Ref Range    Protime 11.1 9.6 - 11.7 Seconds    INR 1.08 0.93 - 1.10   aPTT    Specimen: Blood   Result Value Ref Range    PTT 29.6 24.0 - 31.0 seconds   Troponin    Specimen: " Blood   Result Value Ref Range    Troponin T <0.010 0.000 - 0.030 ng/mL   CBC Auto Differential    Specimen: Blood   Result Value Ref Range    WBC 12.10 (H) 3.40 - 10.80 10*3/mm3    RBC 5.47 (H) 3.77 - 5.28 10*6/mm3    Hemoglobin 13.3 12.0 - 15.9 g/dL    Hematocrit 41.8 34.0 - 46.6 %    MCV 76.5 (L) 79.0 - 97.0 fL    MCH 24.2 (L) 26.6 - 33.0 pg    MCHC 31.7 31.5 - 35.7 g/dL    RDW 17.6 (H) 12.3 - 15.4 %    RDW-SD 47.7 37.0 - 54.0 fl    MPV 8.7 6.0 - 12.0 fL    Platelets 224 140 - 450 10*3/mm3    Neutrophil % 68.8 42.7 - 76.0 %    Lymphocyte % 21.5 19.6 - 45.3 %    Monocyte % 7.6 5.0 - 12.0 %    Eosinophil % 0.9 0.3 - 6.2 %    Basophil % 1.2 0.0 - 1.5 %    Neutrophils, Absolute 8.30 (H) 1.70 - 7.00 10*3/mm3    Lymphocytes, Absolute 2.60 0.70 - 3.10 10*3/mm3    Monocytes, Absolute 0.90 0.10 - 0.90 10*3/mm3    Eosinophils, Absolute 0.10 0.00 - 0.40 10*3/mm3    Basophils, Absolute 0.10 0.00 - 0.20 10*3/mm3    nRBC 0.1 0.0 - 0.2 /100 WBC   POC Glucose Once    Specimen: Blood   Result Value Ref Range    Glucose 138 (H) 70 - 105 mg/dL   ECG 12 Lead   Result Value Ref Range    QT Interval 371 ms   Type & Screen    Specimen: Blood   Result Value Ref Range    ABO Type O     RH type Negative     Antibody Screen Negative     T&S Expiration Date 5/14/2022 11:59:59 PM    Gold Top - SST   Result Value Ref Range    Extra Tube Hold for add-ons.            CT Abdomen Pelvis Without Contrast    Result Date: 5/11/2022  1. No acute abnormality in the abdomen or pelvis. 2. Indeterminate 1.2 cm right lower pole renal lesion immediately adjacent to a cyst. Recommend renal protocol MRI with contrast as an outpatient to better characterize this lesion. 3. Large amount stool in colon suggesting constipation. 4. Additional chronic findings above.  Electronically Signed By-Anthony Lerma MD On:5/11/2022 7:21 PM This report was finalized on [id number] by  Anthony Lerma MD.    CT Angiogram Neck    Result Date: 5/11/2022  1. Patent major  intracranial vasculature. No findings of large vessel occlusion. 2. Patent bilateral carotid arteries. No significant stenosis by NASCET criteria. 3. Patent bilateral vertebral arteries. 4. Asymmetric soft tissue fullness at base of the tongue on left which was described on the prior neck CT from 04/06/2022. Correlation with direct visualization recommended if not previously performed.  Electronically Signed By-Anthony Lerma MD On:5/11/2022 5:54 PM This report was finalized on [id number] by  Anthony Lerma MD.    XR Chest 1 View    Result Date: 5/11/2022  No acute process.  Electronically Signed By-Anthony Lerma MD On:5/11/2022 6:41 PM This report was finalized on [id number] by  Anthony Lerma MD.    CT Head Without Contrast Stroke Protocol    Result Date: 5/11/2022  IMPRESSION : 1. No intracranial hemorrhage. 2. Mild white matter findings most consistent with changes of chronic microvascular disease.  Electronically Signed By-Anthony Lerma MD On:5/11/2022 5:13 PM This report was finalized on [id number] by  Anthony Lerma MD.    CT Angiogram Head w AI Analysis of LVO    Result Date: 5/11/2022  1. Patent major intracranial vasculature. No findings of large vessel occlusion. 2. Patent bilateral carotid arteries. No significant stenosis by NASCET criteria. 3. Patent bilateral vertebral arteries. 4. Asymmetric soft tissue fullness at base of the tongue on left which was described on the prior neck CT from 04/06/2022. Correlation with direct visualization recommended if not previously performed.  Electronically Signed By-Anthony Lerma MD On:5/11/2022 5:54 PM This report was finalized on [id number] by  Anthony Lerma MD.                                       MDM  Number of Diagnoses or Management Options  Diagnosis management comments: Patient is originally presented with description of sudden onset of symptoms.  However she had signed into the ER and had left in the waiting room prior to go to her primary physician.   "She has had progressive weakness for the last 3 weeks.  Over the last 3 days family has been having to help her ambulate and she has been \"wobbly\".  I am concerned is that she has had neurologic symptoms for much longer than the noted 30 minutes of the facial droop.  I am concerned about progressive CVA and that she is out of the window for tPA management based on the timeframe.  I discussed the case with Dr. Sánchez of neurology.  He agrees that we have a patient with neurologic symptoms prior and over the window for tPA.  This rules her out for tPA here.  This was discussed at length with the patient and family members who agree that she has had these neurologic symptoms prior to her presentation here today.  Secondary to this CT and CT angio were obtained.  No occlusive thrombus was found.  She also has elevated liver function test.  And has some abdominal discomfort that she states is been going on for the last couple of weeks.  CT scan does not reveal any emergent findings.  I will admit the patient for further neurologic work-up as well as work-up for her hepatitis.  I discussed the case with Marjorie on-call for Dr. Headley who agrees to admit.  Critical care time was 35 minutes.       Amount and/or Complexity of Data Reviewed  Clinical lab tests: reviewed  Tests in the radiology section of CPT®: reviewed  Tests in the medicine section of CPT®: reviewed  Discussion of test results with the performing providers: yes  Discuss the patient with other providers: yes  Independent visualization of images, tracings, or specimens: yes    Critical Care  Total time providing critical care: 30-74 minutes    Patient Progress  Patient progress: stable      Final diagnoses:   None   cva  Abdominal pain  Elevated LFT's    ED Disposition  ED Disposition     None          No follow-up provider specified.       Medication List      No changes were made to your prescriptions during this visit.          Marciano Kaye, " DO  05/11/22 1942       Marciano Kaye, DO  05/12/22 0107

## 2022-05-11 NOTE — ED NOTES
"Pt states she had Bell's Palsy in her 40s. It then resolved, but her \"face has always been a little crooked.\" States she has felt fatigued and weak for 2 months. States walking around has gotten more difficult. She was in the lobby waiting to be seen and the wait was long so she went to her MD. While there, her mouth began twitching and the MD sent her straight to the ER. Pt states that her mouth was drooping earlier but now appears to be getting better.   "

## 2022-05-12 ENCOUNTER — READMISSION MANAGEMENT (OUTPATIENT)
Dept: CALL CENTER | Facility: HOSPITAL | Age: 77
End: 2022-05-12

## 2022-05-12 ENCOUNTER — APPOINTMENT (OUTPATIENT)
Dept: CARDIOLOGY | Facility: HOSPITAL | Age: 77
End: 2022-05-12

## 2022-05-12 ENCOUNTER — APPOINTMENT (OUTPATIENT)
Dept: MRI IMAGING | Facility: HOSPITAL | Age: 77
End: 2022-05-12

## 2022-05-12 VITALS
HEART RATE: 85 BPM | SYSTOLIC BLOOD PRESSURE: 128 MMHG | DIASTOLIC BLOOD PRESSURE: 70 MMHG | TEMPERATURE: 97.6 F | HEIGHT: 67 IN | OXYGEN SATURATION: 97 % | BODY MASS INDEX: 24.8 KG/M2 | RESPIRATION RATE: 17 BRPM | WEIGHT: 158 LBS

## 2022-05-12 LAB
ALBUMIN SERPL-MCNC: 3.6 G/DL (ref 3.5–5.2)
ALBUMIN/GLOB SERPL: 1.2 G/DL
ALP SERPL-CCNC: 107 U/L (ref 39–117)
ALT SERPL W P-5'-P-CCNC: 136 U/L (ref 1–33)
ANION GAP SERPL CALCULATED.3IONS-SCNC: 11 MMOL/L (ref 5–15)
AST SERPL-CCNC: 212 U/L (ref 1–32)
BH CV ECHO MEAS - ACS: 1.88 CM
BH CV ECHO MEAS - AO MAX PG: 6.4 MMHG
BH CV ECHO MEAS - AO MEAN PG: 3.3 MMHG
BH CV ECHO MEAS - AO ROOT DIAM: 3 CM
BH CV ECHO MEAS - AO V2 MAX: 126.4 CM/SEC
BH CV ECHO MEAS - AO V2 VTI: 24.8 CM
BH CV ECHO MEAS - AVA(I,D): 2.5 CM2
BH CV ECHO MEAS - EDV(CUBED): 118.7 ML
BH CV ECHO MEAS - EDV(MOD-SP4): 67.5 ML
BH CV ECHO MEAS - EF(MOD-SP4): 65.5 %
BH CV ECHO MEAS - ESV(CUBED): 26.8 ML
BH CV ECHO MEAS - ESV(MOD-SP4): 23.3 ML
BH CV ECHO MEAS - FS: 39.1 %
BH CV ECHO MEAS - IVS/LVPW: 0.89 CM
BH CV ECHO MEAS - IVSD: 0.98 CM
BH CV ECHO MEAS - LA DIMENSION(2D): 4.5 CM
BH CV ECHO MEAS - LV DIASTOLIC VOL/BSA (35-75): 36.9 CM2
BH CV ECHO MEAS - LV MASS(C)D: 187.5 GRAMS
BH CV ECHO MEAS - LV MAX PG: 5.2 MMHG
BH CV ECHO MEAS - LV MEAN PG: 2.36 MMHG
BH CV ECHO MEAS - LV SYSTOLIC VOL/BSA (12-30): 12.7 CM2
BH CV ECHO MEAS - LV V1 MAX: 114.2 CM/SEC
BH CV ECHO MEAS - LV V1 VTI: 22.4 CM
BH CV ECHO MEAS - LVIDD: 4.9 CM
BH CV ECHO MEAS - LVIDS: 3 CM
BH CV ECHO MEAS - LVOT AREA: 2.8 CM2
BH CV ECHO MEAS - LVOT DIAM: 1.88 CM
BH CV ECHO MEAS - LVPWD: 1.1 CM
BH CV ECHO MEAS - MV A MAX VEL: 100.4 CM/SEC
BH CV ECHO MEAS - MV DEC SLOPE: 424.2 CM/SEC2
BH CV ECHO MEAS - MV DEC TIME: 0.19 MSEC
BH CV ECHO MEAS - MV E MAX VEL: 79.4 CM/SEC
BH CV ECHO MEAS - MV E/A: 0.79
BH CV ECHO MEAS - MV MAX PG: 4.8 MMHG
BH CV ECHO MEAS - MV MEAN PG: 2.11 MMHG
BH CV ECHO MEAS - MV V2 VTI: 21.3 CM
BH CV ECHO MEAS - MVA(VTI): 2.9 CM2
BH CV ECHO MEAS - PA ACC TIME: 0.1 SEC
BH CV ECHO MEAS - PA PR(ACCEL): 33.8 MMHG
BH CV ECHO MEAS - PA V2 MAX: 91.3 CM/SEC
BH CV ECHO MEAS - PULM A REVS DUR: 0.1 SEC
BH CV ECHO MEAS - PULM A REVS VEL: 36.2 CM/SEC
BH CV ECHO MEAS - PULM DIAS VEL: 48.8 CM/SEC
BH CV ECHO MEAS - PULM S/D: 1.69
BH CV ECHO MEAS - PULM SYS VEL: 82.8 CM/SEC
BH CV ECHO MEAS - RAP SYSTOLE: 3 MMHG
BH CV ECHO MEAS - RV MAX PG: 2.8 MMHG
BH CV ECHO MEAS - RV V1 MAX: 84 CM/SEC
BH CV ECHO MEAS - RV V1 VTI: 15.1 CM
BH CV ECHO MEAS - RVDD: 2.28 CM
BH CV ECHO MEAS - SI(MOD-SP4): 24.2 ML/M2
BH CV ECHO MEAS - SV(LVOT): 62 ML
BH CV ECHO MEAS - SV(MOD-SP4): 44.3 ML
BILIRUB SERPL-MCNC: 0.4 MG/DL (ref 0–1.2)
BUN SERPL-MCNC: 12 MG/DL (ref 8–23)
BUN/CREAT SERPL: 16.4 (ref 7–25)
CALCIUM SPEC-SCNC: 8.9 MG/DL (ref 8.6–10.5)
CHLORIDE SERPL-SCNC: 100 MMOL/L (ref 98–107)
CO2 SERPL-SCNC: 28 MMOL/L (ref 22–29)
CREAT SERPL-MCNC: 0.73 MG/DL (ref 0.57–1)
DEPRECATED RDW RBC AUTO: 47.7 FL (ref 37–54)
EGFRCR SERPLBLD CKD-EPI 2021: 85.9 ML/MIN/1.73
ERYTHROCYTE [DISTWIDTH] IN BLOOD BY AUTOMATED COUNT: 17.4 % (ref 12.3–15.4)
GLOBULIN UR ELPH-MCNC: 3 GM/DL
GLUCOSE BLDC GLUCOMTR-MCNC: 110 MG/DL (ref 70–105)
GLUCOSE BLDC GLUCOMTR-MCNC: 115 MG/DL (ref 70–105)
GLUCOSE BLDC GLUCOMTR-MCNC: 144 MG/DL (ref 70–105)
GLUCOSE BLDC GLUCOMTR-MCNC: 93 MG/DL (ref 70–105)
GLUCOSE SERPL-MCNC: 81 MG/DL (ref 65–99)
HAV IGM SERPL QL IA: NORMAL
HBA1C MFR BLD: 8.3 % (ref 3.5–5.6)
HBV CORE IGM SERPL QL IA: NORMAL
HBV SURFACE AG SERPL QL IA: NORMAL
HCT VFR BLD AUTO: 40.4 % (ref 34–46.6)
HCV AB SER DONR QL: NORMAL
HGB BLD-MCNC: 12.4 G/DL (ref 12–15.9)
LIPASE SERPL-CCNC: 32 U/L (ref 13–60)
MAXIMAL PREDICTED HEART RATE: 145 BPM
MCH RBC QN AUTO: 24.1 PG (ref 26.6–33)
MCHC RBC AUTO-ENTMCNC: 30.8 G/DL (ref 31.5–35.7)
MCV RBC AUTO: 78.2 FL (ref 79–97)
PLATELET # BLD AUTO: 195 10*3/MM3 (ref 140–450)
PMV BLD AUTO: 9.1 FL (ref 6–12)
POTASSIUM SERPL-SCNC: 3.5 MMOL/L (ref 3.5–5.2)
PROT SERPL-MCNC: 6.6 G/DL (ref 6–8.5)
RBC # BLD AUTO: 5.16 10*6/MM3 (ref 3.77–5.28)
SODIUM SERPL-SCNC: 139 MMOL/L (ref 136–145)
STRESS TARGET HR: 123 BPM
T3FREE SERPL-MCNC: 2.67 PG/ML (ref 2–4.4)
WBC NRBC COR # BLD: 8.4 10*3/MM3 (ref 3.4–10.8)

## 2022-05-12 PROCEDURE — G0378 HOSPITAL OBSERVATION PER HR: HCPCS

## 2022-05-12 PROCEDURE — 92610 EVALUATE SWALLOWING FUNCTION: CPT

## 2022-05-12 PROCEDURE — 82962 GLUCOSE BLOOD TEST: CPT

## 2022-05-12 PROCEDURE — 80053 COMPREHEN METABOLIC PANEL: CPT | Performed by: NURSE PRACTITIONER

## 2022-05-12 PROCEDURE — 70551 MRI BRAIN STEM W/O DYE: CPT

## 2022-05-12 PROCEDURE — 99217 PR OBSERVATION CARE DISCHARGE MANAGEMENT: CPT | Performed by: INTERNAL MEDICINE

## 2022-05-12 PROCEDURE — 93306 TTE W/DOPPLER COMPLETE: CPT

## 2022-05-12 PROCEDURE — 63710000001 INSULIN LISPRO (HUMAN) PER 5 UNITS: Performed by: NURSE PRACTITIONER

## 2022-05-12 PROCEDURE — 83690 ASSAY OF LIPASE: CPT | Performed by: NURSE PRACTITIONER

## 2022-05-12 PROCEDURE — 96374 THER/PROPH/DIAG INJ IV PUSH: CPT

## 2022-05-12 PROCEDURE — 85027 COMPLETE CBC AUTOMATED: CPT | Performed by: NURSE PRACTITIONER

## 2022-05-12 PROCEDURE — 25010000002 LORAZEPAM PER 2 MG: Performed by: INTERNAL MEDICINE

## 2022-05-12 PROCEDURE — 97162 PT EVAL MOD COMPLEX 30 MIN: CPT

## 2022-05-12 PROCEDURE — 93306 TTE W/DOPPLER COMPLETE: CPT | Performed by: INTERNAL MEDICINE

## 2022-05-12 PROCEDURE — 99214 OFFICE O/P EST MOD 30 MIN: CPT | Performed by: NURSE PRACTITIONER

## 2022-05-12 RX ORDER — BACLOFEN 10 MG/1
10 TABLET ORAL 2 TIMES DAILY
Status: DISCONTINUED | OUTPATIENT
Start: 2022-05-12 | End: 2022-05-12 | Stop reason: HOSPADM

## 2022-05-12 RX ORDER — BACLOFEN 20 MG/1
10 TABLET ORAL 2 TIMES DAILY
Qty: 3 TABLET | Refills: 0
Start: 2022-05-12 | End: 2022-05-15

## 2022-05-12 RX ORDER — LORAZEPAM 2 MG/ML
1 INJECTION INTRAMUSCULAR ONCE
Status: COMPLETED | OUTPATIENT
Start: 2022-05-12 | End: 2022-05-12

## 2022-05-12 RX ORDER — AMLODIPINE BESYLATE AND BENAZEPRIL HYDROCHLORIDE 5; 20 MG/1; MG/1
1 CAPSULE ORAL DAILY
Start: 2022-05-12

## 2022-05-12 RX ORDER — ASPIRIN 81 MG/1
81 TABLET, CHEWABLE ORAL DAILY
Status: DISCONTINUED | OUTPATIENT
Start: 2022-05-12 | End: 2022-05-12 | Stop reason: HOSPADM

## 2022-05-12 RX ADMIN — ASPIRIN 81 MG: 81 TABLET, CHEWABLE ORAL at 14:06

## 2022-05-12 RX ADMIN — LORAZEPAM 1 MG: 2 INJECTION INTRAMUSCULAR; INTRAVENOUS at 10:20

## 2022-05-12 RX ADMIN — BACLOFEN 10 MG: 10 TABLET ORAL at 14:06

## 2022-05-12 RX ADMIN — INSULIN LISPRO 10 UNITS: 100 INJECTION, SOLUTION INTRAVENOUS; SUBCUTANEOUS at 12:34

## 2022-05-12 NOTE — CONSULTS
"Primary Care Provider: Suma Campbell,*     Consult requested by:  Dr. Kaye    Reason for Consultation: Neurological evaluation     History taken from: patient chart RN    Chief complaint: facial asymmetry, generalized weakness       SUBJECTIVE:    History of present illness: Background per H&P: Marilyn J Pumphrey is a 76 y.o. year old female who presented to [facility] on 5/11/2022 complaining of progressively increasing weakness over the past several weeks. She was seen at PCP prior to arrival where she was advised to report to the emergency department due to noted left facial droop. Left facial weakness was associated with discomfort in her left jaw and neck. Patient reports that she has had increasing weakness over the last several days that she describes as overwhelming fatigue that is interfering with her ability to participate in her normal daily activities.     She does report previous Bell's palsy with resolved symptoms, but states her face has always been \"crooked.\"       Initial evaluation in the emergency department showed elevated , , and alkaline phosphatase 129 in the emergency department. She does report ongoing acetaminophen use as well as rosuvastatin daily for HLD.    - Portions of the above HPI were copied from previous encounters and edited as appropriate. PMH as detailed below.     Pt has a difficult time describing her symptoms, but states she had left facial \"pulling\" yesterday. She denies that her face was weak, but rather it was twitching and spasming. She states her left lip seemed to pull over to the middle of her mouth to the point it made her jaw hurt. She could still open her mouth and was able to swallow. No twitching otherwise. She states this last 3-4 hours and suddenly stopped spontaneously yesterday evening.     Pt states she was started on Baclogen 20mg QID about 4 months ago due to tremors in her mouth. Witnessed tremors on exam are the same " and are consistent with an essential tremor. She states she only takes 20mg BID instead of the scheduled 4 times daily. She did not try any other medications for this and did not start on a lower dose. She noted the fatigue and generalized weakness shortly after starting Baclofen. She denies missing any doses prior to symptoms starting and took her last dose on 5/10 PM.     Pt denies any complaints at this time and states she feels much better after being here. She does continue to have mild tremors in her hands and jaw which have been present for quite some time. She states her sons also have chronic hand tremors.     Review of Systems   Constitutional: Negative for chills, diaphoresis and fatigue.   Eyes: Negative for photophobia and visual disturbance.   Neurological: Positive for tremors. Negative for dizziness, seizures, syncope, facial asymmetry, speech difficulty, weakness, light-headedness, numbness and headaches.          PATIENT HISTORY:  Past Medical History:   Diagnosis Date   • Anxiety    • Arthritis    • Bone pain    • Cancer ([facility]) 09/2016    THYROID   • Depression    • Disease of thyroid gland    • DM type 2 (diabetes mellitus, type 2) ([facility])     TYPE 2   • Fall     2/2021 FELL ON ICE   • GERD (gastroesophageal reflux disease)    • Hypertension    • Irregular heart beat    • Knee pain    • Leaky heart valve     11/2020   • Leg cramps    • Low back pain    • Osteoarthritis    • PONV (postoperative nausea and vomiting)     DOES WELL WITH PROPOFOL   • Sleep apnea     C-PAP IN USE   • UTI (urinary tract infection)    ,   Past Surgical History:   Procedure Laterality Date   • CARPAL TUNNEL RELEASE     • CHOLECYSTECTOMY  1974   • HYSTERECTOMY  1993    partial abdominal hysterectomy   • OOPHORECTOMY Bilateral 2015   • THYROID SURGERY      2 thyroid surgery   • TONSILLECTOMY     • TOTAL KNEE ARTHROPLASTY Left 10/14/2019    Procedure: TOTAL KNEE ARTHROPLASTY;  Surgeon: Sanjay Collado MD;  Location: [facility]  OR;  Service: Orthopedics   • VEIN LIGATION AND STRIPPING BILATERAL     ,   Family History   Problem Relation Age of Onset   • Diabetes Father    • Heart disease Father    • Hypertension Father    • Hyperlipidemia Father    • Thyroid disease Father    • Cancer Sister         lung cancer   • Thyroid disease Sister    • Diabetes Brother    • Heart disease Brother    • Cancer Brother         lung cancer   ,   Social History     Tobacco Use   • Smoking status: Never Smoker   • Smokeless tobacco: Never Used   Vaping Use   • Vaping Use: Never used   Substance Use Topics   • Alcohol use: No   • Drug use: No   ,   Prior to Admission medications    Medication Sig Start Date End Date Taking? Authorizing Provider   amLODIPine-benazepril (LOTREL 5-20) 5-20 MG per capsule Take 1 capsule by mouth 2 (two) times a day.   Yes Ekaterina Moncada MD   aspirin 81 MG tablet Take 1 tablet by mouth Daily.  Patient taking differently: Take 325 mg by mouth Daily. 10/15/19  Yes Sanjay Collado MD   baclofen (LIORESAL) 20 MG tablet Take 10 mg by mouth 4 (Four) Times a Day. 7/26/21  Yes Ekaterina Moncada MD   CALCIUM CARBONATE-VIT D-MIN PO Take 1,200 mg by mouth 2 (Two) Times a Day.   Yes Ekaterina Moncada MD   clorazepate (TRANXENE) 7.5 MG tablet Take 7.5 mg by mouth 2 (Two) Times a Day. 9/25/19  Yes Ekaterina Moncada MD   Dulaglutide (Trulicity) 3 MG/0.5ML solution pen-injector Inject 0.5 mL under the skin into the appropriate area as directed 1 (One) Time Per Week. 5/5/22  Yes Gege Ford MD   empagliflozin (Jardiance) 25 MG tablet tablet Take 1 tablet by mouth Daily. 8/25/21  Yes Gege Ford MD   escitalopram (LEXAPRO) 10 MG tablet Take 10 mg by mouth Every Evening. 5/21/19  Yes Ekaterina Moncada MD   HYDROcodone-acetaminophen (NORCO)  MG per tablet Take 1 tablet by mouth 3 (Three) Times a Day As Needed for Severe Pain . DNF before 5/2/2022 5/2/22  Yes Lisa Wells MD   HYDROcodone-acetaminophen  (NORCO)  MG per tablet Take 1 tablet by mouth 3 (Three) Times a Day As Needed for Severe Pain . DNF before 4/3/2022 4/3/22  Yes Lisa Wells MD   insulin glargine (LANTUS, SEMGLEE) 100 UNIT/ML injection Inject 30 Units under the skin into the appropriate area as directed Daily.  Patient taking differently: Inject 40 Units under the skin into the appropriate area as directed Every Morning. 5/24/21  Yes Gege Ford MD   levothyroxine (SYNTHROID, LEVOTHROID) 100 MCG tablet Take 1 tablet by mouth Daily. 9/17/21  Yes Gege Ford MD   magnesium oxide (MAG-OX) 400 tablet tablet Take 400 mg by mouth 2 (Two) Times a Day.   Yes Ekaterina Moncada MD   metFORMIN (GLUCOPHAGE) 1000 MG tablet Take 1 tablet by mouth 2 (Two) Times a Day With Meals. 5/24/21  Yes Gege Ford MD   metoprolol tartrate (LOPRESSOR) 100 MG tablet Take 100 mg by mouth 2 (Two) Times a Day.   Yes Ekaterina Moncada MD   promethazine (PHENERGAN) 25 MG tablet Take 1 tablet by mouth As Needed. 9/22/19  Yes Ekaterina Moncada MD   Rosuvastatin Calcium 40 MG capsule sprinkle Take 20 mg by mouth Every Night.   Yes Ekaterina Moncada MD   sucralfate (CARAFATE) 1 g tablet Take 1 g by mouth 4 (Four) Times a Day.   Yes Ekaterina Moncada MD   vitamin D (ERGOCALCIFEROL) 1.25 MG (94521 UT) capsule capsule Take 50,000 Units by mouth 1 (One) Time Per Week. 4/23/21  Yes Ekaterina Moncada MD   calcitriol (ROCALTROL) 0.25 MCG capsule Take 0.25 mcg by mouth 2 (Two) Times a Day.    Ekaterina Moncada MD   diclofenac (VOLTAREN) 1 % gel gel Apply 4 g topically to the appropriate area as directed 4 (Four) Times a Day As Needed.    Ekaterina Moncada MD   esomeprazole (nexIUM) 40 MG capsule Take 40 mg by mouth 2 (Two) Times a Day.    Ekaterina Moncada MD   insulin aspart (NOVOLOG FLEXPEN) 100 UNIT/ML solution pen-injector sc pen Inject 10 units before each meal  Patient taking differently: 10 Units. Inject 10 units before each  meal 8/20/19   Gege Ford MD    Allergies:  Codeine and Oxycodone    Current Facility-Administered Medications   Medication Dose Route Frequency Provider Last Rate Last Admin   • aspirin suppository 300 mg  300 mg Rectal Daily Lucila Thompson APRN       • sennosides-docusate (PERICOLACE) 8.6-50 MG per tablet 2 tablet  2 tablet Oral BID Lucila Thompson APRN        And   • polyethylene glycol (MIRALAX) packet 17 g  17 g Oral Daily PRN Lucila Thompson APRN        And   • bisacodyl (DULCOLAX) EC tablet 5 mg  5 mg Oral Daily PRN Lucila Thompson APRN        And   • bisacodyl (DULCOLAX) suppository 10 mg  10 mg Rectal Daily PRN Lucila Thompson APRN       • dextrose (D50W) (25 g/50 mL) IV injection 25 g  25 g Intravenous Q15 Min PRN Lucila Thompson APRN       • dextrose (GLUTOSE) oral gel 15 g  15 g Oral Q15 Min PRN Lucila Thompson APRN       • glucagon (human recombinant) (GLUCAGEN DIAGNOSTIC) 1 mg in sterile water (preservative free) 1 mL injection  1 mg Subcutaneous PRN Lucila Thompson APRN       • insulin glargine (LANTUS, SEMGLEE) injection 30 Units  30 Units Subcutaneous Nightly Lucila Thompson APRN       • insulin lispro (ADMELOG) injection 0-14 Units  0-14 Units Subcutaneous TID AC Lucila Thompson APRN        And   • insulin lispro (ADMELOG) injection 0-14 Units  0-14 Units Subcutaneous PRN Lucila Thompson APRN       • insulin lispro (ADMELOG) injection 10 Units  10 Units Subcutaneous TID With Meals Lucila Thompson APRN       • LORazepam (ATIVAN) injection 1 mg  1 mg Intravenous Once Jose F Duke MD       • niCARdipine (CARDENE) 25mg in 250mL NS infusion  5-15 mg/hr Intravenous Titrated Lucila Thompson APRN   Held at 05/11/22 2054   • sodium chloride 0.9 % flush 10 mL  10 mL Intravenous PRN Hottman, Marciano Pan, DO            ________________________________________________________        OBJECTIVE:  Upon  today's exam, pt is awake and alert. Sitting on the side of the bed. Pleasant, cooperative.      Neurologic Exam  PHYSICAL EXAM:    CONSTITUTIONAL: The patient is in no apparent distress, bright awake and alert. There is no shortness of breath.     HEENT: There is no tenderness over the temporal arteries bilaterally. Normocephalic, atraumatic. TMJ open symmetrically without tenderness. Mild tremor of the chin/jaw consistent with essential tremor    NECK: ROM normal, supple    RESPIRATORY: Breathing unlabored, Breath sounds are normal    CARDIAC: Regular rate and rhythm.     EXTREMITIES:  No clubbing, cyanosis or edema.    PSYCHIATRIC: Mood/affect normal, judgement normal, appropriate    NEUROLOGICAL:    Cognition:   Fully oriented.  Fund of knowledge excellent.  Concentration and attention normal.   Language normal with normal comprehension, fluent speech, intact repetition and naming.   Short and long term memory appears intact    Cranial nerves;    II - pupils bilaterally equal reacting to light,  No new Visual field deficits;  Fundoscopic exam- Not able to be done, non-dilated exam  III,IV,VI: EOMI with no diplopia  V: Normal facial sensations  VII: Slight left facial asymmetry without weakness (hx Bell's Palsy on left)  VIII: No New hearing abnormality  IX, X, XI: normal gag and shoulder shrug;  XII: tongue is in the midline.    Sensory:  Intact to light touch in all extremities.     Motor: Strength 5/5 bilaterally upper and lower extremities. No involuntary movements present except fine action tremor bilaterally. Normal tone and bulk.  Deep tendon reflexes: 1/4 and symmetrical in biceps, brachioradialis, triceps, bilateral 1/4 knees and ankles. Both plantars are flexor.    Cerebellar: Finger to nose and mirror movements normal bilaterally.    Gait and balance: deferred    ________________________________________________________   RESULTS REVIEW:    VITAL SIGNS:   Temp:  [97.9 °F (36.6 °C)-98.5 °F (36.9 °C)] 98  °F (36.7 °C)  Heart Rate:  [75-97] 75  Resp:  [13-20] 15  BP: (142-161)/(69-88) 147/69     LABS:      Lab 05/12/22  0050 05/11/22 1718 05/11/22  1708   WBC 8.40  --  12.10*   HEMOGLOBIN 12.4  --  13.3   HEMATOCRIT 40.4  --  41.8   PLATELETS 195  --  224   NEUTROS ABS  --   --  8.30*   LYMPHS ABS  --   --  2.60   MONOS ABS  --   --  0.90   EOS ABS  --   --  0.10   MCV 78.2*  --  76.5*   PROTIME  --  11.1  --    APTT  --  29.6  --          Lab 05/12/22 0050 05/11/22 1718   SODIUM 139 138   POTASSIUM 3.5 3.8   CHLORIDE 100 97*   CO2 28.0 24.0   ANION GAP 11.0 17.0*   BUN 12 13   CREATININE 0.73 0.86   EGFR 85.9 70.6   GLUCOSE 81 134*   CALCIUM 8.9 9.9   TSH  --  0.712         Lab 05/12/22  0050 05/11/22 1718   TOTAL PROTEIN 6.6 7.7   ALBUMIN 3.60 4.30   GLOBULIN 3.0 3.4   ALT (SGPT) 136* 184*   AST (SGOT) 212* 438*   BILIRUBIN 0.4 0.6   ALK PHOS 107 129*   AMYLASE  --  86   LIPASE 32  --          Lab 05/11/22 1718   TROPONIN T <0.010   PROTIME 11.1   INR 1.08         Lab 05/11/22 1718   CHOLESTEROL 120   LDL CHOL 55   HDL CHOL 44   TRIGLYCERIDES 117         Lab 05/11/22 1718   ABO TYPING O   RH TYPING Negative   ANTIBODY SCREEN Negative             Lab Results   Component Value Date    TSH 0.712 05/11/2022    LDL 55 05/11/2022    HGBA1C 7.9 (H) 07/07/2021    [id number] 163 (L) 06/10/2017       IMAGING STUDIES:  CT Abdomen Pelvis Without Contrast    Result Date: 5/11/2022  1. No acute abnormality in the abdomen or pelvis. 2. Indeterminate 1.2 cm right lower pole renal lesion immediately adjacent to a cyst. Recommend renal protocol MRI with contrast as an outpatient to better characterize this lesion. 3. Large amount stool in colon suggesting constipation. 4. Additional chronic findings above.  Electronically Signed By-Anthony Lerma MD On:5/11/2022 7:21 PM This report was finalized on [id number] by  Anthony Lerma MD.    CT Angiogram Neck    Result Date: 5/11/2022  1. Patent major intracranial vasculature. No  findings of large vessel occlusion. 2. Patent bilateral carotid arteries. No significant stenosis by NASCET criteria. 3. Patent bilateral vertebral arteries. 4. Asymmetric soft tissue fullness at base of the tongue on left which was described on the prior neck CT from 04/06/2022. Correlation with direct visualization recommended if not previously performed.  Electronically Signed By-Anthony Lerma MD On:5/11/2022 5:54 PM This report was finalized on [id number] by  Anthony Lerma MD.    XR Chest 1 View    Result Date: 5/11/2022  No acute process.  Electronically Signed By-Anthony Lerma MD On:5/11/2022 6:41 PM This report was finalized on [id number] by  Anthony Lerma MD.    CT Head Without Contrast Stroke Protocol    Result Date: 5/11/2022  IMPRESSION : 1. No intracranial hemorrhage. 2. Mild white matter findings most consistent with changes of chronic microvascular disease.  Electronically Signed By-Anthony Lerma MD On:5/11/2022 5:13 PM This report was finalized on [id number] by  Anthony Lerma MD.    CT Angiogram Head w AI Analysis of LVO    Result Date: 5/11/2022  1. Patent major intracranial vasculature. No findings of large vessel occlusion. 2. Patent bilateral carotid arteries. No significant stenosis by NASCET criteria. 3. Patent bilateral vertebral arteries. 4. Asymmetric soft tissue fullness at base of the tongue on left which was described on the prior neck CT from 04/06/2022. Correlation with direct visualization recommended if not previously performed.  Electronically Signed By-Anthony Lerma MD On:5/11/2022 5:54 PM This report was finalized on [id number] by  Anthony Lerma MD.      I reviewed the patient's new clinical results.    ________________________________________________________     PROBLEM LIST:    Facial droop    Rheumatoid arthritis (HCC)    CAD (coronary artery disease)    Depression    Gastroesophageal reflux disease    History of pulmonary embolism    Mixed hyperlipidemia    Hypertension,  benign    Hypothyroidism, postsurgical    XAVI (obstructive sleep apnea)    Osteoarthritis    Restless legs syndrome (RLS)    Type 2 diabetes mellitus with hyperglycemia, with long-term current use of insulin ([facility])    Abnormal LFTs            ASSESSMENT/PLAN:  1. Left facial asymmetry. Pt actually describes more of a spasm rather than facial weakness. Currently resolved. Non-specific and pt has significant difficulty describing her symptoms. No acute stroke. This would not be a TIA. Possible facial myokymia. Focal seizure is in the differential though less likely. If episodes happen in the future, further workup should be completed   - CT head: No intracranial hemorrhage. Mild white matter findings most consistent with changes of chronic microvascular disease  - CTA head and neck: Patent major intracranial vasculature. No findings of large vessel occlusion. Patent bilateral carotid arteries. Patent bilateral vertebral arteries. Asymmetric soft tissue fullness at base of the tongue on left which was described on the prior neck CT from 04/06/2022. Correlation with direct visualization recommended if not previously performed.  - MRI brain: No acute intracranial findings. No evidence of recent ischemic insult. Mild to moderate chronic microvascular disease  - Echo: Left ventricular ejection fraction appears to be 61 - 65%. Saline test results are negative. No pericardial effusion noted  - EKG: Sinus rhythm. Prolonged NC interval  - Labs: A1C: 8.3, B12: P, LDL: 55, TSH: 0.712  - Antithrombotics: ASA 81mg daily, 300mg NC while NPO.   - Statin: on hold due to elevated LFTs  - PT/OT/ST as appropriate, fall precautions as appropriate, Neuro checks per protocol, DVT prophylaxis, Stroke education      2. Progressive generalized weakness over the past several weeks likely due to high dose baclofen. Pt initially prescribed Baclofen 20mg QID for tremors. She reports taking it twice daily and states symptoms started shortly after  starting the medication. She has felt better after not taking it for the past 2 days.   - Recommended dose of baclofen is not to exceed 10mg BID. High doses can cause asthenia, dizziness, and other neurologic symptoms. Baclofen should not be suddenly discontinued. Pt would like to stop the medication. Recommend to decrease dose to 10mg BID for 3 days, then 10mg daily x3 days, then discontinue. She was educated on risks of sudden discontinuation as well as risks/benefits.     3. Elevated LFTs. Pt reports taking extra Tylenol in addition to Norco. She is also taking a statin. Levels have improved since admission.   - AST: 438 improved to 212, ALT: 184 improved to 136, hepatitis panel negative  - CT abd/pelvis: No acute abnormality in the abdomen or pelvis. Indeterminate 1.2 cm right lower pole renal lesion immediately adjacent to a cyst. . Large amount stool in colon suggesting constipation.   - Hold statin and acetaminophen, avoid other hepatotoxic medications. Pt educated  - Per primary     4. Essential tremor  - Can consider low dose primidone if tremors start to interfere with eating and ADLs  - Will avoid additional medications for now   - Pt will follow up with her PCP    5. Type 2 diabetes mellitus  - Strict glycemic control, SSI per primary    6. CAD  - Denies chest pain, but did have jaw and neck discomfort  - Continue home meds    7. Hypertension  - Strict BP control, monitor per protocol    8. Hyperparathyroidism with acquired hypothyroidism r/t previous thyroid cancer.   - Managed by Dr. Ford outpt   - TSH 0.712, calcium 8.9    9. Indeterminate 1.2 cm right lower pole renal lesion immediately adjacent to a cyst.   - Per radiology, Recommend renal protocol MRI with contrast as an outpatient to better characterize this lesion.      10. HLD, well controlled   - LDL 55, total cholesterol 120  - Hold statin due to elevated LFTs  - Recommend diet and lifestyle modifications    11. Modification of stroke risk  factors:   - Blood pressure should be less than 130/80 outpatient, HbA1c less than 6.5, LDL less than 70; b12>500 and smoking cessation if applicable. We would be grateful if the primary team / primary care physician would keep a close watch on the above targets.  - Stroke education  - Follow up with neurologist of choice    Will sign off. Please call with questions or concerns.      I discussed the patient's findings and my recommendations with patient, nursing staff and consulting provider    LYUDMILA Marte  05/12/22  09:52 EDT

## 2022-05-12 NOTE — DISCHARGE SUMMARY
[facility] Medicine Services  DISCHARGE SUMMARY    Patient Name: Marilyn J Pumphrey  : 1945  MRN: [id number]    Date of Admission: 2022  Discharge Diagnosis:   Date of Discharge:   Primary Care Physician: Suma Campbell APRN      Presenting Problem:   Weakness [R53.1]  Hepatitis [K75.9]  Facial droop [R29.810]  Cerebrovascular accident (CVA), unspecified mechanism (HCC) [I63.9]    Active and Resolved Hospital Problems:  Active Hospital Problems    Diagnosis POA   • **Facial droop [R29.810] Yes   • Abnormal LFTs [R79.89] Yes   • Depression [F32.A] Yes   • Rheumatoid arthritis (HCC) [M06.9] Yes   • XAVI (obstructive sleep apnea) [G47.33] Yes   • Restless legs syndrome (RLS) [G25.81] Yes   • History of pulmonary embolism [Z86.711] Yes   • Gastroesophageal reflux disease [K21.9] Yes   • CAD (coronary artery disease) [I25.10] Yes   • Osteoarthritis [M19.90] Yes   • Hypothyroidism, postsurgical [E89.0] Yes   • Type 2 diabetes mellitus with hyperglycemia, with long-term current use of insulin (HCC) [E11.65, Z79.4] Not Applicable   • Mixed hyperlipidemia [E78.2] Yes   • Hypertension, benign [I10] Yes      Resolved Hospital Problems   No resolved problems to display.         Hospital Course     Hospital Course:  76-year-old female with previous history of Bell's palsy, presented to [facility] ED on 2022.  Reported she had followed up with PCP for progressively worsening generalized body weakness and PCP noted left facial droop and advised her to present to the ED.  She reported twitching/spasming of the left side of the face which improved spontaneously.  Patient reported that she was started on baclofen 20 mg 4 times daily about 4 months ago due to tremors in her mouth though she only takes 20 mg twice daily.  Reported the generalized body weakness started shortly after baclofen was initiated.  After evaluating in the ED, patient was admitted for further  care    Conditions addressed while inpatient are as stated below    Left facial asymmetry  Resolved  Unlikely CVA/TIA  Per neurologist possible facial myokymia, focal seizure is a differential but less likely  Neurologist recommends further work-up if episode recurs in future  CT head-no acute intracranial abnormality  CTA head and neck- no major artery stenosis  MRI brain-no acute intracranial abnormality  2D echo-LVEF 61 to 65% with negative saline bubble test  On aspirin  Not on statin due to elevated LFT.    Elevated LFT  ?  Etiology  Medication induced- statin/excessive Tylenol use  CT abdomen/pelvis-no acute abnormality noted  Home medication of statin discontinued and patient advised on taking excess Tylenol  LFT-next week  Patient to follow-up with PCP as outpatient.    Progressive generalized weakness  Possibly due to high-dose baclofen  Neurologist recommended weaning off baclofen  Baclofen 10 mg twice daily x3 days  10 mg daily x3 days then discontinue    Essential tremor  Patient to follow-up with PCP  Primidone if tremor interferes with eating and activity of daily living can be considered    CAD-no active angina  Continue home medications    Condition at discharge-stable      DISCHARGE Follow Up Recommendations for labs and diagnostics:       Reasons For Change In Medications and Indications for New Medications:      Day of Discharge     Vital Signs:  Temp:  [97.5 °F (36.4 °C)-98.5 °F (36.9 °C)] 97.6 °F (36.4 °C)  Heart Rate:  [75-94] 85  Resp:  [13-20] 17  BP: (125-161)/(69-88) 128/70  Flow (L/min):  [2] 2    Physical Exam:  Physical Exam  Vitals reviewed.   Constitutional:       General: She is not in acute distress.  HENT:      Head: Normocephalic.      Nose: Nose normal.      Mouth/Throat:      Mouth: Mucous membranes are moist.   Eyes:      Extraocular Movements: Extraocular movements intact.      Conjunctiva/sclera: Conjunctivae normal.      Pupils: Pupils are equal, round, and reactive to light.    Cardiovascular:      Rate and Rhythm: Normal rate.   Pulmonary:      Effort: No respiratory distress.      Breath sounds: Normal breath sounds.   Abdominal:      General: Bowel sounds are normal.      Palpations: Abdomen is soft.      Tenderness: There is no abdominal tenderness.   Musculoskeletal:         General: Normal range of motion.   Skin:     General: Skin is warm and dry.   Neurological:      Mental Status: She is alert and oriented to person, place, and time.   Psychiatric:         Mood and Affect: Mood normal.          Pertinent  and/or Most Recent Results     LAB RESULTS:      Lab 05/12/22 0050 05/11/22 1718 05/11/22 1708   WBC 8.40  --  12.10*   HEMOGLOBIN 12.4  --  13.3   HEMATOCRIT 40.4  --  41.8   PLATELETS 195  --  224   NEUTROS ABS  --   --  8.30*   LYMPHS ABS  --   --  2.60   MONOS ABS  --   --  0.90   EOS ABS  --   --  0.10   MCV 78.2*  --  76.5*   PROTIME  --  11.1  --    APTT  --  29.6  --          Lab 05/12/22 0050 05/11/22 1718 05/11/22 1708   SODIUM 139 138  --    POTASSIUM 3.5 3.8  --    CHLORIDE 100 97*  --    CO2 28.0 24.0  --    ANION GAP 11.0 17.0*  --    BUN 12 13  --    CREATININE 0.73 0.86  --    EGFR 85.9 70.6  --    GLUCOSE 81 134*  --    CALCIUM 8.9 9.9  --    HEMOGLOBIN A1C  --   --  8.3*   TSH  --  0.712  --          Lab 05/12/22 0050 05/11/22 1718   TOTAL PROTEIN 6.6 7.7   ALBUMIN 3.60 4.30   GLOBULIN 3.0 3.4   ALT (SGPT) 136* 184*   AST (SGOT) 212* 438*   BILIRUBIN 0.4 0.6   ALK PHOS 107 129*   AMYLASE  --  86   LIPASE 32  --          Lab 05/11/22 1718   TROPONIN T <0.010   PROTIME 11.1   INR 1.08         Lab 05/11/22 1718   CHOLESTEROL 120   LDL CHOL 55   HDL CHOL 44   TRIGLYCERIDES 117         Lab 05/11/22 1718   ABO TYPING O   RH TYPING Negative   ANTIBODY SCREEN Negative         Brief Urine Lab Results  (Last result in the past 365 days)      Color   Clarity   Blood   Leuk Est   Nitrite   Protein   CREAT   Urine HCG        07/07/21 1104             52.2              Microbiology Results (last 10 days)     Procedure Component Value - Date/Time    COVID PRE-OP / PRE-PROCEDURE SCREENING ORDER (NO ISOLATION) - Swab, Nasopharynx [[id number]]  (Normal) Collected: 05/11/22 2004    Lab Status: Final result Specimen: Swab from Nasopharynx Updated: 05/11/22 2032    Narrative:      The following orders were created for panel order COVID PRE-OP / PRE-PROCEDURE SCREENING ORDER (NO ISOLATION) - Swab, Nasopharynx.  Procedure                               Abnormality         Status                     ---------                               -----------         ------                     COVID-19,CEPHEID/MICHAEL,CO...[[id number]]  Normal              Final result                 Please view results for these tests on the individual orders.    COVID-19,CEPHEID/MICHAEL,COR/[facility]/PAD/[facility] IN-HOUSE(OR EMERGENT/ADD-ON),NP SWAB IN TRANSPORT MEDIA 3-4 HR TAT, RT-PCR - Swab, Nasopharynx [[id number]]  (Normal) Collected: 05/11/22 2004    Lab Status: Final result Specimen: Swab from Nasopharynx Updated: 05/11/22 2032     COVID19 Not Detected    Narrative:      Fact sheet for providers: https://www.fda.gov/media/929508/download     Fact sheet for patients: https://www.fda.gov/media/102664/download  Fact sheet for providers: https://www.fda.gov/media/409771/download    Fact sheet for patients: https://www.fda.gov/media/469612/download    Test performed by PCR.          CT Abdomen Pelvis Without Contrast    Result Date: 5/11/2022  Impression: 1. No acute abnormality in the abdomen or pelvis. 2. Indeterminate 1.2 cm right lower pole renal lesion immediately adjacent to a cyst. Recommend renal protocol MRI with contrast as an outpatient to better characterize this lesion. 3. Large amount stool in colon suggesting constipation. 4. Additional chronic findings above.  Electronically Signed By-Anthony Lerma MD On:5/11/2022 7:21 PM This report was finalized on [id number] by  Anthony Lerma MD.    CT Angiogram  Neck    Result Date: 5/11/2022  Impression: 1. Patent major intracranial vasculature. No findings of large vessel occlusion. 2. Patent bilateral carotid arteries. No significant stenosis by NASCET criteria. 3. Patent bilateral vertebral arteries. 4. Asymmetric soft tissue fullness at base of the tongue on left which was described on the prior neck CT from 04/06/2022. Correlation with direct visualization recommended if not previously performed.  Electronically Signed By-Anthony Lerma MD On:5/11/2022 5:54 PM This report was finalized on [id number] by  Anthony Lerma MD.    MRI Brain Without Contrast    Result Date: 5/12/2022  Impression:  1. No acute intracranial findings. No evidence of recent ischemic insult. 2. Mild to moderate chronic microvascular disease.   Electronically Signed By-Halima Harding MD On:5/12/2022 11:38 AM This report was finalized on [id number] by  Halima Harding MD.    XR Chest 1 View    Result Date: 5/11/2022  Impression: No acute process.  Electronically Signed By-Anthony Lerma MD On:5/11/2022 6:41 PM This report was finalized on [id number] by  Anthony Lerma MD.    CT Head Without Contrast Stroke Protocol    Result Date: 5/11/2022  Impression: IMPRESSION : 1. No intracranial hemorrhage. 2. Mild white matter findings most consistent with changes of chronic microvascular disease.  Electronically Signed By-Anthony Lerma MD On:5/11/2022 5:13 PM This report was finalized on [id number] by  Anthony Lerma MD.    CT Angiogram Head w AI Analysis of LVO    Result Date: 5/11/2022  Impression: 1. Patent major intracranial vasculature. No findings of large vessel occlusion. 2. Patent bilateral carotid arteries. No significant stenosis by NASCET criteria. 3. Patent bilateral vertebral arteries. 4. Asymmetric soft tissue fullness at base of the tongue on left which was described on the prior neck CT from 04/06/2022. Correlation with direct visualization recommended if not previously performed.   Electronically Signed By-Anthony Lerma MD On:5/11/2022 5:54 PM This report was finalized on [id number] by  Anthony Lerma MD.      Results for orders placed during the hospital encounter of 10/19/19    Duplex Venous Lower Extremity - Left CAR    Interpretation Summary  · Normal left lower extremity venous duplex scan.      Results for orders placed during the hospital encounter of 10/19/19    Duplex Venous Lower Extremity - Left CAR    Interpretation Summary  · Normal left lower extremity venous duplex scan.      Results for orders placed during the hospital encounter of 05/11/22    Adult Transthoracic Echo Complete W/ Cont if Necessary Per Protocol (With Agitated Saline)    Interpretation Summary  · Left ventricular ejection fraction appears to be 61 - 65%.  · Saline test results are negative.  · No pericardial effusion noted      Labs Pending at Discharge:      Procedures Performed           Consults:   Consults     Date and Time Order Name Status Description    5/11/2022  7:25 PM Hospitalist (on-call MD unless specified)      5/11/2022  5:02 PM Inpatient Neurology Consult Stroke Completed     5/11/2022  5:02 PM Inpatient Neurology Consult Stroke Completed             Discharge Details        Discharge Medications      Changes to Medications      Instructions Start Date   amLODIPine-benazepril 5-20 MG per capsule  Commonly known as: LOTREL 5-20  What changed: when to take this   1 capsule, Oral, Daily      aspirin 81 MG tablet  What changed: how much to take   81 mg, Oral, Daily      baclofen 20 MG tablet  Commonly known as: LIORESAL  What changed:   · when to take this  · additional instructions   10 mg, Oral, 2 Times Daily, Then 0.5 mg daily x3 days and stop      HYDROcodone-acetaminophen  MG per tablet  Commonly known as: NORCO  What changed: Another medication with the same name was removed. Continue taking this medication, and follow the directions you see here.   1 tablet, Oral, 3 Times Daily PRN, DNF  before 5/2/2022      insulin aspart 100 UNIT/ML solution pen-injector sc pen  Commonly known as: NovoLOG FlexPen  What changed: how much to take   Inject 10 units before each meal      insulin glargine 100 UNIT/ML injection  Commonly known as: LANTUS, SEMGLEE  What changed:   · how much to take  · when to take this   30 Units, Subcutaneous, Daily         Continue These Medications      Instructions Start Date   CALCIUM CARBONATE-VIT D-MIN PO   1,200 mg, Oral, 2 Times Daily      diclofenac 1 % gel gel  Commonly known as: VOLTAREN   4 g, Topical, 4 Times Daily PRN      empagliflozin 25 MG tablet tablet  Commonly known as: Jardiance   25 mg, Oral, Daily      escitalopram 10 MG tablet  Commonly known as: LEXAPRO   10 mg, Oral, Every Evening      esomeprazole 40 MG capsule  Commonly known as: nexIUM   40 mg, Oral, 2 Times Daily      levothyroxine 100 MCG tablet  Commonly known as: SYNTHROID, LEVOTHROID   100 mcg, Oral, Daily      magnesium oxide 400 tablet tablet  Commonly known as: MAG-OX   400 mg, Oral, 2 Times Daily      metoprolol tartrate 100 MG tablet  Commonly known as: LOPRESSOR   100 mg, Oral, 2 Times Daily      promethazine 25 MG tablet  Commonly known as: PHENERGAN   1 tablet, Oral, As Needed      sucralfate 1 g tablet  Commonly known as: CARAFATE   1 g, Oral, 4 Times Daily      vitamin D 1.25 MG (70264 UT) capsule capsule  Commonly known as: ERGOCALCIFEROL   50,000 Units, Oral, Weekly         Stop These Medications    calcitriol 0.25 MCG capsule  Commonly known as: ROCALTROL     clorazepate 7.5 MG tablet  Commonly known as: TRANXENE     metFORMIN 1000 MG tablet  Commonly known as: GLUCOPHAGE     Rosuvastatin Calcium 40 MG capsule sprinkle     Trulicity 3 MG/0.5ML solution pen-injector  Generic drug: Dulaglutide            Allergies   Allergen Reactions   • Codeine Shortness Of Breath   • Oxycodone Hallucinations     Terrible nausea and vomiting         Discharge Disposition:   Home or Self  Care    Diet:  Hospital:  Diet Order   Procedures   • Diet Diabetic/Consistent Carbs; Diabetic - Consistent Carb         Discharge Activity:   Activity Instructions     Gradually Increase Activity Until at Pre-Hospitalization Level              CODE STATUS:  Code Status and Medical Interventions:   Ordered at: 05/1945     Code Status (Patient has no pulse and is not breathing):    CPR (Attempt to Resuscitate)     Medical Interventions (Patient has pulse or is breathing):    Full Support         Future Appointments   Date Time Provider Department Center   6/6/2022  9:00 AM LAB  [facility]  [facility] None   6/9/2022  1:00 PM Lisa Wells MD [facility]  [facility]   6/13/2022  2:30 PM Gege Ford MD [facility]       Additional Instructions for the Follow-ups that You Need to Schedule     Discharge Follow-up with PCP   As directed       Currently Documented PCP:    Suma Campbell APRN    PCP Phone Number:    [phone]     Follow Up Details: Follow-up with PCP next week         Hepatic function panel    May 17, 2022 (Approximate)      Release to patient: Immediate               Time spent on Discharge including face to face service:  32 minutes    This patient has been examined appropriate PPE . 05/12/22      Signature:

## 2022-05-12 NOTE — THERAPY EVALUATION
Patient Name: Marilyn J Pumphrey  : 1945    MRN: [id number]                              Today's Date: 2022       Admit Date: 2022    Visit Dx:     ICD-10-CM ICD-9-CM   1. Cerebrovascular accident (CVA), unspecified mechanism ([facility])  I63.9 434.91   2. Weakness  R53.1 780.79   3. 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[facility])   • Edema of nasopharynx   • FH: thromboembolic disease   • Gastroesophageal reflux disease   • History of pulmonary embolism   • Mixed hyperlipidemia   • Hypertension, benign   • Hypokalemia   • Hypothyroidism, postsurgical   • Injury of costal cartilage   • Malignant neoplasm of thyroid gland ([facility])   • XAVI (obstructive sleep apnea)   • Osteoarthritis   • Polyarthralgia   • Postsurgical hypoparathyroidism ([facility])   • Restless legs syndrome (RLS)   • Type 2 diabetes mellitus with hyperglycemia, with long-term current use of insulin ([facility])   • Type 2 diabetes mellitus without complications ([facility])   • Wrist sprain   • Osteoarthritis of left knee   • Sepsis ([facility])   • Impaired swallowing associated with throat pain   • Status post left knee replacement   • Abnormal LFTs   • Hx of total knee arthroplasty   • Acute respiratory failure with hypoxia ([facility])   • Atelectasis   • Facial droop     Past Medical History:   Diagnosis Date   • Anxiety    • Arthritis    • Bone pain    • Cancer ([facility]) 2016    THYROID   • Depression    • Disease of thyroid gland    • DM type 2 (diabetes mellitus, type 2) ([facility])     TYPE 2   • Fall     2021 FELL ON ICE   • GERD (gastroesophageal reflux disease)    • Hypertension    • Irregular heart beat    • Knee pain    • Leaky heart valve     2020   • Leg cramps    • Low back pain    • Osteoarthritis    •  PONV (postoperative nausea and vomiting)     DOES WELL WITH PROPOFOL   • Sleep apnea     C-PAP IN USE   • UTI (urinary tract infection)      Past Surgical History:   Procedure Laterality Date   • CARPAL TUNNEL RELEASE     • CHOLECYSTECTOMY  1974   • HYSTERECTOMY  1993    partial abdominal hysterectomy   • OOPHORECTOMY Bilateral 2015   • THYROID SURGERY      2 thyroid surgery   • TONSILLECTOMY     • TOTAL KNEE ARTHROPLASTY Left 10/14/2019    Procedure: TOTAL KNEE ARTHROPLASTY;  Surgeon: Sanjay Collado MD;  Location: [facility] MAIN OR;  Service: Orthopedics   • VEIN LIGATION AND STRIPPING BILATERAL        General Information     Row Name 05/12/22 1334          Physical Therapy Time and Intention    Document Type evaluation  -EL     Mode of Treatment individual therapy;physical therapy  -EL     Row Name 05/12/22 1334          General Information    Patient Profile Reviewed yes  -EL     Prior Level of Function independent:;all household mobility;ADL's  states she doesn't drive due to fear of falling asleep in car.  -     Row Name 05/12/22 1334          Living Environment    People in Home alone;other (see comments)  sister lives near and checks on frequently.  -EL     Row Name 05/12/22 1334          Home Main Entrance    Number of Stairs, Main Entrance none;other (see comments)  ramp  -EL     Row Name 05/12/22 1334          Stairs Within Home, Primary    Number of Stairs, Within Home, Primary none  -EL     Row Name 05/12/22 1334          Cognition    Orientation Status (Cognition) oriented x 4  -EL           User Key  (r) = Recorded By, (t) = Taken By, (c) = Cosigned By    Initials Name Provider Type    EL Biju Lambert PT Physical Therapist               Mobility     Row Name 05/12/22 1331          Bed Mobility    Bed Mobility bed mobility (all) activities  -EL     All Activities, [place] (Bed Mobility) independent  -EL     Row Name 05/12/22 1338          Sit-Stand Transfer    Sit-Stand [place] (Transfers)  standby assist  -EL     Row Name 05/12/22 1336          Gait/Stairs (Locomotion)    [place] Level (Gait) standby assist  -EL     Distance in Feet (Gait) 220  -EL           User Key  (r) = Recorded By, (t) = Taken By, (c) = Cosigned By    Initials Name Provider Type    Biju Souza PT Physical Therapist               Obj/Interventions     [facility] Name 05/12/22 1338          Range of Motion Comprehensive    General Range of Motion bilateral lower extremity ROM WFL  -[facility] Name 05/12/22 1338          Strength Comprehensive (MMT)    General Manual Muscle Testing (MMT) Assessment no strength deficits identified  -EL     Row Name 05/12/22 1338          Sensory Assessment (Somatosensory)    Sensory Assessment (Somatosensory) sensation intact  -EL           User Key  (r) = Recorded By, (t) = Taken By, (c) = Cosigned By    Initials Name Provider Type    Biju Souza PT Physical Therapist               Goals/Plan    No documentation.                Clinical Impression     Row Name 05/12/22 1338          Pain    Pretreatment Pain Rating 0/10 - no pain  -EL     Posttreatment Pain Rating 0/10 - no pain  -EL     Row Name 05/12/22 1338          Plan of Care Review    Plan of Care Reviewed With patient  -EL     Outcome Evaluation Pt is a 77 YO F admitted with weakness for approx 2 months, facial droop prior to admission which has since resolved. Pt states she lives home alone, but her sister lives near and checks on her frequently. Pt generally is independent with all ADLs, ambulation without AD and has had no recent falls. Pt states she has had severe fatigue recently, stating if she sits down anywhere she'll fall asleep, this has prevented her from driving recently, but her sister can generally provide transportation. This date pt demonstrates good mobility ambulating increased distance and performing all bed mobility and transfers without physical assistance. Recommendation is return home with assist from family as  needed following d/c.  -EL     Row Name 05/12/22 1338          Therapy Assessment/Plan (PT)    Rehab Potential (PT) good, to achieve stated therapy goals  -     Criteria for Skilled Interventions Met (PT) yes  -EL     Therapy Frequency (PT) 3 times/wk  -EL     Predicted Duration of Therapy Intervention (PT) Until d/c  -EL     Row Name 05/12/22 1338          Vital Signs    O2 Delivery Pre Treatment room air  -EL     O2 Delivery Intra Treatment room air  -EL     O2 Delivery Post Treatment room air  -EL     Pre Patient Position Supine  -EL     Intra Patient Position Standing  -EL     Post Patient Position Supine  -EL     Row Name 05/12/22 1338          Positioning and Restraints    Pre-Treatment Position in bed  -EL     Post Treatment Position bed  -EL     In Bed notified nsg;supine;call light within reach;encouraged to call for assist;exit alarm on  -EL           User Key  (r) = Recorded By, (t) = Taken By, (c) = Cosigned By    Initials Name Provider Type    Biju Souza, PT Physical Therapist               Outcome Measures     Row Name 05/12/22 1344          Modified Ramsey Scale    Pre-Stroke Modified Rikki Scale 6 - Unable to determine (UTD) from the medical record documentation  -     Modified Ramsey Scale 1 - No significant disability despite symptoms.  Able to carry out all usual duties and activities.  -     Row Name 05/12/22 1344          Functional Assessment    Outcome Measure Options Modified Ramsey  -           User Key  (r) = Recorded By, (t) = Taken By, (c) = Cosigned By    Initials Name Provider Type    Biju Souza, PT Physical Therapist                             Physical Therapy Education                 Title: PT OT SLP Therapies (In Progress)     Topic: Physical Therapy (In Progress)     Point: Mobility training (Done)     Learning Progress Summary           Patient Acceptance, E,TB, VU by PHYLLIS at 5/12/2022 1344                   Point: Home exercise program (Not Started)     Learner  Progress:  Not documented in this visit.          Point: Body mechanics (Not Started)     Learner Progress:  Not documented in this visit.          Point: Precautions (Done)     Learning Progress Summary           Patient Acceptance, E,TB, VU by  at 5/12/2022 1344                               User Key     Initials Effective Dates Name Provider Type [facility] 06/23/20 -  Biju Lambert PT Physical Therapist PT              PT Recommendation and Plan     Plan of Care Reviewed With: patient  Outcome Evaluation: Pt is a 75 YO F admitted with weakness for approx 2 months, facial droop prior to admission which has since resolved. Pt states she lives home alone, but her sister lives near and checks on her frequently. Pt generally is independent with all ADLs, ambulation without AD and has had no recent falls. Pt states she has had severe fatigue recently, stating if she sits down anywhere she'll fall asleep, this has prevented her from driving recently, but her sister can generally provide transportation. This date pt demonstrates good mobility ambulating increased distance and performing all bed mobility and transfers without physical assistance. Recommendation is return home with assist from family as needed following d/c.     Time Calculation:    PT Charges     Row Name 05/12/22 1345             Time Calculation    Start Time 0837  -EL      Stop Time 0857  -      Time Calculation (min) 20 min  -EL      PT Received On 05/12/22  -      PT - Next Appointment 05/13/22  -      PT Goal Re-Cert Due Date 05/26/22  -            User Key  (r) = Recorded By, (t) = Taken By, (c) = Cosigned By    Initials Name Provider Type     Biju Lambert PT Physical Therapist              Therapy Charges for Today     Code Description Service Date Service Provider Modifiers Qty    63394916688 HC PT EVAL MOD COMPLEXITY 3 5/12/2022 Biju Lambert, PT GP 1          PT G-Codes  Outcome Measure Options: Modified [place]  Modified Rikki  Scale: 1 - No significant disability despite symptoms.  Able to carry out all usual duties and activities.    Biju Lambert, PT  5/12/2022

## 2022-05-12 NOTE — PLAN OF CARE
Pt. States pt. Is at baseline level of functional independence, does not require skilled OT at this time

## 2022-05-12 NOTE — DISCHARGE INSTRUCTIONS
Elevated LFT  Probably medication induced  Medication induced- statin/excessive Tylenol use  Home medication of statin discontinued and patient advised on taking excess Tylenol  LFT-next week  Patient to follow-up with PCP as outpatient.    Progressive generalized weakness  Possibly due to high-dose baclofen  Neurologist recommended weaning off baclofen  Baclofen 10 mg twice daily x3 days  10 mg daily x3 days then discontinue

## 2022-05-12 NOTE — PLAN OF CARE
Goal Outcome Evaluation:   Seen by PT ambulating well w/o difficulty. Taken off falls risk

## 2022-05-12 NOTE — CASE MANAGEMENT/SOCIAL WORK
Discharge Planning Assessment  [facility]     Patient Name: Marilyn J Pumphrey  MRN: [id number]  Today's Date: 5/12/2022    Admit Date: 5/11/2022     Discharge Needs Assessment     Row Name 05/12/22 1050       Living Environment    People in Home alone    Current Living Arrangements home    Primary Care Provided by self    Provides Primary Care For no one    Family Caregiver if Needed child(deborah), adult    Quality of Family Relationships helpful;involved;supportive    Able to Return to Prior Arrangements yes    Living Arrangement Comments Lives alone       Resource/Environmental Concerns    Resource/Environmental Concerns none    Transportation Concerns none       Transition Planning    Patient/Family Anticipates Transition to home    Patient/Family Anticipated Services at Transition none    Transportation Anticipated family or friend will provide       Discharge Needs Assessment    Readmission Within the Last 30 Days no previous admission in last 30 days    Equipment Currently Used at Home glucometer;cane, straight;wheelchair;walker, rolling    Concerns to be Addressed discharge planning    Anticipated Changes Related to Illness none    Equipment Needed After Discharge none    Provided Post Acute Provider List? N/A    Provided Post Acute Provider Quality & Resource List? N/A               Discharge Plan     Row Name 05/12/22 105       Plan    Plan Anticipates home. Lives alone.    Plan Comments CM to patient's room to complete initial assessment. Patient lives alone (son checks & visits with her on a regular basis). Patient's sister to transport at discharge. Patient has RW, WC & cane (dont use). Patient also has glucometer. Patient states she is IADL. Patient does not  nor work. Home pharmacy is [facility]; however, she is agreeable to Meds to Bed. CM updated pharmacy.              Continued Care and Services - Admitted Since 5/11/2022    Coordination has not been started for this encounter.       Expected  Discharge Date and Time     Expected Discharge Date Expected Discharge Time    May 13, 2022          Demographic Summary     Row Name 05/12/22 1049       General Information    Admission Type observation    Arrived From emergency department    Required Notices Provided Observation Status Notice    Referral Source emergency department    Reason for Consult discharge planning    Preferred Language English       Contact Information    Permission Granted to Share Info With                Functional Status     Row Name 05/12/22 1049       Functional Status    Usual Activity Tolerance good    Current Activity Tolerance good       Functional Status, IADL    Medications independent    Meal Preparation independent    Housekeeping independent    Laundry independent    Shopping independent       Mental Status    General Appearance WDL WDL       Mental Status Summary    Recent Changes in Mental Status/Cognitive Functioning no changes       Employment/    Employment Status unemployed              Met with patient in room wearing PPE: mask, face shield/goggles, gloves, gown.      Maintained distance greater than six feet and spent less than 15 minutes in the room.    Anayeli Gomez RN, MSN  Care Manager  [phone]

## 2022-05-12 NOTE — NURSING NOTE
Lucila Thompsno, LYUDMILA paged at [phone] per pt request. Pt NPO and unable to swallow po restless leg medications. Lucila stated that there are no comparable IV meds to give. NNO. PT educated and verbalized understanding.

## 2022-05-12 NOTE — PLAN OF CARE
Goal Outcome Evaluation:                 Bedside swallow evaluation completed. Pt with increased frustration regarding NPO status, empathy and encouragement provided. L facial droop present with hx of Bell's palsy, pt reports primarily resolved from admit. Oral phase with no difficulties. Adequate labial seal with no anterior loss. No oral residuals. A-P transport and mastication timely. Pharyngeal phase with no overt s/s aspiration. Palpation suggests age appropriate hyo-laryngeal elevation.     Recommend: regular and thin liquid diet. Medications: with thin liquids. Compensations: small bites/sips, upright for all PO.     SLP to sign off at this time. Note negative MRI findings and no acute speech, language, cognitive complaints. Please re-consult if difficulties arise or with changes in condition.

## 2022-05-12 NOTE — THERAPY EVALUATION
Acute Care - Speech Language Pathology   Swallow Initial Evaluation  Rk     Patient Name: Marilyn J Pumphrey  : 1945  MRN: [id number]  Today's Date: 2022               Admit Date: 2022    Visit Dx:     ICD-10-CM ICD-9-CM   1. Cerebrovascular accident (CVA), unspecified mechanism ([facility])  I63.9 434.91   2. Weakness  R53.1 780.79   3. 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[facility])   • Edema of nasopharynx   • FH: thromboembolic disease   • Gastroesophageal reflux disease   • History of pulmonary embolism   • Mixed hyperlipidemia   • Hypertension, benign   • Hypokalemia   • Hypothyroidism, postsurgical   • Injury of costal cartilage   • Malignant neoplasm of thyroid gland ([facility])   • XAVI (obstructive sleep apnea)   • Osteoarthritis   • Polyarthralgia   • Postsurgical hypoparathyroidism ([facility])   • Restless legs syndrome (RLS)   • Type 2 diabetes mellitus with hyperglycemia, with long-term current use of insulin ([facility])   • Type 2 diabetes mellitus without complications ([facility])   • Wrist sprain   • Osteoarthritis of left knee   • Sepsis ([facility])   • Impaired swallowing associated with throat pain   • Status post left knee replacement   • Abnormal LFTs   • Hx of total knee arthroplasty   • Acute respiratory failure with hypoxia ([facility])   • Atelectasis   • Facial droop     Past Medical History:   Diagnosis Date   • Anxiety    • Arthritis    • Bone pain    • Cancer ([facility]) 2016    THYROID   • Depression    • Disease of thyroid gland    • DM type 2 (diabetes mellitus, type 2) ([facility])     TYPE 2   • Fall     2021 FELL ON ICE   • GERD (gastroesophageal reflux disease)    • Hypertension    • Irregular heart beat    • Knee pain    • Leaky heart valve     2020    • Leg cramps    • Low back pain    • Osteoarthritis    • PONV (postoperative nausea and vomiting)     DOES WELL WITH PROPOFOL   • Sleep apnea     C-PAP IN USE   • UTI (urinary tract infection)      Past Surgical History:   Procedure Laterality Date   • CARPAL TUNNEL RELEASE     • CHOLECYSTECTOMY  1974   • HYSTERECTOMY  1993    partial abdominal hysterectomy   • OOPHORECTOMY Bilateral 2015   • THYROID SURGERY      2 thyroid surgery   • TONSILLECTOMY     • TOTAL KNEE ARTHROPLASTY Left 10/14/2019    Procedure: TOTAL KNEE ARTHROPLASTY;  Surgeon: Sanjay Collado MD;  Location: [facility] OR;  Service: Orthopedics   • VEIN LIGATION AND STRIPPING BILATERAL         SLP Recommendation and Plan  Recommend: regular and thin liquid diet. Medications: with thin liquids. Compensations: small bites/sips, upright for all PO.       SLP to sign off at this time. Note negative MRI findings and no acute speech, language, cognitive complaints. Please re-consult if difficulties arise or with changes in condition.     SWALLOW EVALUATION (last 72 hours)     SLP Adult Swallow Evaluation     Row Name 05/12/22 1200       Rehab Evaluation    Document Type evaluation  -AB    Subjective Information no complaints  -AB    Patient Observations alert;cooperative;agree to therapy  -AB    Patient/Family/Caregiver Comments/Observations Pt is alert, cooperative, pleasant.  -AB    Patient Effort good  -AB    Symptoms Noted During/After Treatment none  -AB            General Information    Patient Profile Reviewed yes  -AB    Pertinent History Of Current Problem 76 y.o. female w/PMH of 2 diabetes mellitus, RA, RLS, hyperparathyroidism, osteoarthritis, XAVI, HLD, thyroid cancer, hypothyroidism, history of PE ED, GERD, pression, chronic pain, CAD, who presented to [facility] on 5/11/2022 complaining of several month history of increasing weakness, was seen at PCP earlier today where she was advised to report to the emergency department due to  noted left facial droop.  Patient reports that she has had increasing weakness over the last several days that she describes as overwhelming fatigue that is interfering with her ability to participate in her normal daily activities.  She was noted to have left facial droop in the emergency department earlier today, she states the left side of her face was drawn pretty significantly, and was complicated by discomfort in her left jaw and neck.  She does report previous Bell's palsy with resolved symptoms with no lasting effects.  Patient also reports hyperparathyroidism, and hypothyroidism both secondary to malignant neoplasm of thyroid gland, her replacement is managed by endocrine Dr. Donovan, she was very concerned that her calcium levels was possibly elevated causing her facial symptoms.  She denies any other associated neurological deficits, her speech is clear she is alert and oriented, there is no noted unilateral weakness, but does have generalized weakness that she reports over the last several days has required assistance from her family to even ambulate.  Patient denies any acute distress, chest pain, shortness of breath, palpitations, syncope, fever, chills, vomiting, diarrhea or  complaints.  Patient does report chronic constipation, and some intermittent nausea over the last several weeks with abdominal discomfort.  -AB    Current Method of Nutrition NPO  -AB    Precautions/Limitations, Vision WFL  -AB    Precautions/Limitations, Hearing WFL  -AB    Prior Level of Function-Communication WFL  -AB    Prior Level of Function-Swallowing regular textures;thin liquids  -AB    Plans/Goals Discussed with patient;agreed upon  -AB    Barriers to Rehab none identified  -AB    Patient's Goals for Discharge no concerns voiced  -AB            Oral Motor Structure and Function    Oral Lesions or Structural Abnormalities and/or variants none  -AB    Dentition Assessment natural, present and adequate;missing teeth;upper  dentures/partial in place  missing incisors and canines present  -AB    Secretion Management WNL/WFL  -AB    Mucosal Quality moist, healthy  -AB    Gag Response --  did not assess  -AB    Volitional Swallow WFL  -AB    Volitional Cough WFL  -AB            Oral Musculature and Cranial Nerve Assessment    Oral Motor General Assessment oral labial or buccal impairment  -AB    Oral Labial or Buccal Impairment, Detail, Cranial Nerve VII (Facial): left labial droop  -AB            Clinical Swallow Eval    Clinical Swallow Evaluation Summary Bedside swallow evaluation completed. Pt with increased frustration regarding NPO status, empathy and encouragement provided. L facial droop present with hx of Bell’s palsy, pt reports primarily resolved from admit. Oral phase with no difficulties. Adequate labial seal with no anterior loss. No oral residuals. A-P transport and mastication timely. Pharyngeal phase with no overt s/s aspiration. Palpation suggests age appropriate hyo-laryngeal elevation.   Recommend: regular and thin liquid diet. Medications: with thin liquids. Compensations: small bites/sips, upright for all PO.   SLP to sign off at this time. Note negative MRI findings and no acute speech, language, cognitive complaints. Please re-consult if difficulties arise or with changes in condition.  -AB            SLP Evaluation Clinical Impression    SLP Swallowing Diagnosis swallow WFL  -AB    Functional Impact no impact on function  -AB    Swallow Criteria for Skilled Therapeutic Interventions Met no problems identified which require skilled intervention;baseline status  -AB            SLP Treatment Clinical Impressions    Care Plan Review evaluation/treatment results reviewed;care plan/treatment goals reviewed;risks/benefits reviewed;current/potential barriers reviewed;patient/other agree to care plan  -AB            Recommendations    Therapy Frequency (Swallow) evaluation only  -AB    SLP Diet Recommendation regular  textures;thin liquids  -AB    Recommended Diagnostics No further SLP services recommended  -AB    Recommended Precautions and Strategies upright posture during/after eating;small bites of food and sips of liquid  -AB    Oral Care Recommendations Oral Care BID/PRN  -AB    SLP Rec. for Method of Medication Administration with thin liquids  -AB    Monitor for Signs of Aspiration yes;notify SLP if any concerns  -AB    Anticipated Discharge Disposition (SLP) home  -AB          User Key  (r) = Recorded By, (t) = Taken By, (c) = Cosigned By    Initials Name Effective Dates    Lela Smith, MS CCC-SLP 08/01/21 -                 EDUCATION  The patient has been educated in the following areas:   Dysphagia (Swallowing Impairment) Modified Diet Instruction.              Time Calculation:       Therapy Charges for Today     Code Description Service Date Service Provider Modifiers Qty    [id number]  ST EVAL ORAL PHARYNG SWALLOW 4 5/12/2022 Lela Ang, MS CCC-SLP GN 1             PPE  Patient was not wearing a face mask during this therapy encounter. Therapist used appropriate personal protective equipment including mask, eye protection and gloves.  Mask used was standard procedure mask. Appropriate PPE was worn during the entire therapy session. The patient coughed during this evaluation. Therapist was within 6 feet for 15 minutes or more during the evaluation. Hand hygiene was completed before and after therapy session. Patient is not in enhanced droplet precautions.       Lela Ang MS CCC-SLP  5/12/2022

## 2022-05-12 NOTE — PLAN OF CARE
Problem: Adult Inpatient Plan of Care  Goal: Plan of Care Review  Recent Flowsheet Documentation  Taken 5/12/2022 3498 by Biju Lambert PT  Plan of Care Reviewed With: patient  Outcome Evaluation: Pt is a 77 YO F admitted with weakness for approx 2 months, facial droop prior to admission which has since resolved. Pt states she lives home alone, but her sister lives near and checks on her frequently. Pt generally is independent with all ADLs, ambulation without AD and has had no recent falls. Pt states she has had severe fatigue recently, stating if she sits down anywhere she'll fall asleep, this has prevented her from driving recently, but her sister can generally provide transportation. This date pt demonstrates good mobility ambulating increased distance and performing all bed mobility and transfers without physical assistance. Recommendation is return home with assist from family as needed following d/c.

## 2022-05-12 NOTE — CONSULTS
"Diabetes Education  Assessment/Teaching    Patient Name:  Marilyn J Pumphrey  YOB: 1945  MRN: [id number]  Admit Date:  5/11/2022      Assessment Date:  5/12/2022  Flowsheet Row Most Recent Value   General Information     Referral From: A1c  [Pt seen due to A1c result of 8.3%.]   Height 170.2 cm (67\")   Height Method Stated   Weight 71.7 kg (158 lb)   Weight Method Bed scale   Pregnancy Assessment    Diabetes History    What type of diabetes do you have? Type 2   Do you test your blood sugar at home? yes   Frequency of checks checking bs 4-5 times/day   Meter type Accuchek Patito Plus, about 3 years old   Who performs the test? self   Have you had low blood sugar? (<70mg/dl) yes   How often do you have low blood sugar? occasionally   Education Preferences    Nutrition Information    Assessment Topics    DM Goals           Flowsheet Row Most Recent Value   DM Education Needs    Meter Has own   Meter Type Accuchek   Frequency of Testing AC/HS  [Encouraged pt to report bs to MD office when running outside healthy range.]   Blood Glucose Target Range Discussed A1c result of 8.3%. Reviewed healthy bs range and healthy A1c target. Discussed importance of bs control.   Medication Oral, Insulin, Other injectables, Vial, Pen  [Pt taking trulicity 3 mg/wk, Jardiance 25 mg/day, metformin 1000 mg bid, Lantus 30 units in am per vial, and Novolog 10 units ac her pen. Pt may only eat 1 meal/day]   Problem Solving Hypoglycemia, Signs, Symptoms, Treatment  [Discussed always carrying glucose source with her. Gave low bs handout.]   Reducing Risks A1C testing  [Gave A1c info sheet.]   Healthy Eating --  [Pt states she does not drink sugared beverages. She may snack on a yogurt parfait or fruit. She loves fruit and may be overeating on fruit. Discussed if food contains 30 grams or more of CHO, she should consider it a meal and give the mealtime insulin.]   Motivation Engaged   Teaching Method Explanation, Discussion, " Handouts   Patient Response Verbalized understanding            Other Comments:  Pt follows at the [facility]. She has follow up appt with MD at the [facility] on 6/13/2022. Encouraged pt to get new bs meter when her meter is about 4 years old. [facility]. Explained the [facility] would give her a new meter or [facility] would give her an Accuchek Guide and she would still be able to get test strips through the [facility]. Explained she would not have to do this right now but should in about 1 year. Pt states has never counted CHO. Explained is she is eating a bowl of cereal in am to give mealtime bolus. Gave other examples of foods she has been eating but pt was not considering it a meal and not bolusing. Also discussed if eating fruit, she should eat some protein with it and gave examples. Gave pt the ADA diabetes booklet. Pt with no additional questions.       Electronically signed by:  Deborah Heart RN  05/12/22 15:46 EDT

## 2022-05-12 NOTE — PLAN OF CARE
Problem: Adult Inpatient Plan of Care  Goal: Plan of Care Review  Outcome: Ongoing, Progressing  Goal: Patient-Specific Goal (Individualized)  Outcome: Ongoing, Progressing  Goal: Absence of Hospital-Acquired Illness or Injury  Outcome: Ongoing, Progressing  Intervention: Identify and Manage Fall Risk  Recent Flowsheet Documentation  Taken 5/12/2022 0400 by Svitlana Dorantes LPN  Safety Promotion/Fall Prevention:   safety round/check completed   assistive device/personal items within reach   clutter free environment maintained   nonskid shoes/slippers when out of bed   lighting adjusted   fall prevention program maintained  Taken 5/12/2022 0200 by Svitlana Dorantes LPN  Safety Promotion/Fall Prevention:   safety round/check completed   room organization consistent   nonskid shoes/slippers when out of bed   assistive device/personal items within reach   clutter free environment maintained   fall prevention program maintained  Taken 5/12/2022 0000 by Svitlana Dorantes LPN  Safety Promotion/Fall Prevention:   safety round/check completed   room organization consistent   nonskid shoes/slippers when out of bed   assistive device/personal items within reach   clutter free environment maintained   fall prevention program maintained  Taken 5/11/2022 2200 by Svitlana Dorantes LPN  Safety Promotion/Fall Prevention:   safety round/check completed   room organization consistent   nonskid shoes/slippers when out of bed   fall prevention program maintained   assistive device/personal items within reach  Intervention: Prevent and Manage VTE (Venous Thromboembolism) Risk  Recent Flowsheet Documentation  Taken 5/12/2022 0000 by Svitlana Dorantes LPN  VTE Prevention/Management:   bilateral   sequential compression devices on  Intervention: Prevent Infection  Recent Flowsheet Documentation  Taken 5/12/2022 0400 by Svitlana Dorantes LPN  Infection Prevention:   environmental surveillance performed   single patient room provided  Taken 5/12/2022 0200 by Jayna  ANNA Shane  Infection Prevention:   single patient room provided   environmental surveillance performed   hand hygiene promoted   rest/sleep promoted  Taken 5/12/2022 0000 by Svitlana Dorantes LPN  Infection Prevention:   single patient room provided   rest/sleep promoted   environmental surveillance performed  Taken 5/11/2022 2200 by Svitlana Dorantes LPN  Infection Prevention:   single patient room provided   hand hygiene promoted   equipment surfaces disinfected  Goal: Optimal Comfort and Wellbeing  Outcome: Ongoing, Progressing  Intervention: Provide Person-Centered Care  Recent Flowsheet Documentation  Taken 5/12/2022 0000 by Svitlana Dorantes LPN  Trust Relationship/Rapport:   care explained   choices provided   emotional support provided   empathic listening provided   questions answered   questions encouraged  Goal: Readiness for Transition of Care  Outcome: Ongoing, Progressing     Problem: Diabetes Comorbidity  Goal: Blood Glucose Level Within Targeted Range  Outcome: Ongoing, Progressing     Problem: Hypertension Comorbidity  Goal: Blood Pressure in Desired Range  Outcome: Ongoing, Progressing     Problem: Fall Injury Risk  Goal: Absence of Fall and Fall-Related Injury  Outcome: Ongoing, Progressing  Intervention: Promote Injury-Free Environment  Recent Flowsheet Documentation  Taken 5/12/2022 0400 by Svitlana Dorantes LPN  Safety Promotion/Fall Prevention:   safety round/check completed   assistive device/personal items within reach   clutter free environment maintained   nonskid shoes/slippers when out of bed   lighting adjusted   fall prevention program maintained  Taken 5/12/2022 0200 by Svitlana Dorantes LPN  Safety Promotion/Fall Prevention:   safety round/check completed   room organization consistent   nonskid shoes/slippers when out of bed   assistive device/personal items within reach   clutter free environment maintained   fall prevention program maintained  Taken 5/12/2022 0000 by Svitlana Dorantes LPN  Safety  Promotion/Fall Prevention:   safety round/check completed   room organization consistent   nonskid shoes/slippers when out of bed   assistive device/personal items within reach   clutter free environment maintained   fall prevention program maintained  Taken 5/11/2022 2200 by Svitlana Dorantes LPN  Safety Promotion/Fall Prevention:   safety round/check completed   room organization consistent   nonskid shoes/slippers when out of bed   fall prevention program maintained   assistive device/personal items within reach   Goal Outcome Evaluation:         Patient admitted to room 272. Facial droop present and swallow screen failed in ER. Patient has voiced wanting her meds for restless legs, Np notified. Pt educated that being NPO she cannot have meds/anything by mouth until seen by SLP today. NP stated that there are no IV substitutions for restless legs. Patient voiced understanding, and appreciative.

## 2022-05-13 ENCOUNTER — TELEPHONE (OUTPATIENT)
Dept: ENDOCRINOLOGY | Facility: CLINIC | Age: 77
End: 2022-05-13

## 2022-05-13 DIAGNOSIS — E11.65 TYPE 2 DIABETES MELLITUS WITH HYPERGLYCEMIA, WITH LONG-TERM CURRENT USE OF INSULIN: Primary | ICD-10-CM

## 2022-05-13 DIAGNOSIS — Z79.4 TYPE 2 DIABETES MELLITUS WITH HYPERGLYCEMIA, WITH LONG-TERM CURRENT USE OF INSULIN: Primary | ICD-10-CM

## 2022-05-13 NOTE — CASE MANAGEMENT/SOCIAL WORK
Case Management Discharge Note      Final Note: DC Home.    Provided Post Acute Provider List?: N/A  Provided Post Acute Provider Quality & Resource List?: N/A            Transportation Services  Private: Car (Sister)    Final Discharge Disposition Code: 01 - home or self-care     Anayeli Gomez RN, MSN  Care Manager  [phone]

## 2022-05-13 NOTE — TELEPHONE ENCOUNTER
Patient called and left voicemail stating she was just discharged from [facility] and they want her to stop her calcitriol, metformin, and Trulicity. She wants to know if you want her to stop them or continue.

## 2022-05-13 NOTE — OUTREACH NOTE
Prep Survey    Flowsheet Row Responses   [facility] facility patient discharged from? Rk   Is LACE score < 7 ? No   Emergency Room discharge w/ pulse ox? No   Eligibility Readm Mgmt   Discharge diagnosis left facial droop,  facial myokymia   Does the patient have one of the following disease processes/diagnoses(primary or secondary)? Other   Does the patient have Home health ordered? No   Is there a DME ordered? No   Comments regarding appointments See AVS   Medication alerts for this patient Insulin, ASA   Prep survey completed? Yes          JHON MOSS - Registered Nurse

## 2022-05-15 LAB — QT INTERVAL: 371 MS

## 2022-05-18 ENCOUNTER — READMISSION MANAGEMENT (OUTPATIENT)
Dept: CALL CENTER | Facility: HOSPITAL | Age: 77
End: 2022-05-18

## 2022-05-20 ENCOUNTER — TELEPHONE (OUTPATIENT)
Dept: ENDOCRINOLOGY | Facility: CLINIC | Age: 77
End: 2022-05-20

## 2022-05-20 NOTE — TELEPHONE ENCOUNTER
Patient informed. She states she thinks her primary care doctor just did one the other day. She is going to call them and have it faxed to me. Once received I will check with Dr Ford and see if another one needs done.

## 2022-05-20 NOTE — TELEPHONE ENCOUNTER
She is feeling worse tingling all over, Numbness and tingling are worse. I told her to go to the ER to get checked out.

## 2022-05-31 DIAGNOSIS — E11.65 TYPE 2 DIABETES MELLITUS WITH HYPERGLYCEMIA, WITH LONG-TERM CURRENT USE OF INSULIN: Primary | ICD-10-CM

## 2022-05-31 DIAGNOSIS — Z79.4 TYPE 2 DIABETES MELLITUS WITH HYPERGLYCEMIA, WITH LONG-TERM CURRENT USE OF INSULIN: Primary | ICD-10-CM

## 2022-05-31 DIAGNOSIS — E83.52 HYPERCALCEMIA: ICD-10-CM

## 2022-06-01 ENCOUNTER — LAB (OUTPATIENT)
Dept: LAB | Facility: HOSPITAL | Age: 77
End: 2022-06-01

## 2022-06-01 DIAGNOSIS — E89.0 HYPOTHYROIDISM, POSTSURGICAL: ICD-10-CM

## 2022-06-01 DIAGNOSIS — C73 MALIGNANT NEOPLASM OF THYROID GLAND: ICD-10-CM

## 2022-06-01 DIAGNOSIS — Z79.4 TYPE 2 DIABETES MELLITUS WITH HYPERGLYCEMIA, WITH LONG-TERM CURRENT USE OF INSULIN: ICD-10-CM

## 2022-06-01 DIAGNOSIS — E11.65 TYPE 2 DIABETES MELLITUS WITH HYPERGLYCEMIA, WITH LONG-TERM CURRENT USE OF INSULIN: ICD-10-CM

## 2022-06-01 LAB — HBA1C MFR BLD: 8.1 % (ref 3.5–5.6)

## 2022-06-01 PROCEDURE — 80061 LIPID PANEL: CPT

## 2022-06-01 PROCEDURE — 83036 HEMOGLOBIN GLYCOSYLATED A1C: CPT

## 2022-06-01 PROCEDURE — 84432 ASSAY OF THYROGLOBULIN: CPT

## 2022-06-01 PROCEDURE — 36415 COLL VENOUS BLD VENIPUNCTURE: CPT

## 2022-06-01 PROCEDURE — 80053 COMPREHEN METABOLIC PANEL: CPT

## 2022-06-01 PROCEDURE — 86800 THYROGLOBULIN ANTIBODY: CPT

## 2022-06-01 PROCEDURE — 84443 ASSAY THYROID STIM HORMONE: CPT

## 2022-06-01 PROCEDURE — 84439 ASSAY OF FREE THYROXINE: CPT

## 2022-06-02 DIAGNOSIS — E89.0 HYPOTHYROIDISM, POSTSURGICAL: ICD-10-CM

## 2022-06-02 LAB
ALBUMIN SERPL-MCNC: 4 G/DL (ref 3.5–5.2)
ALBUMIN/GLOB SERPL: 1.4 G/DL
ALP SERPL-CCNC: 120 U/L (ref 39–117)
ALT SERPL W P-5'-P-CCNC: 19 U/L (ref 1–33)
ANION GAP SERPL CALCULATED.3IONS-SCNC: 16 MMOL/L (ref 5–15)
AST SERPL-CCNC: 16 U/L (ref 1–32)
BILIRUB SERPL-MCNC: 0.2 MG/DL (ref 0–1.2)
BUN SERPL-MCNC: 12 MG/DL (ref 8–23)
BUN/CREAT SERPL: 15.4 (ref 7–25)
CALCIUM SPEC-SCNC: 9.4 MG/DL (ref 8.6–10.5)
CHLORIDE SERPL-SCNC: 99 MMOL/L (ref 98–107)
CHOLEST SERPL-MCNC: 219 MG/DL (ref 0–200)
CO2 SERPL-SCNC: 26 MMOL/L (ref 22–29)
CREAT SERPL-MCNC: 0.78 MG/DL (ref 0.57–1)
EGFRCR SERPLBLD CKD-EPI 2021: 79.3 ML/MIN/1.73
GLOBULIN UR ELPH-MCNC: 2.9 GM/DL
GLUCOSE SERPL-MCNC: 236 MG/DL (ref 65–99)
HDLC SERPL-MCNC: 42 MG/DL (ref 40–60)
LDLC SERPL CALC-MCNC: 137 MG/DL (ref 0–100)
LDLC/HDLC SERPL: 3.15 {RATIO}
POTASSIUM SERPL-SCNC: 4.4 MMOL/L (ref 3.5–5.2)
PROT SERPL-MCNC: 6.9 G/DL (ref 6–8.5)
SODIUM SERPL-SCNC: 141 MMOL/L (ref 136–145)
T4 FREE SERPL-MCNC: 1.11 NG/DL (ref 0.93–1.7)
TRIGL SERPL-MCNC: 224 MG/DL (ref 0–150)
TSH SERPL DL<=0.05 MIU/L-ACNC: 0.41 UIU/ML (ref 0.27–4.2)
VLDLC SERPL-MCNC: 40 MG/DL (ref 5–40)

## 2022-06-02 RX ORDER — LEVOTHYROXINE SODIUM 0.1 MG/1
100 TABLET ORAL DAILY
Qty: 90 TABLET | Refills: 2 | Status: SHIPPED | OUTPATIENT
Start: 2022-06-02 | End: 2023-01-27 | Stop reason: SDUPTHER

## 2022-06-08 NOTE — TELEPHONE ENCOUNTER
Asking for Metformin but we don't have it in her chart in her med list. Sh will call her PCP and if not bale to get she will call me back tomorrow.

## 2022-06-09 ENCOUNTER — OFFICE VISIT (OUTPATIENT)
Dept: PAIN MEDICINE | Facility: CLINIC | Age: 77
End: 2022-06-09

## 2022-06-09 VITALS
HEART RATE: 72 BPM | WEIGHT: 162 LBS | SYSTOLIC BLOOD PRESSURE: 146 MMHG | BODY MASS INDEX: 25.43 KG/M2 | DIASTOLIC BLOOD PRESSURE: 82 MMHG | OXYGEN SATURATION: 97 % | RESPIRATION RATE: 16 BRPM | HEIGHT: 67 IN

## 2022-06-09 DIAGNOSIS — M47.817 LUMBOSACRAL SPONDYLOSIS WITHOUT MYELOPATHY: ICD-10-CM

## 2022-06-09 DIAGNOSIS — G89.4 CHRONIC PAIN SYNDROME: Primary | ICD-10-CM

## 2022-06-09 DIAGNOSIS — M79.18 MYOFASCIAL PAIN SYNDROME: ICD-10-CM

## 2022-06-09 DIAGNOSIS — M25.50 POLYARTHRALGIA: ICD-10-CM

## 2022-06-09 DIAGNOSIS — Z79.899 HIGH RISK MEDICATION USE: ICD-10-CM

## 2022-06-09 PROCEDURE — 99214 OFFICE O/P EST MOD 30 MIN: CPT | Performed by: ANESTHESIOLOGY

## 2022-06-09 RX ORDER — DULAGLUTIDE 3 MG/.5ML
4.5 INJECTION, SOLUTION SUBCUTANEOUS WEEKLY
COMMUNITY
End: 2023-01-27

## 2022-06-09 RX ORDER — HYDROCODONE BITARTRATE AND ACETAMINOPHEN 10; 325 MG/1; MG/1
1 TABLET ORAL 3 TIMES DAILY PRN
Qty: 90 TABLET | Refills: 0 | Status: SHIPPED | OUTPATIENT
Start: 2022-07-09 | End: 2022-08-09 | Stop reason: SDUPTHER

## 2022-06-09 RX ORDER — BACLOFEN 20 MG/1
20 TABLET ORAL
COMMUNITY
End: 2023-01-27

## 2022-06-09 RX ORDER — HYDROCODONE BITARTRATE AND ACETAMINOPHEN 10; 325 MG/1; MG/1
1 TABLET ORAL 3 TIMES DAILY PRN
Qty: 90 TABLET | Refills: 0 | Status: SHIPPED | OUTPATIENT
Start: 2022-06-09 | End: 2022-08-09 | Stop reason: SDUPTHER

## 2022-06-09 RX ORDER — CALCITRIOL 0.25 UG/1
0.25 CAPSULE, LIQUID FILLED ORAL
COMMUNITY
End: 2023-01-17

## 2022-06-09 NOTE — PROGRESS NOTES
Subjective    CC joints pain left knee pain  Marilyn J Pumphrey is a 76 y.o. female with multiple comorbidities including DM, polyarthralgia from  inflammatory osteoarthritis, bilateral knee pain, status post left TKA 2019, here for f/u.  Worsening polyarthralgia in the last couple weeks.  Has been taking hydrocodone up to 3 times daily with mild relief.  Has follow-up with Ortho for knee injections.  Also used to have lumbar injection at the orthopedist's office with good relief and functional benefit but the provider left.  Chronic back pain radiating to bilateral hips worse with prolonged activity.  Chronic polyarthralgia/left knee pain,  and generalized myofascial pain interfering with daily activity, sleep,mood.  Considering right knee TKA.  Tried methotrexate and  Celebrex without relief.  Tried Cymbalta, sevella, tramadol without relief.  Seen Rheumatology. and ortho. considering right knee replecement.   Good releif with knee injections by ortho.      Utilizes hydrocodone 10/325  with good relief functional benefit denies side effects.    L-spine MRI 2021.  Multiple level degenerative disc disease and degenerative changes.  Small left paracentral disc protrusion at L3-L4 which appears to abut the exiting nerve root with mild encroachment on neural foramina on the left.    Pain Assessment   Location of Pain: Lower Back, R Hip, L Hip, L Leg, neck pain, joint  Description of Pain: Dull/Aching, Throbbing, Stabbing  Previous Pain Rating :5  Current Pain Ratin  Aggravating Factors: Activity  Alleviating Factors: Rest, Medication  PEG Assessment   What number best describes your pain on average in the past week?8  What number best describes how, during the past week, pain has interfered with your enjoyment of life?7  What number best describes how, during the past week, pain has interfered with your general activity? 10     The following portions of the patient's history were reviewed and updated as  "appropriate: allergies, current medications, past family history, past medical history, past social history, past surgical history and problem list.   has a past medical history of Anxiety, Arthritis, Bone pain, Cancer ([facility]) (09/2016), Depression, Disease of thyroid gland, DM type 2 (diabetes mellitus, type 2) ([facility]), Fall, GERD (gastroesophageal reflux disease), Hypertension, Irregular heart beat, Knee pain, Leaky heart valve, Leg cramps, Low back pain, Numbness around mouth, Osteoarthritis, PONV (postoperative nausea and vomiting), Sleep apnea, and UTI (urinary tract infection).   has a past surgical history that includes Tonsillectomy; Thyroid surgery; Oophorectomy (Bilateral, 2015); Hysterectomy (1993); Cholecystectomy (1974); Carpal tunnel release; vein ligation and stripping bilateral; and Total knee arthroplasty (Left, 10/14/2019).  Social History     Tobacco Use   • Smoking status: Never Smoker   • Smokeless tobacco: Never Used   Substance Use Topics   • Alcohol use: No     Review of Systems   Musculoskeletal: Positive for arthralgias, back pain, joint swelling, myalgias and neck pain.   All other systems reviewed and are negative.    Objective   Physical Exam   Constitutional: No distress.   Pulmonary/Chest: Effort normal.   Musculoskeletal:      Right knee: Tenderness found.      Left knee: Tenderness found.      Cervical back: She exhibits decreased range of motion and tenderness.      Lumbar back: She exhibits decreased range of motion and tenderness.      Comments: Lumbar loading positive, pain on extension of low back past 5 degrees.  TTP on the lumbar facets noted.  Leg raise left   Vitals reviewed.    /82   Pulse 72   Resp 16   Ht 170.2 cm (67\")   Wt 73.5 kg (162 lb)   SpO2 97%   BMI 25.37 kg/m²     PHQ 9 on chart  Opioid risk tool low risk    Assessment & Plan   Diagnoses and all orders for this visit:    1. Chronic pain syndrome (Primary)  -     HYDROcodone-acetaminophen (NORCO)  " MG per tablet; Take 1 tablet by mouth 3 (Three) Times a Day As Needed for Severe Pain .  Dispense: 90 tablet; Refill: 0  -     HYDROcodone-acetaminophen (NORCO)  MG per tablet; Take 1 tablet by mouth 3 (Three) Times a Day As Needed for Severe Pain . DNF before 7/9/2022  Dispense: 90 tablet; Refill: 0    2. Lumbosacral spondylosis without myelopathy    3. Polyarthralgia    4. Myofascial pain syndrome    5. High risk medication use  -     Urine Drug Screen - Urine, Clean Catch; Future      Summary  76 y.o. female with multiple comorbidities including DM, polyarthralgia from  inflammatory osteoarthritis, knee pain S/P left TKA, here for f/u.  Chronic polyarthralgia/left knee pain,  and generalized myofascial pain interfering with daily activity, sleep,mood.      Worsening polyarthralgia in the last couple weeks.  Has been taking hydrocodone up to 3 times daily with mild relief.  Has follow-up with Ortho for knee injections.  Also used to have lumbar injection at her orthopedist's office with good relief and functional benefit but the provider left.  We will obtain record on the injection and plan on repeating.    Continue hydrocodone 10/325 2-3 times daily as needed for severe pain.  UDS and  Inspect reviewed.    Discussed risk of tolerance, dependence, respiratory depression, coma and death associated with use of oral opioids for treatment of chronic nonmalignant pain.      Discussed increased risk with combining opioid and benzo. On clorazepate prn anxiety.    RTC in 2 months

## 2022-06-10 LAB
THYROGLOB AB SERPL-ACNC: 1.4 IU/ML
THYROGLOB SERPL-MCNC: <2 NG/ML
THYROGLOB SERPL-MCNC: ABNORMAL NG/ML

## 2022-06-13 ENCOUNTER — TELEPHONE (OUTPATIENT)
Dept: ENDOCRINOLOGY | Facility: CLINIC | Age: 77
End: 2022-06-13

## 2022-08-09 ENCOUNTER — OFFICE VISIT (OUTPATIENT)
Dept: PAIN MEDICINE | Facility: CLINIC | Age: 77
End: 2022-08-09

## 2022-08-09 VITALS
HEART RATE: 76 BPM | SYSTOLIC BLOOD PRESSURE: 135 MMHG | DIASTOLIC BLOOD PRESSURE: 77 MMHG | HEIGHT: 67 IN | OXYGEN SATURATION: 97 % | BODY MASS INDEX: 26.21 KG/M2 | WEIGHT: 167 LBS | RESPIRATION RATE: 16 BRPM

## 2022-08-09 DIAGNOSIS — M47.817 LUMBOSACRAL SPONDYLOSIS WITHOUT MYELOPATHY: ICD-10-CM

## 2022-08-09 DIAGNOSIS — M79.18 MYOFASCIAL PAIN SYNDROME: ICD-10-CM

## 2022-08-09 DIAGNOSIS — Z79.899 HIGH RISK MEDICATION USE: ICD-10-CM

## 2022-08-09 DIAGNOSIS — M25.50 POLYARTHRALGIA: ICD-10-CM

## 2022-08-09 DIAGNOSIS — G89.4 CHRONIC PAIN SYNDROME: Primary | ICD-10-CM

## 2022-08-09 PROCEDURE — 99214 OFFICE O/P EST MOD 30 MIN: CPT | Performed by: ANESTHESIOLOGY

## 2022-08-09 RX ORDER — ROSUVASTATIN CALCIUM 40 MG/1
20 TABLET, COATED ORAL DAILY
COMMUNITY

## 2022-08-09 RX ORDER — HYDROCODONE BITARTRATE AND ACETAMINOPHEN 10; 325 MG/1; MG/1
1 TABLET ORAL 3 TIMES DAILY PRN
Qty: 90 TABLET | Refills: 0 | Status: SHIPPED | OUTPATIENT
Start: 2022-08-10 | End: 2022-10-04 | Stop reason: SDUPTHER

## 2022-08-09 RX ORDER — HYDROCODONE BITARTRATE AND ACETAMINOPHEN 10; 325 MG/1; MG/1
1 TABLET ORAL 3 TIMES DAILY PRN
Qty: 90 TABLET | Refills: 0 | Status: SHIPPED | OUTPATIENT
Start: 2022-09-08 | End: 2022-10-04 | Stop reason: SDUPTHER

## 2022-08-09 NOTE — PROGRESS NOTES
Subjective    CC joints pain left knee pain  Marilyn J Pumphrey is a 76 y.o. female with multiple comorbidities including DM, polyarthralgia from  inflammatory osteoarthritis, bilateral knee pain, status post left TKA , here for f/u.  Denies new complaints.  Continued bilateral knee pain.  Sees Ortho for injections.  Chronic back pain radiating to bilateral hips worse with prolonged activity.  Chronic polyarthralgia/left knee pain,  and generalized myofascial pain interfering with daily activity, sleep,mood.  Considering right knee TKA.  Tried methotrexate and  Celebrex without relief.  Tried Cymbalta, sevella, tramadol without relief.  Seen Rheumatology. and ortho. considering right knee replecement.   Good releif with knee injections by ortho.      Utilizes hydrocodone 10/325  with good relief functional benefit denies side effects.    L-spine MRI 2021.  Multiple level degenerative disc disease and degenerative changes.  Small left paracentral disc protrusion at L3-L4 which appears to abut the exiting nerve root with mild encroachment on neural foramina on the left.    Pain Assessment   Location of Pain: Lower Back, R Hip, L Hip, L Leg, neck pain, joint  Description of Pain: Dull/Aching, Throbbing, Stabbing  Previous Pain Rating :9  Current Pain Ratin  Aggravating Factors: Activity  Alleviating Factors: Rest, Medication  PEG Assessment   What number best describes your pain on average in the past week?8  What number best describes how, during the past week, pain has interfered with your enjoyment of life?7  What number best describes how, during the past week, pain has interfered with your general activity? 10     The following portions of the patient's history were reviewed and updated as appropriate: allergies, current medications, past family history, past medical history, past social history, past surgical history and problem list.   has a past medical history of Anxiety, Arthritis, Bone pain, Cancer  "([facility]) (09/2016), Depression, Disease of thyroid gland, DM type 2 (diabetes mellitus, type 2) ([facility]), Fall, GERD (gastroesophageal reflux disease), Hypertension, Irregular heart beat, Knee pain, Leaky heart valve, Leg cramps, Low back pain, Numbness around mouth, Osteoarthritis, PONV (postoperative nausea and vomiting), Sleep apnea, and UTI (urinary tract infection).   has a past surgical history that includes Tonsillectomy; Thyroid surgery; Oophorectomy (Bilateral, 2015); Hysterectomy (1993); Cholecystectomy (1974); Carpal tunnel release; vein ligation and stripping bilateral; and Total knee arthroplasty (Left, 10/14/2019).  Social History     Tobacco Use   • Smoking status: Never Smoker   • Smokeless tobacco: Never Used   Substance Use Topics   • Alcohol use: No     Review of Systems   Musculoskeletal: Positive for arthralgias, back pain, joint swelling, myalgias and neck pain.   All other systems reviewed and are negative.    Objective   Physical Exam   Constitutional: No distress.   Pulmonary/Chest: Effort normal.   Musculoskeletal:      Right knee: Tenderness found.      Left knee: Tenderness found.      Cervical back: She exhibits decreased range of motion and tenderness.      Lumbar back: She exhibits decreased range of motion and tenderness.      Comments: Lumbar loading positive, pain on extension of low back past 5 degrees.  TTP on the lumbar facets noted.  Leg raise left   Vitals reviewed.    /77   Pulse 76   Resp 16   Ht 170.2 cm (67\")   Wt 75.8 kg (167 lb)   SpO2 97%   BMI 26.16 kg/m²     PHQ 9 on chart  Opioid risk tool low risk    Assessment & Plan   Diagnoses and all orders for this visit:    1. Chronic pain syndrome (Primary)  -     HYDROcodone-acetaminophen (NORCO)  MG per tablet; Take 1 tablet by mouth 3 (Three) Times a Day As Needed for Severe Pain .  Dispense: 90 tablet; Refill: 0  -     HYDROcodone-acetaminophen (NORCO)  MG per tablet; Take 1 tablet by mouth 3 (Three) " Times a Day As Needed for Severe Pain . DNF before 9/8/2022  Dispense: 90 tablet; Refill: 0    2. Lumbosacral spondylosis without myelopathy    3. Polyarthralgia    4. Myofascial pain syndrome    5. High risk medication use      Summary  76 y.o. female with multiple comorbidities including DM, polyarthralgia from  inflammatory osteoarthritis, knee pain S/P left TKA, here for f/u.  Chronic polyarthralgia/left knee pain,  and generalized myofascial pain interfering with daily activity, sleep,mood.      Denies new complaints.  Continued bilateral knee pain.  Sees Ortho for injections.  Note from Ortho reviewed.    Continue hydrocodone 10/325 2-3 times daily as needed for severe pain.  UDS and  Inspect reviewed.    Discussed risk of tolerance, dependence, respiratory depression, coma and death associated with use of oral opioids for treatment of chronic nonmalignant pain.      Discussed increased risk with combining opioid and benzo. On clorazepate prn anxiety.    RTC in 2 months

## 2022-10-04 ENCOUNTER — OFFICE VISIT (OUTPATIENT)
Dept: PAIN MEDICINE | Facility: CLINIC | Age: 77
End: 2022-10-04

## 2022-10-04 VITALS
OXYGEN SATURATION: 98 % | RESPIRATION RATE: 16 BRPM | HEART RATE: 76 BPM | SYSTOLIC BLOOD PRESSURE: 164 MMHG | DIASTOLIC BLOOD PRESSURE: 82 MMHG

## 2022-10-04 DIAGNOSIS — M79.18 MYOFASCIAL PAIN SYNDROME: ICD-10-CM

## 2022-10-04 DIAGNOSIS — Z79.899 HIGH RISK MEDICATION USE: Primary | ICD-10-CM

## 2022-10-04 DIAGNOSIS — M47.817 LUMBOSACRAL SPONDYLOSIS WITHOUT MYELOPATHY: ICD-10-CM

## 2022-10-04 DIAGNOSIS — G89.4 CHRONIC PAIN SYNDROME: ICD-10-CM

## 2022-10-04 DIAGNOSIS — M25.50 POLYARTHRALGIA: ICD-10-CM

## 2022-10-04 PROCEDURE — 99214 OFFICE O/P EST MOD 30 MIN: CPT | Performed by: ANESTHESIOLOGY

## 2022-10-04 RX ORDER — HYDROCODONE BITARTRATE AND ACETAMINOPHEN 10; 325 MG/1; MG/1
1 TABLET ORAL 3 TIMES DAILY PRN
Qty: 90 TABLET | Refills: 0 | Status: SHIPPED | OUTPATIENT
Start: 2022-10-08 | End: 2022-12-01 | Stop reason: SDUPTHER

## 2022-10-04 RX ORDER — HYDROCODONE BITARTRATE AND ACETAMINOPHEN 10; 325 MG/1; MG/1
1 TABLET ORAL 3 TIMES DAILY PRN
Qty: 90 TABLET | Refills: 0 | Status: SHIPPED | OUTPATIENT
Start: 2022-11-07 | End: 2023-01-04 | Stop reason: SDUPTHER

## 2022-10-04 NOTE — PROGRESS NOTES
Subjective    CC joints pain left knee pain  Marilyn J Pumphrey is a 76 y.o. female with multiple comorbidities including DM, polyarthralgia from  inflammatory osteoarthritis, bilateral knee pain, status post left TKA , here for f/u.  Continued bilateral knee pain, had gel injection with Ortho without relief.  Considering TKA.  Otherwise denies new complaints.  Chronic back pain radiating to bilateral hips worse with prolonged activity.  Chronic polyarthralgia/left knee pain,  and generalized myofascial pain interfering with daily activity, sleep,mood.  Considering right knee TKA.  Tried methotrexate and  Celebrex without relief.  Tried Cymbalta, sevella, tramadol without relief.  Seen Rheumatology. and ortho. considering right knee replecement.   Good releif with knee injections by ortho.      Utilizes hydrocodone 10/325  with good relief functional benefit denies side effects.    L-spine MRI 2021.  Multiple level degenerative disc disease and degenerative changes.  Small left paracentral disc protrusion at L3-L4 which appears to abut the exiting nerve root with mild encroachment on neural foramina on the left.    Pain Assessment   Location of Pain: Lower Back, R Hip, L Hip, L Leg, neck pain, joint  Description of Pain: Dull/Aching, Throbbing, Stabbing  Previous Pain Rating :8  Current Pain Ratin  Aggravating Factors: Activity  Alleviating Factors: Rest, Medication  PEG Assessment   What number best describes your pain on average in the past week?8  What number best describes how, during the past week, pain has interfered with your enjoyment of life?7  What number best describes how, during the past week, pain has interfered with your general activity? 10     The following portions of the patient's history were reviewed and updated as appropriate: allergies, current medications, past family history, past medical history, past social history, past surgical history and problem list.   has a past medical  history of Anxiety, Arthritis, Bone pain, Cancer ([facility]) (09/2016), Depression, Disease of thyroid gland, DM type 2 (diabetes mellitus, type 2) ([facility]), Fall, GERD (gastroesophageal reflux disease), Hypertension, Irregular heart beat, Knee pain, Leaky heart valve, Leg cramps, Low back pain, Numbness around mouth, Osteoarthritis, PONV (postoperative nausea and vomiting), Sleep apnea, and UTI (urinary tract infection).   has a past surgical history that includes Tonsillectomy; Thyroid surgery; Oophorectomy (Bilateral, 2015); Hysterectomy (1993); Cholecystectomy (1974); Carpal tunnel release; vein ligation and stripping bilateral; and Total knee arthroplasty (Left, 10/14/2019).  Social History     Tobacco Use   • Smoking status: Never Smoker   • Smokeless tobacco: Never Used   Substance Use Topics   • Alcohol use: No     Review of Systems   Musculoskeletal: Positive for arthralgias, back pain, joint swelling, myalgias and neck pain.   All other systems reviewed and are negative.    Objective   Physical Exam   Constitutional: No distress.   Pulmonary/Chest: Effort normal.   Musculoskeletal:      Right knee: Tenderness found.      Left knee: Tenderness found.      Cervical back: She exhibits decreased range of motion and tenderness.      Lumbar back: She exhibits decreased range of motion and tenderness.      Comments: Lumbar loading positive, pain on extension of low back past 5 degrees.  TTP on the lumbar facets noted.  Leg raise left   Vitals reviewed.    /82   Pulse 76   Resp 16   SpO2 98%     PHQ 9 on chart  Opioid risk tool low risk    Assessment & Plan   Diagnoses and all orders for this visit:    1. Chronic pain syndrome (Primary)  -     HYDROcodone-acetaminophen (NORCO)  MG per tablet; Take 1 tablet by mouth 3 (Three) Times a Day As Needed for Severe Pain.  Dispense: 90 tablet; Refill: 0  -     HYDROcodone-acetaminophen (NORCO)  MG per tablet; Take 1 tablet by mouth 3 (Three) Times a Day As  Needed for Severe Pain. DNF before 11/7/2022  Dispense: 90 tablet; Refill: 0    2. Lumbosacral spondylosis without myelopathy    3. Polyarthralgia    4. Myofascial pain syndrome    5. High risk medication use      Summary  76 y.o. female with multiple comorbidities including DM, polyarthralgia from  inflammatory osteoarthritis, knee pain S/P left TKA, here for f/u.  Chronic polyarthralgia/left knee pain,  and generalized myofascial pain interfering with daily activity, sleep,mood.      Continued bilateral knee pain, had gel injection with Ortho without relief.  Considering TKA.  Otherwise denies new complaints.    Continue hydrocodone 10/325 2-3 times daily as needed for severe pain.  UDS and  Inspect reviewed.    Discussed risk of tolerance, dependence, respiratory depression, coma and death associated with use of oral opioids for treatment of chronic nonmalignant pain.      Discussed increased risk with combining opioid and benzo. On clorazepate prn anxiety.    RTC in 2 months

## 2022-11-30 ENCOUNTER — DOCUMENTATION (OUTPATIENT)
Dept: ENDOCRINOLOGY | Facility: CLINIC | Age: 77
End: 2022-11-30

## 2022-11-30 NOTE — PROGRESS NOTES
Benefits Investigation Summary    Prescription: Renewal     Dispensing pharmacy: [id number]    Copay amount: Patient has to fill with     PLAN: [facility]  BIN: [id number]  PCN: [id number]  RX GROUP: [facility] Auth and Med Assistance notes: none    Lindsay Terry CPhT

## 2022-12-01 ENCOUNTER — TELEPHONE (OUTPATIENT)
Dept: PAIN MEDICINE | Facility: CLINIC | Age: 77
End: 2022-12-01

## 2022-12-01 DIAGNOSIS — G89.4 CHRONIC PAIN SYNDROME: ICD-10-CM

## 2022-12-01 RX ORDER — HYDROCODONE BITARTRATE AND ACETAMINOPHEN 10; 325 MG/1; MG/1
1 TABLET ORAL 3 TIMES DAILY PRN
Qty: 90 TABLET | Refills: 0 | Status: SHIPPED | OUTPATIENT
Start: 2022-12-08 | End: 2022-12-09 | Stop reason: SDUPTHER

## 2022-12-01 NOTE — TELEPHONE ENCOUNTER
Relationship: SELF    Best call back number: [phone]    Requested Prescriptions: HYDROCODONE  MG.        Pharmacy where request should be sent:  [facility] [phone]    Additional details provided by patient: HAS ABOUT 3 DAYS LEFT.    Does the patient have less than a 3 day supply:  [x] Yes  [] No    Would you like a call back once the refill request has been completed: [x] Yes [] No    If the office needs to give you a call back, can they leave a voicemail: [x] Yes [] No

## 2022-12-09 ENCOUNTER — TELEPHONE (OUTPATIENT)
Dept: PAIN MEDICINE | Facility: CLINIC | Age: 77
End: 2022-12-09

## 2022-12-09 ENCOUNTER — DOCUMENTATION (OUTPATIENT)
Dept: PAIN MEDICINE | Facility: CLINIC | Age: 77
End: 2022-12-09

## 2022-12-09 DIAGNOSIS — G89.4 CHRONIC PAIN SYNDROME: ICD-10-CM

## 2022-12-09 DIAGNOSIS — G89.4 CHRONIC PAIN SYNDROME: Primary | ICD-10-CM

## 2022-12-09 RX ORDER — HYDROCODONE BITARTRATE AND ACETAMINOPHEN 10; 325 MG/1; MG/1
1 TABLET ORAL 3 TIMES DAILY PRN
Qty: 90 TABLET | Refills: 0 | Status: CANCELLED | OUTPATIENT
Start: 2022-12-09

## 2022-12-09 RX ORDER — HYDROCODONE BITARTRATE AND ACETAMINOPHEN 10; 325 MG/1; MG/1
1 TABLET ORAL 3 TIMES DAILY PRN
Qty: 90 TABLET | Refills: 0 | Status: SHIPPED | OUTPATIENT
Start: 2022-12-09 | End: 2022-12-10 | Stop reason: SDUPTHER

## 2022-12-09 NOTE — TELEPHONE ENCOUNTER
Provider: DR DAVIS   Caller: MARILYN J PUMPHREY   Relationship to Patient: PATIENT   Pharmacy: [facility] [address] [facility] [phone]  Phone Number: [phone]  Reason for Call: [facility] IS OUT OF RX    HYDROcodone-acetaminophen (NORCO)  MG per tablet 1 tablet Oral 3 Times Daily PRN 30 MME/Day       PATIENT TOOK LAST TABLET TODAY    PLEASE RESEND REFILL REQUEST TO [facility]

## 2022-12-10 DIAGNOSIS — G89.4 CHRONIC PAIN SYNDROME: ICD-10-CM

## 2022-12-10 RX ORDER — HYDROCODONE BITARTRATE AND ACETAMINOPHEN 10; 325 MG/1; MG/1
1 TABLET ORAL 3 TIMES DAILY PRN
Qty: 90 TABLET | Refills: 0 | Status: SHIPPED | OUTPATIENT
Start: 2022-12-10 | End: 2023-01-04 | Stop reason: SDUPTHER

## 2023-01-04 ENCOUNTER — OFFICE VISIT (OUTPATIENT)
Dept: PAIN MEDICINE | Facility: CLINIC | Age: 78
End: 2023-01-04
Payer: MEDICARE

## 2023-01-04 VITALS
DIASTOLIC BLOOD PRESSURE: 69 MMHG | HEART RATE: 76 BPM | SYSTOLIC BLOOD PRESSURE: 127 MMHG | RESPIRATION RATE: 16 BRPM | OXYGEN SATURATION: 98 %

## 2023-01-04 DIAGNOSIS — Z79.899 HIGH RISK MEDICATION USE: ICD-10-CM

## 2023-01-04 DIAGNOSIS — G89.4 CHRONIC PAIN SYNDROME: Primary | ICD-10-CM

## 2023-01-04 DIAGNOSIS — M79.18 MYOFASCIAL PAIN SYNDROME: ICD-10-CM

## 2023-01-04 DIAGNOSIS — M47.817 LUMBOSACRAL SPONDYLOSIS WITHOUT MYELOPATHY: ICD-10-CM

## 2023-01-04 DIAGNOSIS — M25.50 POLYARTHRALGIA: ICD-10-CM

## 2023-01-04 PROCEDURE — 99214 OFFICE O/P EST MOD 30 MIN: CPT | Performed by: ANESTHESIOLOGY

## 2023-01-04 PROCEDURE — 1160F RVW MEDS BY RX/DR IN RCRD: CPT | Performed by: ANESTHESIOLOGY

## 2023-01-04 PROCEDURE — 1159F MED LIST DOCD IN RCRD: CPT | Performed by: ANESTHESIOLOGY

## 2023-01-04 PROCEDURE — 1125F AMNT PAIN NOTED PAIN PRSNT: CPT | Performed by: ANESTHESIOLOGY

## 2023-01-04 RX ORDER — DULAGLUTIDE 1.5 MG/.5ML
INJECTION, SOLUTION SUBCUTANEOUS
COMMUNITY
End: 2023-01-27

## 2023-01-04 RX ORDER — ESCITALOPRAM OXALATE 10 MG/1
TABLET ORAL
COMMUNITY
End: 2023-01-04 | Stop reason: ALTCHOICE

## 2023-01-04 RX ORDER — PROMETHAZINE HYDROCHLORIDE 25 MG/1
TABLET ORAL EVERY 4 HOURS
COMMUNITY

## 2023-01-04 RX ORDER — HYDROCODONE BITARTRATE AND ACETAMINOPHEN 10; 325 MG/1; MG/1
1 TABLET ORAL 3 TIMES DAILY PRN
Qty: 90 TABLET | Refills: 0 | Status: SHIPPED | OUTPATIENT
Start: 2023-02-04 | End: 2023-03-01 | Stop reason: SDUPTHER

## 2023-01-04 RX ORDER — HYDROCODONE BITARTRATE AND ACETAMINOPHEN 10; 325 MG/1; MG/1
1 TABLET ORAL 3 TIMES DAILY PRN
Qty: 90 TABLET | Refills: 0 | Status: SHIPPED | OUTPATIENT
Start: 2023-01-04 | End: 2023-03-15 | Stop reason: SDUPTHER

## 2023-01-04 RX ORDER — PRAMIPEXOLE DIHYDROCHLORIDE 1 MG/1
1 TABLET ORAL 2 TIMES DAILY
COMMUNITY
Start: 2022-11-13

## 2023-01-04 RX ORDER — CLORAZEPATE DIPOTASSIUM 7.5 MG/1
7.5 TABLET ORAL 2 TIMES DAILY
COMMUNITY
Start: 2022-12-09

## 2023-01-04 NOTE — PROGRESS NOTES
Subjective    CC joints pain left knee pain  Marilyn J Pumphrey is a 77 y.o. female with multiple comorbidities including DM, polyarthralgia from  inflammatory osteoarthritis, bilateral knee pain, status post left TKA , here for f/u.  Lost her  a month ago, grieving but has good family support.  Denies any new complaints today.  Have good relief with right knee steroid injection by Ortho.  Chronic back pain radiating to bilateral hips worse with prolonged activity.  Chronic polyarthralgia/left knee pain,  and generalized myofascial pain interfering with daily activity, sleep,mood.  Considering right knee TKA.  Tried methotrexate and  Celebrex without relief.  Tried Cymbalta, sevella, tramadol without relief.  Seen Rheumatology. and ortho. considering right knee replecement.   Good releif with knee injections by ortho.      Utilizes hydrocodone 10/325  with good relief functional benefit denies side effects.    L-spine MRI 2021.  Multiple level degenerative disc disease and degenerative changes.  Small left paracentral disc protrusion at L3-L4 which appears to abut the exiting nerve root with mild encroachment on neural foramina on the left.    Pain Assessment   Location of Pain: Lower Back, R Hip, L Hip, L Leg, neck pain, joint  Description of Pain: Dull/Aching, Throbbing, Stabbing  Previous Pain Rating :9  Current Pain Ratin  Aggravating Factors: Activity  Alleviating Factors: Rest, Medication  PEG Assessment   What number best describes your pain on average in the past week?8  What number best describes how, during the past week, pain has interfered with your enjoyment of life?6  What number best describes how, during the past week, pain has interfered with your general activity? 9     The following portions of the patient's history were reviewed and updated as appropriate: allergies, current medications, past family history, past medical history, past social history, past surgical history and  problem list.   has a past medical history of Anxiety, Arthritis, Bone pain, Cancer ([facility]) (09/2016), Depression, Disease of thyroid gland, DM type 2 (diabetes mellitus, type 2) ([facility]), Fall, GERD (gastroesophageal reflux disease), Hypertension, Irregular heart beat, Knee pain, Leaky heart valve, Leg cramps, Low back pain, Numbness around mouth, Osteoarthritis, PONV (postoperative nausea and vomiting), Sleep apnea, and UTI (urinary tract infection).   has a past surgical history that includes Tonsillectomy; Thyroid surgery; Oophorectomy (Bilateral, 2015); Hysterectomy (1993); Cholecystectomy (1974); Carpal tunnel release; vein ligation and stripping bilateral; and Total knee arthroplasty (Left, 10/14/2019).  Social History     Tobacco Use   • Smoking status: Never   • Smokeless tobacco: Never   Substance Use Topics   • Alcohol use: No     Review of Systems   Musculoskeletal: Positive for arthralgias, back pain, joint swelling, myalgias and neck pain.   All other systems reviewed and are negative.    Objective   Physical Exam   Constitutional: No distress.   Pulmonary/Chest: Effort normal.   Musculoskeletal:      Right knee: Tenderness found.      Left knee: Tenderness found.      Cervical back: She exhibits decreased range of motion and tenderness.      Lumbar back: She exhibits decreased range of motion and tenderness.      Comments: Lumbar loading positive, pain on extension of low back past 5 degrees.  TTP on the lumbar facets noted.  Leg raise left   Vitals reviewed.    /69   Pulse 76   Resp 16   SpO2 98%     PHQ 9 on chart  Opioid risk tool low risk    Assessment & Plan   Diagnoses and all orders for this visit:    1. Chronic pain syndrome (Primary)  -     HYDROcodone-acetaminophen (NORCO)  MG per tablet; Take 1 tablet by mouth 3 (Three) Times a Day As Needed for Severe Pain.  Dispense: 90 tablet; Refill: 0  -     HYDROcodone-acetaminophen (NORCO)  MG per tablet; Take 1 tablet by mouth 3  (Three) Times a Day As Needed for Severe Pain. DNF before 2/4/2023  Dispense: 90 tablet; Refill: 0    2. Lumbosacral spondylosis without myelopathy    3. Polyarthralgia    4. Myofascial pain syndrome    5. High risk medication use      Summary  77 y.o. female with multiple comorbidities including DM, polyarthralgia from  inflammatory osteoarthritis, knee pain S/P left TKA, here for f/u.  Chronic polyarthralgia/left knee pain,  and generalized myofascial pain interfering with daily activity, sleep,mood.      Lost her  a month ago, grieving but has good family support.  Denies any new complaints today.  Have good relief with right knee steroid injection by Ortho.    Continue hydrocodone 10/325 2-3 times daily as needed for severe pain.  UDS and  Inspect reviewed.    Discussed risk of tolerance, dependence, respiratory depression, coma and death associated with use of oral opioids for treatment of chronic nonmalignant pain.      Discussed increased risk with combining opioid and benzo. On clorazepate prn anxiety.    RTC in 2 months

## 2023-01-17 NOTE — TELEPHONE ENCOUNTER
Last seen 11/2021. Next appt 7/2023. Please advise if OK to send enough refills to get to that appt.

## 2023-01-20 ENCOUNTER — TELEPHONE (OUTPATIENT)
Dept: ENDOCRINOLOGY | Facility: CLINIC | Age: 78
End: 2023-01-20
Payer: MEDICARE

## 2023-01-20 RX ORDER — CALCITRIOL 0.25 UG/1
CAPSULE, LIQUID FILLED ORAL
Qty: 30 CAPSULE | Refills: 0 | Status: SHIPPED | OUTPATIENT
Start: 2023-01-20 | End: 2023-02-01 | Stop reason: SDUPTHER

## 2023-01-20 NOTE — TELEPHONE ENCOUNTER
January 19, 2023  Gege Ford MD  to Me        4:28 PM  Send 15-day prescription and schedule her on January 27, 2023 for follow-up visit we have openings.

## 2023-01-20 NOTE — TELEPHONE ENCOUNTER
Me     NC     2:46 PM  Note    Last seen 11/2021. Next appt 7/2023. Please advise if OK to send enough refills to get to that appt.             NC    2:46 PM  You routed this conversation to Gege Ford MD   January 19, 2023  Gege Ford MD  to Me        4:28 PM  Send 15-day prescription and schedule her on January 27, 2023 for follow-up visit we have openings.

## 2023-01-27 ENCOUNTER — OFFICE VISIT (OUTPATIENT)
Dept: ENDOCRINOLOGY | Facility: CLINIC | Age: 78
End: 2023-01-27
Payer: MEDICARE

## 2023-01-27 ENCOUNTER — SPECIALTY PHARMACY (OUTPATIENT)
Dept: ENDOCRINOLOGY | Facility: CLINIC | Age: 78
End: 2023-01-27
Payer: MEDICARE

## 2023-01-27 VITALS
OXYGEN SATURATION: 95 % | WEIGHT: 170 LBS | DIASTOLIC BLOOD PRESSURE: 70 MMHG | BODY MASS INDEX: 26.68 KG/M2 | SYSTOLIC BLOOD PRESSURE: 120 MMHG | HEIGHT: 67 IN | HEART RATE: 80 BPM

## 2023-01-27 DIAGNOSIS — E78.2 MIXED HYPERLIPIDEMIA: Chronic | ICD-10-CM

## 2023-01-27 DIAGNOSIS — C73 MALIGNANT NEOPLASM OF THYROID GLAND: Chronic | ICD-10-CM

## 2023-01-27 DIAGNOSIS — E89.0 HYPOTHYROIDISM, POSTSURGICAL: Chronic | ICD-10-CM

## 2023-01-27 DIAGNOSIS — Z79.4 TYPE 2 DIABETES MELLITUS WITH HYPERGLYCEMIA, WITH LONG-TERM CURRENT USE OF INSULIN: Primary | Chronic | ICD-10-CM

## 2023-01-27 DIAGNOSIS — E11.65 TYPE 2 DIABETES MELLITUS WITH HYPERGLYCEMIA, WITH LONG-TERM CURRENT USE OF INSULIN: Primary | Chronic | ICD-10-CM

## 2023-01-27 DIAGNOSIS — E89.2 POSTSURGICAL HYPOPARATHYROIDISM: ICD-10-CM

## 2023-01-27 DIAGNOSIS — I10 HYPERTENSION, BENIGN: Chronic | ICD-10-CM

## 2023-01-27 PROBLEM — M51.36 DEGENERATION OF LUMBAR INTERVERTEBRAL DISC: Status: ACTIVE | Noted: 2023-01-27

## 2023-01-27 PROBLEM — M51.369 DEGENERATION OF LUMBAR INTERVERTEBRAL DISC: Status: ACTIVE | Noted: 2023-01-27

## 2023-01-27 PROCEDURE — 99214 OFFICE O/P EST MOD 30 MIN: CPT | Performed by: INTERNAL MEDICINE

## 2023-01-27 RX ORDER — LEVOTHYROXINE SODIUM 112 UG/1
TABLET ORAL
Qty: 90 TABLET | Refills: 4 | Status: SHIPPED | OUTPATIENT
Start: 2023-01-27

## 2023-01-27 RX ORDER — TIRZEPATIDE 7.5 MG/.5ML
7.5 INJECTION, SOLUTION SUBCUTANEOUS WEEKLY
Qty: 6 ML | Refills: 6 | Status: SHIPPED | OUTPATIENT
Start: 2023-01-27

## 2023-01-27 NOTE — PATIENT INSTRUCTIONS
Increase levothyroxine to 112 mcg p.o. daily  DC Trulicity and add Mounjaro 7.5 mg subcu weekly  Continue rest of the medications  Always keep glucose source in case of low blood sugars  Continue to work on your diet and activity  Annual eye exam  Check TSH, free T4, TG panel in 6 weeks  Rest of the labs before follow-up in 6 months.  Pharmacy evaluation.

## 2023-01-27 NOTE — PROGRESS NOTES
Endocrine Progress Note Outpatient     Patient Care Team:  Suma Campbell APRN as PCP - General  Suma Campbell APRN as PCP - Family Medicine  Heath Musa MD as Consulting Physician (Cardiology)    Chief Complaint: Follow-up thyroid and diabetes    HPI: 77-year-old female with history of thyroid cancer, postsurgical hypothyroidism, postsurgical hypoparathyroidism, type 2 diabetes, hyperlipidemia hypertension is here for follow-up.    For thyroid cancer she has undergone total thyroidectomy and did not receive radioactive iodine treatment.  Her pathology with 1.6 cm papillary thyroid cancer with margins were clear and no lymphatic or vascular invasion was noted.  She is currently on levothyroxine 100 mcg p.o. daily.  She is tolerating well.      Surgical hypothyroidism: On levothyroxine 100 mcg p.o. daily    Postsurgical hypoparathyroidism: She is currently on calcium with vitamin D and calcitriol to .25 mcg twice a day.    Type 2 diabetes: She is currently on Trulicity 4.5 mg subcu weekly, Metformin 1000 mg twice a day, Jardiance 25 mg once a day and Lantus 30 units daily and NovoLog 10 units each meal and SSI.  She has not required any assistance or hospitalization for low blood sugar since last seen.  Trying to work on her diet the best she can.    Hyperlipidemia: On Crestor    Hypertension: Well-controlled.    Past Medical History:   Diagnosis Date   • Anxiety    • Arthritis    • Bone pain    • Cancer (HCC) 09/2016    THYROID   • Depression    • Disease of thyroid gland    • DM type 2 (diabetes mellitus, type 2) ([facility])     TYPE 2   • Fall     2/2021 FELL ON ICE   • GERD (gastroesophageal reflux disease)    • Hypertension    • Irregular heart beat    • Knee pain    • Leaky heart valve     11/2020   • Leg cramps    • Low back pain    • Numbness around mouth     5/12/2022-- was inpatient at [facility]-- ct ran and MRI-- no stroke   • Osteoarthritis    • PONV (postoperative nausea and vomiting)     DOES  WELL WITH PROPOFOL   • Sleep apnea     C-PAP IN USE   • UTI (urinary tract infection)        Social History     Socioeconomic History   • Marital status:    • Number of children: 3   • Years of education: 9   Tobacco Use   • Smoking status: Never   • Smokeless tobacco: Never   Vaping Use   • Vaping Use: Never used   Substance and Sexual Activity   • Alcohol use: No   • Drug use: No   • Sexual activity: Defer       Family History   Problem Relation Age of Onset   • Diabetes Father    • Heart disease Father    • Hypertension Father    • Hyperlipidemia Father    • Thyroid disease Father    • Cancer Sister         lung cancer   • Thyroid disease Sister    • Diabetes Brother    • Heart disease Brother    • Cancer Brother         lung cancer       Allergies   Allergen Reactions   • Codeine Shortness Of Breath   • Oxycodone Hallucinations     Terrible nausea and vomiting       ROS:   Constitutional:  Denies fatigue, tiredness.    Eyes:  Denies change in visual acuity   HENT:  Denies nasal congestion or sore throat   Respiratory: denies cough, shortness of breath.   Cardiovascular:  denies chest pain, edema   GI:  Denies abdominal pain, nausea, vomiting.   Musculoskeletal:  Denies back pain or joint pain   Integument:  Denies dry skin and rash   Neurologic:  Denies headache, focal weakness or sensory changes   Endocrine:  Denies polyuria or polydipsia   Psychiatric:  Denies depression or anxiety      Vitals:    01/27/23 1243   BP: 120/70   Pulse: 80   SpO2: 95%     Body mass index is 26.63 kg/m².     Physical Exam:  GEN: NAD, conversant  EYES: EOMI, PERRL, no conjunctival erythema  NECK: no thyromegaly, full ROM   CV: RRR, no murmurs/rubs/gallops, no peripheral edema  LUNG: CTAB, no wheezes/rales/ronchi  SKIN: no rashes, no acanthosis  MSK: no deformities, full ROM of all extremities  NEURO: no tremors, DTR normal  PSYCH: AOX3, appropriate mood, affect normal      Results Review:     I reviewed the patient's new  clinical results.    Lab Results   Component Value Date    HGBA1C 8.1 (H) 06/01/2022    HGBA1C 8.3 (H) 05/11/2022    HGBA1C 7.9 (H) 07/07/2021      Lab Results   Component Value Date    GLUCOSE 236 (H) 06/01/2022    BUN 12 06/01/2022    CREATININE 0.78 06/01/2022    EGFRIFNONA 78 07/07/2021    EGFRIFAFRI >60 02/14/2018    BCR 15.4 06/01/2022    K 4.4 06/01/2022    CO2 26.0 06/01/2022    CALCIUM 9.4 06/01/2022    ALBUMIN 4.00 06/01/2022    LABIL2 1.2 09/11/2019    AST 16 06/01/2022    ALT 19 06/01/2022    CHOL 219 (H) 06/01/2022    TRIG 224 (H) 06/01/2022     (H) 06/01/2022    HDL 42 06/01/2022     Lab Results   Component Value Date    TSH 0.410 06/01/2022    FREET4 1.11 06/01/2022    THYROIDAB 24 (H) 06/11/2017     Labs from December 2022 showed an A1c of 8.6, TSH 10.5  Sodium 148, potassium 4.4, chloride 99, CO2 30 BUN was 15 with creatinine 1.7, calcium 9.0, AST 19 and ALT 13.    Medication Review: Reviewed.       Current Outpatient Medications:   •  amLODIPine-benazepril (LOTREL 5-20) 5-20 MG per capsule, Take 1 capsule by mouth Daily., Disp: , Rfl:   •  aspirin 81 MG tablet, Take 1 tablet by mouth Daily. (Patient taking differently: Take 325 mg by mouth Daily.), Disp: 30 tablet, Rfl: 0  •  calcitriol (ROCALTROL) 0.25 MCG capsule, Take 1 capsule by mouth twice daily, Disp: 30 capsule, Rfl: 0  •  CALCIUM CARBONATE-VIT D-MIN PO, Take 1,200 mg by mouth 2 (Two) Times a Day., Disp: , Rfl:   •  clorazepate (TRANXENE) 7.5 MG tablet, Take 7.5 mg by mouth 2 (Two) Times a Day., Disp: , Rfl:   •  diclofenac (VOLTAREN) 1 % gel gel, Apply 4 g topically to the appropriate area as directed 4 (Four) Times a Day As Needed., Disp: , Rfl:   •  Diclofenac Sodium (VOLTAREN) 1 % gel gel, Apply 4 g topically to the appropriate area as directed., Disp: , Rfl:   •  Dulaglutide (Trulicity) 3 MG/0.5ML solution pen-injector, Inject 4.5 mg under the skin into the appropriate area as directed 1 (One) Time Per Week., Disp: , Rfl:   •   empagliflozin (Jardiance) 25 MG tablet tablet, Take 1 tablet by mouth Daily., Disp: 90 tablet, Rfl: 3  •  escitalopram (LEXAPRO) 10 MG tablet, Take 10 mg by mouth Every Evening., Disp: , Rfl:   •  esomeprazole (nexIUM) 40 MG capsule, Take 40 mg by mouth 2 (Two) Times a Day., Disp: , Rfl:   •  HYDROcodone-acetaminophen (NORCO)  MG per tablet, Take 1 tablet by mouth 3 (Three) Times a Day As Needed for Severe Pain., Disp: 90 tablet, Rfl: 0  •  [START ON 2/4/2023] HYDROcodone-acetaminophen (NORCO)  MG per tablet, Take 1 tablet by mouth 3 (Three) Times a Day As Needed for Severe Pain. DNF before 2/4/2023, Disp: 90 tablet, Rfl: 0  •  insulin aspart (NOVOLOG FLEXPEN) 100 UNIT/ML solution pen-injector sc pen, Inject 10 units before each meal (Patient taking differently: 10 Units. Inject 10 units before each meal), Disp: 5 pen, Rfl: 4  •  insulin glargine (LANTUS, SEMGLEE) 100 UNIT/ML injection, Inject 30 Units under the skin into the appropriate area as directed Daily. (Patient taking differently: Inject 40 Units under the skin into the appropriate area as directed Every Morning.), Disp: 30 mL, Rfl: 6  •  levothyroxine (SYNTHROID, LEVOTHROID) 100 MCG tablet, Take 1 tablet by mouth Daily., Disp: 90 tablet, Rfl: 2  •  magnesium oxide (MAG-OX) 400 tablet tablet, Take 400 mg by mouth 2 (Two) Times a Day., Disp: , Rfl:   •  metFORMIN (Glucophage) 1000 MG tablet, Take 1 tablet by mouth 2 (Two) Times a Day With Meals., Disp: 180 tablet, Rfl: 2  •  metoprolol tartrate (LOPRESSOR) 100 MG tablet, Take 100 mg by mouth 2 (Two) Times a Day., Disp: , Rfl:   •  pramipexole (MIRAPEX) 1 MG tablet, Take 1 mg by mouth 2 (Two) Times a Day., Disp: , Rfl:   •  promethazine (PHENERGAN) 25 MG tablet, Take 1 tablet by mouth As Needed., Disp: , Rfl: 1  •  promethazine (PHENERGAN) 25 MG tablet, Every 4 (Four) Hours., Disp: , Rfl:   •  rosuvastatin (CRESTOR) 40 MG tablet, Take 20 mg by mouth Daily., Disp: , Rfl:   •  sucralfate (CARAFATE)  1 g tablet, Take 1 g by mouth 4 (Four) Times a Day., Disp: , Rfl:   •  vitamin D (ERGOCALCIFEROL) 1.25 MG (14793 UT) capsule capsule, Take 50,000 Units by mouth 1 (One) Time Per Week., Disp: , Rfl:           Assessment and plan:  1.  Thyroid cancer: Status post thyroid surgery in September 2016.  She did not receive radioactive iodine treatment.  Tumor was 1.6 cm papillary thyroid cancer with margins clear.  Her CT scan of the neck in October 2019 did not show any lymphadenopathy.  No TG level, will follow levels    2.  Hypothyroidism: Secondary to thyroid surgery, uncontrolled, TSH high at 10.5, will increase levothyroxine to 112 mcg p.o. daily and follow labs.    3.  Postsurgical hypoparathyroidism: She is currently on Rocaltrol with calcium and vitamin D and her calcium is normal.  She is asymptomatic.  We will continue current management.    4.  Diabetes mellitus type 2 with Hyperglycemia: Uncontrolled, A1c 8.6%.  She would like to try something different than Trulicity.  Will DC Trulicity and add Mounjaro 7.5 mg subcu weekly and will continue Lantus with NovoLog along with metformin and Jardiance.    5.  Hyperlipidemia: Excellent control, continue current medications    6.  Hypertension:Better, continue current medications.    Assessment and plan from November 23, 2021 reviewed and updated.           Gege Ford MD FACE.

## 2023-01-27 NOTE — PROGRESS NOTES
Specialty Pharmacy      Marilyn J Pumphrey is a 77 y.o. female with Type 2 Diabetes seen by an Endocrinology provider. This was an initial visit to provide medication education as the patient is taking specialty medication Jardiance and Era.     Marilyn J Pumphrey is ineligible to fill specialty medication at [facility] and/or enrollment in the Endocrine Disorders Patient Management Program at this time due to insurance restrictions. [facility]    Results of Benefits Investigation  [facility]    Relevant Past Medical History and Comorbidities  Past Medical History:   Diagnosis Date   • Anxiety    • Arthritis    • Bone pain    • Cancer (HCC) 09/2016    THYROID   • Depression    • Disease of thyroid gland    • DM type 2 (diabetes mellitus, type 2) ([facility])     TYPE 2   • Fall     2/2021 FELL ON ICE   • GERD (gastroesophageal reflux disease)    • Hypertension    • Irregular heart beat    • Knee pain    • Leaky heart valve     11/2020   • Leg cramps    • Low back pain    • Numbness around mouth     5/12/2022-- was inpatient at [facility]-- ct ran and MRI-- no stroke   • Osteoarthritis    • PONV (postoperative nausea and vomiting)     DOES WELL WITH PROPOFOL   • Sleep apnea     C-PAP IN USE   • UTI (urinary tract infection)      Social History     Socioeconomic History   • Marital status:    • Number of children: 3   • Years of education: 9   Tobacco Use   • Smoking status: Never   • Smokeless tobacco: Never   Vaping Use   • Vaping Use: Never used   Substance and Sexual Activity   • Alcohol use: No   • Drug use: No   • Sexual activity: Defer          Allergies  Codeine and Oxycodone       Current Medication List    Current Outpatient Medications:   •  amLODIPine-benazepril (LOTREL 5-20) 5-20 MG per capsule, Take 1 capsule by mouth Daily., Disp: , Rfl:   •  aspirin 81 MG tablet, Take 1 tablet by mouth Daily. (Patient taking differently: Take 325 mg  by mouth Daily.), Disp: 30 tablet, Rfl: 0  •  calcitriol (ROCALTROL) 0.25 MCG capsule, Take 1 capsule by mouth twice daily, Disp: 30 capsule, Rfl: 0  •  CALCIUM CARBONATE-VIT D-MIN PO, Take 1,200 mg by mouth 2 (Two) Times a Day., Disp: , Rfl:   •  clorazepate (TRANXENE) 7.5 MG tablet, Take 7.5 mg by mouth 2 (Two) Times a Day., Disp: , Rfl:   •  diclofenac (VOLTAREN) 1 % gel gel, Apply 4 g topically to the appropriate area as directed 4 (Four) Times a Day As Needed., Disp: , Rfl:   •  Diclofenac Sodium (VOLTAREN) 1 % gel gel, Apply 4 g topically to the appropriate area as directed., Disp: , Rfl:   •  empagliflozin (Jardiance) 25 MG tablet tablet, Take 1 tablet by mouth Daily., Disp: 90 tablet, Rfl: 3  •  escitalopram (LEXAPRO) 10 MG tablet, Take 10 mg by mouth Every Evening., Disp: , Rfl:   •  esomeprazole (nexIUM) 40 MG capsule, Take 40 mg by mouth 2 (Two) Times a Day., Disp: , Rfl:   •  HYDROcodone-acetaminophen (NORCO)  MG per tablet, Take 1 tablet by mouth 3 (Three) Times a Day As Needed for Severe Pain., Disp: 90 tablet, Rfl: 0  •  [START ON 2/4/2023] HYDROcodone-acetaminophen (NORCO)  MG per tablet, Take 1 tablet by mouth 3 (Three) Times a Day As Needed for Severe Pain. DNF before 2/4/2023, Disp: 90 tablet, Rfl: 0  •  insulin aspart (NOVOLOG FLEXPEN) 100 UNIT/ML solution pen-injector sc pen, Inject 10 units before each meal (Patient taking differently: 10 Units. Inject 10 units before each meal), Disp: 5 pen, Rfl: 4  •  insulin glargine (LANTUS, SEMGLEE) 100 UNIT/ML injection, Inject 30 Units under the skin into the appropriate area as directed Daily. (Patient taking differently: Inject 40 Units under the skin into the appropriate area as directed Every Morning.), Disp: 30 mL, Rfl: 6  •  levothyroxine (SYNTHROID, LEVOTHROID) 112 MCG tablet, Take 1 tablet p.o. daily, Disp: 90 tablet, Rfl: 4  •  magnesium oxide (MAG-OX) 400 tablet tablet, Take 400 mg by mouth 2 (Two) Times a Day., Disp: , Rfl:   •   metFORMIN (Glucophage) 1000 MG tablet, Take 1 tablet by mouth 2 (Two) Times a Day With Meals., Disp: 180 tablet, Rfl: 2  •  metoprolol tartrate (LOPRESSOR) 100 MG tablet, Take 100 mg by mouth 2 (Two) Times a Day., Disp: , Rfl:   •  pramipexole (MIRAPEX) 1 MG tablet, Take 1 mg by mouth 2 (Two) Times a Day., Disp: , Rfl:   •  promethazine (PHENERGAN) 25 MG tablet, Take 1 tablet by mouth As Needed., Disp: , Rfl: 1  •  promethazine (PHENERGAN) 25 MG tablet, Every 4 (Four) Hours., Disp: , Rfl:   •  rosuvastatin (CRESTOR) 40 MG tablet, Take 20 mg by mouth Daily., Disp: , Rfl:   •  sucralfate (CARAFATE) 1 g tablet, Take 1 g by mouth 4 (Four) Times a Day., Disp: , Rfl:   •  Tirzepatide (Mounjaro) 7.5 MG/0.5ML solution pen-injector, Inject 7.5 mg under the skin into the appropriate area as directed 1 (One) Time Per Week., Disp: 6 mL, Rfl: 6  •  vitamin D (ERGOCALCIFEROL) 1.25 MG (72627 UT) capsule capsule, Take 50,000 Units by mouth 1 (One) Time Per Week., Disp: , Rfl:         Provided the following education on mounjaro:   Mounjaro® (tirzepatide)   How long will I be on this medication for?  The amount of time you will be on this medication will be determined by your doctor based on blood sugar and A1c control. You will most likely be on this medication or another diabetes medication throughout your lifetime. Do not abruptly stop this medication without talking to your doctor first.     How do I take this medication?  Take as directed on your prescription label. Mounjaro is supplied in a single-use pen for each dose and you will use a new pre-filled pen each week.  It is injected under the skin (subcutaneously) of your stomach, thigh or upper arm.  You may inject in the same body area each week, but make sure to use a different spot each time.  Use this medication once weekly, on the same day each week, with or without food.        First, choose your injection site and clean the area, allowing it to dry completely.  Make  sure the pen is in the locked position and remove the cap by pulling it straight off.     • Turn the pen to the unlocked position and place the clear base firmly against your skin at your injection site.  Press and hold the button on the end of the pen; you will hear a click once the injection starts.     • You will hear a second click when the needle starts retracting.  The injection will take about 5-10 seconds.     • Continue pressing against your skin until the gray plunger is visible, and then you will slowly lift the pen from your skin and dispose of the pen (detailed below in “Medication Storage / Handling”).     What are some possible side effects?  You may notice you don't feel as hungry, especially when you first start using Mounjaro.  In addition to decreased appetite, the most common side effects are nausea, diarrhea, vomiting, stomach pain, indigestion, and fatigue.  Redness, itching, and/or swelling can occur where the shot was given. You should also monitor for low blood sugar (hypoglycemia), especially if you are taking Mounjaro with other medications that cause low blood sugar.     What happens if I miss a dose?  If you miss a dose, take it as soon as you remember as long as there are at least 3 days until the next scheduled dose.  If there are less than 3 days, skip the missed dose and resume  Mounjaro on the regularly scheduled day.  Do not take 2 doses at the same time or extra doses.      Medication Safety    What are things I should warn my doctor immediately about?  Do not use Mounjaro if you or a family member have ever had medullary thyroid cancer (MTC) or Multiple Endocrine Neoplasia syndrome type 2 (MEN 2).     • Tell your doctor if you get a lump or swelling in your neck, hoarseness, difficulty swallowing, or feel short of breath (these may be symptoms of thyroid cancer).      Tell your doctor if you have or have had problems with your kidneys or pancreas.    • Stop using Mounjaro and get  medical help right away if you have severe pain in your stomach area that will not go away as this could be a sign of pancreatitis (inflammation of your pancreas)      Tell your doctor if you have problem digesting food or slowed emptying of your stomach (gastroparesis).        Let your doctor know if you have changes in vision while taking Mounjaro or have been diagnosed with diabetic retinopathy.      Talk to your doctor if you are pregnant, planning to become pregnant, or breastfeeding.       Get medical help right away if you notice any signs/symptoms of an allergic reaction (rash, hives, difficulty breathing, etc.).    What are things that I should be cautious of?  Be cautious of any side effects from this medication. Talk to your doctor if any new ones develop or aren't getting better.    What are some medications that can interact with this one?  Taking Mounjaro with other medications that also lower your blood sugar such as insulin and glipizide/glimepiride/glyburide may increase the risk of low blood sugar.   • Your doctor may reduce the dose of these medications when you start Mounjaro to minimize low blood sugars      It should not be taken with other medicines called GLP-1 receptor agonists, because these work the same way as Mounjaro.        Because Mounjaro slows stomach emptying, it can affect the way some medicines work.      Always tell your doctor or pharmacist immediately if you start taking any new medications, including over-the-counter medications, vitamins, and herbal supplements.        Medication Storage/Handling    How should I handle this medication?  Keep this medication out of reach of pets/children and keep the pen capped when not in use.  Do not share your medicine pens with others.    How does this medication need to be stored?  Store Mounjaro in the refrigerator [2°C to 8°C (36°F to 46°F)]; do not freeze and do not use if Mounjaro has been frozen.  You may store your Mounjaro pens at  room temperature [8°C to 30°C (46°F to 86°F)] for up to 21 days.  Protect from excessive heat and sunlight.  Keep in the original carton until time of administration.    How should I dispose of this medication?  Used Mounjaro pens should discarded after each use (for single use only). Place your used Mounjaro pen in an approved sharps container after use.  If you do not have a sharps container, you may use a household container made of heavy-duty plastic with a tight-fitting lid that is leak resistant (e.g., heavy-duty plastic laundry detergent bottle).        If your doctor decides to stop this medication, take to your local police station for proper disposal. Some pharmacies also have take-back bins for medication drop-off.       Resources/Support    How can I remind myself to take this medication?  You can download reminder apps to help you manage your refills. You may also set an alarm on your phone to remind you.     Is financial support available?   [facility] can provide co-pay cards if you have commercial insurance or patient assistance if you have Medicare or no insurance.     Which vaccines are recommended for me?  Talk to your doctor about these vaccines:   • Flu    • Coronavirus (COVID-19)    • Pneumococcal (pneumonia)    • Tdap    • Hepatitis B    • Zoster (shingles)           Changes to Therapy Plan Discussed with Patient  Medication Therapy Changes by Provider Discontinue trulicity and start mounjaro 7.5 mg weekly. Increase levothyroxine and continue other medications.  Patient was educated on mounjaro and will check with her pharmacy to see if there are issues with getting medication or cost.   Answered all questions and concerns at this time.  Additional Plans, Therapy Recommendations, or Therapy Problems to Be Addressed: None     Shannon Calle, PharmD  Clinical Specialty Pharmacist, Endocrinology  1/27/2023  14:48 EST

## 2023-02-01 DIAGNOSIS — E89.0 HYPOTHYROIDISM, POSTSURGICAL: ICD-10-CM

## 2023-02-01 DIAGNOSIS — E89.2 POSTSURGICAL HYPOPARATHYROIDISM: Primary | ICD-10-CM

## 2023-02-01 RX ORDER — CALCITRIOL 0.25 UG/1
0.25 CAPSULE, LIQUID FILLED ORAL 2 TIMES DAILY
Qty: 30 CAPSULE | Refills: 3 | Status: SHIPPED | OUTPATIENT
Start: 2023-02-01

## 2023-03-01 DIAGNOSIS — G89.4 CHRONIC PAIN SYNDROME: ICD-10-CM

## 2023-03-01 NOTE — TELEPHONE ENCOUNTER
Caller: MARILYN PUMPHREY    Relationship: SELF    Best call back number: [phone]    Requested Prescriptions:   HYDROCODONE    Pharmacy where request should be sent:  [facility]    Additional details provided by patient:PATIENT HAS AN APPOINTMENT SCHEDULED FOR 03/15/2023    Does the patient have less than a 3 day supply:  [] Yes  [x] No    Would you like a call back once the refill request has been completed: [x] Yes [] No    If the office needs to give you a call back, can they leave a voicemail: [x] Yes [] No    Esteban Rodriguez Rep   03/01/23 11:48 EST

## 2023-03-02 RX ORDER — HYDROCODONE BITARTRATE AND ACETAMINOPHEN 10; 325 MG/1; MG/1
1 TABLET ORAL 3 TIMES DAILY PRN
Qty: 15 TABLET | Refills: 0 | Status: SHIPPED | OUTPATIENT
Start: 2023-03-11 | End: 2023-03-09 | Stop reason: SDUPTHER

## 2023-03-09 ENCOUNTER — TELEPHONE (OUTPATIENT)
Dept: PAIN MEDICINE | Facility: CLINIC | Age: 78
End: 2023-03-09
Payer: MEDICARE

## 2023-03-09 DIAGNOSIS — G89.4 CHRONIC PAIN SYNDROME: ICD-10-CM

## 2023-03-09 RX ORDER — HYDROCODONE BITARTRATE AND ACETAMINOPHEN 10; 325 MG/1; MG/1
1 TABLET ORAL 3 TIMES DAILY PRN
Qty: 15 TABLET | Refills: 0 | Status: SHIPPED | OUTPATIENT
Start: 2023-03-11 | End: 2023-03-15 | Stop reason: SDUPTHER

## 2023-03-09 NOTE — TELEPHONE ENCOUNTER
3/9/23-- Dr JUAREZ-- Manny wants  Patient to transfer all rxes there-- to expensive  For patient--- can you escribe 3/11  rx of Hydrocodone to  pharmacy for her 15 pill fill-- they have her rx in stock

## 2023-03-10 ENCOUNTER — LAB (OUTPATIENT)
Dept: LAB | Facility: HOSPITAL | Age: 78
End: 2023-03-10
Payer: MEDICARE

## 2023-03-10 DIAGNOSIS — G89.4 CHRONIC PAIN SYNDROME: ICD-10-CM

## 2023-03-10 DIAGNOSIS — E89.2 POSTSURGICAL HYPOPARATHYROIDISM: ICD-10-CM

## 2023-03-10 DIAGNOSIS — E11.65 TYPE 2 DIABETES MELLITUS WITH HYPERGLYCEMIA, WITH LONG-TERM CURRENT USE OF INSULIN: Chronic | ICD-10-CM

## 2023-03-10 DIAGNOSIS — E89.0 HYPOTHYROIDISM, POSTSURGICAL: Chronic | ICD-10-CM

## 2023-03-10 DIAGNOSIS — Z79.4 TYPE 2 DIABETES MELLITUS WITH HYPERGLYCEMIA, WITH LONG-TERM CURRENT USE OF INSULIN: Chronic | ICD-10-CM

## 2023-03-10 LAB
T4 FREE SERPL-MCNC: 1.15 NG/DL (ref 0.93–1.7)
TSH SERPL DL<=0.05 MIU/L-ACNC: 2.03 UIU/ML (ref 0.27–4.2)

## 2023-03-10 PROCEDURE — 84439 ASSAY OF FREE THYROXINE: CPT

## 2023-03-10 PROCEDURE — 86800 THYROGLOBULIN ANTIBODY: CPT

## 2023-03-10 PROCEDURE — 84443 ASSAY THYROID STIM HORMONE: CPT

## 2023-03-10 PROCEDURE — 36415 COLL VENOUS BLD VENIPUNCTURE: CPT

## 2023-03-10 PROCEDURE — 84432 ASSAY OF THYROGLOBULIN: CPT

## 2023-03-10 NOTE — TELEPHONE ENCOUNTER
Pharmacy is closed tomorrow patient is asking if she can get it today. Also quantity needs to be change.

## 2023-03-13 RX ORDER — HYDROCODONE BITARTRATE AND ACETAMINOPHEN 10; 325 MG/1; MG/1
1 TABLET ORAL 3 TIMES DAILY PRN
Qty: 90 TABLET | Refills: 0 | OUTPATIENT
Start: 2023-03-13

## 2023-03-15 ENCOUNTER — OFFICE VISIT (OUTPATIENT)
Dept: PAIN MEDICINE | Facility: CLINIC | Age: 78
End: 2023-03-15
Payer: MEDICARE

## 2023-03-15 VITALS
HEART RATE: 76 BPM | RESPIRATION RATE: 16 BRPM | SYSTOLIC BLOOD PRESSURE: 147 MMHG | OXYGEN SATURATION: 99 % | DIASTOLIC BLOOD PRESSURE: 79 MMHG

## 2023-03-15 DIAGNOSIS — M79.18 MYOFASCIAL PAIN SYNDROME: ICD-10-CM

## 2023-03-15 DIAGNOSIS — M47.817 LUMBOSACRAL SPONDYLOSIS WITHOUT MYELOPATHY: ICD-10-CM

## 2023-03-15 DIAGNOSIS — G89.4 CHRONIC PAIN SYNDROME: ICD-10-CM

## 2023-03-15 DIAGNOSIS — M25.50 POLYARTHRALGIA: ICD-10-CM

## 2023-03-15 DIAGNOSIS — Z79.899 HIGH RISK MEDICATION USE: Primary | ICD-10-CM

## 2023-03-15 PROCEDURE — 1160F RVW MEDS BY RX/DR IN RCRD: CPT | Performed by: ANESTHESIOLOGY

## 2023-03-15 PROCEDURE — 1125F AMNT PAIN NOTED PAIN PRSNT: CPT | Performed by: ANESTHESIOLOGY

## 2023-03-15 PROCEDURE — 3077F SYST BP >= 140 MM HG: CPT | Performed by: ANESTHESIOLOGY

## 2023-03-15 PROCEDURE — 3078F DIAST BP <80 MM HG: CPT | Performed by: ANESTHESIOLOGY

## 2023-03-15 PROCEDURE — 1159F MED LIST DOCD IN RCRD: CPT | Performed by: ANESTHESIOLOGY

## 2023-03-15 PROCEDURE — 99214 OFFICE O/P EST MOD 30 MIN: CPT | Performed by: ANESTHESIOLOGY

## 2023-03-15 RX ORDER — HYDROCODONE BITARTRATE AND ACETAMINOPHEN 10; 325 MG/1; MG/1
1 TABLET ORAL 3 TIMES DAILY PRN
Qty: 90 TABLET | Refills: 0 | Status: SHIPPED | OUTPATIENT
Start: 2023-04-16

## 2023-03-15 RX ORDER — HYDROCODONE BITARTRATE AND ACETAMINOPHEN 10; 325 MG/1; MG/1
1 TABLET ORAL 3 TIMES DAILY PRN
Qty: 90 TABLET | Refills: 0 | Status: SHIPPED | OUTPATIENT
Start: 2023-03-17

## 2023-03-15 NOTE — PROGRESS NOTES
Subjective    CC joints pain left knee pain  Marilyn J Pumphrey is a 77 y.o. female with multiple comorbidities including DM, polyarthralgia from  inflammatory osteoarthritis, bilateral knee pain, status post left TKA 2019, here for f/u.  Worsening generalized myofascial pain and polyarthralgia the last 2-3 weeks.  Utilizing diclofenac gel with mild relief.  Continues to have good relief of hydrocodone and denies side effects.  Chronic back pain radiating to bilateral hips worse with prolonged activity.  Chronic polyarthralgia/left knee pain,  and generalized myofascial pain interfering with daily activity, sleep,mood.  Considering right knee TKA.  Tried methotrexate and  Celebrex without relief.  Tried Cymbalta, sevella, tramadol without relief.  Seen Rheumatology. and ortho. considering right knee replecement.   Good releif with knee injections by ortho.      Utilizes hydrocodone 10/325  with good relief functional benefit denies side effects.    L-spine MRI 8/2021.  Multiple level degenerative disc disease and degenerative changes.  Small left paracentral disc protrusion at L3-L4 which appears to abut the exiting nerve root with mild encroachment on neural foramina on the left.    Pain Assessment   Location of Pain: Lower Back, R Hip, L Hip, L Leg, neck pain, joint  Description of Pain: Dull/Aching, Throbbing, Stabbing  Previous Pain Rating :7  Current Pain Rating: 10  Aggravating Factors: Activity  Alleviating Factors: Rest, Medication  PEG Assessment   What number best describes your pain on average in the past week?8  What number best describes how, during the past week, pain has interfered with your enjoyment of life?8  What number best describes how, during the past week, pain has interfered with your general activity? 10     The following portions of the patient's history were reviewed and updated as appropriate: allergies, current medications, past family history, past medical history, past social history,  past surgical history and problem list.   has a past medical history of Anxiety, Arthritis, Bone pain, Cancer (HCC) (09/2016), Depression, Disease of thyroid gland, DM type 2 (diabetes mellitus, type 2) ([facility]), Fall, GERD (gastroesophageal reflux disease), Hypertension, Irregular heart beat, Knee pain, Leaky heart valve, Leg cramps, Low back pain, Numbness around mouth, Osteoarthritis, PONV (postoperative nausea and vomiting), Sleep apnea, and UTI (urinary tract infection).   has a past surgical history that includes Tonsillectomy; Thyroid surgery; Oophorectomy (Bilateral, 2015); Hysterectomy (1993); Cholecystectomy (1974); Carpal tunnel release; vein ligation and stripping bilateral; and Total knee arthroplasty (Left, 10/14/2019).  Social History     Tobacco Use   • Smoking status: Never   • Smokeless tobacco: Never   Substance Use Topics   • Alcohol use: No     Review of Systems   Musculoskeletal: Positive for arthralgias, back pain, joint swelling, myalgias and neck pain.   All other systems reviewed and are negative.    Objective   Physical Exam   Constitutional: No distress.   Pulmonary/Chest: Effort normal.   Musculoskeletal:      Right knee: Tenderness found.      Left knee: Tenderness found.      Cervical back: She exhibits decreased range of motion and tenderness.      Lumbar back: She exhibits decreased range of motion and tenderness.      Comments: Lumbar loading positive, pain on extension of low back past 5 degrees.  TTP on the lumbar facets noted.  Leg raise left   Vitals reviewed.    /79   Pulse 76   Resp 16   SpO2 99%     PHQ 9 on chart  Opioid risk tool low risk    Assessment & Plan   Diagnoses and all orders for this visit:    1. Chronic pain syndrome (Primary)  -     HYDROcodone-acetaminophen (NORCO)  MG per tablet; Take 1 tablet by mouth 3 (Three) Times a Day As Needed for Severe Pain. DNF before 4/16/2023  Dispense: 90 tablet; Refill: 0  -     HYDROcodone-acetaminophen (NORCO)   MG per tablet; Take 1 tablet by mouth 3 (Three) Times a Day As Needed for Severe Pain. DNF before 3/17/2023  Dispense: 90 tablet; Refill: 0    2. Lumbosacral spondylosis without myelopathy    3. Polyarthralgia    4. Myofascial pain syndrome    5. High risk medication use      Summary  77 y.o. female with multiple comorbidities including DM, polyarthralgia from  inflammatory osteoarthritis, knee pain S/P left TKA, here for f/u.  Chronic polyarthralgia/left knee pain,  and generalized myofascial pain interfering with daily activity, sleep,mood.      Worsening generalized myofascial pain and polyarthralgia the last 2-3 weeks.  Utilizing diclofenac gel with mild relief.  Continues to have good relief of hydrocodone and denies side effects.    Continue hydrocodone 10/325 2-3 times daily as needed for severe pain.  UDS and  Inspect reviewed.    Discussed risk of tolerance, dependence, respiratory depression, coma and death associated with use of oral opioids for treatment of chronic nonmalignant pain.      Discussed increased risk with combining opioid and benzo. On clorazepate prn anxiety.    RTC in 2 months

## 2023-03-20 LAB
THYROGLOB AB SERPL-ACNC: 1.8 IU/ML
THYROGLOB SERPL-MCNC: <2 NG/ML
THYROGLOB SERPL-MCNC: ABNORMAL NG/ML

## 2023-03-22 DIAGNOSIS — E89.2 POSTSURGICAL HYPOPARATHYROIDISM: Primary | ICD-10-CM

## 2023-04-13 DIAGNOSIS — E89.0 HYPOTHYROIDISM, POSTSURGICAL: ICD-10-CM

## 2023-04-13 DIAGNOSIS — E89.2 POSTSURGICAL HYPOPARATHYROIDISM: ICD-10-CM

## 2023-04-13 RX ORDER — CALCITRIOL 0.25 UG/1
0.25 CAPSULE, LIQUID FILLED ORAL 2 TIMES DAILY
Qty: 30 CAPSULE | Refills: 3 | Status: SHIPPED | OUTPATIENT
Start: 2023-04-13

## 2023-04-27 DIAGNOSIS — E89.2 POSTSURGICAL HYPOPARATHYROIDISM: ICD-10-CM

## 2023-04-27 DIAGNOSIS — E89.0 HYPOTHYROIDISM, POSTSURGICAL: ICD-10-CM

## 2023-04-27 RX ORDER — CALCITRIOL 0.25 UG/1
0.25 CAPSULE, LIQUID FILLED ORAL 2 TIMES DAILY
Qty: 60 CAPSULE | Refills: 3 | Status: SHIPPED | OUTPATIENT
Start: 2023-04-27

## 2023-05-10 ENCOUNTER — OFFICE VISIT (OUTPATIENT)
Dept: PAIN MEDICINE | Facility: CLINIC | Age: 78
End: 2023-05-10
Payer: MEDICARE

## 2023-05-10 VITALS
RESPIRATION RATE: 16 BRPM | SYSTOLIC BLOOD PRESSURE: 136 MMHG | DIASTOLIC BLOOD PRESSURE: 72 MMHG | HEART RATE: 75 BPM | OXYGEN SATURATION: 97 %

## 2023-05-10 DIAGNOSIS — G89.4 CHRONIC PAIN SYNDROME: Primary | ICD-10-CM

## 2023-05-10 DIAGNOSIS — Z79.899 HIGH RISK MEDICATION USE: ICD-10-CM

## 2023-05-10 DIAGNOSIS — M25.50 POLYARTHRALGIA: ICD-10-CM

## 2023-05-10 DIAGNOSIS — M47.817 LUMBOSACRAL SPONDYLOSIS WITHOUT MYELOPATHY: ICD-10-CM

## 2023-05-10 DIAGNOSIS — M79.18 MYOFASCIAL PAIN SYNDROME: ICD-10-CM

## 2023-05-10 RX ORDER — DOXYCYCLINE HYCLATE 50 MG/1
1 CAPSULE, GELATIN COATED ORAL
COMMUNITY
Start: 2023-03-23

## 2023-05-10 RX ORDER — HYDROCODONE BITARTRATE AND ACETAMINOPHEN 10; 325 MG/1; MG/1
1 TABLET ORAL 3 TIMES DAILY PRN
Qty: 90 TABLET | Refills: 0 | Status: SHIPPED | OUTPATIENT
Start: 2023-05-16 | End: 2023-05-10 | Stop reason: SDUPTHER

## 2023-05-10 RX ORDER — HYDROCODONE BITARTRATE AND ACETAMINOPHEN 10; 325 MG/1; MG/1
1 TABLET ORAL 3 TIMES DAILY PRN
Qty: 90 TABLET | Refills: 0 | Status: SHIPPED | OUTPATIENT
Start: 2023-06-15 | End: 2023-05-10 | Stop reason: SDUPTHER

## 2023-05-10 RX ORDER — HYDROCODONE BITARTRATE AND ACETAMINOPHEN 10; 325 MG/1; MG/1
1 TABLET ORAL 3 TIMES DAILY PRN
Qty: 90 TABLET | Refills: 0 | Status: SHIPPED | OUTPATIENT
Start: 2023-05-16 | End: 2023-05-11 | Stop reason: SDUPTHER

## 2023-05-10 RX ORDER — DULAGLUTIDE 3 MG/.5ML
INJECTION, SOLUTION SUBCUTANEOUS
COMMUNITY
End: 2023-05-12

## 2023-05-10 RX ORDER — HYDROCODONE BITARTRATE AND ACETAMINOPHEN 10; 325 MG/1; MG/1
1 TABLET ORAL 3 TIMES DAILY PRN
Qty: 90 TABLET | Refills: 0 | Status: SHIPPED | OUTPATIENT
Start: 2023-06-15 | End: 2023-05-11 | Stop reason: SDUPTHER

## 2023-05-10 NOTE — PROGRESS NOTES
Subjective    CC joints pain left knee pain  Marilyn J Pumphrey is a 77 y.o. female with multiple comorbidities including DM, polyarthralgia from  inflammatory osteoarthritis, bilateral knee pain, status post left TKA , here for f/u.  Continued generalized myofascial pain and polyarthralgia.  Mild relief with hydrocodone stretching.  Also dealing with iron deficiency anemia, having iron infusions currently.  Chronic back pain radiating to bilateral hips worse with prolonged activity.  Chronic polyarthralgia/left knee pain,  and generalized myofascial pain interfering with daily activity, sleep,mood.  Considering right knee TKA.  Tried methotrexate and  Celebrex without relief.  Tried Cymbalta, sevella, tramadol without relief.  Seen Rheumatology. and ortho. considering right knee replecement.   Good releif with knee injections by ortho.      Utilizes hydrocodone 10/325  with good relief functional benefit denies side effects.    L-spine MRI 2021.  Multiple level degenerative disc disease and degenerative changes.  Small left paracentral disc protrusion at L3-L4 which appears to abut the exiting nerve root with mild encroachment on neural foramina on the left.    Pain Assessment   Location of Pain: Lower Back, R Hip, L Hip, L Leg, neck pain, joint  Description of Pain: Dull/Aching, Throbbing, Stabbing  Previous Pain Rating :10  Current Pain Ratin  Aggravating Factors: Activity  Alleviating Factors: Rest, Medication  PEG Assessment   What number best describes your pain on average in the past week?8  What number best describes how, during the past week, pain has interfered with your enjoyment of life?7  What number best describes how, during the past week, pain has interfered with your general activity? 10     The following portions of the patient's history were reviewed and updated as appropriate: allergies, current medications, past family history, past medical history, past social history, past surgical  history and problem list.   has a past medical history of Anxiety, Arthritis, Bone pain, Cancer (09/2016), Depression, Disease of thyroid gland, DM type 2 (diabetes mellitus, type 2), Fall, GERD (gastroesophageal reflux disease), Hypertension, Irregular heart beat, Knee pain, Leaky heart valve, Leg cramps, Low back pain, Low iron, Numbness around mouth, Osteoarthritis, PONV (postoperative nausea and vomiting), Sleep apnea, and UTI (urinary tract infection).   has a past surgical history that includes Tonsillectomy; Thyroid surgery; Oophorectomy (Bilateral, 2015); Hysterectomy (1993); Cholecystectomy (1974); Carpal tunnel release; vein ligation and stripping bilateral; and Total knee arthroplasty (Left, 10/14/2019).  Social History     Tobacco Use   • Smoking status: Never   • Smokeless tobacco: Never   Substance Use Topics   • Alcohol use: No     Review of Systems   Musculoskeletal: Positive for arthralgias, back pain, joint swelling, myalgias and neck pain.   All other systems reviewed and are negative.    Objective   Physical Exam   Constitutional: No distress.   Cane   Musculoskeletal:      Right knee: Tenderness found.      Left knee: Tenderness found.      Cervical back: She exhibits decreased range of motion.      Lumbar back: She exhibits decreased range of motion and tenderness.      Comments: Lumbar loading positive, pain on extension of low back past 5 degrees.  TTP on the lumbar facets noted.  Leg raise left   Vitals reviewed.    /72   Pulse 75   Resp 16   SpO2 97%     PHQ 9 on chart  Opioid risk tool low risk    Assessment & Plan   Diagnoses and all orders for this visit:    1. Chronic pain syndrome (Primary)  -     HYDROcodone-acetaminophen (NORCO)  MG per tablet; Take 1 tablet by mouth 3 (Three) Times a Day As Needed for Severe Pain. DNF before 5/16/2023  Dispense: 90 tablet; Refill: 0  -     HYDROcodone-acetaminophen (NORCO)  MG per tablet; Take 1 tablet by mouth 3 (Three) Times a  Day As Needed for Severe Pain. DNF before 6/15/2023  Dispense: 90 tablet; Refill: 0    2. Lumbosacral spondylosis without myelopathy    3. Polyarthralgia    4. Myofascial pain syndrome    5. High risk medication use      Summary  77 y.o. female with multiple comorbidities including DM, polyarthralgia from  inflammatory osteoarthritis, knee pain S/P left TKA, here for f/u.  Chronic polyarthralgia/left knee pain,  and generalized myofascial pain interfering with daily activity, sleep,mood.      Continued generalized myofascial pain and polyarthralgia.  Mild relief with hydrocodone stretching.  Also dealing with iron deficiency anemia, having iron infusions currently.    Continue hydrocodone 10/325 2-3 times daily as needed for severe pain.  UDS and  Inspect reviewed.    Discussed risk of tolerance, dependence, respiratory depression, coma and death associated with use of oral opioids for treatment of chronic nonmalignant pain.      Discussed increased risk with combining opioid and benzo. On clorazepate prn anxiety.    RTC in 2 months

## 2023-05-11 DIAGNOSIS — G89.4 CHRONIC PAIN SYNDROME: ICD-10-CM

## 2023-05-11 RX ORDER — HYDROCODONE BITARTRATE AND ACETAMINOPHEN 10; 325 MG/1; MG/1
1 TABLET ORAL 3 TIMES DAILY PRN
Qty: 90 TABLET | Refills: 0 | Status: SHIPPED | OUTPATIENT
Start: 2023-06-15

## 2023-05-11 RX ORDER — HYDROCODONE BITARTRATE AND ACETAMINOPHEN 10; 325 MG/1; MG/1
1 TABLET ORAL 3 TIMES DAILY PRN
Qty: 90 TABLET | Refills: 0 | Status: SHIPPED | OUTPATIENT
Start: 2023-05-16

## 2023-05-12 DIAGNOSIS — Z79.4 TYPE 2 DIABETES MELLITUS WITH HYPERGLYCEMIA, WITH LONG-TERM CURRENT USE OF INSULIN: Primary | ICD-10-CM

## 2023-05-12 DIAGNOSIS — E11.65 TYPE 2 DIABETES MELLITUS WITH HYPERGLYCEMIA, WITH LONG-TERM CURRENT USE OF INSULIN: Primary | ICD-10-CM

## 2023-05-12 RX ORDER — TIRZEPATIDE 7.5 MG/.5ML
7.5 INJECTION, SOLUTION SUBCUTANEOUS WEEKLY
Qty: 6 ML | Refills: 6 | Status: SHIPPED | OUTPATIENT
Start: 2023-05-12

## 2023-05-23 PROBLEM — R10.13 EPIGASTRIC PAIN: Status: ACTIVE | Noted: 2017-06-30

## 2023-07-24 ENCOUNTER — HOSPITAL ENCOUNTER (EMERGENCY)
Facility: HOSPITAL | Age: 78
Discharge: HOME | End: 2023-07-24
Attending: EMERGENCY MEDICINE | Admitting: EMERGENCY MEDICINE
Payer: MEDICARE

## 2023-07-24 VITALS
HEART RATE: 77 BPM | RESPIRATION RATE: 15 BRPM | WEIGHT: 175 LBS | OXYGEN SATURATION: 93 % | BODY MASS INDEX: 27.47 KG/M2 | SYSTOLIC BLOOD PRESSURE: 117 MMHG | HEIGHT: 67 IN | TEMPERATURE: 98 F | DIASTOLIC BLOOD PRESSURE: 66 MMHG

## 2023-07-24 DIAGNOSIS — M15.9 PRIMARY OSTEOARTHRITIS INVOLVING MULTIPLE JOINTS: Primary | ICD-10-CM

## 2023-07-24 LAB
ANION GAP SERPL CALCULATED.3IONS-SCNC: 15 MMOL/L (ref 5–15)
BASOPHILS # BLD AUTO: 0.1 10*3/MM3 (ref 0–0.2)
BASOPHILS NFR BLD AUTO: 0.7 % (ref 0–1.5)
BUN SERPL-MCNC: 10 MG/DL (ref 8–23)
BUN/CREAT SERPL: 14.5 (ref 7–25)
CALCIUM SPEC-SCNC: 7.2 MG/DL (ref 8.6–10.5)
CHLORIDE SERPL-SCNC: 96 MMOL/L (ref 98–107)
CO2 SERPL-SCNC: 24 MMOL/L (ref 22–29)
CREAT SERPL-MCNC: 0.69 MG/DL (ref 0.57–1)
CRP SERPL-MCNC: 9.38 MG/DL (ref 0–0.5)
DEPRECATED RDW RBC AUTO: 54.7 FL (ref 37–54)
EGFRCR SERPLBLD CKD-EPI 2021: 90.1 ML/MIN/1.73
EOSINOPHIL # BLD AUTO: 0.2 10*3/MM3 (ref 0–0.4)
EOSINOPHIL NFR BLD AUTO: 1.6 % (ref 0.3–6.2)
ERYTHROCYTE [DISTWIDTH] IN BLOOD BY AUTOMATED COUNT: 18.9 % (ref 12.3–15.4)
ERYTHROCYTE [SEDIMENTATION RATE] IN BLOOD: 54 MM/HR (ref 0–30)
GLUCOSE SERPL-MCNC: 111 MG/DL (ref 65–99)
HCT VFR BLD AUTO: 41.1 % (ref 34–46.6)
HGB BLD-MCNC: 13.4 G/DL (ref 12–15.9)
LYMPHOCYTES # BLD AUTO: 0.7 10*3/MM3 (ref 0.7–3.1)
LYMPHOCYTES NFR BLD AUTO: 7 % (ref 19.6–45.3)
MCH RBC QN AUTO: 27.2 PG (ref 26.6–33)
MCHC RBC AUTO-ENTMCNC: 32.6 G/DL (ref 31.5–35.7)
MCV RBC AUTO: 83.5 FL (ref 79–97)
MONOCYTES # BLD AUTO: 0.5 10*3/MM3 (ref 0.1–0.9)
MONOCYTES NFR BLD AUTO: 5.1 % (ref 5–12)
NEUTROPHILS NFR BLD AUTO: 85.6 % (ref 42.7–76)
NEUTROPHILS NFR BLD AUTO: 9 10*3/MM3 (ref 1.7–7)
NRBC BLD AUTO-RTO: 0 /100 WBC (ref 0–0.2)
PLAT MORPH BLD: NORMAL
PLATELET # BLD AUTO: 188 10*3/MM3 (ref 140–450)
PMV BLD AUTO: 8.8 FL (ref 6–12)
POIKILOCYTOSIS BLD QL SMEAR: NORMAL
POTASSIUM SERPL-SCNC: 4.1 MMOL/L (ref 3.5–5.2)
RBC # BLD AUTO: 4.93 10*6/MM3 (ref 3.77–5.28)
SODIUM SERPL-SCNC: 135 MMOL/L (ref 136–145)
WBC MORPH BLD: NORMAL
WBC NRBC COR # BLD: 10.5 10*3/MM3 (ref 3.4–10.8)

## 2023-07-24 PROCEDURE — 25010000002 DEXAMETHASONE PER 1 MG: Performed by: EMERGENCY MEDICINE

## 2023-07-24 PROCEDURE — 86140 C-REACTIVE PROTEIN: CPT | Performed by: NURSE PRACTITIONER

## 2023-07-24 PROCEDURE — 80048 BASIC METABOLIC PNL TOTAL CA: CPT | Performed by: NURSE PRACTITIONER

## 2023-07-24 PROCEDURE — 99283 EMERGENCY DEPT VISIT LOW MDM: CPT

## 2023-07-24 PROCEDURE — 25010000002 FENTANYL CITRATE (PF) 50 MCG/ML SOLUTION: Performed by: EMERGENCY MEDICINE

## 2023-07-24 PROCEDURE — 85652 RBC SED RATE AUTOMATED: CPT | Performed by: NURSE PRACTITIONER

## 2023-07-24 PROCEDURE — 96375 TX/PRO/DX INJ NEW DRUG ADDON: CPT

## 2023-07-24 PROCEDURE — 85007 BL SMEAR W/DIFF WBC COUNT: CPT | Performed by: NURSE PRACTITIONER

## 2023-07-24 PROCEDURE — 85025 COMPLETE CBC W/AUTO DIFF WBC: CPT | Performed by: NURSE PRACTITIONER

## 2023-07-24 PROCEDURE — 96374 THER/PROPH/DIAG INJ IV PUSH: CPT

## 2023-07-24 RX ORDER — DEXAMETHASONE SODIUM PHOSPHATE 4 MG/ML
10 INJECTION, SOLUTION INTRA-ARTICULAR; INTRALESIONAL; INTRAMUSCULAR; INTRAVENOUS; SOFT TISSUE ONCE
Status: COMPLETED | OUTPATIENT
Start: 2023-07-24 | End: 2023-07-24

## 2023-07-24 RX ORDER — PREDNISONE 50 MG/1
50 TABLET ORAL DAILY
Qty: 3 TABLET | Refills: 0 | Status: SHIPPED | OUTPATIENT
Start: 2023-07-24

## 2023-07-24 RX ORDER — FENTANYL CITRATE 50 UG/ML
50 INJECTION, SOLUTION INTRAMUSCULAR; INTRAVENOUS ONCE
Status: COMPLETED | OUTPATIENT
Start: 2023-07-24 | End: 2023-07-24

## 2023-07-24 RX ADMIN — DEXAMETHASONE SODIUM PHOSPHATE 10 MG: 4 INJECTION, SOLUTION INTRAMUSCULAR; INTRAVENOUS at 16:29

## 2023-07-24 RX ADMIN — FENTANYL CITRATE 50 MCG: 50 INJECTION, SOLUTION INTRAMUSCULAR; INTRAVENOUS at 16:29

## 2023-07-24 NOTE — ED PROVIDER NOTES
Subjective   History of Present Illness  Chief complaint swelling in my joints arthritis acting up    History of present illness is a 77-year-old female history of osteoarthritis and several health problems who complains of 1 week history of her joint swelling up particular in her hands.  She is taking her pain medication the pain management gives her without relief.  The pain is severe worse with movement better with rest she is hardly able to get around.  No fever chills or night sweats.  No chest pain neck arm jaw pain or shortness of breath.  Denies any foreign travels no recent long car ride plane or immobilization no change in medications recently.  No urinary problems no bowel problems.  Denies any tick bites or rashes    Review of Systems   Constitutional:  Negative for chills and fever.   Respiratory:  Negative for chest tightness and shortness of breath.    Gastrointestinal:  Negative for abdominal pain and vomiting.   Musculoskeletal:  Positive for joint swelling. Negative for back pain and neck pain.   Skin:  Negative for rash.   Neurological:  Positive for weakness. Negative for dizziness and light-headedness.   Psychiatric/Behavioral:  Negative for confusion.      Past Medical History:   Diagnosis Date    Anemia, unspecified     Anxiety     Arthritis     Bone pain     Cancer 09/2016    THYROID    Depression     Disease of thyroid gland     DM type 2 (diabetes mellitus, type 2)     TYPE 2    Fall     2/2021 FELL ON ICE    GERD (gastroesophageal reflux disease)     Hypertension     Irregular heart beat     Knee pain     Leaky heart valve     11/2020    Leg cramps     Low back pain     Low iron     getting iron infusions 5/10/23    Numbness around mouth     5/12/2022-- was inpatient at [facility]-- ct ran and MRI-- no stroke    Osteoarthritis     PONV (postoperative nausea and vomiting)     DOES WELL WITH PROPOFOL    Sleep apnea     C-PAP IN USE    UTI (urinary tract infection)        Allergies   Allergen  Reactions    Codeine Shortness Of Breath    Oxycodone Hallucinations     Terrible nausea and vomiting       Past Surgical History:   Procedure Laterality Date    CARPAL TUNNEL RELEASE      CHOLECYSTECTOMY  1974    HYSTERECTOMY  1993    partial abdominal hysterectomy    OOPHORECTOMY Bilateral 2015    THYROID SURGERY      2 thyroid surgery    TONSILLECTOMY      TOTAL KNEE ARTHROPLASTY Left 10/14/2019    Procedure: TOTAL KNEE ARTHROPLASTY;  Surgeon: Sanjay Collado MD;  Location: [facility] OR;  Service: Orthopedics    VEIN LIGATION AND STRIPPING BILATERAL         Family History   Problem Relation Age of Onset    Diabetes Father     Heart disease Father     Hypertension Father     Hyperlipidemia Father     Thyroid disease Father     Cancer Sister         lung cancer    Thyroid disease Sister     Diabetes Brother     Heart disease Brother     Cancer Brother         lung cancer       Social History     Socioeconomic History    Marital status:     Number of children: 3    Years of education: 9   Tobacco Use    Smoking status: Never    Smokeless tobacco: Never   Vaping Use    Vaping Use: Never used   Substance and Sexual Activity    Alcohol use: No    Drug use: No    Sexual activity: Defer     Prior to Admission medications    Medication Sig Start Date End Date Taking? Authorizing Provider   amLODIPine-benazepril (LOTREL 5-20) 5-20 MG per capsule Take 1 capsule by mouth Daily. 5/12/22   Jose F Duke MD   aspirin 81 MG tablet Take 1 tablet by mouth Daily.  Patient taking differently: Take 325 mg by mouth Daily. 10/15/19   Sanjay Collado MD   calcitriol (ROCALTROL) 0.25 MCG capsule Take 1 capsule by mouth 2 (Two) Times a Day. 4/27/23   Gege Ford MD   CALCIUM CARBONATE-VIT D-MIN PO Take 1,200 mg by mouth 2 (Two) Times a Day.    ProviderEkaterina MD   clorazepate (TRANXENE) 7.5 MG tablet Take 1 tablet by mouth 2 (Two) Times a Day. 12/9/22   Ekaterina Moncada MD   diclofenac (VOLTAREN) 1 % gel gel Apply 4  g topically to the appropriate area as directed 4 (Four) Times a Day As Needed.    Ekaterina Moncada MD   Diclofenac Sodium (VOLTAREN) 1 % gel gel Apply 4 g topically to the appropriate area as directed.    Ekaterina Moncada MD   empagliflozin (Jardiance) 25 MG tablet tablet Take 1 tablet by mouth Daily. 8/25/21   Gege Ford MD   escitalopram (LEXAPRO) 10 MG tablet Take 1 tablet by mouth Every Evening. 5/21/19   Ekaterina Moncada MD   esomeprazole (nexIUM) 40 MG capsule Take 1 capsule by mouth 2 (Two) Times a Day.    Ekaterina Moncada MD   ferrous gluconate (FERGON) 324 MG tablet Take 1 tablet by mouth Daily With Breakfast. 3/23/23   Ekaterina Moncada MD   HYDROcodone-acetaminophen (NORCO)  MG per tablet Take 1 tablet by mouth 3 (Three) Times a Day As Needed for Severe Pain. 7/15/23   Lisa Wells MD   HYDROcodone-acetaminophen (NORCO)  MG per tablet Take 1 tablet by mouth 3 (Three) Times a Day As Needed for Severe Pain. DNF before 8/15/2023 8/15/23   Lisa Wells MD   HYDROcodone-acetaminophen (NORCO)  MG per tablet Take 1 tablet by mouth 3 (Three) Times a Day As Needed for Severe Pain. DNF before 9/14/2023 9/14/23   Lisa Wells MD   insulin aspart (NOVOLOG FLEXPEN) 100 UNIT/ML solution pen-injector sc pen Inject 10 units before each meal  Patient taking differently: 10 Units. Inject 10 units before each meal 8/20/19   Gege Ford MD   insulin glargine (LANTUS, SEMGLEE) 100 UNIT/ML injection Inject 30 Units under the skin into the appropriate area as directed Daily.  Patient taking differently: Inject 40 Units under the skin into the appropriate area as directed Every Morning. 5/24/21   Gege Ford MD   levothyroxine (SYNTHROID, LEVOTHROID) 112 MCG tablet Take 1 tablet p.o. daily 1/27/23   Gege Ford MD   magnesium oxide (MAG-OX) 400 tablet tablet Take 1 tablet by mouth 2 (Two) Times a Day.    Ekaterina Moncada MD   metFORMIN  (Glucophage) 1000 MG tablet Take 1 tablet by mouth 2 (Two) Times a Day With Meals. 6/14/22   Gege Ford MD   metoprolol tartrate (LOPRESSOR) 100 MG tablet Take 1 tablet by mouth 2 (Two) Times a Day.    Ekaterina Moncada MD   pramipexole (MIRAPEX) 1 MG tablet Take 1 tablet by mouth 2 (Two) Times a Day. 11/13/22   Ekaterina Moncada MD   promethazine (PHENERGAN) 25 MG tablet Take 1 tablet by mouth As Needed. 9/22/19   Ekaterina Moncada MD   promethazine (PHENERGAN) 25 MG tablet Every 4 (Four) Hours.    Ekaterina Moncada MD   rosuvastatin (CRESTOR) 40 MG tablet Take 20 mg by mouth Daily.    Ekaterina Moncada MD   sucralfate (CARAFATE) 1 g tablet Take 1 tablet by mouth 4 (Four) Times a Day.    Ekaterina Moncada MD   Tirzepatide (Mounjaro) 7.5 MG/0.5ML solution pen-injector Inject 0.5 mL under the skin into the appropriate area as directed 1 (One) Time Per Week. 5/12/23   Gege Ford MD   vitamin D (ERGOCALCIFEROL) 1.25 MG (23365 UT) capsule capsule Take 1 capsule by mouth 1 (One) Time Per Week. 4/23/21   Ekaterina Moncada MD            Objective   Physical Exam  Constitutional this is a 77-year-old female awake alert nontoxic-appearing triage vital signs reviewed.  HEENT extraocular muscles are intact pupils equal round reactive sclera clear neck supple no adenopathy no meningeal signs.  Lungs clear no retractions hearts regular without murmur abdomen soft without tenderness good bowel sounds no peritoneal findings or pulsatile masses.  Extremities pulses equal upper and lower extremities no edema no cords no Homans' sign no evidence of DVT.  Skin is warm and dry without rashes or cellulitic changes joints are swollen throughout the fingers and hands.  They are not red or feverish no evidence of a septic joint or infected joints just diffusely swollen.  There is no cords throughout the arm good cap refill compartments are soft to palpation no pain with passive stretching and no  evidence of cellulitis or septic joint or compartment syndrome.  No evidence of DVT the arm.  Patient has some mild swelling to the knees and feet but there is not as bad as the hands the calfs are soft no tenderness no Homans' sign no cords no evidence of DVT.  Neurologic awake alert and follows commands monitorings normal without focal weakness.  Procedures           ED Course      Results for orders placed or performed during the hospital encounter of 07/24/23   Basic Metabolic Panel    Specimen: Blood   Result Value Ref Range    Glucose 111 (H) 65 - 99 mg/dL    BUN 10 8 - 23 mg/dL    Creatinine 0.69 0.57 - 1.00 mg/dL    Sodium 135 (L) 136 - 145 mmol/L    Potassium 4.1 3.5 - 5.2 mmol/L    Chloride 96 (L) 98 - 107 mmol/L    CO2 24.0 22.0 - 29.0 mmol/L    Calcium 7.2 (L) 8.6 - 10.5 mg/dL    BUN/Creatinine Ratio 14.5 7.0 - 25.0    Anion Gap 15.0 5.0 - 15.0 mmol/L    eGFR 90.1 >60.0 mL/min/1.73   Sedimentation Rate    Specimen: Blood   Result Value Ref Range    Sed Rate 54 (H) 0 - 30 mm/hr   C-reactive Protein    Specimen: Blood   Result Value Ref Range    C-Reactive Protein 9.38 (H) 0.00 - 0.50 mg/dL   CBC Auto Differential    Specimen: Blood   Result Value Ref Range    WBC 10.50 3.40 - 10.80 10*3/mm3    RBC 4.93 3.77 - 5.28 10*6/mm3    Hemoglobin 13.4 12.0 - 15.9 g/dL    Hematocrit 41.1 34.0 - 46.6 %    MCV 83.5 79.0 - 97.0 fL    MCH 27.2 26.6 - 33.0 pg    MCHC 32.6 31.5 - 35.7 g/dL    RDW 18.9 (H) 12.3 - 15.4 %    RDW-SD 54.7 (H) 37.0 - 54.0 fl    MPV 8.8 6.0 - 12.0 fL    Platelets 188 140 - 450 10*3/mm3    Neutrophil % 85.6 (H) 42.7 - 76.0 %    Lymphocyte % 7.0 (L) 19.6 - 45.3 %    Monocyte % 5.1 5.0 - 12.0 %    Eosinophil % 1.6 0.3 - 6.2 %    Basophil % 0.7 0.0 - 1.5 %    Neutrophils, Absolute 9.00 (H) 1.70 - 7.00 10*3/mm3    Lymphocytes, Absolute 0.70 0.70 - 3.10 10*3/mm3    Monocytes, Absolute 0.50 0.10 - 0.90 10*3/mm3    Eosinophils, Absolute 0.20 0.00 - 0.40 10*3/mm3    Basophils, Absolute 0.10 0.00 - 0.20  10*3/mm3    nRBC 0.0 0.0 - 0.2 /100 WBC   Scan Slide    Specimen: Blood   Result Value Ref Range    Poikilocytes Slight/1+ None Seen    WBC Morphology Normal Normal    Platelet Morphology Normal Normal     No radiology results for the last day  Medications   dexamethasone (DECADRON) injection 10 mg (10 mg Intravenous Given 7/24/23 1629)   fentaNYL citrate (PF) (SUBLIMAZE) injection 50 mcg (50 mcg Intravenous Given 7/24/23 1629)                                            Medical Decision Making  Medical decision making.  Patient IV established had the above exam evaluation was given Dilaudid 0.5 mg IV and Decadron 10 mg IV.  Labs obtained independent reviewed by me.  Basic metabolic profile unremarkable and calcium 7.2 sed rate 54 CRP 9.38 CBC unremarkable.  Patient repeat examination at 5:20 PM was up walking she was feeling better at this point.  So the swelling in her hands but the pain was improved.  I do not see evidence of a septic joint on exam as his DVT on see any signs that suggest cellulitis or bacteremia or sepsis at this time.  No evidence that she has compartment syndrome or arterial compromise or vascular issue at this point.  This is a flare most likely related to her osteoarthritis.  He has an appoint with a hand surgeon tomorrow concerning carpal tunnel.  She has pain management she can call as well.  We talked about her blood sugar is only 111 here we talked about steroids and the risk of this.  But she states she has done it before and she is okay with taking steroids for a couple days she will adjust her insulin accordingly.  We a long discussion about what to return for and she voiced understanding was discharged home for outpatient management and follow-up.  Stable unremarkable ER course.    Problems Addressed:  Primary osteoarthritis involving multiple joints: chronic illness or injury    Amount and/or Complexity of Data Reviewed  Labs: ordered. Decision-making details documented in ED  Course.    Risk  Prescription drug management.        Final diagnoses:   Primary osteoarthritis involving multiple joints       ED Disposition  ED Disposition       ED Disposition   Discharge    Condition   Stable    Comment   --               Suma Campbell, APRN  [address] [place] [id number] [phone]    In 1 day           Medication List        New Prescriptions      predniSONE 50 MG tablet  Commonly known as: DELTASONE  Take 1 tablet by mouth Daily.            Changed      aspirin 81 MG tablet  Take 1 tablet by mouth Daily.  What changed: how much to take     insulin aspart 100 UNIT/ML solution pen-injector sc pen  Commonly known as: NovoLOG FlexPen  Inject 10 units before each meal  What changed: how much to take     insulin glargine 100 UNIT/ML injection  Commonly known as: LANTUS, SEMGLEE  Inject 30 Units under the skin into the appropriate area as directed Daily.  What changed:   how much to take  when to take this               Where to Get Your Medications        These medications were sent to [id number] - [place], [place] - [address]  - [phone]  - [phone]   [address] [place] [address]      Phone: [phone]   predniSONE 50 MG tablet            Jann Mata MD  07/24/23 0867

## 2023-07-24 NOTE — DISCHARGE INSTRUCTIONS
Return for increasing pain swelling fevers redness unable to walk altered mental status uncontrolled blood sugars or any other new or worse problems or concerns return immediately to the ER.  Monitor your sugar closely and adjust your insulin accordingly.  Follow-up with your doctor call tomorrow and follow-up with your hand surgeon as scheduled.  Call pain management doctor tomorrow as well.

## 2023-07-27 ENCOUNTER — TELEPHONE (OUTPATIENT)
Dept: PAIN MEDICINE | Facility: CLINIC | Age: 78
End: 2023-07-27
Payer: MEDICARE

## 2023-08-04 ENCOUNTER — TELEPHONE (OUTPATIENT)
Dept: PAIN MEDICINE | Facility: CLINIC | Age: 78
End: 2023-08-04
Payer: MEDICARE

## 2023-08-04 NOTE — TELEPHONE ENCOUNTER
8/4/23-- Dr JUAREZ-- pt came in with her pills of  hydrocodone acet --not helping her due to bad arthritis and was seen at ER--- - Counted 68 pills in bottle-- last dose--she took one-- last night--has not been taking 3 a day due to them not helping-- s  Since it is the weekend after pill ct of 68 pills-- I gave her 3 pills  back---to have, incase note does not get answered until Monday or pharmacy ,for new meds , might need pa-- so she would not be without any  meds over the weekend---    Pharmacy pt uses for new meds , to be rxed is [facility] in [facility] pt call back # is [phone]     Last filled meds per inspect on 7/19 sold- #90 for 30 days of hydrocodone acet

## 2023-08-07 DIAGNOSIS — G89.4 CHRONIC PAIN SYNDROME: Primary | ICD-10-CM

## 2023-08-07 RX ORDER — OXYCODONE AND ACETAMINOPHEN 7.5; 325 MG/1; MG/1
1 TABLET ORAL 3 TIMES DAILY PRN
Qty: 90 TABLET | Refills: 0 | Status: SHIPPED | OUTPATIENT
Start: 2023-08-07

## 2023-08-07 NOTE — TELEPHONE ENCOUNTER
She has requested to try oxycodone after she was seen in the ER.  However I see that oxycodone is listed as an allergy because it gives her hallucination.  Before I sent it I just want to make sure she is still okay to try and aware of it since she did not mention it to the ER physician who suggested oxycodone.  Her other option would be to take morphine twice a day.

## 2023-08-07 NOTE — TELEPHONE ENCOUNTER
8/7/23-- LEFT VM TO WHAT DR JUAREZ STATED IN HER MESSAGE NOTE-- NEED TO HEAR BACK FROM PT ON WHAT SHE WANTS TO TAKE MORPHINE OR TRY THE OXYCODONE ( THAT WE HAVE IN HER CHART THAT IT CAUSES HALLUCINATIONS AND  NAUSEA AND VOMITING)-- SHE WAS GIVEN MY PHONE NUMBER TO CALL ME BACK AT THE OFFICE BEFORE dR juarez WILL PRESCRIBE ANYTHING

## 2023-08-07 NOTE — TELEPHONE ENCOUNTER
8/7/23-- DR JUAREZ-- JAMAICA CALLED BACK-- SHE SAID THE REASON FOR REACTION TO OXYCODONE IS  DUE TO: WAS GIVEN TO MUCH-- 40MG AFTER SURGERY---  TO STRONG OF A DOSAGE -- THEY DECREASED IT TO 10MG AFTER KNEE SURGERY AND SHE DID FINE-- ON MORPHINE IT DOES MAKE HER SICK AND VOMIT AND HAS CHEST PAIN WITH IT, SO IF YOU RX MORPHINE SHE SAID SHE WOULD NEED PHENERGAN TOO-- SHE STATED OXYCODONE 10MG WORKED GOOD -- BUT 40MG THAT SHE WAS GIVEN WAS TOO MUCH-PT CALL BACK # [phone]

## 2023-08-23 PROBLEM — D50.9 IDA (IRON DEFICIENCY ANEMIA): Status: ACTIVE | Noted: 2023-04-20

## 2023-08-23 PROBLEM — K59.00 CONSTIPATION: Status: ACTIVE | Noted: 2023-08-23

## 2023-08-23 PROBLEM — E07.9 THYROID DISEASE: Status: ACTIVE | Noted: 2023-08-23

## 2023-08-23 PROBLEM — D12.3 BENIGN NEOPLASM OF TRANSVERSE COLON: Status: ACTIVE | Noted: 2018-01-18

## 2023-08-23 PROBLEM — I10 HYPERTENSION: Status: ACTIVE | Noted: 2023-08-23

## 2023-08-23 PROBLEM — I82.409 ACUTE EMBOLISM AND THROMBOSIS OF UNSPECIFIED DEEP VEINS OF UNSPECIFIED LOWER EXTREMITY: Status: ACTIVE | Noted: 2023-08-23

## 2023-08-23 PROBLEM — K90.9 MALABSORPTION OF IRON: Status: ACTIVE | Noted: 2023-04-20

## 2023-08-23 PROBLEM — K44.9 DIAPHRAGMATIC HERNIA WITHOUT OBSTRUCTION OR GANGRENE: Status: ACTIVE | Noted: 2017-06-30

## 2023-08-23 PROBLEM — E53.8 B12 DEFICIENCY: Status: ACTIVE | Noted: 2023-03-23

## 2023-08-23 PROBLEM — M25.561 PAIN IN RIGHT KNEE: Status: ACTIVE | Noted: 2020-05-07

## 2023-08-23 PROBLEM — D50.0 IRON DEFICIENCY ANEMIA DUE TO CHRONIC BLOOD LOSS: Status: ACTIVE | Noted: 2018-01-18

## 2023-08-23 PROBLEM — Z87.19 HX OF HIATAL HERNIA: Status: ACTIVE | Noted: 2023-08-23

## 2023-08-23 PROBLEM — Z86.0100 HISTORY OF COLONIC POLYPS: Status: ACTIVE | Noted: 2023-08-23

## 2023-08-23 PROBLEM — N80.9 ENDOMETRIOSIS: Status: ACTIVE | Noted: 2023-08-23

## 2023-08-23 PROBLEM — M43.10 SPONDYLOLISTHESIS: Status: ACTIVE | Noted: 2021-06-25

## 2023-08-23 PROBLEM — Z86.010 HISTORY OF COLONIC POLYPS: Status: ACTIVE | Noted: 2023-08-23

## 2023-08-23 PROBLEM — K57.30 DVRTCLOS OF LG INT W/O PERFORATION OR ABSCESS W/O BLEEDING: Status: ACTIVE | Noted: 2018-01-18

## 2023-08-23 PROBLEM — R10.13 EPIGASTRIC PAIN: Status: ACTIVE | Noted: 2017-06-30

## 2023-08-23 PROBLEM — Z79.899 POLYPHARMACY: Status: ACTIVE | Noted: 2023-04-20

## 2023-08-23 PROBLEM — Z83.719 FAMILY HISTORY OF COLONIC POLYPS: Status: ACTIVE | Noted: 2023-08-23

## 2023-08-23 PROBLEM — D64.9 ANEMIA: Status: ACTIVE | Noted: 2023-08-23

## 2023-08-23 PROBLEM — Z83.71 FAMILY HISTORY OF COLONIC POLYPS: Status: ACTIVE | Noted: 2023-08-23

## 2023-08-23 PROBLEM — E78.00 HYPERCHOLESTEROLEMIA: Status: ACTIVE | Noted: 2023-08-23

## 2023-08-23 PROBLEM — F41.9 ANXIETY DISORDER: Status: ACTIVE | Noted: 2023-08-23

## 2023-08-23 RX ORDER — PENICILLIN V POTASSIUM 500 MG/1
TABLET ORAL
COMMUNITY

## 2023-08-23 RX ORDER — DULAGLUTIDE 1.5 MG/.5ML
INJECTION, SOLUTION SUBCUTANEOUS
COMMUNITY

## 2023-08-23 RX ORDER — MELOXICAM 7.5 MG/1
7.5 TABLET ORAL 2 TIMES DAILY PRN
COMMUNITY
Start: 2023-08-08

## 2023-08-28 ENCOUNTER — LAB (OUTPATIENT)
Dept: LAB | Facility: HOSPITAL | Age: 78
End: 2023-08-28
Payer: MEDICARE

## 2023-08-28 DIAGNOSIS — E11.65 TYPE 2 DIABETES MELLITUS WITH HYPERGLYCEMIA, WITH LONG-TERM CURRENT USE OF INSULIN: Chronic | ICD-10-CM

## 2023-08-28 DIAGNOSIS — E89.0 HYPOTHYROIDISM, POSTSURGICAL: Chronic | ICD-10-CM

## 2023-08-28 DIAGNOSIS — E89.2 POSTSURGICAL HYPOPARATHYROIDISM: ICD-10-CM

## 2023-08-28 DIAGNOSIS — Z79.4 TYPE 2 DIABETES MELLITUS WITH HYPERGLYCEMIA, WITH LONG-TERM CURRENT USE OF INSULIN: Chronic | ICD-10-CM

## 2023-08-28 LAB
ALBUMIN SERPL-MCNC: 4 G/DL (ref 3.5–5.2)
ALBUMIN UR-MCNC: <1.2 MG/DL
ALBUMIN/GLOB SERPL: 1.3 G/DL
ALP SERPL-CCNC: 127 U/L (ref 39–117)
ALT SERPL W P-5'-P-CCNC: 21 U/L (ref 1–33)
ANION GAP SERPL CALCULATED.3IONS-SCNC: 16 MMOL/L (ref 5–15)
AST SERPL-CCNC: 18 U/L (ref 1–32)
BILIRUB SERPL-MCNC: 0.2 MG/DL (ref 0–1.2)
BUN SERPL-MCNC: 13 MG/DL (ref 8–23)
BUN/CREAT SERPL: 17.1 (ref 7–25)
CALCIUM SPEC-SCNC: 8.5 MG/DL (ref 8.6–10.5)
CHLORIDE SERPL-SCNC: 99 MMOL/L (ref 98–107)
CHOLEST SERPL-MCNC: 202 MG/DL (ref 0–200)
CO2 SERPL-SCNC: 28 MMOL/L (ref 22–29)
CREAT SERPL-MCNC: 0.76 MG/DL (ref 0.57–1)
CREAT UR-MCNC: 64.6 MG/DL
EGFRCR SERPLBLD CKD-EPI 2021: 81.3 ML/MIN/1.73
GLOBULIN UR ELPH-MCNC: 3.1 GM/DL
GLUCOSE SERPL-MCNC: 163 MG/DL (ref 65–99)
HBA1C MFR BLD: 7.4 % (ref 4.8–5.6)
HDLC SERPL-MCNC: 39 MG/DL (ref 40–60)
LDLC SERPL CALC-MCNC: 128 MG/DL (ref 0–100)
LDLC/HDLC SERPL: 3.16 {RATIO}
MICROALBUMIN/CREAT UR: NORMAL MG/G{CREAT}
POTASSIUM SERPL-SCNC: 3.6 MMOL/L (ref 3.5–5.2)
PROT SERPL-MCNC: 7.1 G/DL (ref 6–8.5)
SODIUM SERPL-SCNC: 143 MMOL/L (ref 136–145)
T4 FREE SERPL-MCNC: 1.19 NG/DL (ref 0.93–1.7)
TRIGL SERPL-MCNC: 198 MG/DL (ref 0–150)
TSH SERPL DL<=0.05 MIU/L-ACNC: 7.3 UIU/ML (ref 0.27–4.2)
VLDLC SERPL-MCNC: 35 MG/DL (ref 5–40)

## 2023-08-28 PROCEDURE — 86800 THYROGLOBULIN ANTIBODY: CPT

## 2023-08-28 PROCEDURE — 83036 HEMOGLOBIN GLYCOSYLATED A1C: CPT

## 2023-08-28 PROCEDURE — 84443 ASSAY THYROID STIM HORMONE: CPT

## 2023-08-28 PROCEDURE — 82570 ASSAY OF URINE CREATININE: CPT

## 2023-08-28 PROCEDURE — 82043 UR ALBUMIN QUANTITATIVE: CPT

## 2023-08-28 PROCEDURE — 80061 LIPID PANEL: CPT

## 2023-08-28 PROCEDURE — 36415 COLL VENOUS BLD VENIPUNCTURE: CPT

## 2023-08-28 PROCEDURE — 80053 COMPREHEN METABOLIC PANEL: CPT

## 2023-08-28 PROCEDURE — 84432 ASSAY OF THYROGLOBULIN: CPT

## 2023-08-28 PROCEDURE — 84439 ASSAY OF FREE THYROXINE: CPT

## 2023-08-29 ENCOUNTER — OFFICE VISIT (OUTPATIENT)
Dept: ENDOCRINOLOGY | Facility: CLINIC | Age: 78
End: 2023-08-29
Payer: MEDICARE

## 2023-08-29 VITALS
SYSTOLIC BLOOD PRESSURE: 140 MMHG | OXYGEN SATURATION: 96 % | HEIGHT: 67 IN | DIASTOLIC BLOOD PRESSURE: 72 MMHG | WEIGHT: 173 LBS | BODY MASS INDEX: 27.15 KG/M2 | HEART RATE: 77 BPM

## 2023-08-29 DIAGNOSIS — I10 HYPERTENSION, BENIGN: Chronic | ICD-10-CM

## 2023-08-29 DIAGNOSIS — Z79.4 TYPE 2 DIABETES MELLITUS WITH HYPERGLYCEMIA, WITH LONG-TERM CURRENT USE OF INSULIN: Chronic | ICD-10-CM

## 2023-08-29 DIAGNOSIS — C73 MALIGNANT NEOPLASM OF THYROID GLAND: Primary | Chronic | ICD-10-CM

## 2023-08-29 DIAGNOSIS — E89.0 HYPOTHYROIDISM, POSTSURGICAL: Chronic | ICD-10-CM

## 2023-08-29 DIAGNOSIS — E89.2 POSTSURGICAL HYPOPARATHYROIDISM: ICD-10-CM

## 2023-08-29 DIAGNOSIS — E11.65 TYPE 2 DIABETES MELLITUS WITH HYPERGLYCEMIA, WITH LONG-TERM CURRENT USE OF INSULIN: Chronic | ICD-10-CM

## 2023-08-29 DIAGNOSIS — E78.2 MIXED HYPERLIPIDEMIA: Chronic | ICD-10-CM

## 2023-08-29 LAB — GLUCOSE BLDC GLUCOMTR-MCNC: 127 MG/DL (ref 70–105)

## 2023-08-29 PROCEDURE — 82948 REAGENT STRIP/BLOOD GLUCOSE: CPT | Performed by: INTERNAL MEDICINE

## 2023-08-29 RX ORDER — ROSUVASTATIN CALCIUM 5 MG/1
5 TABLET, COATED ORAL DAILY
Qty: 90 TABLET | Refills: 3 | Status: SHIPPED | OUTPATIENT
Start: 2023-08-29 | End: 2024-08-28

## 2023-08-29 NOTE — PROGRESS NOTES
Endocrine Progress Note Outpatient     Patient Care Team:  Suma Campbell APRN as PCP - General  Suma Campbell APRN as PCP - Family Medicine  Heath Musa MD as Consulting Physician (Cardiology)    Chief Complaint: Follow-up thyroid and diabetes    HPI: 77-year-old female with history of thyroid cancer, postsurgical hypothyroidism, postsurgical hypoparathyroidism, type 2 diabetes, hyperlipidemia hypertension is here for follow-up.    For thyroid cancer she has undergone total thyroidectomy and did not receive radioactive iodine treatment.  Her pathology with 1.6 cm papillary thyroid cancer with margins were clear and no lymphatic or vascular invasion was noted.  She is currently on levothyroxine 112 mcg p.o. daily.  She is tolerating well.      Surgical hypothyroidism: On levothyroxine 112 mcg p.o. daily    Postsurgical hypoparathyroidism: She is currently on calcium with vitamin D and calcitriol to .25 mcg twice a day.    Type 2 diabetes: She is currently on Trulicity 4.5 mg subcu weekly, Metformin 1000 mg twice a day, Jardiance 25 mg once a day and Lantus 30 units daily and NovoLog 10 units each meal and SSI.  She has not required any assistance or hospitalization for low blood sugar since last seen.  Trying to work on her diet the best she can.    Hyperlipidemia: On Crestor    Hypertension: Well-controlled.    Past Medical History:   Diagnosis Date    Anemia, unspecified     Anxiety     Arthritis     Bone pain     Cancer 09/2016    THYROID    Depression     Disease of thyroid gland     DM type 2 (diabetes mellitus, type 2)     TYPE 2    Fall     2/2021 FELL ON ICE    GERD (gastroesophageal reflux disease)     Hypertension     Irregular heart beat     Knee pain     Leaky heart valve     11/2020    Leg cramps     Low back pain     Low iron     getting iron infusions 5/10/23    Numbness around mouth     5/12/2022-- was inpatient at [facility]-- ct ran and MRI-- no stroke    Osteoarthritis     PONV  (postoperative nausea and vomiting)     DOES WELL WITH PROPOFOL    Sleep apnea     C-PAP IN USE    UTI (urinary tract infection)        Social History     Socioeconomic History    Marital status:     Number of children: 3    Years of education: 9   Tobacco Use    Smoking status: Never    Smokeless tobacco: Never   Vaping Use    Vaping Use: Never used   Substance and Sexual Activity    Alcohol use: No    Drug use: No    Sexual activity: Defer       Family History   Problem Relation Age of Onset    Diabetes Father     Heart disease Father     Hypertension Father     Hyperlipidemia Father     Thyroid disease Father     Cancer Sister         lung cancer    Thyroid disease Sister     Diabetes Brother     Heart disease Brother     Cancer Brother         lung cancer       Allergies   Allergen Reactions    Codeine Shortness Of Breath    Oxycodone Hallucinations     Terrible nausea and vomiting       ROS:   Constitutional:  Denies fatigue, tiredness.    Eyes:  Denies change in visual acuity   HENT:  Denies nasal congestion or sore throat   Respiratory: denies cough, shortness of breath.   Cardiovascular:  denies chest pain, edema   GI:  Denies abdominal pain, nausea, vomiting.   Musculoskeletal:  Denies back pain or joint pain   Integument:  Denies dry skin and rash   Neurologic:  Denies headache, focal weakness or sensory changes   Endocrine:  Denies polyuria or polydipsia   Psychiatric:  Denies depression or anxiety      Vitals:    08/29/23 1453   BP: 140/72   Pulse: 77   SpO2: 96%     Body mass index is 27.1 kg/mý.     Physical Exam:  GEN: NAD, conversant  EYES: EOMI, PERRL, no conjunctival erythema  NECK: no thyromegaly, full ROM   CV: RRR, no murmurs/rubs/gallops, no peripheral edema  LUNG: CTAB, no wheezes/rales/ronchi  SKIN: no rashes, no acanthosis  MSK: no deformities, full ROM of all extremities  NEURO: no tremors, DTR normal  PSYCH: AOX3, appropriate mood, affect normal      Results Review:     I reviewed  the patient's new clinical results.    Lab Results   Component Value Date    HGBA1C 7.40 (H) 08/28/2023    HGBA1C 8.1 (H) 06/01/2022    HGBA1C 8.3 (H) 05/11/2022      Lab Results   Component Value Date    GLUCOSE 163 (H) 08/28/2023    BUN 13 08/28/2023    CREATININE 0.76 08/28/2023    EGFRIFNONA 78 07/07/2021    EGFRIFAFRI >60 02/14/2018    BCR 17.1 08/28/2023    K 3.6 08/28/2023    CO2 28.0 08/28/2023    CALCIUM 8.5 (L) 08/28/2023    ALBUMIN 4.0 08/28/2023    LABIL2 1.2 09/11/2019    AST 18 08/28/2023    ALT 21 08/28/2023    CHOL 202 (H) 08/28/2023    TRIG 198 (H) 08/28/2023     (H) 08/28/2023    HDL 39 (L) 08/28/2023     Lab Results   Component Value Date    TSH 7.300 (H) 08/28/2023    FREET4 1.19 08/28/2023    THYROIDAB 24 (H) 06/11/2017     Labs from December 2022 showed an A1c of 8.6, TSH 10.5  Sodium 148, potassium 4.4, chloride 99, CO2 30 BUN was 15 with creatinine 1.7, calcium 9.0, AST 19 and ALT 13.    Medication Review: Reviewed.       Current Outpatient Medications:     amLODIPine-benazepril (LOTREL 5-20) 5-20 MG per capsule, Take 1 capsule by mouth Daily., Disp: , Rfl:     aspirin 81 MG tablet, Take 1 tablet by mouth Daily. (Patient taking differently: Take 325 mg by mouth Daily.), Disp: 30 tablet, Rfl: 0    calcitriol (ROCALTROL) 0.25 MCG capsule, Take 1 capsule by mouth 2 (Two) Times a Day., Disp: 60 capsule, Rfl: 3    CALCIUM CARBONATE-VIT D-MIN PO, Take 1,200 mg by mouth 3 (Three) Times a Day., Disp: , Rfl:     clorazepate (TRANXENE) 7.5 MG tablet, Take 1 tablet by mouth 2 (Two) Times a Day., Disp: , Rfl:     diclofenac (VOLTAREN) 1 % gel gel, Apply 4 g topically to the appropriate area as directed 4 (Four) Times a Day As Needed., Disp: , Rfl:     Diclofenac Sodium (VOLTAREN) 1 % gel gel, Apply 4 g topically to the appropriate area as directed., Disp: , Rfl:     Dulaglutide (Trulicity) 1.5 MG/0.5ML solution pen-injector, Inject by subcutaneous route., Disp: , Rfl:     empagliflozin (Jardiance)  25 MG tablet tablet, Take 1 tablet by mouth Daily., Disp: 90 tablet, Rfl: 3    escitalopram (LEXAPRO) 10 MG tablet, Take 1 tablet by mouth Every Evening., Disp: , Rfl:     esomeprazole (nexIUM) 40 MG capsule, Take 1 capsule by mouth 2 (Two) Times a Day., Disp: , Rfl:     ferrous gluconate (FERGON) 324 MG tablet, Take 1 tablet by mouth Daily With Breakfast., Disp: , Rfl:     insulin aspart (NOVOLOG FLEXPEN) 100 UNIT/ML solution pen-injector sc pen, Inject 10 units before each meal (Patient taking differently: 10 Units. Inject 10 units before each meal), Disp: 5 pen, Rfl: 4    insulin glargine (LANTUS, SEMGLEE) 100 UNIT/ML injection, Inject 30 Units under the skin into the appropriate area as directed Daily. (Patient taking differently: Inject 40 Units under the skin into the appropriate area as directed Every Morning.), Disp: 30 mL, Rfl: 6    levothyroxine (SYNTHROID, LEVOTHROID) 112 MCG tablet, Take 1 tablet p.o. daily, Disp: 90 tablet, Rfl: 4    magnesium oxide (MAG-OX) 400 tablet tablet, Take 1 tablet by mouth 2 (Two) Times a Day., Disp: , Rfl:     meloxicam (MOBIC) 7.5 MG tablet, Take 1 tablet by mouth 2 (Two) Times a Day As Needed. for pain, Disp: , Rfl:     metFORMIN (Glucophage) 1000 MG tablet, Take 1 tablet by mouth 2 (Two) Times a Day With Meals., Disp: 180 tablet, Rfl: 2    metoprolol tartrate (LOPRESSOR) 100 MG tablet, Take 1 tablet by mouth 2 (Two) Times a Day., Disp: , Rfl:     oxyCODONE-acetaminophen (PERCOCET) 7.5-325 MG per tablet, Take 1 tablet by mouth 3 (Three) Times a Day As Needed for Severe Pain., Disp: 90 tablet, Rfl: 0    penicillin v potassium (VEETID) 500 MG tablet, TAKE 1 TABLET BY MOUTH EVERY 6 HOURS UNTIL GONE, Disp: , Rfl:     pramipexole (MIRAPEX) 1 MG tablet, Take 1 tablet by mouth 2 (Two) Times a Day., Disp: , Rfl:     promethazine (PHENERGAN) 25 MG tablet, Take 1 tablet by mouth As Needed., Disp: , Rfl: 1    promethazine (PHENERGAN) 25 MG tablet, Every 4 (Four) Hours., Disp: , Rfl:      vitamin D (ERGOCALCIFEROL) 1.25 MG (56408 UT) capsule capsule, Take 1 capsule by mouth 1 (One) Time Per Week., Disp: , Rfl:           Assessment and plan:  1.  Thyroid cancer: Status post thyroid surgery in September 2016.  She did not receive radioactive iodine treatment.  Tumor was 1.6 cm papillary thyroid cancer with margins clear.  Her CT scan of the neck in October 2019 did not show any lymphadenopathy.  Thyroglobulin level pending.    2.  Hypothyroidism: Secondary to thyroid surgery, uncontrolled, TSH mild high, she is currently on levothyroxine 112 mcg p.o. daily but she is taking with other medications.  She is advised to take her thyroid medicine by itself with water at least 1 hour before or after the pills and food.  She verbalizes understanding.    3.  Postsurgical hypoparathyroidism: She is currently on Rocaltrol with calcium and vitamin D and her calcium is normal.  She is asymptomatic.  We will continue current management.    4.  Diabetes mellitus type 2 with Hyperglycemia: Uncontrolled with A1c at 7.4%.  We did try Mounjaro but her insurance would not cover it.  She will continue Trulicity along with Jardiance, Lantus and NovoLog.  She is advised to always keep glucose source in case of low blood sugars.    5.  Hyperlipidemia: Uncontrolled with high LDL, she is not on any lipid-lowering medications at this time.  I will add Crestor 5 mg p.o. daily and follow labs.    6.  Hypertension: Better, continue current medications.    Assessment and plan from January 27, 2023 reviewed and updated.           Gege Ford MD FACE.

## 2023-08-29 NOTE — PATIENT INSTRUCTIONS
Please take your thyroid medication by itself with water at least 1 hour before or after other pills and food.  Start Crestor 5 mg p.o. daily  Continue rest of the medications  Continue to work on your diet and activity  Always keep glucose source in case of low blood sugar  Labs before follow-up.

## 2023-09-01 LAB
THYROGLOB AB SERPL-ACNC: <1 IU/ML
THYROGLOB SERPL-MCNC: <0.1 NG/ML
THYROGLOB SERPL-MCNC: NORMAL NG/ML

## 2023-09-06 DIAGNOSIS — G89.4 CHRONIC PAIN SYNDROME: ICD-10-CM

## 2023-09-06 RX ORDER — OXYCODONE AND ACETAMINOPHEN 7.5; 325 MG/1; MG/1
1 TABLET ORAL 3 TIMES DAILY PRN
Qty: 90 TABLET | Refills: 0 | Status: SHIPPED | OUTPATIENT
Start: 2023-09-06

## 2023-10-06 DIAGNOSIS — G89.4 CHRONIC PAIN SYNDROME: ICD-10-CM

## 2023-10-06 RX ORDER — OXYCODONE AND ACETAMINOPHEN 7.5; 325 MG/1; MG/1
1 TABLET ORAL 3 TIMES DAILY PRN
Qty: 90 TABLET | Refills: 0 | Status: SHIPPED | OUTPATIENT
Start: 2023-10-06

## 2023-10-17 ENCOUNTER — OFFICE VISIT (OUTPATIENT)
Dept: PAIN MEDICINE | Facility: CLINIC | Age: 78
End: 2023-10-17
Payer: MEDICARE

## 2023-10-17 VITALS
HEART RATE: 74 BPM | OXYGEN SATURATION: 97 % | RESPIRATION RATE: 16 BRPM | DIASTOLIC BLOOD PRESSURE: 80 MMHG | SYSTOLIC BLOOD PRESSURE: 138 MMHG

## 2023-10-17 DIAGNOSIS — M79.18 MYOFASCIAL PAIN SYNDROME: ICD-10-CM

## 2023-10-17 DIAGNOSIS — M47.817 LUMBOSACRAL SPONDYLOSIS WITHOUT MYELOPATHY: ICD-10-CM

## 2023-10-17 DIAGNOSIS — G89.4 CHRONIC PAIN SYNDROME: Primary | ICD-10-CM

## 2023-10-17 DIAGNOSIS — Z79.899 HIGH RISK MEDICATION USE: ICD-10-CM

## 2023-10-17 DIAGNOSIS — M25.50 POLYARTHRALGIA: ICD-10-CM

## 2023-10-17 PROCEDURE — 3079F DIAST BP 80-89 MM HG: CPT | Performed by: ANESTHESIOLOGY

## 2023-10-17 PROCEDURE — 3075F SYST BP GE 130 - 139MM HG: CPT | Performed by: ANESTHESIOLOGY

## 2023-10-17 PROCEDURE — 99214 OFFICE O/P EST MOD 30 MIN: CPT | Performed by: ANESTHESIOLOGY

## 2023-10-17 PROCEDURE — 1160F RVW MEDS BY RX/DR IN RCRD: CPT | Performed by: ANESTHESIOLOGY

## 2023-10-17 PROCEDURE — 1125F AMNT PAIN NOTED PAIN PRSNT: CPT | Performed by: ANESTHESIOLOGY

## 2023-10-17 PROCEDURE — 1159F MED LIST DOCD IN RCRD: CPT | Performed by: ANESTHESIOLOGY

## 2023-10-17 RX ORDER — OXYCODONE AND ACETAMINOPHEN 7.5; 325 MG/1; MG/1
1 TABLET ORAL 3 TIMES DAILY PRN
Qty: 90 TABLET | Refills: 0 | Status: SHIPPED | OUTPATIENT
Start: 2023-11-05

## 2023-10-17 RX ORDER — OXYCODONE AND ACETAMINOPHEN 7.5; 325 MG/1; MG/1
1 TABLET ORAL 3 TIMES DAILY PRN
Qty: 90 TABLET | Refills: 0 | Status: SHIPPED | OUTPATIENT
Start: 2023-12-05

## 2023-10-17 NOTE — PROGRESS NOTES
Subjective    CC joints pain left knee pain  Marilyn J Pumphrey is a 77 y.o. female with multiple comorbidities including DM, polyarthralgia from  inflammatory osteoarthritis, bilateral knee pain, status post left TKA , here for f/u.  Continues to have good relief and functional benefit of oxycodone.  Also having good relief with right knee steroid injection by Ortho.  Denies new complaints today.  Chronic back pain radiating to bilateral hips worse with prolonged activity.  Chronic polyarthralgia/left knee pain,  and generalized myofascial pain interfering with daily activity, sleep,mood.  Considering right knee TKA.  Tried methotrexate and  Celebrex without relief.  Tried Cymbalta, sevella, tramadol without relief.  Seen Rheumatology. and ortho. considering right knee replecement.   Good releif with knee injections by ortho.      Utilizes hydrocodone 10/325  with good relief functional benefit denies side effects.    L-spine MRI 2021.  Multiple level degenerative disc disease and degenerative changes.  Small left paracentral disc protrusion at L3-L4 which appears to abut the exiting nerve root with mild encroachment on neural foramina on the left.    Pain Assessment   Location of Pain: Lower Back, R Hip, L Hip, Leg, neck pain, joint, right knee  Description of Pain: Dull/Aching, Throbbing, Stabbing  Previous Pain Rating :5  Current Pain Ratin  Aggravating Factors: Activity  Alleviating Factors: Rest, Medication  PEG Assessment   What number best describes your pain on average in the past week?8  What number best describes how, during the past week, pain has interfered with your enjoyment of life?6  What number best describes how, during the past week, pain has interfered with your general activity? 9     The following portions of the patient's history were reviewed and updated as appropriate: allergies, current medications, past family history, past medical history, past social history, past surgical  history and problem list.   has a past medical history of Anemia, unspecified, Anxiety, Arthritis, Bone pain, Cancer (09/2016), Depression, Disease of thyroid gland, DM type 2 (diabetes mellitus, type 2), Fall, GERD (gastroesophageal reflux disease), Hypertension, Irregular heart beat, Knee pain, Leaky heart valve, Leg cramps, Low back pain, Low iron, Numbness around mouth, Osteoarthritis, PONV (postoperative nausea and vomiting), Sleep apnea, and UTI (urinary tract infection).   has a past surgical history that includes Tonsillectomy; Thyroid surgery; Oophorectomy (Bilateral, 2015); Hysterectomy (1993); Cholecystectomy (1974); Carpal tunnel release; vein ligation and stripping bilateral; and Total knee arthroplasty (Left, 10/14/2019).  Social History     Tobacco Use    Smoking status: Never    Smokeless tobacco: Never   Substance Use Topics    Alcohol use: No     Review of Systems   Musculoskeletal:  Positive for arthralgias, back pain, joint swelling, myalgias and neck pain.   All other systems reviewed and are negative.    Objective   Physical Exam   Constitutional: No distress.   Cane   Musculoskeletal:      Comments: Lumbar loading positive, pain on extension of low back past 5 degrees.  TTP on the lumbar facets noted.  Leg raise left   Vitals reviewed.    /80   Pulse 74   Resp 16   SpO2 97%     PHQ 9 on chart  Opioid risk tool low risk    Assessment & Plan   Diagnoses and all orders for this visit:    1. Chronic pain syndrome (Primary)  -     oxyCODONE-acetaminophen (PERCOCET) 7.5-325 MG per tablet; Take 1 tablet by mouth 3 (Three) Times a Day As Needed for Severe Pain.  Dispense: 90 tablet; Refill: 0  -     oxyCODONE-acetaminophen (PERCOCET) 7.5-325 MG per tablet; Take 1 tablet by mouth 3 (Three) Times a Day As Needed for Severe Pain. DNF before 12/5/2023  Dispense: 90 tablet; Refill: 0    2. Lumbosacral spondylosis without myelopathy    3. Polyarthralgia    4. Myofascial pain syndrome    5. High  risk medication use      Summary  77 y.o. female with multiple comorbidities including DM, polyarthralgia from  inflammatory osteoarthritis, knee pain S/P left TKA, here for f/u.  Chronic polyarthralgia/left knee pain,  and generalized myofascial pain interfering with daily activity, sleep,mood.      Continues to have good relief and functional benefit of oxycodone.  Also having good relief with right knee steroid injection by Ortho.  Denies new complaints today..    Continue oxycodone 7.5 /325 2-3 times daily as needed for severe pain.  UDS and  Inspect reviewed.    Discussed risk of tolerance, dependence, respiratory depression, coma and death associated with use of oral opioids for treatment of chronic nonmalignant pain.      Discussed increased risk with combining opioid and benzo. On clorazepate prn anxiety.    RTC in 2-3 months

## 2023-10-30 ENCOUNTER — OFFICE VISIT (OUTPATIENT)
Dept: WOUND CARE | Facility: HOSPITAL | Age: 78
End: 2023-10-30
Payer: MEDICARE

## 2023-10-30 ENCOUNTER — TRANSCRIBE ORDERS (OUTPATIENT)
Dept: ADMINISTRATIVE | Facility: HOSPITAL | Age: 78
End: 2023-10-30
Payer: MEDICARE

## 2023-10-30 DIAGNOSIS — M79.662 PAIN AND SWELLING OF LOWER LEG, LEFT: Primary | ICD-10-CM

## 2023-10-30 DIAGNOSIS — E11.622 TYPE 2 DIABETES MELLITUS WITH OTHER SKIN ULCER, UNSPECIFIED WHETHER LONG TERM INSULIN USE: ICD-10-CM

## 2023-10-30 DIAGNOSIS — L97.829 NON-PRESSURE CHRONIC ULCER OF OTHER PART OF LEFT LOWER LEG WITH UNSPECIFIED SEVERITY: ICD-10-CM

## 2023-10-30 DIAGNOSIS — I87.2 VENOUS INSUFFICIENCY (CHRONIC) (PERIPHERAL): Primary | ICD-10-CM

## 2023-10-30 DIAGNOSIS — M79.89 PAIN AND SWELLING OF LOWER LEG, LEFT: Primary | ICD-10-CM

## 2023-10-30 DIAGNOSIS — L98.499 TYPE 2 DIABETES MELLITUS WITH OTHER SKIN ULCER, UNSPECIFIED WHETHER LONG TERM INSULIN USE: ICD-10-CM

## 2023-10-30 PROCEDURE — G0463 HOSPITAL OUTPT CLINIC VISIT: HCPCS

## 2023-10-30 PROCEDURE — 97602 WOUND(S) CARE NON-SELECTIVE: CPT

## 2023-11-06 ENCOUNTER — HOSPITAL ENCOUNTER (OUTPATIENT)
Dept: CARDIOLOGY | Facility: HOSPITAL | Age: 78
Discharge: HOME | End: 2023-11-06
Payer: MEDICARE

## 2023-11-06 DIAGNOSIS — M79.662 PAIN AND SWELLING OF LOWER LEG, LEFT: ICD-10-CM

## 2023-11-06 DIAGNOSIS — M79.89 PAIN AND SWELLING OF LOWER LEG, LEFT: ICD-10-CM

## 2023-11-06 LAB
BH CV LOWER ARTERIAL LEFT ABI RATIO: 1.2
BH CV LOWER ARTERIAL LEFT DORSALIS PEDIS SYS MAX: 209
BH CV LOWER ARTERIAL LEFT GREAT TOE SYS MAX: 174
BH CV LOWER ARTERIAL LEFT POST TIBIAL SYS MAX: 210
BH CV LOWER ARTERIAL LEFT TBI RATIO: 0.99
BH CV LOWER ARTERIAL RIGHT ABI RATIO: 1.27
BH CV LOWER ARTERIAL RIGHT DORSALIS PEDIS SYS MAX: 222
BH CV LOWER ARTERIAL RIGHT GREAT TOE SYS MAX: 166
BH CV LOWER ARTERIAL RIGHT POST TIBIAL SYS MAX: NORMAL
BH CV LOWER ARTERIAL RIGHT TBI RATIO: 0.95
UPPER ARTERIAL LEFT ARM BRACHIAL SYS MAX: 172
UPPER ARTERIAL RIGHT ARM BRACHIAL SYS MAX: 175

## 2023-11-06 PROCEDURE — 93922 UPR/L XTREMITY ART 2 LEVELS: CPT

## 2023-11-10 ENCOUNTER — HOSPITAL ENCOUNTER (OUTPATIENT)
Dept: GENERAL RADIOLOGY | Facility: HOSPITAL | Age: 78
Discharge: HOME | End: 2023-11-10
Payer: MEDICARE

## 2023-11-10 ENCOUNTER — TRANSCRIBE ORDERS (OUTPATIENT)
Dept: ADMINISTRATIVE | Facility: HOSPITAL | Age: 78
End: 2023-11-10
Payer: MEDICARE

## 2023-11-10 ENCOUNTER — HOSPITAL ENCOUNTER (OUTPATIENT)
Dept: GENERAL RADIOLOGY | Facility: HOSPITAL | Age: 78
Discharge: HOME | End: 2023-11-10
Admitting: NURSE PRACTITIONER
Payer: MEDICARE
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
## 2023-11-10 DIAGNOSIS — R52 PAIN: ICD-10-CM
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
## 2023-11-10 PROCEDURE — 73060 X-RAY EXAM OF HUMERUS: CPT

## 2023-11-10 PROCEDURE — 73630 X-RAY EXAM OF FOOT: CPT

## 2023-11-10 PROCEDURE — 73610 X-RAY EXAM OF ANKLE: CPT

## 2023-11-13 ENCOUNTER — OFFICE VISIT (OUTPATIENT)
Dept: WOUND CARE | Facility: HOSPITAL | Age: 78
End: 2023-11-13
Payer: MEDICARE

## 2023-11-13 DIAGNOSIS — E11.622 TYPE 2 DIABETES MELLITUS WITH OTHER SKIN ULCER, UNSPECIFIED WHETHER LONG TERM INSULIN USE: ICD-10-CM

## 2023-11-13 DIAGNOSIS — L98.499 TYPE 2 DIABETES MELLITUS WITH OTHER SKIN ULCER, UNSPECIFIED WHETHER LONG TERM INSULIN USE: ICD-10-CM

## 2023-11-13 DIAGNOSIS — L97.829 NON-PRESSURE CHRONIC ULCER OF OTHER PART OF LEFT LOWER LEG WITH UNSPECIFIED SEVERITY: ICD-10-CM

## 2023-11-13 DIAGNOSIS — I87.2 VENOUS INSUFFICIENCY (CHRONIC) (PERIPHERAL): Primary | ICD-10-CM

## 2023-11-13 PROCEDURE — G0463 HOSPITAL OUTPT CLINIC VISIT: HCPCS

## 2023-11-27 ENCOUNTER — OFFICE VISIT (OUTPATIENT)
Dept: WOUND CARE | Facility: HOSPITAL | Age: 78
End: 2023-11-27
Payer: MEDICARE

## 2023-11-27 DIAGNOSIS — I87.2 VENOUS INSUFFICIENCY (CHRONIC) (PERIPHERAL): Primary | ICD-10-CM

## 2023-11-27 DIAGNOSIS — L97.822 NON-PRESSURE CHRONIC ULCER OF OTHER PART OF LEFT LOWER LEG WITH FAT LAYER EXPOSED: ICD-10-CM

## 2023-11-27 DIAGNOSIS — E11.622 TYPE 2 DIABETES MELLITUS WITH OTHER SKIN ULCER, UNSPECIFIED WHETHER LONG TERM INSULIN USE: ICD-10-CM

## 2023-11-27 DIAGNOSIS — L98.499 TYPE 2 DIABETES MELLITUS WITH OTHER SKIN ULCER, UNSPECIFIED WHETHER LONG TERM INSULIN USE: ICD-10-CM

## 2023-11-27 PROCEDURE — 99213 OFFICE O/P EST LOW 20 MIN: CPT

## 2023-11-27 PROCEDURE — G0463 HOSPITAL OUTPT CLINIC VISIT: HCPCS

## 2024-01-02 ENCOUNTER — TELEPHONE (OUTPATIENT)
Dept: ENDOCRINOLOGY | Facility: CLINIC | Age: 79
End: 2024-01-02

## 2024-01-02 NOTE — TELEPHONE ENCOUNTER
Hub staff attempted to follow warm transfer process and was unsuccessful     Caller: [facility]    Relationship to patient:     Best call back number: [phone]    Patient is needing: [facility] CALLING TO SEE IF FAX WAS RECEIVED FOR PATIENT [employer] AND [employer]ENS[employer] ALSO NEEDING OFFICE TO SEND MOST RECENT CHART NOTES TO THEM -[phone] ATTN ADS    STATES THEY SENT THE FAX TO THE OFFICE BACK ON 12-28-23

## 2024-01-09 ENCOUNTER — OFFICE VISIT (OUTPATIENT)
Dept: PAIN MEDICINE | Facility: CLINIC | Age: 79
End: 2024-01-09
Payer: MEDICARE

## 2024-01-09 VITALS
WEIGHT: 180.9 LBS | RESPIRATION RATE: 16 BRPM | HEART RATE: 77 BPM | BODY MASS INDEX: 28.33 KG/M2 | DIASTOLIC BLOOD PRESSURE: 79 MMHG | OXYGEN SATURATION: 98 % | SYSTOLIC BLOOD PRESSURE: 143 MMHG

## 2024-01-09 DIAGNOSIS — M79.18 MYOFASCIAL PAIN SYNDROME: ICD-10-CM

## 2024-01-09 DIAGNOSIS — M47.817 LUMBOSACRAL SPONDYLOSIS WITHOUT MYELOPATHY: ICD-10-CM

## 2024-01-09 DIAGNOSIS — M25.50 POLYARTHRALGIA: ICD-10-CM

## 2024-01-09 DIAGNOSIS — G89.4 CHRONIC PAIN SYNDROME: Primary | ICD-10-CM

## 2024-01-09 DIAGNOSIS — Z79.899 HIGH RISK MEDICATION USE: ICD-10-CM

## 2024-01-09 RX ORDER — OXYCODONE AND ACETAMINOPHEN 7.5; 325 MG/1; MG/1
1 TABLET ORAL 3 TIMES DAILY PRN
Qty: 90 TABLET | Refills: 0 | Status: SHIPPED | OUTPATIENT
Start: 2024-02-08

## 2024-01-09 RX ORDER — OXYCODONE AND ACETAMINOPHEN 7.5; 325 MG/1; MG/1
1 TABLET ORAL 3 TIMES DAILY PRN
Qty: 90 TABLET | Refills: 0 | Status: SHIPPED | OUTPATIENT
Start: 2024-01-09

## 2024-01-09 NOTE — PROGRESS NOTES
Subjective    CC joints pain left knee pain  Marilyn J Pumphrey is a 78 y.o. female with multiple comorbidities including DM, polyarthralgia from  inflammatory osteoarthritis, bilateral knee pain, status post left TKA 2019, here for f/u.  Worsening polyarthralgia generalized myofascial pain due to cold weather.  Good relief of oxycodone taking up to 2-3 daily since acute flareup.  Chronic back pain radiating to bilateral hips worse with prolonged activity.  Chronic polyarthralgia/left knee pain,  and generalized myofascial pain interfering with daily activity, sleep,mood.  Considering right knee TKA.  Tried methotrexate and  Celebrex without relief.  Tried Cymbalta, sevella, tramadol without relief.  Seen Rheumatology. and ortho. considering right knee replecement.   Good releif with knee injections by ortho.      Utilizes hydrocodone 10/325  with good relief functional benefit denies side effects.    L-spine MRI 2021.  Multiple level degenerative disc disease and degenerative changes.  Small left paracentral disc protrusion at L3-L4 which appears to abut the exiting nerve root with mild encroachment on neural foramina on the left.    Pain Assessment   Location of Pain: Lower Back, R Hip, L Hip, Leg, neck pain, joint, right knee  Description of Pain: Dull/Aching, Throbbing, Stabbing  Previous Pain Rating :6  Current Pain Ratin  Aggravating Factors: Activity  Alleviating Factors: Rest, Medication  PEG Assessment   What number best describes your pain on average in the past week?8  What number best describes how, during the past week, pain has interfered with your enjoyment of life?8  What number best describes how, during the past week, pain has interfered with your general activity? 9     The following portions of the patient's history were reviewed and updated as appropriate: allergies, current medications, past family history, past medical history, past social history, past surgical history and problem  list.   has a past medical history of Anemia, unspecified, Anxiety, Arthritis, Bone pain, Cancer (09/2016), Depression, Disease of thyroid gland, DM type 2 (diabetes mellitus, type 2), Fall, GERD (gastroesophageal reflux disease), Hypertension, Irregular heart beat, Knee pain, Leaky heart valve, Leg cramps, Low back pain, Low iron, Numbness around mouth, Osteoarthritis, Polypharmacy (04/20/2023), PONV (postoperative nausea and vomiting), Sleep apnea, and UTI (urinary tract infection).   has a past surgical history that includes Tonsillectomy; Thyroid surgery; Oophorectomy (Bilateral, 2015); Hysterectomy (1993); Cholecystectomy (1974); Carpal tunnel release; vein ligation and stripping bilateral; and Total knee arthroplasty (Left, 10/14/2019).      Review of Systems   Musculoskeletal:  Positive for arthralgias, back pain, joint swelling, myalgias and neck pain.   All other systems reviewed and are negative.    Objective   Physical Exam   Constitutional: No distress.   Cane   Musculoskeletal:      Comments: Lumbar loading positive, pain on extension of low back past 5 degrees.  TTP on the lumbar facets noted.  Leg raise left   Vitals reviewed.    /79 (BP Location: Left arm, Patient Position: Sitting, Cuff Size: Adult)   Pulse 77   Resp 16   Wt 82.1 kg (180 lb 14.4 oz)   SpO2 98%   BMI 28.33 kg/m²     PHQ 9 on chart  Opioid risk tool low risk    Assessment & Plan   Diagnoses and all orders for this visit:    1. Chronic pain syndrome (Primary)  -     oxyCODONE-acetaminophen (PERCOCET) 7.5-325 MG per tablet; Take 1 tablet by mouth 3 (Three) Times a Day As Needed for Severe Pain.  Dispense: 90 tablet; Refill: 0  -     oxyCODONE-acetaminophen (PERCOCET) 7.5-325 MG per tablet; Take 1 tablet by mouth 3 (Three) Times a Day As Needed for Severe Pain. DNF before 2/8/2024  Dispense: 90 tablet; Refill: 0    2. Lumbosacral spondylosis without myelopathy    3. Polyarthralgia    4. Myofascial pain syndrome    5. High risk  medication use      Summary  77 y.o. female with multiple comorbidities including DM, polyarthralgia from  inflammatory osteoarthritis, knee pain S/P left TKA, here for f/u.  Chronic polyarthralgia/left knee pain,  and generalized myofascial pain interfering with daily activity, sleep,mood.      Worsening polyarthralgia generalized myofascial pain due to cold weather.  Good relief of oxycodone taking up to 2-3 daily since acute flareup.    Continue oxycodone 7.5 /325 2-3 times daily as needed for severe pain.  UDS and  Inspect reviewed.    Discussed risk of tolerance, dependence, respiratory depression, coma and death associated with use of oral opioids for treatment of chronic nonmalignant pain.      Discussed increased risk with combining opioid and benzo. On clorazepate prn anxiety.    RTC in 2-3 months

## 2024-01-12 NOTE — TELEPHONE ENCOUNTER
Caller:DORIS( [facility])    Relationship to patient: [facility] [place]    Best call back number: [phone]    Patient is needing: DORIS FROM [employer] WAS CALLING TO CHECK IF A FAX WAS RECEIVED FOR DIABETIC SUPPLIES AND HE ALSO NEEDS THE MOST RECENT CHART NOTES. DORIS NEEDS THE PRESCRIPTION FOR THE SUPPLIES AS SOON AS POSSIBLE. FAX# [phone] TO SEND ALL INFORMATION.

## 2024-01-25 ENCOUNTER — TELEPHONE (OUTPATIENT)
Dept: ENDOCRINOLOGY | Facility: CLINIC | Age: 79
End: 2024-01-25

## 2024-01-25 NOTE — TELEPHONE ENCOUNTER
Caller: DORIS([facility])    Relationship: Other    Best call back number: [phone]    Equipment requested: ANIBAL MARISCAL 2     Reason for the request: N/A    Prescribing Provider: DR SERAFIN KELLER    Additional information or concerns: DORIS WAS CALLING TO FIND OUT IF THE PRESCRIPTION REQUEST WAS RECEIVED, FILLED OUT AND THE TURN AROUND TIME. FAX WAS SENT ORDER BACK IN DECEMBER 2023 AND THEY STILL HAVE NOT GOTTEN ANY RESPONSE. [facility] FAX# [phone].

## 2024-03-06 ENCOUNTER — OFFICE VISIT (OUTPATIENT)
Dept: PAIN MEDICINE | Facility: CLINIC | Age: 79
End: 2024-03-06
Payer: MEDICARE

## 2024-03-06 VITALS
OXYGEN SATURATION: 96 % | SYSTOLIC BLOOD PRESSURE: 159 MMHG | BODY MASS INDEX: 29.13 KG/M2 | DIASTOLIC BLOOD PRESSURE: 85 MMHG | RESPIRATION RATE: 16 BRPM | HEART RATE: 73 BPM | WEIGHT: 186 LBS

## 2024-03-06 DIAGNOSIS — G89.4 CHRONIC PAIN SYNDROME: Primary | ICD-10-CM

## 2024-03-06 DIAGNOSIS — M25.50 POLYARTHRALGIA: ICD-10-CM

## 2024-03-06 DIAGNOSIS — Z79.899 HIGH RISK MEDICATION USE: ICD-10-CM

## 2024-03-06 DIAGNOSIS — M79.18 MYOFASCIAL PAIN SYNDROME: ICD-10-CM

## 2024-03-06 DIAGNOSIS — M47.817 LUMBOSACRAL SPONDYLOSIS WITHOUT MYELOPATHY: ICD-10-CM

## 2024-03-06 RX ORDER — OXYCODONE AND ACETAMINOPHEN 7.5; 325 MG/1; MG/1
1 TABLET ORAL 3 TIMES DAILY PRN
Qty: 90 TABLET | Refills: 0 | Status: SHIPPED | OUTPATIENT
Start: 2024-03-08

## 2024-03-06 RX ORDER — OXYCODONE AND ACETAMINOPHEN 7.5; 325 MG/1; MG/1
1 TABLET ORAL 3 TIMES DAILY PRN
Qty: 90 TABLET | Refills: 0 | Status: SHIPPED | OUTPATIENT
Start: 2024-04-07

## 2024-03-06 RX ORDER — LOTEPREDNOL ETABONATE 5 MG/G
GEL OPHTHALMIC
COMMUNITY
Start: 2024-02-06

## 2024-03-06 NOTE — PROGRESS NOTES
Subjective    CC joints pain left knee pain  Marilyn J Pumphrey is a 78 y.o. female with multiple comorbidities including DM, polyarthralgia from  inflammatory osteoarthritis, bilateral knee pain, status post left TKA , here for f/u.  Continued chronic back pain radiating to bilateral hips worse with prolonged activity.  Chronic polyarthralgia/left knee pain,  and generalized myofascial pain interfering with daily activity, sleep,mood.  Considering right knee TKA.  Tried methotrexate and  Celebrex without relief.  Tried Cymbalta, sevella, tramadol without relief.  Seen Rheumatology. and ortho. considering right knee replecement.   Good releif with knee injections by ortho.      Utilizes hydrocodone 10  with good relief functional benefit denies side effects.    L-spine MRI 2021.  Multiple level degenerative disc disease and degenerative changes.  Small left paracentral disc protrusion at L3-L4 which appears to abut the exiting nerve root with mild encroachment on neural foramina on the left.    Pain Assessment   Location of Pain: Lower Back, R Hip, L Hip, Leg, neck pain, joint, right knee  Description of Pain: Dull/Aching, Throbbing, Stabbing  Previous Pain Rating :8  Current Pain Ratin  Aggravating Factors: Activity  Alleviating Factors: Rest, Medication  PEG Assessment   What number best describes your pain on average in the past week?8  What number best describes how, during the past week, pain has interfered with your enjoyment of life?7  What number best describes how, during the past week, pain has interfered with your general activity? 10     The following portions of the patient's history were reviewed and updated as appropriate: allergies, current medications, past family history, past medical history, past social history, past surgical history and problem list.   has a past medical history of Anemia, unspecified, Anxiety, Arthritis, Bone pain, Cancer (2016), Depression, Disease of thyroid  gland, DM type 2 (diabetes mellitus, type 2), Fall, GERD (gastroesophageal reflux disease), Hypertension, Irregular heart beat, Knee pain, Leaky heart valve, Leg cramps, Low back pain, Low iron, Numbness around mouth, Osteoarthritis, Polypharmacy (04/20/2023), PONV (postoperative nausea and vomiting), Sleep apnea, and UTI (urinary tract infection).   has a past surgical history that includes Tonsillectomy; Thyroid surgery; Oophorectomy (Bilateral, 2015); Hysterectomy (1993); Cholecystectomy (1974); Carpal tunnel release; vein ligation and stripping bilateral; and Total knee arthroplasty (Left, 10/14/2019).      Review of Systems   Musculoskeletal:  Positive for arthralgias, back pain, joint swelling, myalgias and neck pain.   All other systems reviewed and are negative.    Objective   Physical Exam   Constitutional: No distress.   Cane   Musculoskeletal:      Comments: Lumbar loading positive, pain on extension of low back past 5 degrees.  TTP on the lumbar facets noted.  Leg raise left   Vitals reviewed.    /85   Pulse 73   Resp 16   Wt 84.4 kg (186 lb)   SpO2 96%   BMI 29.13 kg/m²     PHQ 9 on chart  Opioid risk tool low risk    Assessment & Plan   Diagnoses and all orders for this visit:    1. Chronic pain syndrome (Primary)  -     oxyCODONE-acetaminophen (PERCOCET) 7.5-325 MG per tablet; Take 1 tablet by mouth 3 (Three) Times a Day As Needed for Severe Pain.  Dispense: 90 tablet; Refill: 0  -     oxyCODONE-acetaminophen (PERCOCET) 7.5-325 MG per tablet; Take 1 tablet by mouth 3 (Three) Times a Day As Needed for Severe Pain. DNF before 4/7/2024  Dispense: 90 tablet; Refill: 0    2. Lumbosacral spondylosis without myelopathy    3. Polyarthralgia    4. Myofascial pain syndrome    5. High risk medication use      Summary  78 y.o. female with multiple comorbidities including DM, polyarthralgia from  inflammatory osteoarthritis, knee pain S/P left TKA, here for f/u.  Chronic polyarthralgia/left knee pain,   and generalized myofascial pain interfering with daily activity, sleep,mood.      Denies new issues.  Has started a low-carb and low sugar diet.  Reports improvement in stamina and energy but continues to have polyarthralgia/myofascial pain.  Good relief with oxycodone and denies side effects    Continue oxycodone 7.5 /325 2-3 times daily as needed for severe pain.  UDS and  Inspect reviewed.    Discussed risk of tolerance, dependence, respiratory depression, coma and death associated with use of oral opioids for treatment of chronic nonmalignant pain.      Discussed increased risk with combining opioid and benzo. On clorazepate prn anxiety.    RTC in 2-3 months

## 2024-03-21 ENCOUNTER — TELEPHONE (OUTPATIENT)
Dept: ENDOCRINOLOGY | Facility: CLINIC | Age: 79
End: 2024-03-21

## 2024-05-06 ENCOUNTER — OFFICE VISIT (OUTPATIENT)
Dept: PAIN MEDICINE | Facility: CLINIC | Age: 79
End: 2024-05-06
Payer: MEDICARE

## 2024-05-06 VITALS
SYSTOLIC BLOOD PRESSURE: 144 MMHG | BODY MASS INDEX: 28.99 KG/M2 | WEIGHT: 185.1 LBS | OXYGEN SATURATION: 96 % | RESPIRATION RATE: 16 BRPM | DIASTOLIC BLOOD PRESSURE: 97 MMHG | HEART RATE: 73 BPM

## 2024-05-06 DIAGNOSIS — M47.817 LUMBOSACRAL SPONDYLOSIS WITHOUT MYELOPATHY: ICD-10-CM

## 2024-05-06 DIAGNOSIS — Z79.899 HIGH RISK MEDICATION USE: ICD-10-CM

## 2024-05-06 DIAGNOSIS — G89.4 CHRONIC PAIN SYNDROME: Primary | ICD-10-CM

## 2024-05-06 DIAGNOSIS — G89.29 CHRONIC LEFT SHOULDER PAIN: ICD-10-CM

## 2024-05-06 DIAGNOSIS — M25.512 CHRONIC LEFT SHOULDER PAIN: ICD-10-CM

## 2024-05-06 DIAGNOSIS — M25.50 POLYARTHRALGIA: ICD-10-CM

## 2024-05-06 PROCEDURE — 3080F DIAST BP >= 90 MM HG: CPT | Performed by: ANESTHESIOLOGY

## 2024-05-06 PROCEDURE — 1159F MED LIST DOCD IN RCRD: CPT | Performed by: ANESTHESIOLOGY

## 2024-05-06 PROCEDURE — 1125F AMNT PAIN NOTED PAIN PRSNT: CPT | Performed by: ANESTHESIOLOGY

## 2024-05-06 PROCEDURE — 3077F SYST BP >= 140 MM HG: CPT | Performed by: ANESTHESIOLOGY

## 2024-05-06 PROCEDURE — 99214 OFFICE O/P EST MOD 30 MIN: CPT | Performed by: ANESTHESIOLOGY

## 2024-05-06 PROCEDURE — 1160F RVW MEDS BY RX/DR IN RCRD: CPT | Performed by: ANESTHESIOLOGY

## 2024-05-06 RX ORDER — OXYCODONE AND ACETAMINOPHEN 7.5; 325 MG/1; MG/1
1 TABLET ORAL 3 TIMES DAILY PRN
Qty: 90 TABLET | Refills: 0 | Status: SHIPPED | OUTPATIENT
Start: 2024-06-08

## 2024-05-06 RX ORDER — OXYCODONE AND ACETAMINOPHEN 7.5; 325 MG/1; MG/1
1 TABLET ORAL 3 TIMES DAILY PRN
Qty: 90 TABLET | Refills: 0 | Status: SHIPPED | OUTPATIENT
Start: 2024-05-09

## 2024-05-07 ENCOUNTER — TELEPHONE (OUTPATIENT)
Dept: PAIN MEDICINE | Facility: CLINIC | Age: 79
End: 2024-05-07
Payer: MEDICARE

## 2024-05-09 ENCOUNTER — TELEPHONE (OUTPATIENT)
Dept: PAIN MEDICINE | Facility: CLINIC | Age: 79
End: 2024-05-09
Payer: MEDICARE

## 2024-07-01 ENCOUNTER — TELEPHONE (OUTPATIENT)
Dept: PAIN MEDICINE | Facility: CLINIC | Age: 79
End: 2024-07-01
Payer: MEDICARE

## 2024-07-01 NOTE — TELEPHONE ENCOUNTER
Caller: Pumphrey, Marilyn J    Relationship to patient: Self    Best call back number: [phone] (home)     Patient is needing: PATIENT IS FEELING BETTER AND WANTS TO CANCEL PROCEDURE THAT SHE HAS WITH RYAN ON 7/8/24

## 2024-07-02 ENCOUNTER — OFFICE VISIT (OUTPATIENT)
Dept: PAIN MEDICINE | Facility: CLINIC | Age: 79
End: 2024-07-02
Payer: MEDICARE

## 2024-07-02 VITALS
RESPIRATION RATE: 16 BRPM | OXYGEN SATURATION: 93 % | BODY MASS INDEX: 28.51 KG/M2 | SYSTOLIC BLOOD PRESSURE: 100 MMHG | DIASTOLIC BLOOD PRESSURE: 64 MMHG | WEIGHT: 182 LBS | HEART RATE: 83 BPM

## 2024-07-02 DIAGNOSIS — M47.817 LUMBOSACRAL SPONDYLOSIS WITHOUT MYELOPATHY: ICD-10-CM

## 2024-07-02 DIAGNOSIS — M25.50 POLYARTHRALGIA: ICD-10-CM

## 2024-07-02 DIAGNOSIS — G89.4 CHRONIC PAIN SYNDROME: Primary | ICD-10-CM

## 2024-07-02 DIAGNOSIS — Z79.899 HIGH RISK MEDICATION USE: ICD-10-CM

## 2024-07-02 RX ORDER — OXYCODONE AND ACETAMINOPHEN 7.5; 325 MG/1; MG/1
1 TABLET ORAL 3 TIMES DAILY PRN
Qty: 90 TABLET | Refills: 0 | Status: SHIPPED | OUTPATIENT
Start: 2024-08-06

## 2024-07-02 RX ORDER — OXYCODONE AND ACETAMINOPHEN 7.5; 325 MG/1; MG/1
1 TABLET ORAL 3 TIMES DAILY PRN
Qty: 90 TABLET | Refills: 0 | Status: SHIPPED | OUTPATIENT
Start: 2024-07-07

## 2024-07-02 NOTE — PROGRESS NOTES
Subjective    CC joints pain left knee pain  Marilyn J Pumphrey is a 78 y.o. female with multiple comorbidities including DM, polyarthralgia from  inflammatory osteoarthritis, bilateral knee pain, status post left TKA , here for f/u.    Continued and worsening right knee pain.  She has made appointment with Ortho and has decided on proceeding TKA.  Ambulating with cane today.  Continues to have good relief of oxycodone but also taking Aleve occasionally    Chronic back pain radiating to bilateral hips worse with prolonged activity.  Chronic polyarthralgia/left knee pain,  and generalized myofascial pain interfering with daily activity, sleep,mood.  Considering right knee TKA.  Tried methotrexate and  Celebrex without relief.  Tried Cymbalta, sevella, tramadol without relief.  Seen Rheumatology. and ortho. considering right knee replecement.   Good releif with knee injections by ortho.      Utilizes hydrocodone 10/325  with good relief functional benefit denies side effects.    L-spine MRI 2021.  Multiple level degenerative disc disease and degenerative changes.  Small left paracentral disc protrusion at L3-L4 which appears to abut the exiting nerve root with mild encroachment on neural foramina on the left.    Pain Assessment   Location of Pain: Lower Back, R Hip, L Hip, Leg, neck pain, joint, right knee  Description of Pain: Dull/Aching, Throbbing, Stabbing  Previous Pain Rating :7  Current Pain Ratin  Aggravating Factors: Activity  Alleviating Factors: Rest, Medication  PEG Assessment   What number best describes your pain on average in the past week?9  What number best describes how, during the past week, pain has interfered with your enjoyment of life?7  What number best describes how, during the past week, pain has interfered with your general activity? 10     The following portions of the patient's history were reviewed and updated as appropriate: allergies, current medications, past family history,  past medical history, past social history, past surgical history and problem list.    Review of Systems   Musculoskeletal:  Positive for arthralgias, back pain, joint swelling, myalgias and neck pain.   All other systems reviewed and are negative.    Objective   Physical Exam   Constitutional: No distress.   Cane   Musculoskeletal:      Comments: Lumbar loading positive, pain on extension of low back past 5 degrees.  TTP on the lumbar facets noted.  Leg raise left   Vitals reviewed.    /64 (BP Location: Left arm, Patient Position: Sitting, Cuff Size: Adult)   Pulse 83   Resp 16   Wt 82.6 kg (182 lb)   SpO2 93%   BMI 28.51 kg/m²     PHQ 9 on chart  Opioid risk tool low risk    Assessment & Plan   Diagnoses and all orders for this visit:    1. Chronic pain syndrome (Primary)  -     oxyCODONE-acetaminophen (PERCOCET) 7.5-325 MG per tablet; Take 1 tablet by mouth 3 (Three) Times a Day As Needed for Severe Pain.  Dispense: 90 tablet; Refill: 0  -     oxyCODONE-acetaminophen (PERCOCET) 7.5-325 MG per tablet; Take 1 tablet by mouth 3 (Three) Times a Day As Needed for Severe Pain. DNF before 8/6/2024  Dispense: 90 tablet; Refill: 0    2. Lumbosacral spondylosis without myelopathy    3. Polyarthralgia    4. High risk medication use      Summary  78 y.o. female with multiple comorbidities including DM, polyarthralgia from  inflammatory osteoarthritis, knee pain S/P left TKA, here for f/u.  Chronic polyarthralgia/left knee pain,  and generalized myofascial pain interfering with daily activity, sleep,mood.      Continued and worsening right knee pain.  She has made appointment with Ortho and has decided on proceeding TKA.  Ambulating with cane today.  Continues to have good relief of oxycodone but also taking Aleve occasionally    Continue oxycodone 7.5 /325 2-3 times daily as needed for severe pain.  UDS and  Inspect reviewed.    Discussed risk of tolerance, dependence, respiratory depression, coma and death  associated with use of oral opioids for treatment of chronic nonmalignant pain.      Discussed increased risk with combining opioid and benzo. On clorazepate prn anxiety.    RTC in 2-3 months

## 2024-09-03 ENCOUNTER — OFFICE VISIT (OUTPATIENT)
Dept: PAIN MEDICINE | Facility: CLINIC | Age: 79
End: 2024-09-03
Payer: MEDICARE

## 2024-09-03 ENCOUNTER — HOSPITAL ENCOUNTER (INPATIENT)
Facility: HOSPITAL | Age: 79
LOS: 2 days | Discharge: HOME HEALTH | DRG: 522 | End: 2024-09-05
Attending: EMERGENCY MEDICINE | Admitting: STUDENT IN AN ORGANIZED HEALTH CARE EDUCATION/TRAINING PROGRAM
Payer: MEDICARE

## 2024-09-03 ENCOUNTER — APPOINTMENT (OUTPATIENT)
Dept: GENERAL RADIOLOGY | Facility: HOSPITAL | Age: 79
DRG: 522 | End: 2024-09-03
Payer: MEDICARE

## 2024-09-03 VITALS
BODY MASS INDEX: 26.63 KG/M2 | WEIGHT: 170 LBS | SYSTOLIC BLOOD PRESSURE: 140 MMHG | HEART RATE: 79 BPM | DIASTOLIC BLOOD PRESSURE: 80 MMHG | RESPIRATION RATE: 16 BRPM

## 2024-09-03 DIAGNOSIS — G89.4 CHRONIC PAIN SYNDROME: ICD-10-CM

## 2024-09-03 DIAGNOSIS — S72.001A CLOSED FRACTURE OF NECK OF RIGHT FEMUR, INITIAL ENCOUNTER: ICD-10-CM

## 2024-09-03 DIAGNOSIS — Z79.899 HIGH RISK MEDICATION USE: Primary | ICD-10-CM
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
## 2024-09-03 DIAGNOSIS — M25.50 POLYARTHRALGIA: ICD-10-CM

## 2024-09-03 DIAGNOSIS — M47.817 LUMBOSACRAL SPONDYLOSIS WITHOUT MYELOPATHY: ICD-10-CM

## 2024-09-03 PROBLEM — S72.91XA FEMUR FRACTURE, RIGHT: Status: ACTIVE | Noted: 2024-09-03

## 2024-09-03 LAB
ABO GROUP BLD: NORMAL
ALBUMIN SERPL-MCNC: 4.4 G/DL (ref 3.5–5.2)
ALBUMIN/GLOB SERPL: 1.5 G/DL
ALP SERPL-CCNC: 84 U/L (ref 39–117)
ALT SERPL W P-5'-P-CCNC: 21 U/L (ref 1–33)
ANION GAP SERPL CALCULATED.3IONS-SCNC: 16.8 MMOL/L (ref 5–15)
APTT PPP: 30.4 SECONDS (ref 61–76.5)
AST SERPL-CCNC: 26 U/L (ref 1–32)
BASOPHILS # BLD AUTO: 0.04 10*3/MM3 (ref 0–0.2)
BASOPHILS NFR BLD AUTO: 0.4 % (ref 0–1.5)
BILIRUB SERPL-MCNC: 0.6 MG/DL (ref 0–1.2)
BLD GP AB SCN SERPL QL: NEGATIVE
BUN SERPL-MCNC: 14 MG/DL (ref 8–23)
BUN/CREAT SERPL: 14.9 (ref 7–25)
CALCIUM SPEC-SCNC: 8.6 MG/DL (ref 8.6–10.5)
CHLORIDE SERPL-SCNC: 98 MMOL/L (ref 98–107)
CO2 SERPL-SCNC: 23.2 MMOL/L (ref 22–29)
CREAT SERPL-MCNC: 0.94 MG/DL (ref 0.57–1)
DEPRECATED RDW RBC AUTO: 42 FL (ref 37–54)
EGFRCR SERPLBLD CKD-EPI 2021: 62.6 ML/MIN/1.73
EOSINOPHIL # BLD AUTO: 0.24 10*3/MM3 (ref 0–0.4)
EOSINOPHIL NFR BLD AUTO: 2.6 % (ref 0.3–6.2)
ERYTHROCYTE [DISTWIDTH] IN BLOOD BY AUTOMATED COUNT: 13.2 % (ref 12.3–15.4)
GLOBULIN UR ELPH-MCNC: 2.9 GM/DL
GLUCOSE SERPL-MCNC: 146 MG/DL (ref 65–99)
HCT VFR BLD AUTO: 49.5 % (ref 34–46.6)
HGB BLD-MCNC: 15.9 G/DL (ref 12–15.9)
HOLD SPECIMEN: NORMAL
HOLD SPECIMEN: NORMAL
IMM GRANULOCYTES # BLD AUTO: 0.05 10*3/MM3 (ref 0–0.05)
IMM GRANULOCYTES NFR BLD AUTO: 0.5 % (ref 0–0.5)
INR PPP: 1.03 (ref 0.93–1.1)
LYMPHOCYTES # BLD AUTO: 1.87 10*3/MM3 (ref 0.7–3.1)
LYMPHOCYTES NFR BLD AUTO: 19.9 % (ref 19.6–45.3)
MCH RBC QN AUTO: 27.9 PG (ref 26.6–33)
MCHC RBC AUTO-ENTMCNC: 32.1 G/DL (ref 31.5–35.7)
MCV RBC AUTO: 87 FL (ref 79–97)
MONOCYTES # BLD AUTO: 0.85 10*3/MM3 (ref 0.1–0.9)
MONOCYTES NFR BLD AUTO: 9.1 % (ref 5–12)
NEUTROPHILS NFR BLD AUTO: 6.33 10*3/MM3 (ref 1.7–7)
NEUTROPHILS NFR BLD AUTO: 67.5 % (ref 42.7–76)
NRBC BLD AUTO-RTO: 0 /100 WBC (ref 0–0.2)
PLATELET # BLD AUTO: 203 10*3/MM3 (ref 140–450)
PMV BLD AUTO: 11.2 FL (ref 6–12)
POTASSIUM SERPL-SCNC: 4.5 MMOL/L (ref 3.5–5.2)
PROT SERPL-MCNC: 7.3 G/DL (ref 6–8.5)
PROTHROMBIN TIME: 11.2 SECONDS (ref 9.6–11.7)
RBC # BLD AUTO: 5.69 10*6/MM3 (ref 3.77–5.28)
RH BLD: NEGATIVE
SODIUM SERPL-SCNC: 138 MMOL/L (ref 136–145)
T&S EXPIRATION DATE: NORMAL
WBC NRBC COR # BLD AUTO: 9.38 10*3/MM3 (ref 3.4–10.8)
WHOLE BLOOD HOLD COAG: NORMAL
WHOLE BLOOD HOLD SPECIMEN: NORMAL

## 2024-09-03 PROCEDURE — 85025 COMPLETE CBC W/AUTO DIFF WBC: CPT | Performed by: EMERGENCY MEDICINE

## 2024-09-03 PROCEDURE — 99214 OFFICE O/P EST MOD 30 MIN: CPT | Performed by: ANESTHESIOLOGY

## 2024-09-03 PROCEDURE — 25010000002 HYDROMORPHONE 1 MG/ML SOLUTION: Performed by: EMERGENCY MEDICINE

## 2024-09-03 PROCEDURE — 1159F MED LIST DOCD IN RCRD: CPT | Performed by: ANESTHESIOLOGY

## 2024-09-03 PROCEDURE — 25010000002 HYDROMORPHONE 1 MG/ML SOLUTION: Performed by: NURSE PRACTITIONER

## 2024-09-03 PROCEDURE — 80053 COMPREHEN METABOLIC PANEL: CPT | Performed by: EMERGENCY MEDICINE

## 2024-09-03 PROCEDURE — 1125F AMNT PAIN NOTED PAIN PRSNT: CPT | Performed by: ANESTHESIOLOGY

## 2024-09-03 PROCEDURE — 25010000002 ONDANSETRON PER 1 MG: Performed by: EMERGENCY MEDICINE

## 2024-09-03 PROCEDURE — 73552 X-RAY EXAM OF FEMUR 2/>: CPT

## 2024-09-03 PROCEDURE — 85730 THROMBOPLASTIN TIME PARTIAL: CPT | Performed by: EMERGENCY MEDICINE

## 2024-09-03 PROCEDURE — 85610 PROTHROMBIN TIME: CPT | Performed by: EMERGENCY MEDICINE

## 2024-09-03 PROCEDURE — 3077F SYST BP >= 140 MM HG: CPT | Performed by: ANESTHESIOLOGY

## 2024-09-03 PROCEDURE — 86900 BLOOD TYPING SEROLOGIC ABO: CPT | Performed by: EMERGENCY MEDICINE

## 2024-09-03 PROCEDURE — 63710000001 PROMETHAZINE PER 25 MG: Performed by: NURSE PRACTITIONER

## 2024-09-03 PROCEDURE — 86901 BLOOD TYPING SEROLOGIC RH(D): CPT | Performed by: EMERGENCY MEDICINE

## 2024-09-03 PROCEDURE — 73502 X-RAY EXAM HIP UNI 2-3 VIEWS: CPT

## 2024-09-03 PROCEDURE — 86850 RBC ANTIBODY SCREEN: CPT | Performed by: EMERGENCY MEDICINE

## 2024-09-03 PROCEDURE — 1160F RVW MEDS BY RX/DR IN RCRD: CPT | Performed by: ANESTHESIOLOGY

## 2024-09-03 PROCEDURE — 3079F DIAST BP 80-89 MM HG: CPT | Performed by: ANESTHESIOLOGY

## 2024-09-03 PROCEDURE — 99285 EMERGENCY DEPT VISIT HI MDM: CPT

## 2024-09-03 RX ORDER — OXYCODONE HYDROCHLORIDE 5 MG/1
10 TABLET ORAL EVERY 4 HOURS PRN
Status: DISCONTINUED | OUTPATIENT
Start: 2024-09-03 | End: 2024-09-05 | Stop reason: HOSPADM

## 2024-09-03 RX ORDER — OXYCODONE AND ACETAMINOPHEN 7.5; 325 MG/1; MG/1
1 TABLET ORAL 3 TIMES DAILY PRN
Qty: 90 TABLET | Refills: 0 | Status: ON HOLD | OUTPATIENT
Start: 2024-10-05 | End: 2024-09-03

## 2024-09-03 RX ORDER — SODIUM CHLORIDE 0.9 % (FLUSH) 0.9 %
10 SYRINGE (ML) INJECTION AS NEEDED
Status: DISCONTINUED | OUTPATIENT
Start: 2024-09-03 | End: 2024-09-05 | Stop reason: HOSPADM

## 2024-09-03 RX ORDER — OXYCODONE AND ACETAMINOPHEN 7.5; 325 MG/1; MG/1
1 TABLET ORAL 3 TIMES DAILY PRN
Qty: 90 TABLET | Refills: 0 | Status: SHIPPED | OUTPATIENT
Start: 2024-11-05 | End: 2024-09-05 | Stop reason: HOSPADM

## 2024-09-03 RX ORDER — ALUMINA, MAGNESIA, AND SIMETHICONE 2400; 2400; 240 MG/30ML; MG/30ML; MG/30ML
15 SUSPENSION ORAL EVERY 6 HOURS PRN
Status: DISCONTINUED | OUTPATIENT
Start: 2024-09-03 | End: 2024-09-05 | Stop reason: HOSPADM

## 2024-09-03 RX ORDER — POLYETHYLENE GLYCOL 3350 17 G/17G
17 POWDER, FOR SOLUTION ORAL DAILY PRN
Status: DISCONTINUED | OUTPATIENT
Start: 2024-09-03 | End: 2024-09-05 | Stop reason: HOSPADM

## 2024-09-03 RX ORDER — AMOXICILLIN 250 MG
2 CAPSULE ORAL 2 TIMES DAILY PRN
Status: DISCONTINUED | OUTPATIENT
Start: 2024-09-03 | End: 2024-09-05 | Stop reason: HOSPADM

## 2024-09-03 RX ORDER — OXYCODONE AND ACETAMINOPHEN 7.5; 325 MG/1; MG/1
1 TABLET ORAL 3 TIMES DAILY PRN
Qty: 90 TABLET | Refills: 0 | Status: ON HOLD | OUTPATIENT
Start: 2024-09-07 | End: 2024-09-03

## 2024-09-03 RX ORDER — BISACODYL 5 MG/1
5 TABLET, DELAYED RELEASE ORAL DAILY PRN
Status: DISCONTINUED | OUTPATIENT
Start: 2024-09-03 | End: 2024-09-05 | Stop reason: HOSPADM

## 2024-09-03 RX ORDER — ONDANSETRON 2 MG/ML
4 INJECTION INTRAMUSCULAR; INTRAVENOUS ONCE
Status: COMPLETED | OUTPATIENT
Start: 2024-09-03 | End: 2024-09-03

## 2024-09-03 RX ORDER — ONDANSETRON 2 MG/ML
4 INJECTION INTRAMUSCULAR; INTRAVENOUS EVERY 6 HOURS PRN
Status: DISCONTINUED | OUTPATIENT
Start: 2024-09-03 | End: 2024-09-05 | Stop reason: HOSPADM

## 2024-09-03 RX ORDER — DEXTROSE MONOHYDRATE 25 G/50ML
25 INJECTION, SOLUTION INTRAVENOUS
Status: DISCONTINUED | OUTPATIENT
Start: 2024-09-03 | End: 2024-09-05 | Stop reason: HOSPADM

## 2024-09-03 RX ORDER — BISACODYL 10 MG
10 SUPPOSITORY, RECTAL RECTAL DAILY PRN
Status: DISCONTINUED | OUTPATIENT
Start: 2024-09-03 | End: 2024-09-05 | Stop reason: HOSPADM

## 2024-09-03 RX ORDER — SODIUM CHLORIDE 0.9 % (FLUSH) 0.9 %
10 SYRINGE (ML) INJECTION EVERY 12 HOURS SCHEDULED
Status: DISCONTINUED | OUTPATIENT
Start: 2024-09-03 | End: 2024-09-05 | Stop reason: HOSPADM

## 2024-09-03 RX ORDER — IBUPROFEN 600 MG/1
1 TABLET ORAL
Status: DISCONTINUED | OUTPATIENT
Start: 2024-09-03 | End: 2024-09-05 | Stop reason: HOSPADM

## 2024-09-03 RX ORDER — NICOTINE POLACRILEX 4 MG
15 LOZENGE BUCCAL
Status: DISCONTINUED | OUTPATIENT
Start: 2024-09-03 | End: 2024-09-05 | Stop reason: HOSPADM

## 2024-09-03 RX ORDER — PROMETHAZINE HYDROCHLORIDE 25 MG/1
25 TABLET ORAL EVERY 6 HOURS PRN
Status: DISCONTINUED | OUTPATIENT
Start: 2024-09-03 | End: 2024-09-05 | Stop reason: HOSPADM

## 2024-09-03 RX ORDER — SODIUM CHLORIDE 9 MG/ML
40 INJECTION, SOLUTION INTRAVENOUS AS NEEDED
Status: DISCONTINUED | OUTPATIENT
Start: 2024-09-03 | End: 2024-09-05 | Stop reason: HOSPADM

## 2024-09-03 RX ORDER — ONDANSETRON 4 MG/1
4 TABLET, ORALLY DISINTEGRATING ORAL EVERY 6 HOURS PRN
Status: DISCONTINUED | OUTPATIENT
Start: 2024-09-03 | End: 2024-09-05 | Stop reason: HOSPADM

## 2024-09-03 RX ORDER — ACETAMINOPHEN 325 MG/1
650 TABLET ORAL EVERY 6 HOURS PRN
Status: DISCONTINUED | OUTPATIENT
Start: 2024-09-03 | End: 2024-09-05 | Stop reason: HOSPADM

## 2024-09-03 RX ORDER — INSULIN LISPRO 100 [IU]/ML
2-7 INJECTION, SOLUTION INTRAVENOUS; SUBCUTANEOUS EVERY 6 HOURS
Status: DISCONTINUED | OUTPATIENT
Start: 2024-09-04 | End: 2024-09-04

## 2024-09-03 RX ADMIN — ONDANSETRON 4 MG: 2 INJECTION INTRAMUSCULAR; INTRAVENOUS at 17:41

## 2024-09-03 RX ADMIN — PROMETHAZINE HYDROCHLORIDE 25 MG: 25 TABLET ORAL at 23:16

## 2024-09-03 RX ADMIN — HYDROMORPHONE HYDROCHLORIDE 0.5 MG: 1 INJECTION, SOLUTION INTRAMUSCULAR; INTRAVENOUS; SUBCUTANEOUS at 17:41

## 2024-09-03 RX ADMIN — HYDROMORPHONE HYDROCHLORIDE 1 MG: 1 INJECTION, SOLUTION INTRAMUSCULAR; INTRAVENOUS; SUBCUTANEOUS at 20:31

## 2024-09-03 RX ADMIN — Medication 10 ML: at 20:31

## 2024-09-03 RX ADMIN — OXYCODONE HYDROCHLORIDE 10 MG: 5 TABLET ORAL at 23:16

## 2024-09-03 NOTE — PROGRESS NOTES
Subjective    CC joints pain left knee pain  Marilyn J Pumphrey is a 78 y.o. female with multiple comorbidities including DM, polyarthralgia from  inflammatory osteoarthritis, bilateral knee pain, status post left TKA 2019, here for f/u.    Scheduled for left knee TKA with Dr. Bradford in 2 weeks.  Otherwise denies any new complaints or issues, continues to have good relief of oxycodone without side effects.    Chronic back pain radiating to bilateral hips worse with prolonged activity.  Chronic polyarthralgia/left knee pain,  and generalized myofascial pain interfering with daily activity, sleep,mood.  Considering right knee TKA.  Tried methotrexate and  Celebrex without relief.  Tried Cymbalta, sevella, tramadol without relief.  Seen Rheumatology. and ortho. considering right knee replecement.   Good releif with knee injections by ortho.      Utilizes hydrocodone 10/325  with good relief functional benefit denies side effects.    L-spine MRI 2021.  Multiple level degenerative disc disease and degenerative changes.  Small left paracentral disc protrusion at L3-L4 which appears to abut the exiting nerve root with mild encroachment on neural foramina on the left.    Pain Assessment   Location of Pain: Lower Back, R Hip, L Hip, Leg, neck pain, joint, right knee  Description of Pain: Dull/Aching, Throbbing, Stabbing  Previous Pain Rating :9  Current Pain Ratin  Aggravating Factors: Activity  Alleviating Factors: Rest, Medication  PEG Assessment   What number best describes your pain on average in the past week?9  What number best describes how, during the past week, pain has interfered with your enjoyment of life?8  What number best describes how, during the past week, pain has interfered with your general activity? 10     The following portions of the patient's history were reviewed and updated as appropriate: allergies, current medications, past family history, past medical history, past social history, past  surgical history and problem list.    Review of Systems   Musculoskeletal:  Positive for arthralgias, back pain, joint swelling, myalgias and neck pain.   All other systems reviewed and are negative.    Objective   Physical Exam   Constitutional: No distress.   Cane   Musculoskeletal:      Comments: Lumbar loading positive, pain on extension of low back past 5 degrees.  TTP on the lumbar facets noted.  Leg raise left   Vitals reviewed.    /80 (BP Location: Left arm, Patient Position: Sitting, Cuff Size: Adult)   Pulse 79   Resp 16   Wt 77.1 kg (170 lb)   BMI 26.63 kg/m²     PHQ 9 on chart  Opioid risk tool low risk    Assessment & Plan   Diagnoses and all orders for this visit:    1. Lumbosacral spondylosis without myelopathy (Primary)    2. Chronic pain syndrome  -     oxyCODONE-acetaminophen (PERCOCET) 7.5-325 MG per tablet; Take 1 tablet by mouth 3 (Three) Times a Day As Needed for Severe Pain.  Dispense: 90 tablet; Refill: 0  -     oxyCODONE-acetaminophen (PERCOCET) 7.5-325 MG per tablet; Take 1 tablet by mouth 3 (Three) Times a Day As Needed for Severe Pain. DNF before 10/5/2024  Dispense: 90 tablet; Refill: 0  -     oxyCODONE-acetaminophen (PERCOCET) 7.5-325 MG per tablet; Take 1 tablet by mouth 3 (Three) Times a Day As Needed for Severe Pain. DNF before 11/5/2024  Dispense: 90 tablet; Refill: 0    3. Polyarthralgia    4. High risk medication use      Summary  78 y.o. female with multiple comorbidities including DM, polyarthralgia from  inflammatory osteoarthritis, knee pain S/P left TKA, here for f/u.  Chronic polyarthralgia/left knee pain,  and generalized myofascial pain interfering with daily activity, sleep,mood.      Scheduled for left knee TKA with Dr. Bradford in 2 weeks.  Otherwise denies any new complaints or issues, continues to have good relief of oxycodone without side effects.  Okay for surgeon to prescribe opioid postop if indicated.    Continue oxycodone 7.5 /325 2-3 times daily as  needed for severe pain.  UDS and  Inspect reviewed.    Discussed risk of tolerance, dependence, respiratory depression, coma and death associated with use of oral opioids for treatment of chronic nonmalignant pain.      Discussed increased risk with combining opioid and benzo. On clorazepate prn anxiety.    RTC in 2-3 months

## 2024-09-03 NOTE — Clinical Note
Level of Care: Med/Surg [1]   Admitting Physician: ARMAND FRANKS [[id number]]   Attending Physician: ARMAND FRANKS [[id number]]

## 2024-09-03 NOTE — ED PROVIDER NOTES
Subjective   History of Present Illness  Chief complaint fall with hip injury    History of present illness a 78-year-old female who tripped and fell landing on her right hip today complaints.  Pain in the right hip.  EMS brought the patient in for evaluation they placed a pelvic binder on her with a sheet and some sort of metal splint on her knee.  The patient denies any head injury neck or back pain no chest or abdominal pain no syncope.  No previous hip surgery she was due to get a knee replacement in a couple weeks.      Review of Systems   Constitutional:  Negative for chills and fever.   Respiratory:  Negative for chest tightness and shortness of breath.    Cardiovascular:  Negative for chest pain.   Gastrointestinal:  Negative for abdominal pain and vomiting.   Musculoskeletal:  Negative for back pain and neck pain.   Skin:  Negative for wound.   Neurological:  Negative for headaches.   Psychiatric/Behavioral:  Negative for confusion.        Past Medical History:   Diagnosis Date    Anemia, unspecified     Anxiety     Arthritis     Bone pain     Cancer 09/2016    THYROID    Depression     Disease of thyroid gland     DM type 2 (diabetes mellitus, type 2)     TYPE 2    Fall     2/2021 FELL ON ICE    GERD (gastroesophageal reflux disease)     Hypertension     Irregular heart beat     Knee pain     Leaky heart valve     11/2020    Leg cramps     Low back pain     Low iron     getting iron infusions 5/10/23    Numbness around mouth     5/12/2022-- was inpatient at [facility]-- ct ran and MRI-- no stroke    Osteoarthritis     Polypharmacy 04/20/2023    PONV (postoperative nausea and vomiting)     DOES WELL WITH PROPOFOL    Sleep apnea     C-PAP IN USE    UTI (urinary tract infection)        Allergies   Allergen Reactions    Codeine Shortness Of Breath    Oxycodone Hallucinations     Terrible nausea and vomiting       Past Surgical History:   Procedure Laterality Date    CARPAL TUNNEL RELEASE      CHOLECYSTECTOMY  1974     HYSTERECTOMY  1993    partial abdominal hysterectomy    OOPHORECTOMY Bilateral 2015    THYROID SURGERY      2 thyroid surgery    TONSILLECTOMY      TOTAL KNEE ARTHROPLASTY Left 10/14/2019    Procedure: TOTAL KNEE ARTHROPLASTY;  Surgeon: Sanjay Collado MD;  [facility];  Service: Orthopedics    VEIN LIGATION AND STRIPPING BILATERAL         Family History   Problem Relation Age of Onset    Diabetes Father     Heart disease Father     Hypertension Father     Hyperlipidemia Father     Thyroid disease Father     Cancer Sister         lung cancer    Thyroid disease Sister     Diabetes Brother     Heart disease Brother     Cancer Brother         lung cancer       Social History     Socioeconomic History    Marital status:     Number of children: 3    Years of education: 9   Tobacco Use    Smoking status: Never    Smokeless tobacco: Never   Vaping Use    Vaping status: Never Used   Substance and Sexual Activity    Alcohol use: No    Drug use: No    Sexual activity: Defer     Prior to Admission medications    Medication Sig Start Date End Date Taking? Authorizing Provider   amLODIPine-benazepril (LOTREL 5-20) 5-20 MG per capsule Take 1 capsule by mouth Daily. 5/12/22   Jose F Duke MD   aspirin 81 MG tablet Take 1 tablet by mouth Daily.  Patient taking differently: Take 325 mg by mouth Daily. 10/15/19   Sanjay Collado MD   calcitriol (ROCALTROL) 0.25 MCG capsule Take 1 capsule by mouth 2 (Two) Times a Day. 4/27/23   Gege Ford MD   CALCIUM CARBONATE-VIT D-MIN PO Take 1,200 mg by mouth 2 (Two) Times a Day.    ProviderEkaterina MD   clorazepate (TRANXENE) 7.5 MG tablet Take 1 tablet by mouth 2 (Two) Times a Day. 12/9/22   Ekaterina Moncada MD   diclofenac (VOLTAREN) 1 % gel gel Apply 4 g topically to the appropriate area as directed 4 (Four) Times a Day As Needed.    Ekaterina Moncada MD   Diclofenac Sodium (VOLTAREN) 1 % gel gel Apply 4 g topically to the appropriate area as  directed.    Ekaterina Moncada MD   empagliflozin (Jardiance) 25 MG tablet tablet Take 1 tablet by mouth Daily. 8/25/21   Gege Ford MD   escitalopram (LEXAPRO) 10 MG tablet Take 1 tablet by mouth Every Evening. 5/21/19   Ekaterina Moncada MD   esomeprazole (nexIUM) 40 MG capsule Take 1 capsule by mouth 2 (Two) Times a Day.    Ekaterina Moncada MD   insulin aspart (NOVOLOG FLEXPEN) 100 UNIT/ML solution pen-injector sc pen Inject 10 units before each meal  Patient taking differently: 10 Units. Inject 10 units before each meal 8/20/19   Gege Ford MD   insulin glargine (LANTUS, SEMGLEE) 100 UNIT/ML injection Inject 30 Units under the skin into the appropriate area as directed Daily.  Patient taking differently: Inject 40 Units under the skin into the appropriate area as directed Every Night. 5/24/21   Gege Ford MD   levothyroxine (SYNTHROID, LEVOTHROID) 112 MCG tablet Take 1 tablet p.o. daily  Patient taking differently: Take 125 mcg by mouth Every Morning. Take 1 tablet p.o. daily 1/27/23   Gege Ford MD   Lotemax 0.5 % gel ophthalmic gel INSTILL ONE DROP DAILY INTO THE RIGHT EYE AND ONE DROP THREE TIMES A DAY INTO THE LEFT EYE 2/6/24   Ekaterina Moncada MD   magnesium oxide (MAG-OX) 400 tablet tablet Take 1 tablet by mouth 2 (Two) Times a Day.    Ekaterina Moncada MD   metFORMIN (Glucophage) 1000 MG tablet Take 1 tablet by mouth 2 (Two) Times a Day With Meals. 6/14/22   Gege Ford MD   metoprolol tartrate (LOPRESSOR) 100 MG tablet Take 1 tablet by mouth 2 (Two) Times a Day.    Ekaterina Moncada MD   oxyCODONE-acetaminophen (PERCOCET) 7.5-325 MG per tablet Take 1 tablet by mouth 3 (Three) Times a Day As Needed for Severe Pain. DNF before 11/5/2024 11/5/24   Lisa Wells MD   pramipexole (MIRAPEX) 1 MG tablet Take 1 tablet by mouth 2 (Two) Times a Day. 11/13/22   Ekaterina Moncada MD   promethazine (PHENERGAN) 25 MG tablet Take 1 tablet by mouth As Needed.  9/22/19   ProviderEkaterina MD   promethazine (PHENERGAN) 25 MG tablet Every 4 (Four) Hours.    Ekaterina Moncada MD   rosuvastatin (Crestor) 5 MG tablet Take 1 tablet by mouth Daily. 8/29/23 8/28/24  Gege Ford MD   vitamin D (ERGOCALCIFEROL) 1.25 MG (88554 UT) capsule capsule Take 1 capsule by mouth 1 (One) Time Per Week. 4/23/21   ProviderEkaterina MD          Objective   Physical Exam  Constitutional/78-year-old female awake alert no distress.  Triage vital signs reviewed.  HEENT no signs of trauma.  Extraocular muscles intact pupils equal round reactive no raccoon or Dill sign back no cervical thoracic lumbar spine tenderness noted lungs clear no retraction no use of accessory chest wall without tenderness abdomen soft no tenderness good bowel sounds extremities arms full range of motion no deformities left leg full range of motion deformities she has shortening rotation to the right leg with pain to the right hip.  No pain to the knee or the ankle toes under including big toe dorsiflex plantarflex at difficulty neurovascular motor sensor intact distally.  Neurologic awake alert and oriented x 4 with a Nuzhat Coma Scale 15  Procedures           ED Course      Results for orders placed or performed during the hospital encounter of 09/03/24   Comprehensive Metabolic Panel    Specimen: Blood   Result Value Ref Range    Glucose 146 (H) 65 - 99 mg/dL    BUN 14 8 - 23 mg/dL    Creatinine 0.94 0.57 - 1.00 mg/dL    Sodium 138 136 - 145 mmol/L    Potassium 4.5 3.5 - 5.2 mmol/L    Chloride 98 98 - 107 mmol/L    CO2 23.2 22.0 - 29.0 mmol/L    Calcium 8.6 8.6 - 10.5 mg/dL    Total Protein 7.3 6.0 - 8.5 g/dL    Albumin 4.4 3.5 - 5.2 g/dL    ALT (SGPT) 21 1 - 33 U/L    AST (SGOT) 26 1 - 32 U/L    Alkaline Phosphatase 84 39 - 117 U/L    Total Bilirubin 0.6 0.0 - 1.2 mg/dL    Globulin 2.9 gm/dL    A/G Ratio 1.5 g/dL    BUN/Creatinine Ratio 14.9 7.0 - 25.0    Anion Gap 16.8 (H) 5.0 - 15.0 mmol/L    eGFR 62.6  >60.0 mL/min/1.73   Protime-INR    Specimen: Blood   Result Value Ref Range    Protime 11.2 9.6 - 11.7 Seconds    INR 1.03 0.93 - 1.10   aPTT    Specimen: Blood   Result Value Ref Range    PTT 30.4 (L) 61.0 - 76.5 seconds   CBC Auto Differential    Specimen: Blood   Result Value Ref Range    WBC 9.38 3.40 - 10.80 10*3/mm3    RBC 5.69 (H) 3.77 - 5.28 10*6/mm3    Hemoglobin 15.9 12.0 - 15.9 g/dL    Hematocrit 49.5 (H) 34.0 - 46.6 %    MCV 87.0 79.0 - 97.0 fL    MCH 27.9 26.6 - 33.0 pg    MCHC 32.1 31.5 - 35.7 g/dL    RDW 13.2 12.3 - 15.4 %    RDW-SD 42.0 37.0 - 54.0 fl    MPV 11.2 6.0 - 12.0 fL    Platelets 203 140 - 450 10*3/mm3    Neutrophil % 67.5 42.7 - 76.0 %    Lymphocyte % 19.9 19.6 - 45.3 %    Monocyte % 9.1 5.0 - 12.0 %    Eosinophil % 2.6 0.3 - 6.2 %    Basophil % 0.4 0.0 - 1.5 %    Immature Grans % 0.5 0.0 - 0.5 %    Neutrophils, Absolute 6.33 1.70 - 7.00 10*3/mm3    Lymphocytes, Absolute 1.87 0.70 - 3.10 10*3/mm3    Monocytes, Absolute 0.85 0.10 - 0.90 10*3/mm3    Eosinophils, Absolute 0.24 0.00 - 0.40 10*3/mm3    Basophils, Absolute 0.04 0.00 - 0.20 10*3/mm3    Immature Grans, Absolute 0.05 0.00 - 0.05 10*3/mm3    nRBC 0.0 0.0 - 0.2 /100 WBC   Type & Screen    Specimen: Blood   Result Value Ref Range    ABO Type O     RH type Negative     Antibody Screen Negative     T&S Expiration Date 9/6/2024 11:59:59 PM    Green Top (Gel)   Result Value Ref Range    Extra Tube Hold for add-ons.    Lavender Top   Result Value Ref Range    Extra Tube hold for add-on    Gold Top - SST   Result Value Ref Range    Extra Tube Hold for add-ons.    Light Blue Top   Result Value Ref Range    Extra Tube Hold for add-ons.      XR Hip With or Without Pelvis 2 - 3 View Right    Result Date: 9/3/2024  Impression: Acute displaced, impacted, and angulated subcapital right femoral neck fracture. Electronically Signed: Harpal Herrera  9/3/2024 6:37 PM EDT  Workstation ID: [id number]    XR Femur 2 View Right    Result Date:  9/3/2024  Impression: Acute displaced, impacted, and angulated subcapital right femoral neck fracture. Electronically Signed: Harpal Herrera  9/3/2024 6:37 PM EDT  Workstation ID: [id number]   Medications   sodium chloride 0.9 % flush 10 mL (has no administration in time range)   ondansetron (ZOFRAN) injection 4 mg (4 mg Intravenous Given 9/3/24 1741)   HYDROmorphone (DILAUDID) injection 0.5 mg (0.5 mg Intravenous Given 9/3/24 1741)                                            Medical Decision Making  Decision making.  IV established monitor placement review of sinus rhythm was given Dilaudid 0.5 mg IV Zofran 4 mg IV.  X-rays obtained of the pelvis hip and femur my independent review show a femoral neck hip fracture I will see dislocation or other fracture radiology review the same labs obtained my independent review comprehensive metabolic profile unremarkable.  CBC unremarkable.  Patient is not on anticoagulants repeat exam patient is feeling better resting comfortably neurovascular motor sensor intact.  She has no other complaints.  I do not see any evidence that suggest acute head injury or spine injury or cord injury or acute dislocated hip just the fracture that I can see.  I do not see evidence suggest a knee injury or an ankle injury.  Not complete list of all possibilities.  Orthopedist is Dr. Bradford who is going to do her knee replacement.  I talked to the hospitalist nurse practitioner we discussed the case and patient will be admitted to hospital with orthopedic consultation stable otherwise unremarkable improved ER course.    Problems Addressed:  Closed fracture of neck of right femur, initial encounter: complicated acute illness or injury  Fall, initial encounter: complicated acute illness or injury    Amount and/or Complexity of Data Reviewed  Labs: ordered. Decision-making details documented in ED Course.  Radiology: ordered and independent interpretation performed. Decision-making details documented in  ED Course.    Risk  Parenteral controlled substances.  Decision regarding hospitalization.        Final diagnoses:   Fall, initial encounter   Closed fracture of neck of right femur, initial encounter       ED Disposition  ED Disposition       ED Disposition   Decision to Admit    Condition   --    Comment   Level of Care: Med/Surg [1]   Admitting Physician: ARMAND FRANKS [[id number]]   Attending Physician: ARMAND FRANKS [[id number]]                 No follow-up provider specified.       Medication List      No changes were made to your prescriptions during this visit.            Jann Mata MD  09/04/24 0157

## 2024-09-04 ENCOUNTER — APPOINTMENT (OUTPATIENT)
Dept: GENERAL RADIOLOGY | Facility: HOSPITAL | Age: 79
DRG: 522 | End: 2024-09-04
Payer: MEDICARE

## 2024-09-04 ENCOUNTER — ANESTHESIA (OUTPATIENT)
Dept: PERIOP | Facility: HOSPITAL | Age: 79
End: 2024-09-04
Payer: MEDICARE

## 2024-09-04 ENCOUNTER — ANESTHESIA EVENT (OUTPATIENT)
Dept: PERIOP | Facility: HOSPITAL | Age: 79
End: 2024-09-04
Payer: MEDICARE

## 2024-09-04 LAB
ANION GAP SERPL CALCULATED.3IONS-SCNC: 13.1 MMOL/L (ref 5–15)
BASOPHILS # BLD AUTO: 0.06 10*3/MM3 (ref 0–0.2)
BASOPHILS NFR BLD AUTO: 0.5 % (ref 0–1.5)
BUN SERPL-MCNC: 13 MG/DL (ref 8–23)
BUN/CREAT SERPL: 15.7 (ref 7–25)
CALCIUM SPEC-SCNC: 8.1 MG/DL (ref 8.6–10.5)
CHLORIDE SERPL-SCNC: 99 MMOL/L (ref 98–107)
CO2 SERPL-SCNC: 26.9 MMOL/L (ref 22–29)
CREAT SERPL-MCNC: 0.83 MG/DL (ref 0.57–1)
DEPRECATED RDW RBC AUTO: 42.5 FL (ref 37–54)
EGFRCR SERPLBLD CKD-EPI 2021: 72.7 ML/MIN/1.73
EOSINOPHIL # BLD AUTO: 0.2 10*3/MM3 (ref 0–0.4)
EOSINOPHIL NFR BLD AUTO: 1.6 % (ref 0.3–6.2)
ERYTHROCYTE [DISTWIDTH] IN BLOOD BY AUTOMATED COUNT: 13.1 % (ref 12.3–15.4)
GEN 5 2HR TROPONIN T REFLEX: 16 NG/L
GLUCOSE BLDC GLUCOMTR-MCNC: 126 MG/DL (ref 70–105)
GLUCOSE BLDC GLUCOMTR-MCNC: 136 MG/DL (ref 70–105)
GLUCOSE BLDC GLUCOMTR-MCNC: 138 MG/DL (ref 70–105)
GLUCOSE BLDC GLUCOMTR-MCNC: 180 MG/DL (ref 70–105)
GLUCOSE BLDC GLUCOMTR-MCNC: 81 MG/DL (ref 70–105)
GLUCOSE SERPL-MCNC: 150 MG/DL (ref 65–99)
HCT VFR BLD AUTO: 49.3 % (ref 34–46.6)
HGB BLD-MCNC: 16.2 G/DL (ref 12–15.9)
IMM GRANULOCYTES # BLD AUTO: 0.08 10*3/MM3 (ref 0–0.05)
IMM GRANULOCYTES NFR BLD AUTO: 0.6 % (ref 0–0.5)
LYMPHOCYTES # BLD AUTO: 1.78 10*3/MM3 (ref 0.7–3.1)
LYMPHOCYTES NFR BLD AUTO: 13.9 % (ref 19.6–45.3)
MCH RBC QN AUTO: 29 PG (ref 26.6–33)
MCHC RBC AUTO-ENTMCNC: 32.9 G/DL (ref 31.5–35.7)
MCV RBC AUTO: 88.2 FL (ref 79–97)
MONOCYTES # BLD AUTO: 1.31 10*3/MM3 (ref 0.1–0.9)
MONOCYTES NFR BLD AUTO: 10.2 % (ref 5–12)
NEUTROPHILS NFR BLD AUTO: 73.2 % (ref 42.7–76)
NEUTROPHILS NFR BLD AUTO: 9.42 10*3/MM3 (ref 1.7–7)
NRBC BLD AUTO-RTO: 0 /100 WBC (ref 0–0.2)
PLATELET # BLD AUTO: 172 10*3/MM3 (ref 140–450)
PMV BLD AUTO: 10.9 FL (ref 6–12)
POTASSIUM SERPL-SCNC: 4 MMOL/L (ref 3.5–5.2)
QT INTERVAL: 395 MS
QTC INTERVAL: 486 MS
RBC # BLD AUTO: 5.59 10*6/MM3 (ref 3.77–5.28)
SODIUM SERPL-SCNC: 139 MMOL/L (ref 136–145)
TROPONIN T DELTA: 5 NG/L
TROPONIN T SERPL HS-MCNC: 11 NG/L
WBC NRBC COR # BLD AUTO: 12.85 10*3/MM3 (ref 3.4–10.8)

## 2024-09-04 PROCEDURE — 76000 FLUOROSCOPY <1 HR PHYS/QHP: CPT

## 2024-09-04 PROCEDURE — 99222 1ST HOSP IP/OBS MODERATE 55: CPT | Performed by: INTERNAL MEDICINE

## 2024-09-04 PROCEDURE — 73060 X-RAY EXAM OF HUMERUS: CPT

## 2024-09-04 PROCEDURE — 25810000003 LACTATED RINGERS PER 1000 ML: Performed by: ANESTHESIOLOGY

## 2024-09-04 PROCEDURE — 73501 X-RAY EXAM HIP UNI 1 VIEW: CPT

## 2024-09-04 PROCEDURE — 73502 X-RAY EXAM HIP UNI 2-3 VIEWS: CPT

## 2024-09-04 PROCEDURE — 25010000002 KETOROLAC TROMETHAMINE PER 15 MG: Performed by: ORTHOPAEDIC SURGERY

## 2024-09-04 PROCEDURE — 84484 ASSAY OF TROPONIN QUANT: CPT

## 2024-09-04 PROCEDURE — 71045 X-RAY EXAM CHEST 1 VIEW: CPT

## 2024-09-04 PROCEDURE — C1776 JOINT DEVICE (IMPLANTABLE): HCPCS | Performed by: ORTHOPAEDIC SURGERY

## 2024-09-04 PROCEDURE — 25810000003 SODIUM CHLORIDE 0.9 % SOLUTION 250 ML FLEX CONT: Performed by: NURSE ANESTHETIST, CERTIFIED REGISTERED

## 2024-09-04 PROCEDURE — 25010000002 HYDROMORPHONE 1 MG/ML SOLUTION: Performed by: NURSE PRACTITIONER

## 2024-09-04 PROCEDURE — 25010000002 ROPIVACAINE PER 1 MG: Performed by: ORTHOPAEDIC SURGERY

## 2024-09-04 PROCEDURE — P9041 ALBUMIN (HUMAN),5%, 50ML: HCPCS | Performed by: NURSE ANESTHETIST, CERTIFIED REGISTERED

## 2024-09-04 PROCEDURE — 25010000002 CLONIDINE PER 1 MG: Performed by: ORTHOPAEDIC SURGERY

## 2024-09-04 PROCEDURE — 25010000002 CEFAZOLIN PER 500 MG: Performed by: ORTHOPAEDIC SURGERY

## 2024-09-04 PROCEDURE — 25010000002 SUGAMMADEX 200 MG/2ML SOLUTION: Performed by: NURSE ANESTHETIST, CERTIFIED REGISTERED

## 2024-09-04 PROCEDURE — 63710000001 INSULIN GLARGINE PER 5 UNITS: Performed by: ORTHOPAEDIC SURGERY

## 2024-09-04 PROCEDURE — 25010000002 MORPHINE PER 10 MG

## 2024-09-04 PROCEDURE — 25010000002 FENTANYL CITRATE (PF) 100 MCG/2ML SOLUTION: Performed by: NURSE ANESTHETIST, CERTIFIED REGISTERED

## 2024-09-04 PROCEDURE — 25010000002 PROPOFOL 1000 MG/100ML EMULSION: Performed by: NURSE ANESTHETIST, CERTIFIED REGISTERED

## 2024-09-04 PROCEDURE — 25010000002 ALBUMIN HUMAN 5% PER 50 ML: Performed by: NURSE ANESTHETIST, CERTIFIED REGISTERED

## 2024-09-04 PROCEDURE — 85025 COMPLETE CBC W/AUTO DIFF WBC: CPT | Performed by: NURSE PRACTITIONER

## 2024-09-04 PROCEDURE — 25010000002 ONDANSETRON PER 1 MG: Performed by: NURSE PRACTITIONER

## 2024-09-04 PROCEDURE — 25010000002 FENTANYL CITRATE (PF) 50 MCG/ML SOLUTION: Performed by: NURSE ANESTHETIST, CERTIFIED REGISTERED

## 2024-09-04 PROCEDURE — 27130 TOTAL HIP ARTHROPLASTY: CPT | Performed by: NURSE PRACTITIONER

## 2024-09-04 PROCEDURE — 93010 ELECTROCARDIOGRAM REPORT: CPT | Performed by: INTERNAL MEDICINE

## 2024-09-04 PROCEDURE — 25010000002 VANCOMYCIN 1 G RECONSTITUTED SOLUTION: Performed by: ORTHOPAEDIC SURGERY

## 2024-09-04 PROCEDURE — 93005 ELECTROCARDIOGRAM TRACING: CPT | Performed by: ORTHOPAEDIC SURGERY

## 2024-09-04 PROCEDURE — 82948 REAGENT STRIP/BLOOD GLUCOSE: CPT

## 2024-09-04 PROCEDURE — 25010000002 SUCCINYLCHOLINE PER 20 MG: Performed by: NURSE ANESTHETIST, CERTIFIED REGISTERED

## 2024-09-04 PROCEDURE — 25010000002 MAGNESIUM SULFATE PER 500 MG OF MAGNESIUM: Performed by: NURSE ANESTHETIST, CERTIFIED REGISTERED

## 2024-09-04 PROCEDURE — 25010000002 PROMETHAZINE PER 50 MG

## 2024-09-04 PROCEDURE — 73020 X-RAY EXAM OF SHOULDER: CPT

## 2024-09-04 PROCEDURE — C1713 ANCHOR/SCREW BN/BN,TIS/BN: HCPCS | Performed by: ORTHOPAEDIC SURGERY

## 2024-09-04 PROCEDURE — 80048 BASIC METABOLIC PNL TOTAL CA: CPT | Performed by: NURSE PRACTITIONER

## 2024-09-04 PROCEDURE — 25010000002 EPINEPHRINE 1 MG/ML SOLUTION: Performed by: ORTHOPAEDIC SURGERY

## 2024-09-04 PROCEDURE — 25010000002 PHENYLEPHRINE 10 MG/ML SOLUTION 5 ML VIAL: Performed by: NURSE ANESTHETIST, CERTIFIED REGISTERED

## 2024-09-04 PROCEDURE — 25810000003 SODIUM CHLORIDE 0.9 % SOLUTION: Performed by: ANESTHESIOLOGY

## 2024-09-04 PROCEDURE — 0SR9069 REPLACEMENT OF RIGHT HIP JOINT WITH OXIDIZED ZIRCONIUM ON POLYETHYLENE SYNTHETIC SUBSTITUTE, CEMENTED, OPEN APPROACH: ICD-10-PCS | Performed by: ORTHOPAEDIC SURGERY

## 2024-09-04 DEVICE — POLARSTEM STANDARD CEMENTED 1
Type: IMPLANTABLE DEVICE | Site: HIP | Status: FUNCTIONAL
Brand: POLARSTEM

## 2024-09-04 DEVICE — IMPLANTABLE DEVICE: Type: IMPLANTABLE DEVICE | Site: HIP | Status: FUNCTIONAL

## 2024-09-04 DEVICE — OXINIUM FEMORAL HEAD 12/14 TAPER                                    28 MM +0
Type: IMPLANTABLE DEVICE | Site: HIP | Status: FUNCTIONAL
Brand: OXINIUM

## 2024-09-04 DEVICE — R3 3 HOLE ACETABULAR SHELL 56MM
Type: IMPLANTABLE DEVICE | Site: HIP | Status: FUNCTIONAL
Brand: R3 ACETABULAR

## 2024-09-04 DEVICE — CMT BONE PALACOS R HI/VISC 1X40: Type: IMPLANTABLE DEVICE | Site: HIP | Status: FUNCTIONAL

## 2024-09-04 DEVICE — DEV CONTRL TISS STRATAFIX SPIRAL MNCRYL UD 3/0 PLS 30CM: Type: IMPLANTABLE DEVICE | Site: HIP | Status: FUNCTIONAL

## 2024-09-04 DEVICE — OR3O DUAL MOBILITY XLPE INSERT 28/44
Type: IMPLANTABLE DEVICE | Site: HIP | Status: FUNCTIONAL
Brand: OR3O DUAL MOBILITY

## 2024-09-04 DEVICE — BONE PREPARATION KIT
Type: IMPLANTABLE DEVICE | Site: HIP | Status: FUNCTIONAL
Brand: BIOPREP

## 2024-09-04 DEVICE — OR3O DUAL MOBILITY LINER 44/56
Type: IMPLANTABLE DEVICE | Site: HIP | Status: FUNCTIONAL
Brand: OR3O DUAL MOBILITY

## 2024-09-04 RX ORDER — CEFAZOLIN SODIUM 1 G/3ML
INJECTION, POWDER, FOR SOLUTION INTRAMUSCULAR; INTRAVENOUS AS NEEDED
Status: DISCONTINUED | OUTPATIENT
Start: 2024-09-04 | End: 2024-09-04 | Stop reason: HOSPADM

## 2024-09-04 RX ORDER — LIDOCAINE HYDROCHLORIDE 10 MG/ML
INJECTION, SOLUTION EPIDURAL; INFILTRATION; INTRACAUDAL; PERINEURAL AS NEEDED
Status: DISCONTINUED | OUTPATIENT
Start: 2024-09-04 | End: 2024-09-04 | Stop reason: SURG

## 2024-09-04 RX ORDER — ROCURONIUM BROMIDE 10 MG/ML
INJECTION, SOLUTION INTRAVENOUS AS NEEDED
Status: DISCONTINUED | OUTPATIENT
Start: 2024-09-04 | End: 2024-09-04 | Stop reason: SURG

## 2024-09-04 RX ORDER — GABAPENTIN 300 MG/1
300 CAPSULE ORAL ONCE
Status: COMPLETED | OUTPATIENT
Start: 2024-09-04 | End: 2024-09-04

## 2024-09-04 RX ORDER — SUCCINYLCHOLINE CHLORIDE 20 MG/ML
INJECTION INTRAMUSCULAR; INTRAVENOUS AS NEEDED
Status: DISCONTINUED | OUTPATIENT
Start: 2024-09-04 | End: 2024-09-04 | Stop reason: SURG

## 2024-09-04 RX ORDER — TRANEXAMIC ACID 10 MG/ML
1000 INJECTION, SOLUTION INTRAVENOUS ONCE
Status: DISCONTINUED | OUTPATIENT
Start: 2024-09-04 | End: 2024-09-04 | Stop reason: HOSPADM

## 2024-09-04 RX ORDER — IPRATROPIUM BROMIDE AND ALBUTEROL SULFATE 2.5; .5 MG/3ML; MG/3ML
3 SOLUTION RESPIRATORY (INHALATION) ONCE AS NEEDED
Status: DISCONTINUED | OUTPATIENT
Start: 2024-09-04 | End: 2024-09-04 | Stop reason: HOSPADM

## 2024-09-04 RX ORDER — ASPIRIN 81 MG/1
81 TABLET, CHEWABLE ORAL 2 TIMES DAILY
Status: DISCONTINUED | OUTPATIENT
Start: 2024-09-04 | End: 2024-09-05 | Stop reason: HOSPADM

## 2024-09-04 RX ORDER — ALBUMIN, HUMAN INJ 5% 5 %
SOLUTION INTRAVENOUS CONTINUOUS PRN
Status: DISCONTINUED | OUTPATIENT
Start: 2024-09-04 | End: 2024-09-04 | Stop reason: SURG

## 2024-09-04 RX ORDER — ERGOCALCIFEROL 1.25 MG/1
50000 CAPSULE, LIQUID FILLED ORAL WEEKLY
Status: DISCONTINUED | OUTPATIENT
Start: 2024-09-09 | End: 2024-09-05 | Stop reason: HOSPADM

## 2024-09-04 RX ORDER — LISINOPRIL 20 MG/1
20 TABLET ORAL
Status: DISCONTINUED | OUTPATIENT
Start: 2024-09-04 | End: 2024-09-05 | Stop reason: HOSPADM

## 2024-09-04 RX ORDER — AMLODIPINE BESYLATE 5 MG/1
5 TABLET ORAL
Status: DISCONTINUED | OUTPATIENT
Start: 2024-09-04 | End: 2024-09-05 | Stop reason: HOSPADM

## 2024-09-04 RX ORDER — PROPOFOL 10 MG/ML
INJECTION, EMULSION INTRAVENOUS AS NEEDED
Status: DISCONTINUED | OUTPATIENT
Start: 2024-09-04 | End: 2024-09-04 | Stop reason: SURG

## 2024-09-04 RX ORDER — PANTOPRAZOLE SODIUM 40 MG/1
40 TABLET, DELAYED RELEASE ORAL
Status: DISCONTINUED | OUTPATIENT
Start: 2024-09-04 | End: 2024-09-05 | Stop reason: HOSPADM

## 2024-09-04 RX ORDER — MAGNESIUM SULFATE HEPTAHYDRATE 500 MG/ML
INJECTION, SOLUTION INTRAMUSCULAR; INTRAVENOUS AS NEEDED
Status: DISCONTINUED | OUTPATIENT
Start: 2024-09-04 | End: 2024-09-04 | Stop reason: SURG

## 2024-09-04 RX ORDER — TRANEXAMIC ACID 100 MG/ML
INJECTION, SOLUTION INTRAVENOUS AS NEEDED
Status: DISCONTINUED | OUTPATIENT
Start: 2024-09-04 | End: 2024-09-04 | Stop reason: HOSPADM

## 2024-09-04 RX ORDER — INSULIN LISPRO 100 [IU]/ML
2-7 INJECTION, SOLUTION INTRAVENOUS; SUBCUTANEOUS
Status: DISCONTINUED | OUTPATIENT
Start: 2024-09-05 | End: 2024-09-05 | Stop reason: HOSPADM

## 2024-09-04 RX ORDER — VANCOMYCIN HYDROCHLORIDE 1 G/20ML
INJECTION, POWDER, LYOPHILIZED, FOR SOLUTION INTRAVENOUS AS NEEDED
Status: DISCONTINUED | OUTPATIENT
Start: 2024-09-04 | End: 2024-09-04 | Stop reason: HOSPADM

## 2024-09-04 RX ORDER — METOCLOPRAMIDE HYDROCHLORIDE 5 MG/ML
10 INJECTION INTRAMUSCULAR; INTRAVENOUS EVERY 6 HOURS PRN
Status: DISCONTINUED | OUTPATIENT
Start: 2024-09-04 | End: 2024-09-05 | Stop reason: HOSPADM

## 2024-09-04 RX ORDER — CALCITRIOL 0.25 UG/1
0.25 CAPSULE, LIQUID FILLED ORAL 2 TIMES DAILY
Status: DISCONTINUED | OUTPATIENT
Start: 2024-09-04 | End: 2024-09-05 | Stop reason: HOSPADM

## 2024-09-04 RX ORDER — ACETAMINOPHEN 500 MG
1000 TABLET ORAL ONCE
Status: COMPLETED | OUTPATIENT
Start: 2024-09-04 | End: 2024-09-04

## 2024-09-04 RX ORDER — CELECOXIB 200 MG/1
200 CAPSULE ORAL ONCE
Status: COMPLETED | OUTPATIENT
Start: 2024-09-04 | End: 2024-09-04

## 2024-09-04 RX ORDER — METOPROLOL TARTRATE 50 MG
100 TABLET ORAL EVERY 12 HOURS SCHEDULED
Status: DISCONTINUED | OUTPATIENT
Start: 2024-09-04 | End: 2024-09-05 | Stop reason: HOSPADM

## 2024-09-04 RX ORDER — HYDRALAZINE HYDROCHLORIDE 20 MG/ML
5 INJECTION INTRAMUSCULAR; INTRAVENOUS
Status: DISCONTINUED | OUTPATIENT
Start: 2024-09-04 | End: 2024-09-04 | Stop reason: HOSPADM

## 2024-09-04 RX ORDER — METOCLOPRAMIDE HYDROCHLORIDE 5 MG/ML
10 INJECTION INTRAMUSCULAR; INTRAVENOUS EVERY 6 HOURS
Status: DISCONTINUED | OUTPATIENT
Start: 2024-09-04 | End: 2024-09-04

## 2024-09-04 RX ORDER — MAGNESIUM HYDROXIDE 1200 MG/15ML
LIQUID ORAL AS NEEDED
Status: DISCONTINUED | OUTPATIENT
Start: 2024-09-04 | End: 2024-09-04 | Stop reason: HOSPADM

## 2024-09-04 RX ORDER — LABETALOL HYDROCHLORIDE 5 MG/ML
5 INJECTION, SOLUTION INTRAVENOUS
Status: DISCONTINUED | OUTPATIENT
Start: 2024-09-04 | End: 2024-09-04 | Stop reason: HOSPADM

## 2024-09-04 RX ORDER — SODIUM CHLORIDE 0.9 % (FLUSH) 0.9 %
10 SYRINGE (ML) INJECTION EVERY 12 HOURS SCHEDULED
Status: DISCONTINUED | OUTPATIENT
Start: 2024-09-04 | End: 2024-09-04 | Stop reason: HOSPADM

## 2024-09-04 RX ORDER — TRANEXAMIC ACID 100 MG/ML
INJECTION, SOLUTION INTRAVENOUS AS NEEDED
Status: DISCONTINUED | OUTPATIENT
Start: 2024-09-04 | End: 2024-09-04 | Stop reason: SURG

## 2024-09-04 RX ORDER — FENTANYL CITRATE 50 UG/ML
50 INJECTION, SOLUTION INTRAMUSCULAR; INTRAVENOUS ONCE
Status: DISCONTINUED | OUTPATIENT
Start: 2024-09-04 | End: 2024-09-04 | Stop reason: HOSPADM

## 2024-09-04 RX ORDER — SODIUM CHLORIDE 0.9 % (FLUSH) 0.9 %
10 SYRINGE (ML) INJECTION AS NEEDED
Status: DISCONTINUED | OUTPATIENT
Start: 2024-09-04 | End: 2024-09-04 | Stop reason: HOSPADM

## 2024-09-04 RX ORDER — EPHEDRINE SULFATE 5 MG/ML
5 INJECTION INTRAVENOUS ONCE AS NEEDED
Status: DISCONTINUED | OUTPATIENT
Start: 2024-09-04 | End: 2024-09-04 | Stop reason: HOSPADM

## 2024-09-04 RX ORDER — CLORAZEPATE DIPOTASSIUM 3.75 MG/1
7.5 TABLET ORAL 2 TIMES DAILY
Status: DISCONTINUED | OUTPATIENT
Start: 2024-09-04 | End: 2024-09-05 | Stop reason: HOSPADM

## 2024-09-04 RX ORDER — ONDANSETRON 2 MG/ML
4 INJECTION INTRAMUSCULAR; INTRAVENOUS ONCE AS NEEDED
Status: DISCONTINUED | OUTPATIENT
Start: 2024-09-04 | End: 2024-09-04 | Stop reason: HOSPADM

## 2024-09-04 RX ORDER — SODIUM CHLORIDE 9 MG/ML
40 INJECTION, SOLUTION INTRAVENOUS AS NEEDED
Status: DISCONTINUED | OUTPATIENT
Start: 2024-09-04 | End: 2024-09-04 | Stop reason: HOSPADM

## 2024-09-04 RX ORDER — NALOXONE HCL 0.4 MG/ML
0.4 VIAL (ML) INJECTION AS NEEDED
Status: DISCONTINUED | OUTPATIENT
Start: 2024-09-04 | End: 2024-09-04 | Stop reason: HOSPADM

## 2024-09-04 RX ORDER — PREDNISOLONE ACETATE 10 MG/ML
1 SUSPENSION/ DROPS OPHTHALMIC EVERY 8 HOURS SCHEDULED
Status: DISCONTINUED | OUTPATIENT
Start: 2024-09-04 | End: 2024-09-05 | Stop reason: HOSPADM

## 2024-09-04 RX ORDER — DIPHENHYDRAMINE HYDROCHLORIDE 50 MG/ML
12.5 INJECTION INTRAMUSCULAR; INTRAVENOUS
Status: DISCONTINUED | OUTPATIENT
Start: 2024-09-04 | End: 2024-09-04 | Stop reason: HOSPADM

## 2024-09-04 RX ORDER — LEVOTHYROXINE SODIUM 125 UG/1
125 TABLET ORAL
Status: DISCONTINUED | OUTPATIENT
Start: 2024-09-04 | End: 2024-09-05 | Stop reason: HOSPADM

## 2024-09-04 RX ORDER — INSULIN GLARGINE 100 [IU]/ML
40 INJECTION, SOLUTION SUBCUTANEOUS NIGHTLY
Status: DISCONTINUED | OUTPATIENT
Start: 2024-09-04 | End: 2024-09-05 | Stop reason: HOSPADM

## 2024-09-04 RX ORDER — ESCITALOPRAM OXALATE 10 MG/1
10 TABLET ORAL NIGHTLY
Status: DISCONTINUED | OUTPATIENT
Start: 2024-09-04 | End: 2024-09-05 | Stop reason: HOSPADM

## 2024-09-04 RX ORDER — SODIUM CHLORIDE, SODIUM LACTATE, POTASSIUM CHLORIDE, CALCIUM CHLORIDE 600; 310; 30; 20 MG/100ML; MG/100ML; MG/100ML; MG/100ML
9 INJECTION, SOLUTION INTRAVENOUS CONTINUOUS PRN
Status: DISCONTINUED | OUTPATIENT
Start: 2024-09-04 | End: 2024-09-05 | Stop reason: HOSPADM

## 2024-09-04 RX ORDER — FENTANYL CITRATE 50 UG/ML
50 INJECTION, SOLUTION INTRAMUSCULAR; INTRAVENOUS
Status: DISCONTINUED | OUTPATIENT
Start: 2024-09-04 | End: 2024-09-04 | Stop reason: HOSPADM

## 2024-09-04 RX ORDER — MORPHINE SULFATE 2 MG/ML
1 INJECTION, SOLUTION INTRAMUSCULAR; INTRAVENOUS EVERY 4 HOURS PRN
Status: DISCONTINUED | OUTPATIENT
Start: 2024-09-04 | End: 2024-09-05 | Stop reason: HOSPADM

## 2024-09-04 RX ORDER — PRAMIPEXOLE DIHYDROCHLORIDE 1 MG/1
1 TABLET ORAL 2 TIMES DAILY
Status: DISCONTINUED | OUTPATIENT
Start: 2024-09-04 | End: 2024-09-05 | Stop reason: HOSPADM

## 2024-09-04 RX ORDER — DIPHENHYDRAMINE HYDROCHLORIDE 50 MG/ML
12.5 INJECTION INTRAMUSCULAR; INTRAVENOUS ONCE AS NEEDED
Status: DISCONTINUED | OUTPATIENT
Start: 2024-09-04 | End: 2024-09-04 | Stop reason: HOSPADM

## 2024-09-04 RX ORDER — MORPHINE SULFATE 2 MG/ML
2 INJECTION, SOLUTION INTRAMUSCULAR; INTRAVENOUS EVERY 4 HOURS PRN
Status: DISCONTINUED | OUTPATIENT
Start: 2024-09-04 | End: 2024-09-05 | Stop reason: HOSPADM

## 2024-09-04 RX ORDER — FENTANYL CITRATE 50 UG/ML
INJECTION, SOLUTION INTRAMUSCULAR; INTRAVENOUS AS NEEDED
Status: DISCONTINUED | OUTPATIENT
Start: 2024-09-04 | End: 2024-09-04 | Stop reason: SURG

## 2024-09-04 RX ORDER — ROSUVASTATIN CALCIUM 5 MG/1
5 TABLET, COATED ORAL DAILY
Status: DISCONTINUED | OUTPATIENT
Start: 2024-09-04 | End: 2024-09-05 | Stop reason: HOSPADM

## 2024-09-04 RX ADMIN — TRANEXAMIC ACID 1000 MG: 100 INJECTION, SOLUTION INTRAVENOUS at 17:50

## 2024-09-04 RX ADMIN — MAGNESIUM SULFATE HEPTAHYDRATE 1 G: 500 INJECTION, SOLUTION INTRAMUSCULAR; INTRAVENOUS at 18:10

## 2024-09-04 RX ADMIN — SODIUM CHLORIDE 2000 MG: 900 INJECTION INTRAVENOUS at 17:36

## 2024-09-04 RX ADMIN — FENTANYL CITRATE 50 MCG: 50 INJECTION, SOLUTION INTRAMUSCULAR; INTRAVENOUS at 18:05

## 2024-09-04 RX ADMIN — PROPOFOL INJECTABLE EMULSION 100 MCG/KG/MIN: 10 INJECTION, EMULSION INTRAVENOUS at 17:55

## 2024-09-04 RX ADMIN — FENTANYL CITRATE 25 MCG: 50 INJECTION, SOLUTION INTRAMUSCULAR; INTRAVENOUS at 19:09

## 2024-09-04 RX ADMIN — INSULIN GLARGINE 40 UNITS: 100 INJECTION, SOLUTION SUBCUTANEOUS at 23:31

## 2024-09-04 RX ADMIN — Medication 10 ML: at 08:33

## 2024-09-04 RX ADMIN — ONDANSETRON 4 MG: 2 INJECTION INTRAMUSCULAR; INTRAVENOUS at 08:09

## 2024-09-04 RX ADMIN — ROCURONIUM BROMIDE 10 MG: 10 INJECTION, SOLUTION INTRAVENOUS at 17:46

## 2024-09-04 RX ADMIN — ROCURONIUM BROMIDE 40 MG: 10 INJECTION, SOLUTION INTRAVENOUS at 18:00

## 2024-09-04 RX ADMIN — SUGAMMADEX 200 MG: 100 INJECTION, SOLUTION INTRAVENOUS at 19:01

## 2024-09-04 RX ADMIN — PREDNISOLONE ACETATE 1 DROP: 10 SUSPENSION/ DROPS OPHTHALMIC at 08:30

## 2024-09-04 RX ADMIN — SODIUM CHLORIDE 500 ML: 9 INJECTION, SOLUTION INTRAVENOUS at 20:57

## 2024-09-04 RX ADMIN — CELECOXIB 200 MG: 200 CAPSULE ORAL at 17:35

## 2024-09-04 RX ADMIN — FENTANYL CITRATE 25 MCG: 50 INJECTION, SOLUTION INTRAMUSCULAR; INTRAVENOUS at 18:57

## 2024-09-04 RX ADMIN — PHENYLEPHRINE HYDROCHLORIDE 0.3 MCG/KG/MIN: 10 INJECTION INTRAVENOUS at 17:50

## 2024-09-04 RX ADMIN — FENTANYL CITRATE 25 MCG: 50 INJECTION, SOLUTION INTRAMUSCULAR; INTRAVENOUS at 17:40

## 2024-09-04 RX ADMIN — PREDNISOLONE ACETATE 1 DROP: 10 SUSPENSION/ DROPS OPHTHALMIC at 13:26

## 2024-09-04 RX ADMIN — HYDROMORPHONE HYDROCHLORIDE 1 MG: 1 INJECTION, SOLUTION INTRAMUSCULAR; INTRAVENOUS; SUBCUTANEOUS at 05:34

## 2024-09-04 RX ADMIN — FENTANYL CITRATE 50 MCG: 50 INJECTION, SOLUTION INTRAMUSCULAR; INTRAVENOUS at 19:56

## 2024-09-04 RX ADMIN — SUCCINYLCHOLINE CHLORIDE 100 MG: 20 INJECTION, SOLUTION INTRAMUSCULAR; INTRAVENOUS at 17:46

## 2024-09-04 RX ADMIN — MORPHINE SULFATE 2 MG: 2 INJECTION, SOLUTION INTRAMUSCULAR; INTRAVENOUS at 14:29

## 2024-09-04 RX ADMIN — PROMETHAZINE HYDROCHLORIDE 12.5 MG: 25 INJECTION INTRAMUSCULAR; INTRAVENOUS at 10:40

## 2024-09-04 RX ADMIN — LIDOCAINE HYDROCHLORIDE 50 MG: 10 INJECTION, SOLUTION EPIDURAL; INFILTRATION; INTRACAUDAL; PERINEURAL at 17:46

## 2024-09-04 RX ADMIN — MORPHINE SULFATE 2 MG: 2 INJECTION, SOLUTION INTRAMUSCULAR; INTRAVENOUS at 10:39

## 2024-09-04 RX ADMIN — GABAPENTIN 300 MG: 300 CAPSULE ORAL at 17:35

## 2024-09-04 RX ADMIN — FENTANYL CITRATE 25 MCG: 50 INJECTION, SOLUTION INTRAMUSCULAR; INTRAVENOUS at 19:01

## 2024-09-04 RX ADMIN — HYDROMORPHONE HYDROCHLORIDE 1 MG: 1 INJECTION, SOLUTION INTRAMUSCULAR; INTRAVENOUS; SUBCUTANEOUS at 01:24

## 2024-09-04 RX ADMIN — FENTANYL CITRATE 25 MCG: 50 INJECTION, SOLUTION INTRAMUSCULAR; INTRAVENOUS at 17:46

## 2024-09-04 RX ADMIN — SODIUM CHLORIDE, SODIUM LACTATE, POTASSIUM CHLORIDE, AND CALCIUM CHLORIDE: .6; .31; .03; .02 INJECTION, SOLUTION INTRAVENOUS at 17:40

## 2024-09-04 RX ADMIN — PROPOFOL INJECTABLE EMULSION 100 MG: 10 INJECTION, EMULSION INTRAVENOUS at 17:46

## 2024-09-04 RX ADMIN — ALBUMIN (HUMAN): 12.5 INJECTION, SOLUTION INTRAVENOUS at 18:00

## 2024-09-04 RX ADMIN — ACETAMINOPHEN 1000 MG: 500 TABLET, FILM COATED ORAL at 17:35

## 2024-09-04 RX ADMIN — FENTANYL CITRATE 25 MCG: 50 INJECTION, SOLUTION INTRAMUSCULAR; INTRAVENOUS at 19:05

## 2024-09-04 NOTE — CONSULTS
CARDIOLOGY CONSULT:    Marilyn J Pumphrey  1945  female  [id number]      Referring Provider: Hospitalist  Reason for Consultation: Status post hip fracture    Patient Care Team:  Suma Campbell APRN as PCP - General  Suma Campbell APRN as PCP - Family Medicine  Heath Musa MD as Consulting Physician (Cardiology)    Chief complaint status post fall and hip fracture    Subjective .     History of present illness:  Marilyn J Pumphrey is a 78 y.o. female with history of diabetes hypertension sleep apnea and hyperlipidemia presented to the hospital after a fall.  Patient did not have any symptoms of chest pain or shortness of breath.  No complaints of any palpitations dizziness syncope or swelling of the feet.  Patient was then found to have right femur fracture and needs surgery and needs preop evaluation.  Patient has been taking her medicines daily lipid    Review of Systems   Constitutional: Negative for fever and malaise/fatigue.   HENT:  Negative for ear pain and nosebleeds.    Eyes:  Negative for blurred vision and double vision.   Cardiovascular:  Negative for chest pain, dyspnea on exertion and palpitations.   Respiratory:  Negative for cough and shortness of breath.    Skin:  Negative for rash.   Musculoskeletal:  Positive for falls. Negative for joint pain.   Gastrointestinal:  Negative for abdominal pain, nausea and vomiting.   Neurological:  Negative for focal weakness and headaches.   Psychiatric/Behavioral:  Negative for depression. The patient is not nervous/anxious.    All other systems reviewed and are negative.      History  Past Medical History:   Diagnosis Date    Anemia, unspecified     Anxiety     Arthritis     Bone pain     Cancer 09/2016    THYROID    Depression     Disease of thyroid gland     DM type 2 (diabetes mellitus, type 2)     TYPE 2    Fall     2/2021 FELL ON ICE    GERD (gastroesophageal reflux disease)     Hypertension     Irregular heart beat     Knee pain      Leaky heart valve     11/2020    Leg cramps     Low back pain     Low iron     getting iron infusions 5/10/23    Numbness around mouth     5/12/2022-- was inpatient at [facility]-- ct ran and MRI-- no stroke    Osteoarthritis     Polypharmacy 04/20/2023    PONV (postoperative nausea and vomiting)     DOES WELL WITH PROPOFOL    Sleep apnea     C-PAP IN USE    UTI (urinary tract infection)        Past Surgical History:   Procedure Laterality Date    CARPAL TUNNEL RELEASE      CHOLECYSTECTOMY  1974    HYSTERECTOMY  1993    partial abdominal hysterectomy    OOPHORECTOMY Bilateral 2015    THYROID SURGERY      2 thyroid surgery    TONSILLECTOMY      TOTAL KNEE ARTHROPLASTY Left 10/14/2019    Procedure: TOTAL KNEE ARTHROPLASTY;  Surgeon: Sanjay Collado MD;  Location: [facility] MAIN OR;  Service: Orthopedics    VEIN LIGATION AND STRIPPING BILATERAL         Family History   Problem Relation Age of Onset    Diabetes Father     Heart disease Father     Hypertension Father     Hyperlipidemia Father     Thyroid disease Father     Cancer Sister         lung cancer    Thyroid disease Sister     Diabetes Brother     Heart disease Brother     Cancer Brother         lung cancer       Social History     Tobacco Use    Smoking status: Never    Smokeless tobacco: Never   Vaping Use    Vaping status: Never Used   Substance Use Topics    Alcohol use: No    Drug use: No        Medications Prior to Admission   Medication Sig Dispense Refill Last Dose    amLODIPine-benazepril (LOTREL 5-20) 5-20 MG per capsule Take 1 capsule by mouth Daily.       aspirin 81 MG tablet Take 1 tablet by mouth Daily. (Patient taking differently: Take 325 mg by mouth Daily.) 30 tablet 0     calcitriol (ROCALTROL) 0.25 MCG capsule Take 1 capsule by mouth 2 (Two) Times a Day. 60 capsule 3     CALCIUM CARBONATE-VIT D-MIN PO Take 1,200 mg by mouth 2 (Two) Times a Day.       clorazepate (TRANXENE) 7.5 MG tablet Take 1 tablet by mouth 2 (Two) Times a Day.       diclofenac  (VOLTAREN) 1 % gel gel Apply 4 g topically to the appropriate area as directed 4 (Four) Times a Day As Needed.       Diclofenac Sodium (VOLTAREN) 1 % gel gel Apply 4 g topically to the appropriate area as directed.       empagliflozin (Jardiance) 25 MG tablet tablet Take 1 tablet by mouth Daily. 90 tablet 3     escitalopram (LEXAPRO) 10 MG tablet Take 1 tablet by mouth Every Evening.       esomeprazole (nexIUM) 40 MG capsule Take 1 capsule by mouth 2 (Two) Times a Day.       insulin aspart (NOVOLOG FLEXPEN) 100 UNIT/ML solution pen-injector sc pen Inject 10 units before each meal (Patient taking differently: 10 Units. Inject 10 units before each meal) 5 pen 4     insulin glargine (LANTUS, SEMGLEE) 100 UNIT/ML injection Inject 30 Units under the skin into the appropriate area as directed Daily. (Patient taking differently: Inject 40 Units under the skin into the appropriate area as directed Every Night.) 30 mL 6     levothyroxine (SYNTHROID, LEVOTHROID) 112 MCG tablet Take 1 tablet p.o. daily (Patient taking differently: Take 125 mcg by mouth Every Morning. Take 1 tablet p.o. daily) 90 tablet 4     Lotemax 0.5 % gel ophthalmic gel INSTILL ONE DROP DAILY INTO THE RIGHT EYE AND ONE DROP THREE TIMES A DAY INTO THE LEFT EYE       magnesium oxide (MAG-OX) 400 tablet tablet Take 1 tablet by mouth 2 (Two) Times a Day.       metFORMIN (Glucophage) 1000 MG tablet Take 1 tablet by mouth 2 (Two) Times a Day With Meals. 180 tablet 2     metoprolol tartrate (LOPRESSOR) 100 MG tablet Take 1 tablet by mouth 2 (Two) Times a Day.       [START ON 11/5/2024] oxyCODONE-acetaminophen (PERCOCET) 7.5-325 MG per tablet Take 1 tablet by mouth 3 (Three) Times a Day As Needed for Severe Pain. DNF before 11/5/2024 90 tablet 0     pramipexole (MIRAPEX) 1 MG tablet Take 1 tablet by mouth 2 (Two) Times a Day.       promethazine (PHENERGAN) 25 MG tablet Take 1 tablet by mouth As Needed.  1     promethazine (PHENERGAN) 25 MG tablet Every 4 (Four)  Hours.       rosuvastatin (Crestor) 5 MG tablet Take 1 tablet by mouth Daily. 90 tablet 3     vitamin D (ERGOCALCIFEROL) 1.25 MG (27776 UT) capsule capsule Take 1 capsule by mouth 1 (One) Time Per Week.          Allergies: Codeine    Scheduled Meds:amLODIPine, 5 mg, Oral, Q24H   And  lisinopril, 20 mg, Oral, Q24H  [Transfer Hold] calcitriol, 0.25 mcg, Oral, BID  [Transfer Hold] clorazepate, 7.5 mg, Oral, BID  [Transfer Hold] empagliflozin, 25 mg, Oral, Daily  [Transfer Hold] escitalopram, 10 mg, Oral, Nightly  fentaNYL citrate (PF), 50 mcg, Intravenous, Once  [Transfer Hold] insulin glargine, 40 Units, Subcutaneous, Nightly  [Transfer Hold] insulin lispro, 2-7 Units, Subcutaneous, Q6H  [Transfer Hold] levothyroxine, 125 mcg, Oral, Q AM  [Transfer Hold] magnesium oxide, 400 mg, Oral, BID  metoprolol tartrate, 100 mg, Oral, Q12H  [Transfer Hold] pantoprazole, 40 mg, Oral, Q AM  [Transfer Hold] pramipexole, 1 mg, Oral, BID  [Transfer Hold] prednisoLONE acetate, 1 drop, Right Eye, Q8H  [Transfer Hold] rosuvastatin, 5 mg, Oral, Daily  [Transfer Hold] sodium chloride, 10 mL, Intravenous, Q12H  sodium chloride, 10 mL, Intravenous, Q12H  tranexamic acid, 1,000 mg, Intravenous, Once  tranexamic acid, 1,000 mg, Intravenous, Once  [Transfer Hold] vitamin D, 50,000 Units, Oral, Weekly      Continuous Infusions:lactated ringers, 9 mL/hr, Last Rate: 100 mL/hr (09/04/24 1740)      PRN Meds:.  [Transfer Hold] acetaminophen    [Transfer Hold] aluminum-magnesium hydroxide-simethicone    [Transfer Hold] senna-docusate sodium **AND** [Transfer Hold] polyethylene glycol **AND** [Transfer Hold] bisacodyl **AND** [Transfer Hold] bisacodyl    [Transfer Hold] Calcium Replacement - Follow Nurse / BPA Driven Protocol    [Transfer Hold] dextrose    [Transfer Hold] dextrose    [Transfer Hold] glucagon (human recombinant)    lactated ringers    [Transfer Hold] Magnesium Standard Dose Replacement - Follow Nurse / BPA Driven Protocol    [Transfer  "Hold] melatonin    [Transfer Hold] metoclopramide    [Transfer Hold] Morphine    [Transfer Hold] Morphine    [Transfer Hold] ondansetron ODT **OR** [Transfer Hold] ondansetron    [Transfer Hold] oxyCODONE    [Transfer Hold] Phosphorus Replacement - Follow Nurse / BPA Driven Protocol    Potassium Replacement - Follow Nurse / BPA Driven Protocol    [Transfer Hold] promethazine    [Transfer Hold] promethazine    [COMPLETED] Insert Peripheral IV **AND** [Transfer Hold] sodium chloride    [Transfer Hold] sodium chloride    sodium chloride    [Transfer Hold] sodium chloride    sodium chloride    sodium chloride 500 mL with povidone-iodine 118 mL irrigation    Objective     VITAL SIGNS  Vitals:    09/04/24 1050 09/04/24 1136 09/04/24 1300 09/04/24 1500   BP:  156/86     BP Location:  Right arm     Patient Position:  Lying     Pulse:   86 92   Resp:  18     Temp: 99.2 °F (37.3 °C) 98.3 °F (36.8 °C)     TempSrc: Oral Oral     SpO2:  95% 94% 96%   Weight:       Height:           Flowsheet Rows      Flowsheet Row First Filed Value   Admission Height 170.2 cm (67\") Documented at 09/03/2024 1624   Admission Weight 77.1 kg (170 lb) Documented at 09/03/2024 1624             TELEMETRY: Sinus rhythm    Physical Exam:  Constitutional:       Appearance: Well-developed.   Eyes:      General: No scleral icterus.     Conjunctiva/sclera: Conjunctivae normal.      Pupils: Pupils are equal, round, and reactive to light.   HENT:      Head: Normocephalic and atraumatic.   Neck:      Vascular: No carotid bruit or JVD.   Pulmonary:      Effort: Pulmonary effort is normal.      Breath sounds: Normal breath sounds. No wheezing. No rales.   Cardiovascular:      Normal rate. Regular rhythm.   Pulses:     Intact distal pulses.   Abdominal:      General: Bowel sounds are normal.      Palpations: Abdomen is soft.   Musculoskeletal: Normal range of motion.      Cervical back: Normal range of motion and neck supple. Skin:     General: Skin is warm and " dry.      Findings: No rash.   Neurological:      Mental Status: Alert.      Comments: No focal deficits          Results Review:   I reviewed the patient's new clinical results.  Lab Results (last 24 hours)       Procedure Component Value Units Date/Time    POC Glucose Once [[id number]]  (Normal) Collected: 09/04/24 1715    Specimen: Blood Updated: 09/04/24 1753     Glucose 81 mg/dL      Comment: Serial Number: [id number]:  [id number]       High Sensitivity Troponin T 2Hr [[id number]]  (Abnormal) Collected: 09/04/24 1325    Specimen: Blood Updated: 09/04/24 1407     HS Troponin T 16 ng/L      Troponin T Delta 5 ng/L     Narrative:      High Sensitive Troponin T Reference Range:  <14.0 ng/L- Negative Female for AMI  <22.0 ng/L- Negative Male for AMI  >=14 - Abnormal Female indicating possible myocardial injury.  >=22 - Abnormal Male indicating possible myocardial injury.   Clinicians would have to utilize clinical acumen, EKG, Troponin, and serial changes to determine if it is an Acute Myocardial Infarction or myocardial injury due to an underlying chronic condition.         POC Glucose Once [[id number]]  (Abnormal) Collected: 09/04/24 1139    Specimen: Blood Updated: 09/04/24 1142     Glucose 138 mg/dL      Comment: Serial Number: [id number]:  [id number]       High Sensitivity Troponin T [[id number]]  (Normal) Collected: 09/04/24 1032    Specimen: Blood from Arm, Right Updated: 09/04/24 1106     HS Troponin T 11 ng/L     Narrative:      High Sensitive Troponin T Reference Range:  <14.0 ng/L- Negative Female for AMI  <22.0 ng/L- Negative Male for AMI  >=14 - Abnormal Female indicating possible myocardial injury.  >=22 - Abnormal Male indicating possible myocardial injury.   Clinicians would have to utilize clinical acumen, EKG, Troponin, and serial changes to determine if it is an Acute Myocardial Infarction or myocardial injury due to an underlying chronic condition.         POC Glucose Once [[id number]]   (Abnormal) Collected: 09/04/24 0801    Specimen: Blood Updated: 09/04/24 0808     Glucose 136 mg/dL      Comment: Serial Number: [id number]:  [id number]       Basic Metabolic Panel [[id number]]  (Abnormal) Collected: 09/04/24 0121    Specimen: Blood Updated: 09/04/24 0203     Glucose 150 mg/dL      BUN 13 mg/dL      Creatinine 0.83 mg/dL      Sodium 139 mmol/L      Potassium 4.0 mmol/L      Chloride 99 mmol/L      CO2 26.9 mmol/L      Calcium 8.1 mg/dL      BUN/Creatinine Ratio 15.7     Anion Gap 13.1 mmol/L      eGFR 72.7 mL/min/1.73     Narrative:      GFR Normal >60  Chronic Kidney Disease <60  Kidney Failure <15    The GFR formula is only valid for adults with stable renal function between ages 18 and 70.    CBC Auto Differential [[id number]]  (Abnormal) Collected: 09/04/24 0121    Specimen: Blood Updated: 09/04/24 0127     WBC 12.85 10*3/mm3      RBC 5.59 10*6/mm3      Hemoglobin 16.2 g/dL      Hematocrit 49.3 %      MCV 88.2 fL      MCH 29.0 pg      MCHC 32.9 g/dL      RDW 13.1 %      RDW-SD 42.5 fl      MPV 10.9 fL      Platelets 172 10*3/mm3      Neutrophil % 73.2 %      Lymphocyte % 13.9 %      Monocyte % 10.2 %      Eosinophil % 1.6 %      Basophil % 0.5 %      Immature Grans % 0.6 %      Neutrophils, Absolute 9.42 10*3/mm3      Lymphocytes, Absolute 1.78 10*3/mm3      Monocytes, Absolute 1.31 10*3/mm3      Eosinophils, Absolute 0.20 10*3/mm3      Basophils, Absolute 0.06 10*3/mm3      Immature Grans, Absolute 0.08 10*3/mm3      nRBC 0.0 /100 WBC             Imaging Results (Last 24 Hours)       Procedure Component Value Units Date/Time    XR Shoulder 1 View Left [[id number]] Collected: 09/04/24 0938     Updated: 09/04/24 0941    Narrative:      XR SHOULDER 1 VW LEFT    Date of Exam: 9/4/2024 9:09 AM EDT    Indication: Left arm/shoulder pain - post fall    Comparison: Left humerus radiograph 9/4/2024, 11/10/2023    Findings:  No left shoulder fracture or dislocation. Mild degenerative spurring of the  glenohumeral joint and left acromioclavicular joint. Clavicle appears intact. Osteopenic changes are suggested. No osteolytic or osteoblastic lesion is identified. The imaged   left ribs appear intact, and the imaged left lung is clear      Impression:      Impression:  No acute left shoulder findings.  Mild degenerative change of the left shoulder.      Electronically Signed: Halima Harding MD    9/4/2024 9:39 AM EDT    Workstation ID: [id number]    XR Humerus Left [[id number]] Collected: 09/04/24 0937     Updated: 09/04/24 0940    Narrative:      XR HUMERUS LEFT    Date of Exam: 9/4/2024 9:09 AM EDT    Indication: Left arm/shoulder pain - post fall    Comparison: Left humerus radiograph 11/10/2023.    Findings:  No acute left humerus fracture. No shoulder or elbow joint dislocation is seen. Mild degenerative spurring of the acromioclavicular joint. Mild osteopenic changes are suggested. No unexpected retained radiopaque foreign body is seen within the soft   tissues. The imaged left ribs appear intact.      Impression:      Impression:  No acute abnormality of the left humerus.      Electronically Signed: Halima Harding MD    9/4/2024 9:38 AM EDT    Workstation ID: [id number]    XR Chest 1 View [[id number]] Collected: 09/04/24 0934     Updated: 09/04/24 0937    Narrative:      XR CHEST 1 VW    Date of Exam: 9/4/2024 9:09 AM EDT    Indication: Pre-surgery    Comparison: AP chest radiograph 5/11/2022    Findings:  No acute airspace disease is seen. The heart size is within normal limits. No pleural effusion, pneumothorax or acute osseous abnormalities are identified.      Impression:      Impression:  No acute cardiopulmonary findings.      Electronically Signed: Halmia Harding MD    9/4/2024 9:35 AM EDT    Workstation ID: [id number]    XR Hip With or Without Pelvis 2 - 3 View Right [[id number]] Collected: 09/03/24 1834     Updated: 09/03/24 1839    Narrative:      XR HIP W OR WO PELVIS 2-3 VIEW RIGHT, XR FEMUR 2 VW  RIGHT    Date of Exam: 9/3/2024 6:10 PM EDT    Indication: Fall with right hip pain    Comparison: None available.    Findings:  There is an acute mildly displaced, impacted, and angulated subcapital right femoral neck fracture.  There is surrounding soft tissue edema.    Mild right hip osteoarthrosis. Severe tricompartmental right knee osteoarthrosis.      Impression:      Impression:  Acute displaced, impacted, and angulated subcapital right femoral neck fracture.      Electronically Signed: Harpal Herrera    9/3/2024 6:37 PM EDT    Workstation ID: [id number]    XR Femur 2 View Right [[id number]] Collected: 09/03/24 1834     Updated: 09/03/24 1839    Narrative:      XR HIP W OR WO PELVIS 2-3 VIEW RIGHT, XR FEMUR 2 VW RIGHT    Date of Exam: 9/3/2024 6:10 PM EDT    Indication: Fall with right hip pain    Comparison: None available.    Findings:  There is an acute mildly displaced, impacted, and angulated subcapital right femoral neck fracture.  There is surrounding soft tissue edema.    Mild right hip osteoarthrosis. Severe tricompartmental right knee osteoarthrosis.      Impression:      Impression:  Acute displaced, impacted, and angulated subcapital right femoral neck fracture.      Electronically Signed: Harpal Herrera    9/3/2024 6:37 PM EDT    Workstation ID: [id number]            EKG      I personally viewed and interpreted the patient's EKG/Telemetry data:    ECHOCARDIOGRAM:  Results for orders placed during the hospital encounter of 05/11/22    Adult Transthoracic Echo Complete W/ Cont if Necessary Per Protocol (With Agitated Saline)    Interpretation Summary  · Left ventricular ejection fraction appears to be 61 - 65%.  · Saline test results are negative.  · No pericardial effusion noted         STRESS MYOVIEW:  Results for orders placed during the hospital encounter of 12/04/20    Stress Test With Myocardial Perfusion One Day    Interpretation Summary  · Left ventricular ejection fraction is normal.  (Calculated EF = 58%).  · Myocardial perfusion imaging indicates a normal myocardial perfusion study with no evidence of ischemia.  · Impressions are consistent with a low risk study.       CARDIAC CATHETERIZATION:    No results found for this or any previous visit.       OTHER:         MDM      Status post fall and hip fracture  Patient presented with fall and hip fracture and does not have any other symptoms  Patient has an EKG which looks normal rhythm  Patient had an echocardiogram and a stress moistly in the past which did not show any ischemia  Patient does not have any symptoms and hence she does not need any further cardiac workup and is cleared for surgery with low risk.    Diabetes  Patient is on sliding scale insulin for now    Hypertension  Patient blood pressure is currently stable but is on home medicines.    *Hyperlipidemia  Patient is currently on statins and the lipid levels are well within normal limits.    I discussed the patients findings and my recommendations with patient and nurse    Heath Musa MD  09/04/24  18:33 EDT

## 2024-09-04 NOTE — PLAN OF CARE
Goal Outcome Evaluation:     Problem: Adult Inpatient Plan of Care  Goal: Plan of Care Review  Outcome: Ongoing, Progressing  Goal: Patient-Specific Goal (Individualized)  Outcome: Ongoing, Progressing  Goal: Absence of Hospital-Acquired Illness or Injury  Outcome: Ongoing, Progressing  Intervention: Identify and Manage Fall Risk  Recent Flowsheet Documentation  Taken 9/4/2024 1246 by Sherita Landers RN  Safety Promotion/Fall Prevention:   safety round/check completed   room organization consistent   nonskid shoes/slippers when out of bed   fall prevention program maintained   clutter free environment maintained   activity supervised  Taken 9/4/2024 1039 by Sherita Landers RN  Safety Promotion/Fall Prevention:   safety round/check completed   room organization consistent   nonskid shoes/slippers when out of bed   fall prevention program maintained   clutter free environment maintained   activity supervised  Taken 9/4/2024 0814 by Sherita Landers RN  Safety Promotion/Fall Prevention:   safety round/check completed   room organization consistent   nonskid shoes/slippers when out of bed   fall prevention program maintained   clutter free environment maintained   activity supervised  Taken 9/4/2024 0756 by Sherita Landers RN  Safety Promotion/Fall Prevention:   safety round/check completed   room organization consistent   nonskid shoes/slippers when out of bed   clutter free environment maintained   activity supervised  Intervention: Prevent Skin Injury  Recent Flowsheet Documentation  Taken 9/4/2024 1246 by Sherita Landers RN  Body Position:   weight shifting   tilted   left  Taken 9/4/2024 1039 by Sherita Landers RN  Body Position:   weight shifting   supine  Taken 9/4/2024 0834 by Sherita Landers RN  Body Position:   tilted   right   weight shifting  Taken 9/4/2024 0756 by Sherita Landers RN  Skin Protection:   adhesive use limited   incontinence pads utilized   skin-to-device areas padded   skin-to-skin areas padded    tubing/devices free from skin contact  Taken 9/4/2024 0630 by Sherita Landers RN  Body Position:   tilted   left   weight shifting  Intervention: Prevent and Manage VTE (Venous Thromboembolism) Risk  Recent Flowsheet Documentation  Taken 9/4/2024 0756 by Sherita Landers RN  Activity Management: bedrest  VTE Prevention/Management:   bilateral   sequential compression devices on  Intervention: Prevent Infection  Recent Flowsheet Documentation  Taken 9/4/2024 0756 by Sherita Landers RN  Infection Prevention:   visitors restricted/screened   single patient room provided   rest/sleep promoted   personal protective equipment utilized   hand hygiene promoted  Goal: Optimal Comfort and Wellbeing  Outcome: Ongoing, Progressing  Intervention: Monitor Pain and Promote Comfort  Recent Flowsheet Documentation  Taken 9/4/2024 1109 by Sherita Landers RN  Pain Management Interventions:   quiet environment facilitated   pillow support provided   position adjusted   care clustered   breathing exercises  Intervention: Provide Person-Centered Care  Recent Flowsheet Documentation  Taken 9/4/2024 0756 by Sherita Ladners RN  Trust Relationship/Rapport:   care explained   thoughts/feelings acknowledged   questions answered  Goal: Readiness for Transition of Care  Outcome: Ongoing, Progressing     Problem: Pain Acute  Goal: Acceptable Pain Control and Functional Ability  Outcome: Ongoing, Progressing  Intervention: Develop Pain Management Plan  Recent Flowsheet Documentation  Taken 9/4/2024 1109 by Sherita Landers RN  Pain Management Interventions:   quiet environment facilitated   pillow support provided   position adjusted   care clustered   breathing exercises  Intervention: Optimize Psychosocial Wellbeing  Recent Flowsheet Documentation  Taken 9/4/2024 0756 by Sherita Landers RN  Supportive Measures: active listening utilized     Problem: Adjustment to Injury (Hip Fracture Surgical Repair)  Goal: Optimal Coping with Change in Health Status  Outcome:  Ongoing, Progressing  Intervention: Support Psychosocial Response to Surgery and Mobility Changes  Recent Flowsheet Documentation  Taken 9/4/2024 0756 by Sherita Landers RN  Supportive Measures: active listening utilized  Family/Support System Care: self-care encouraged     Problem: Bleeding (Hip Fracture Surgical Repair)  Goal: Absence of Bleeding  Outcome: Ongoing, Progressing     Problem: Bowel Motility Impaired (Hip Fracture Surgical Repair)  Goal: Effective Bowel Elimination  Outcome: Ongoing, Progressing     Problem: Cognitive Decline Risk (Hip Fracture Surgical Repair)  Goal: Cognitive Function Maintained  Outcome: Ongoing, Progressing     Problem: Fluid and Electrolyte Imbalance (Hip Fracture Surgical Repair)  Goal: Fluid and Electrolyte Balance  Outcome: Ongoing, Progressing     Problem: Infection (Hip Fracture Surgical Repair)  Goal: Absence of Infection Signs and Symptoms  Outcome: Ongoing, Progressing     Problem: Joint Function Impaired (Hip Fracture Surgical Repair)  Goal: Optimal Functional Ability  Outcome: Ongoing, Progressing  Intervention: Promote Optimal Functional Status  Recent Flowsheet Documentation  Taken 9/4/2024 0756 by Sherita Landers RN  Activity Management: bedrest     Problem: Ongoing Anesthesia Effects (Hip Fracture Surgical Repair)  Goal: Anesthesia/Sedation Recovery  Outcome: Ongoing, Progressing  Intervention: Optimize Anesthesia Recovery  Recent Flowsheet Documentation  Taken 9/4/2024 1246 by Sherita Landers, RN  Safety Promotion/Fall Prevention:   safety round/check completed   room organization consistent   nonskid shoes/slippers when out of bed   fall prevention program maintained   clutter free environment maintained   activity supervised  Taken 9/4/2024 1039 by Sherita Landers, RN  Safety Promotion/Fall Prevention:   safety round/check completed   room organization consistent   nonskid shoes/slippers when out of bed   fall prevention program maintained   clutter free environment  maintained   activity supervised  Taken 9/4/2024 0814 by Sherita Landers RN  Safety Promotion/Fall Prevention:   safety round/check completed   room organization consistent   nonskid shoes/slippers when out of bed   fall prevention program maintained   clutter free environment maintained   activity supervised  Taken 9/4/2024 0756 by Sherita Landers RN  Safety Promotion/Fall Prevention:   safety round/check completed   room organization consistent   nonskid shoes/slippers when out of bed   clutter free environment maintained   activity supervised     Problem: Pain (Hip Fracture Surgical Repair)  Goal: Acceptable Pain Control  Outcome: Ongoing, Progressing  Intervention: Prevent or Manage Pain  Recent Flowsheet Documentation  Taken 9/4/2024 1109 by Sherita Landers RN  Pain Management Interventions:   quiet environment facilitated   pillow support provided   position adjusted   care clustered   breathing exercises     Problem: Postoperative Nausea and Vomiting (Hip Fracture Surgical Repair)  Goal: Nausea and Vomiting Relief  Outcome: Ongoing, Progressing     Problem: Postoperative Urinary Retention (Hip Fracture Surgical Repair)  Goal: Effective Urinary Elimination  Outcome: Ongoing, Progressing     Problem: Respiratory Compromise (Hip Fracture Surgical Repair)  Goal: Effective Oxygenation and Ventilation  Outcome: Ongoing, Progressing  Intervention: Optimize Oxygenation and Ventilation  Recent Flowsheet Documentation  Taken 9/4/2024 1246 by Sherita Landers RN  Head of Bed (HOB) Positioning: HOB elevated  Taken 9/4/2024 1039 by Sherita Landesr RN  Head of Bed (HOB) Positioning: HOB elevated  Taken 9/4/2024 0834 by Sherita Landers RN  Head of Bed (HOB) Positioning: HOB elevated  Taken 9/4/2024 0630 by Sherita Landers RN  Head of Bed (HOB) Positioning: HOB elevated     Problem: Skin Injury Risk Increased  Goal: Skin Health and Integrity  Outcome: Ongoing, Progressing  Intervention: Optimize Skin Protection  Recent Flowsheet  Documentation  Taken 9/4/2024 1246 by Sherita Landers RN  Head of Bed ([facility]) Positioning: HOB elevated  Taken 9/4/2024 1039 by Sherita Landers RN  Head of Bed ([facility]) Positioning: HOB elevated  Taken 9/4/2024 0834 by Sherita Landers RN  Head of Bed ([facility]) Positioning: HOB elevated  Taken 9/4/2024 0756 by Sherita Landers RN  Pressure Reduction Techniques:   heels elevated off bed   pressure points protected   weight shift assistance provided  Pressure Reduction Devices:   pressure-redistributing mattress utilized   heel offloading device utilized   positioning supports utilized  Skin Protection:   adhesive use limited   incontinence pads utilized   skin-to-device areas padded   skin-to-skin areas padded   tubing/devices free from skin contact  Taken 9/4/2024 0630 by Sherita Landers RN  Head of Bed ([facility]) Positioning: HOB elevated     Problem: Diabetes Comorbidity  Goal: Blood Glucose Level Within Targeted Range  Outcome: Ongoing, Progressing     Problem: Hypertension Comorbidity  Goal: Blood Pressure in Desired Range  Outcome: Ongoing, Progressing

## 2024-09-04 NOTE — ANESTHESIA PROCEDURE NOTES
Airway  Urgency: elective    Date/Time: 9/4/2024 5:47 PM  Airway not difficult    General Information and Staff    Patient location during procedure: OR    Indications and Patient Condition  Indications for airway management: airway protection    Preoxygenated: yes  MILS maintained throughout  Mask difficulty assessment: 1 - vent by mask    Final Airway Details  Final airway type: endotracheal airway      Successful airway: ETT  Cuffed: yes   Successful intubation technique: video laryngoscopy  Facilitating devices/methods: intubating stylet  Endotracheal tube insertion site: oral  Blade: Pardo  Blade size: 3  ETT size (mm): 7.0  Cormack-Lehane Classification: grade I - full view of glottis  Placement verified by: chest auscultation and capnometry   Cuff volume (mL): 7  Number of attempts at approach: 1  Assessment: lips, teeth, and gum same as pre-op and atraumatic intubation

## 2024-09-04 NOTE — PLAN OF CARE
Goal Outcome Evaluation:      Pt admitted from ED. Family at bedside. Pt NPO and consults called.

## 2024-09-04 NOTE — CONSULTS
Orthopaedic Surgery  Consult Note  Cori Patel, APRN  [phone]    HPI:  Patient is a 78 y.o. Not  or  female who presents with Right hip pain.   Patient states she was bringing her groceries into the house yesterday with a wagon when the wheel became stuck on the sidewalk causing her to fall onto her right side. She was unable to stand. She has constant pain in her right lateral hip and her right groin. The pain is a sharp, stabbing, and throbbing pain with any movement. She denied any recent illness. She  is scheduled with Dr. Bradford on Sept 18 to scheduled a right knee replacement. She has had a prior left knee replacement.     MEDICAL HISTORY  Past Medical History:   Diagnosis Date    Anemia, unspecified     Anxiety     Arthritis     Bone pain     Cancer 09/2016    THYROID    Depression     Disease of thyroid gland     DM type 2 (diabetes mellitus, type 2)     TYPE 2    Fall     2/2021 FELL ON ICE    GERD (gastroesophageal reflux disease)     Hypertension     Irregular heart beat     Knee pain     Leaky heart valve     11/2020    Leg cramps     Low back pain     Low iron     getting iron infusions 5/10/23    Numbness around mouth     5/12/2022-- was inpatient at [facility]-- ct ran and MRI-- no stroke    Osteoarthritis     Polypharmacy 04/20/2023    PONV (postoperative nausea and vomiting)     DOES WELL WITH PROPOFOL    Sleep apnea     C-PAP IN USE    UTI (urinary tract infection)      Past Surgical History:   Procedure Laterality Date    CARPAL TUNNEL RELEASE      CHOLECYSTECTOMY  1974    HYSTERECTOMY  1993    partial abdominal hysterectomy    OOPHORECTOMY Bilateral 2015    THYROID SURGERY      2 thyroid surgery    TONSILLECTOMY      TOTAL KNEE ARTHROPLASTY Left 10/14/2019    Procedure: TOTAL KNEE ARTHROPLASTY;  Surgeon: Sanjay Collado MD;  Location: [facility] OR;  Service: Orthopedics    VEIN LIGATION AND STRIPPING BILATERAL       Prior to Admission medications    Medication Sig Start Date End  Date Taking? Authorizing Provider   amLODIPine-benazepril (LOTREL 5-20) 5-20 MG per capsule Take 1 capsule by mouth Daily. 5/12/22   Jose F Duke MD   aspirin 81 MG tablet Take 1 tablet by mouth Daily.  Patient taking differently: Take 325 mg by mouth Daily. 10/15/19   Sanjay Collado MD   calcitriol (ROCALTROL) 0.25 MCG capsule Take 1 capsule by mouth 2 (Two) Times a Day. 4/27/23   Gege Ford MD   CALCIUM CARBONATE-VIT D-MIN PO Take 1,200 mg by mouth 2 (Two) Times a Day.    Ekaterina Moncada MD   clorazepate (TRANXENE) 7.5 MG tablet Take 1 tablet by mouth 2 (Two) Times a Day. 12/9/22   Ekaterina Moncada MD   diclofenac (VOLTAREN) 1 % gel gel Apply 4 g topically to the appropriate area as directed 4 (Four) Times a Day As Needed.    Ekaterina Moncada MD   Diclofenac Sodium (VOLTAREN) 1 % gel gel Apply 4 g topically to the appropriate area as directed.    Ekaterina Moncada MD   empagliflozin (Jardiance) 25 MG tablet tablet Take 1 tablet by mouth Daily. 8/25/21   Gege oFrd MD   escitalopram (LEXAPRO) 10 MG tablet Take 1 tablet by mouth Every Evening. 5/21/19   Ekaterina Moncada MD   esomeprazole (nexIUM) 40 MG capsule Take 1 capsule by mouth 2 (Two) Times a Day.    Ekaterina Moncada MD   insulin aspart (NOVOLOG FLEXPEN) 100 UNIT/ML solution pen-injector sc pen Inject 10 units before each meal  Patient taking differently: 10 Units. Inject 10 units before each meal 8/20/19   Gege Ford MD   insulin glargine (LANTUS, SEMGLEE) 100 UNIT/ML injection Inject 30 Units under the skin into the appropriate area as directed Daily.  Patient taking differently: Inject 40 Units under the skin into the appropriate area as directed Every Night. 5/24/21   Gege Ford MD   levothyroxine (SYNTHROID, LEVOTHROID) 112 MCG tablet Take 1 tablet p.o. daily  Patient taking differently: Take 125 mcg by mouth Every Morning. Take 1 tablet p.o. daily 1/27/23   Gege Ford MD   Lotemax 0.5 % gel  "ophthalmic gel INSTILL ONE DROP DAILY INTO THE RIGHT EYE AND ONE DROP THREE TIMES A DAY INTO THE LEFT EYE 2/6/24   Ekaterina Moncada MD   magnesium oxide (MAG-OX) 400 tablet tablet Take 1 tablet by mouth 2 (Two) Times a Day.    Ekaterina Moncada MD   metFORMIN (Glucophage) 1000 MG tablet Take 1 tablet by mouth 2 (Two) Times a Day With Meals. 6/14/22   Gege Ford MD   metoprolol tartrate (LOPRESSOR) 100 MG tablet Take 1 tablet by mouth 2 (Two) Times a Day.    Ekaterina Moncada MD   oxyCODONE-acetaminophen (PERCOCET) 7.5-325 MG per tablet Take 1 tablet by mouth 3 (Three) Times a Day As Needed for Severe Pain. DNF before 11/5/2024 11/5/24   Lisa Wells MD   pramipexole (MIRAPEX) 1 MG tablet Take 1 tablet by mouth 2 (Two) Times a Day. 11/13/22   Ekaterina Moncada MD   promethazine (PHENERGAN) 25 MG tablet Take 1 tablet by mouth As Needed. 9/22/19   Ekaterina Moncada MD   promethazine (PHENERGAN) 25 MG tablet Every 4 (Four) Hours.    Ekaterina Moncada MD   rosuvastatin (Crestor) 5 MG tablet Take 1 tablet by mouth Daily. 8/29/23 8/28/24  Gege Ford MD   vitamin D (ERGOCALCIFEROL) 1.25 MG (69148 UT) capsule capsule Take 1 capsule by mouth 1 (One) Time Per Week. 4/23/21   Ekaterina Moncada MD     Allergies   Allergen Reactions    Codeine Shortness Of Breath     Most Recent Immunizations   Administered Date(s) Administered    Influenza, Unspecified 01/01/2017    Pneumococcal, Unspecified 01/01/2017     Social History     Tobacco Use    Smoking status: Never    Smokeless tobacco: Never   Substance Use Topics    Alcohol use: No      Social History     Substance and Sexual Activity   Drug Use No       VITALS: /80 (BP Location: Right arm, Patient Position: Lying)   Pulse 96   Temp 98 °F (36.7 °C) (Axillary)   Resp 14   Ht 170.2 cm (67\")   Wt 77.1 kg (170 lb)   SpO2 91%   BMI 26.63 kg/m²  Body mass index is 26.63 kg/m².    PHYSICAL EXAM:   CONSTITUTIONAL: No acute " distress  LUNGS: Equal chest rise, no shortness of air  CARDIOVASCULAR: palpable peripheral pulses  SKIN: no skin lesions in the area examined  LYMPH: no lymphadenopathy in the area examined  Musculoskeletal:    Right Hip:  Normal neurovascular exam to the leg.  Good motor strength.  Denies any sensory deficits.  Brisk capillary refill intact.  Sharp pain with palpation of the right hip. Mild  effusion and ecchymosis noted.    RADIOLOGY REVIEW:   XR Hip With or Without Pelvis 2 - 3 View Right    Result Date: 9/3/2024  Impression: Acute displaced, impacted, and angulated subcapital right femoral neck fracture. Electronically Signed: Harpal Herrera  9/3/2024 6:37 PM EDT  Workstation ID: [id number]    XR Femur 2 View Right    Result Date: 9/3/2024  Impression: Acute displaced, impacted, and angulated subcapital right femoral neck fracture. Electronically Signed: Harpal Herrera  9/3/2024 6:37 PM EDT  Workstation ID: [id number]     LABS:   Results for the past 24 hours:   Recent Results (from the past 24 hour(s))   Green Top (Gel)    Collection Time: 09/03/24  4:47 PM   Result Value Ref Range    Extra Tube Hold for add-ons.    Lavender Top    Collection Time: 09/03/24  4:47 PM   Result Value Ref Range    Extra Tube hold for add-on    Gold Top - SST    Collection Time: 09/03/24  4:47 PM   Result Value Ref Range    Extra Tube Hold for add-ons.    Light Blue Top    Collection Time: 09/03/24  4:47 PM   Result Value Ref Range    Extra Tube Hold for add-ons.    Comprehensive Metabolic Panel    Collection Time: 09/03/24  4:47 PM    Specimen: Blood   Result Value Ref Range    Glucose 146 (H) 65 - 99 mg/dL    BUN 14 8 - 23 mg/dL    Creatinine 0.94 0.57 - 1.00 mg/dL    Sodium 138 136 - 145 mmol/L    Potassium 4.5 3.5 - 5.2 mmol/L    Chloride 98 98 - 107 mmol/L    CO2 23.2 22.0 - 29.0 mmol/L    Calcium 8.6 8.6 - 10.5 mg/dL    Total Protein 7.3 6.0 - 8.5 g/dL    Albumin 4.4 3.5 - 5.2 g/dL    ALT (SGPT) 21 1 - 33 U/L    AST (SGOT) 26 1  - 32 U/L    Alkaline Phosphatase 84 39 - 117 U/L    Total Bilirubin 0.6 0.0 - 1.2 mg/dL    Globulin 2.9 gm/dL    A/G Ratio 1.5 g/dL    BUN/Creatinine Ratio 14.9 7.0 - 25.0    Anion Gap 16.8 (H) 5.0 - 15.0 mmol/L    eGFR 62.6 >60.0 mL/min/1.73   Protime-INR    Collection Time: 09/03/24  4:47 PM    Specimen: Blood   Result Value Ref Range    Protime 11.2 9.6 - 11.7 Seconds    INR 1.03 0.93 - 1.10   aPTT    Collection Time: 09/03/24  4:47 PM    Specimen: Blood   Result Value Ref Range    PTT 30.4 (L) 61.0 - 76.5 seconds   CBC Auto Differential    Collection Time: 09/03/24  4:47 PM    Specimen: Blood   Result Value Ref Range    WBC 9.38 3.40 - 10.80 10*3/mm3    RBC 5.69 (H) 3.77 - 5.28 10*6/mm3    Hemoglobin 15.9 12.0 - 15.9 g/dL    Hematocrit 49.5 (H) 34.0 - 46.6 %    MCV 87.0 79.0 - 97.0 fL    MCH 27.9 26.6 - 33.0 pg    MCHC 32.1 31.5 - 35.7 g/dL    RDW 13.2 12.3 - 15.4 %    RDW-SD 42.0 37.0 - 54.0 fl    MPV 11.2 6.0 - 12.0 fL    Platelets 203 140 - 450 10*3/mm3    Neutrophil % 67.5 42.7 - 76.0 %    Lymphocyte % 19.9 19.6 - 45.3 %    Monocyte % 9.1 5.0 - 12.0 %    Eosinophil % 2.6 0.3 - 6.2 %    Basophil % 0.4 0.0 - 1.5 %    Immature Grans % 0.5 0.0 - 0.5 %    Neutrophils, Absolute 6.33 1.70 - 7.00 10*3/mm3    Lymphocytes, Absolute 1.87 0.70 - 3.10 10*3/mm3    Monocytes, Absolute 0.85 0.10 - 0.90 10*3/mm3    Eosinophils, Absolute 0.24 0.00 - 0.40 10*3/mm3    Basophils, Absolute 0.04 0.00 - 0.20 10*3/mm3    Immature Grans, Absolute 0.05 0.00 - 0.05 10*3/mm3    nRBC 0.0 0.0 - 0.2 /100 WBC   Type & Screen    Collection Time: 09/03/24  5:48 PM    Specimen: Blood   Result Value Ref Range    ABO Type O     RH type Negative     Antibody Screen Negative     T&S Expiration Date 9/6/2024 11:59:59 PM    Basic Metabolic Panel    Collection Time: 09/04/24  1:21 AM    Specimen: Blood   Result Value Ref Range    Glucose 150 (H) 65 - 99 mg/dL    BUN 13 8 - 23 mg/dL    Creatinine 0.83 0.57 - 1.00 mg/dL    Sodium 139 136 - 145 mmol/L     Potassium 4.0 3.5 - 5.2 mmol/L    Chloride 99 98 - 107 mmol/L    CO2 26.9 22.0 - 29.0 mmol/L    Calcium 8.1 (L) 8.6 - 10.5 mg/dL    BUN/Creatinine Ratio 15.7 7.0 - 25.0    Anion Gap 13.1 5.0 - 15.0 mmol/L    eGFR 72.7 >60.0 mL/min/1.73   CBC Auto Differential    Collection Time: 09/04/24  1:21 AM    Specimen: Blood   Result Value Ref Range    WBC 12.85 (H) 3.40 - 10.80 10*3/mm3    RBC 5.59 (H) 3.77 - 5.28 10*6/mm3    Hemoglobin 16.2 (H) 12.0 - 15.9 g/dL    Hematocrit 49.3 (H) 34.0 - 46.6 %    MCV 88.2 79.0 - 97.0 fL    MCH 29.0 26.6 - 33.0 pg    MCHC 32.9 31.5 - 35.7 g/dL    RDW 13.1 12.3 - 15.4 %    RDW-SD 42.5 37.0 - 54.0 fl    MPV 10.9 6.0 - 12.0 fL    Platelets 172 140 - 450 10*3/mm3    Neutrophil % 73.2 42.7 - 76.0 %    Lymphocyte % 13.9 (L) 19.6 - 45.3 %    Monocyte % 10.2 5.0 - 12.0 %    Eosinophil % 1.6 0.3 - 6.2 %    Basophil % 0.5 0.0 - 1.5 %    Immature Grans % 0.6 (H) 0.0 - 0.5 %    Neutrophils, Absolute 9.42 (H) 1.70 - 7.00 10*3/mm3    Lymphocytes, Absolute 1.78 0.70 - 3.10 10*3/mm3    Monocytes, Absolute 1.31 (H) 0.10 - 0.90 10*3/mm3    Eosinophils, Absolute 0.20 0.00 - 0.40 10*3/mm3    Basophils, Absolute 0.06 0.00 - 0.20 10*3/mm3    Immature Grans, Absolute 0.08 (H) 0.00 - 0.05 10*3/mm3    nRBC 0.0 0.0 - 0.2 /100 WBC   POC Glucose Once    Collection Time: 09/04/24  8:01 AM    Specimen: Blood   Result Value Ref Range    Glucose 136 (H) 70 - 105 mg/dL   ECG 12 Lead Pre-Op / Pre-Procedure    Collection Time: 09/04/24  8:38 AM   Result Value Ref Range    QT Interval 395 ms    QTC Interval 486 ms       IMPRESSION:  Patient is a 78 y.o. Not  or  female with Acute displaced, impacted, and angulated subcapital right femoral neck fracture.     PLAN:   Admited to: Jose Juan Wilcox DO  Plan is to perform a right total hip arthroplasty this evening due to femoral neck fracture. Patient will remain nonweightbearing until surgery.  Patient will remain n.p.o. until surgery.    Consent: I discussed  the risks and benefits of hip replacement surgery to include but not limited to the risk of fracture, infection, damage to neurovascular structures, the risk of anesthesia, medical complications, the need for further procedures, blood clots/pulmonary embolism, and others.  The patient understood the risks and wishes to proceed with surgery.    LYUDMILA Art  Orthopaedic Surgery  [facility]  [phone] - [place] Office  [phone] - [facility]

## 2024-09-04 NOTE — CASE MANAGEMENT/SOCIAL WORK
Discharge Planning Assessment   Rk     Patient Name: Marilyn J Pumphrey  MRN: [id number]  Today's Date: 9/4/2024    Admit Date: 9/3/2024    Plan: From home with sister.  Plans to return home.  Pending surgery today on right hip.  PT OT eval pending   Discharge Needs Assessment       Row Name 09/04/24 1607       Living Environment    People in Home sibling(s)    Name(s) of People in Home sister Nini    Current Living Arrangements home    Potentially Unsafe Housing Conditions none    In the past 12 months has the electric, gas, oil, or water company threatened to shut off services in your home? No    Primary Care Provided by self    Provides Primary Care For no one    Family Caregiver if Needed sibling(s)    Family Caregiver Names sister Nini, daughter    Quality of Family Relationships helpful;involved;supportive    Able to Return to Prior Arrangements yes       Resource/Environmental Concerns    Resource/Environmental Concerns none    Transportation Concerns none       Transportation Needs    In the past 12 months, has lack of transportation kept you from medical appointments or from getting medications? no    In the past 12 months, has lack of transportation kept you from meetings, work, or from getting things needed for daily living? No       Food Insecurity    Within the past 12 months, you worried that your food would run out before you got the money to buy more. Never true    Within the past 12 months, the food you bought just didn't last and you didn't have money to get more. Never true       Transition Planning    Patient/Family Anticipates Transition to home with family;home with help/services    Patient/Family Anticipated Services at Transition home health care    Transportation Anticipated family or friend will provide       Discharge Needs Assessment    Readmission Within the Last 30 Days no previous admission in last 30 days    Equipment Currently Used at Home glucometer;cane, straight;wheelchair     Concerns to be Addressed care coordination/care conferences;discharge planning    Anticipated Changes Related to Illness none    Equipment Needed After Discharge none    Outpatient/Agency/Support Group Needs homecare agency    Discharge Facility/Level of Care Needs home with home health    Provided Post Acute Provider List? Yes    Post Acute Provider List Home Health    Delivered To Patient;Support Person    Support Person daughter    Method of Delivery In person    Patient's Choice of Community Agency(s) [facility]                   Discharge Plan       Row Name 09/04/24 1609       Plan    Plan From home with sister.  Plans to return home.  Pending surgery today on right hip.  PT OT eval pending    Patient/Family in Agreement with Plan yes    Plan Comments Patient lives at home with sisterNini. Patient does not drive. family will transport at discharge. Patient performs ADL. PCP and pharmacy confirmed. Denies financial assistance needs for medication and/or food. Patient to have surgery on her right hip today, Will need PT OT eval.  Family reports patient will not want to go to rehab. Would possible be agreeable to .  if so would like [facility]                  Continued Care and Services - Admitted Since 9/3/2024    No active coordination exists for this encounter.       Expected Discharge Date and Time       Expected Discharge Date Expected Discharge Time    Sep 6, 2024            Demographic Summary       Row Name 09/04/24 1607       General Information    Admission Type inpatient    Arrived From emergency department    Required Notices Provided Important Message from Medicare    Referral Source admission list    Reason for Consult discharge planning    Preferred Language English       Contact Information    Permission Granted to Share Info With                    Functional Status       Row Name 09/04/24 1607       Functional Status    Usual Activity Tolerance good    Current Activity Tolerance good        Functional Status, IADL    Medications assistive person    Meal Preparation independent    Housekeeping independent    Laundry independent    Shopping assistive person       Mental Status    General Appearance WDL WDL       Mental Status Summary    Recent Changes in Mental Status/Cognitive Functioning no changes                  Lyubov Gamble RN    SIPS 1  [email or web address]  Office [phone]  Cell [phone]

## 2024-09-04 NOTE — SIGNIFICANT NOTE
09/04/24 0731   OTHER   Discipline occupational therapist   Rehab Time/Intention   Session Not Performed unable to evaluate, medical status change;other (see comments)  (Pt has acute displaced, impacted, and angulated subcapital right femoral neck fracture. Ortho consulted, likely to undergo surgery. Will f/u for evaluation when appropriate.)   Recommendation   OT - Next Appointment 09/05/24

## 2024-09-04 NOTE — PROGRESS NOTES
.    [facility] MEDICINE SERVICE  DAILY PROGRESS NOTE    NAME: Marilyn J Pumphrey  : 1945  MRN: [id number]      LOS: 1 day     PROVIDER OF SERVICE: Oleg Nazario MD    Chief Complaint: Femur fracture, right    Subjective:     Interval History:  History taken from: patient  Patient seen and examined at bedside this morning.  Her daughter was also present.  She says she is uncomfortable and the Dilaudid makes her feel funny.  Discussed about changing to morphine instead.  Also reports exquisite pain and tenderness in the left extremity that she says started after coming to the hospital.    Review of Systems:   Review of Systems negative except as mentioned above    Objective:     Vital Signs  Temp:  [98 °F (36.7 °C)-98.3 °F (36.8 °C)] 98 °F (36.7 °C)  Heart Rate:  [79-97] 96  Resp:  [13-18] 14  BP: (110-184)/() 167/80  Flow (L/min):  [2-2.5] 2.5   Body mass index is 26.63 kg/m².    Physical Exam  Physical Exam  Vitals and nursing note reviewed.   HENT:      Head: Normocephalic and atraumatic.   Eyes:      Extraocular Movements: Extraocular movements intact.      Pupils: Pupils are equal, round, and reactive to light.   Cardiovascular:      Rate and Rhythm: Normal rate and regular rhythm.      Pulses: Normal pulses.      Heart sounds: Normal heart sounds.   Pulmonary:      Effort: Pulmonary effort is normal.      Breath sounds: Normal breath sounds.   Abdominal:      General: Bowel sounds are normal.      Palpations: Abdomen is soft.      Tenderness: There is no abdominal tenderness.   Musculoskeletal:      Comments: All extremities with normal ROM other than right leg not assessed due to known right femur fracture and severe pain on palpation and movement of left upper extremity.   Skin:     General: Skin is warm and dry.   Neurological:      Mental Status: She is alert and oriented to person, place, and time.   Psychiatric:         Mood and Affect: Mood normal.         Behavior: Behavior normal.      Scheduled Meds   amLODIPine, 5 mg, Oral, Q24H   And  lisinopril, 20 mg, Oral, Q24H  calcitriol, 0.25 mcg, Oral, BID  clorazepate, 7.5 mg, Oral, BID  [START ON 9/5/2024] empagliflozin, 25 mg, Oral, Daily  escitalopram, 10 mg, Oral, Nightly  insulin glargine, 40 Units, Subcutaneous, Nightly  insulin lispro, 2-7 Units, Subcutaneous, Q6H  levothyroxine, 125 mcg, Oral, Q AM  magnesium oxide, 400 mg, Oral, BID  metoprolol tartrate, 100 mg, Oral, Q12H  pantoprazole, 40 mg, Oral, Q AM  pramipexole, 1 mg, Oral, BID  prednisoLONE acetate, 1 drop, Right Eye, Q8H  rosuvastatin, 5 mg, Oral, Daily  sodium chloride, 10 mL, Intravenous, Q12H  [START ON 9/9/2024] vitamin D, 50,000 Units, Oral, Weekly       PRN Meds     acetaminophen    aluminum-magnesium hydroxide-simethicone    senna-docusate sodium **AND** polyethylene glycol **AND** bisacodyl **AND** bisacodyl    Calcium Replacement - Follow Nurse / BPA Driven Protocol    dextrose    dextrose    glucagon (human recombinant)    Magnesium Standard Dose Replacement - Follow Nurse / BPA Driven Protocol    melatonin    metoclopramide    Morphine    Morphine    ondansetron ODT **OR** ondansetron    oxyCODONE    Phosphorus Replacement - Follow Nurse / BPA Driven Protocol    Potassium Replacement - Follow Nurse / BPA Driven Protocol    promethazine    promethazine    [COMPLETED] Insert Peripheral IV **AND** sodium chloride    sodium chloride    sodium chloride   Infusions         Diagnostic Data    Results from last 7 days   Lab Units 09/04/24  0121 09/03/24  1647   WBC 10*3/mm3 12.85* 9.38   HEMOGLOBIN g/dL 16.2* 15.9   HEMATOCRIT % 49.3* 49.5*   PLATELETS 10*3/mm3 172 203   GLUCOSE mg/dL 150* 146*   CREATININE mg/dL 0.83 0.94   BUN mg/dL 13 14   SODIUM mmol/L 139 138   POTASSIUM mmol/L 4.0 4.5   AST (SGOT) U/L  --  26   ALT (SGPT) U/L  --  21   ALK PHOS U/L  --  84   BILIRUBIN mg/dL  --  0.6   ANION GAP mmol/L 13.1 16.8*       XR Shoulder 1 View Left    Result Date:  9/4/2024  Impression: No acute left shoulder findings. Mild degenerative change of the left shoulder. Electronically Signed: Halima Harding MD  9/4/2024 9:39 AM EDT  Workstation ID: [id number]    XR Humerus Left    Result Date: 9/4/2024  Impression: No acute abnormality of the left humerus. Electronically Signed: Halima Harding MD  9/4/2024 9:38 AM EDT  Workstation ID: [id number]    XR Chest 1 View    Result Date: 9/4/2024  Impression: No acute cardiopulmonary findings. Electronically Signed: Halima Harding MD  9/4/2024 9:35 AM EDT  Workstation ID: [id number]    XR Hip With or Without Pelvis 2 - 3 View Right    Result Date: 9/3/2024  Impression: Acute displaced, impacted, and angulated subcapital right femoral neck fracture. Electronically Signed: Harpal Herrera  9/3/2024 6:37 PM EDT  Workstation ID: [id number]    XR Femur 2 View Right    Result Date: 9/3/2024  Impression: Acute displaced, impacted, and angulated subcapital right femoral neck fracture. Electronically Signed: Harpal Herrera  9/3/2024 6:37 PM EDT  Workstation ID: [id number]       I reviewed the patient's new clinical results.    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       Right femur fracture  Fall  - XR femur right and XR hip pelvis right showed acute displaced,  impacted, and angulated subcapital right femoral neck fracture  - ortho consulted and plan for surgery later this afternoon.  - bed rest, pain control     Hypertension  Hyperlipidemia  - cont home norvasc/benazepril, statin     DM type II  - glucose 146  - cont home jardiance and lantus when verified   - SSI q6 hours     Chronic pain   Polyarthralgia  - treating fracture pain at this time  -Extreme pain reported in left upper extremity, x-ray of left arm and shoulder do not reveal any acute abnormality or fracture.  -Will check troponin.     Depression  - cont home lexapro     GERD  - cont home ppi     BENITEZ  - cont home ferrous sulfate     H/o thyroid CA s/p thyroidectomy   - cont home synthroid      RLS  - cont home mirapex    VTE Prophylaxis:  Mechanical VTE prophylaxis orders are present.         Code status is   Code Status and Medical Interventions: CPR (Attempt to Resuscitate); Full Support   Ordered at: 09/03/24 2011     Level Of Support Discussed With:    Patient     Code Status (Patient has no pulse and is not breathing):    CPR (Attempt to Resuscitate)     Medical Interventions (Patient has pulse or is breathing):    Full Support       Plan for disposition: Pending clinical course    Time: 30 minutes    Signature: Electronically signed by Oleg Nazario MD, 09/04/24, 10:16 EDT.  [facility]ist Team

## 2024-09-04 NOTE — ANESTHESIA PREPROCEDURE EVALUATION
Anesthesia Evaluation     Patient summary reviewed and Nursing notes reviewed   history of anesthetic complications:  PONV  NPO Solid Status: > 8 hours  NPO Liquid Status: > 8 hours           Airway   Mallampati: II  TM distance: >3 FB  Neck ROM: full  No difficulty expected  Dental - normal exam     Pulmonary    (+) ,sleep apnea  Cardiovascular     (+) hypertension, CAD, DVT, hyperlipidemia      Neuro/Psych  (+) numbness, psychiatric history  GI/Hepatic/Renal/Endo    (+) GERD, diabetes mellitus, thyroid problem     Musculoskeletal     Abdominal    Substance History      OB/GYN          Other   arthritis,   history of cancer                Anesthesia Plan    ASA 3     general and ERAS Protocol   total IV anesthesia  intravenous induction     Anesthetic plan, risks, benefits, and alternatives have been provided, discussed and informed consent has been obtained with: patient.    Plan discussed with CRNA.    CODE STATUS:    Level Of Support Discussed With: Patient  Code Status (Patient has no pulse and is not breathing): CPR (Attempt to Resuscitate)  Medical Interventions (Patient has pulse or is breathing): Full Support

## 2024-09-04 NOTE — SIGNIFICANT NOTE
09/04/24 0746   OTHER   Discipline physical therapist   Rehab Time/Intention   Session Not Performed unable to evaluate, medical status change;other (see comments)  (Pt with R hip fx; ortho consult pending with surgery anticipated. PT will eval per ortho orders.)   Therapy Assessment/Plan (PT)   Criteria for Skilled Interventions Met (PT) yes;meets criteria   Recommendation   PT - Next Appointment 09/05/24

## 2024-09-04 NOTE — H&P
0    [facility] Medicine Services    Hospitalist History and Physical     Marilyn J Pumphrey : 1945 MRN:[id number] LOS:0 ROOM: [facility] [place]/     Reason for admission: Femur fracture, right     Assessment / Plan     Right femur fracture  Fall  - XR femur right and XR hip pelvis right showed acute displaced, impacted, and angulated subcapital right femoral neck fracture  - ortho consulted  - bed rest, pain control, NPO after midnight    Hypertension  Hyperlipidemia  - cont home norvasc/benazepril, statin    DM type II  - glucose 146  - cont home jardiance and lantus when verified   - SSI q6 hours    Chronic pain   Polyarthralgia  - treating fracture pain at this time    Depression  - cont home lexapro    GERD  - cont home ppi    BENITEZ  - cont home ferrous sulfate    H/o thyroid CA s/p thyroidectomy   - cont home synthroid     RLS  - cont home mirapex        Level Of Support Discussed With: Patient  Code Status (Patient has no pulse and is not breathing): CPR (Attempt to Resuscitate)  Medical Interventions (Patient has pulse or is breathing): Full Support       Nutrition:   NPO Diet NPO Type: Strict NPO     VTE Prophylaxis:  Mechanical VTE prophylaxis orders are present.         History of Present illness     Marilyn J Pumphrey is a 78 y.o. female with PMH of polyarthralgia, chronic pain, DM type II, thyroid CA s/p thyroidectomy now treated for hypothyroidism, HTN, XAVI, RLS, H/o PE who presented to [facility] ED 9/3/2024 after a fall. She stated she was wheeling her groceries in her house with a wagon when the wheel became stuck on the sidewalk causing her to fall onto her right side. She was unable to stand. She has constant pain in her right lateral hip and her right groin. The pain is a sharp, stabbing pain with any movement. She denied any recent illness. She is supposed to see ortho Dr. Bradford on  to scheduled a right knee replacement. She has had a prior left knee replacement.     ED course:     XR femur  right and XR hip pelvis right showed acute displaced, impacted, and angulated subcapital right femoral neck fracture. Labs unremarkable. BP elevated secondary to pain. She follows with pain management and takes percocet 7.5mg tablets as needed 2-3 times daily. She was given 0.5mg of IV dilaudid with no improvement in her pain. She then was given 1mg IV dilaudid with some improvement in her pain. Ortho was consulted. She was admitted to the hospitalist for further treatment of right femur fracture.     Subjective / Review of systems     Review of Systems   Constitutional: Negative.    HENT: Negative.     Eyes: Negative.    Respiratory: Negative.     Cardiovascular: Negative.    Gastrointestinal: Negative.    Genitourinary: Negative.    Musculoskeletal:  Positive for joint swelling.        Right hip pain   Skin: Negative.    Neurological: Negative.    Psychiatric/Behavioral: Negative.          Past Medical/Surgical/Social/Family History & Allergies     Past Medical History:   Diagnosis Date    Anemia, unspecified     Anxiety     Arthritis     Bone pain     Cancer 09/2016    THYROID    Depression     Disease of thyroid gland     DM type 2 (diabetes mellitus, type 2)     TYPE 2    Fall     2/2021 FELL ON ICE    GERD (gastroesophageal reflux disease)     Hypertension     Irregular heart beat     Knee pain     Leaky heart valve     11/2020    Leg cramps     Low back pain     Low iron     getting iron infusions 5/10/23    Numbness around mouth     5/12/2022-- was inpatient at [facility]-- ct ran and MRI-- no stroke    Osteoarthritis     Polypharmacy 04/20/2023    PONV (postoperative nausea and vomiting)     DOES WELL WITH PROPOFOL    Sleep apnea     C-PAP IN USE    UTI (urinary tract infection)       Past Surgical History:   Procedure Laterality Date    CARPAL TUNNEL RELEASE      CHOLECYSTECTOMY  1974    HYSTERECTOMY  1993    partial abdominal hysterectomy    OOPHORECTOMY Bilateral 2015    THYROID SURGERY      2 thyroid surgery     TONSILLECTOMY      TOTAL KNEE ARTHROPLASTY Left 10/14/2019    Procedure: TOTAL KNEE ARTHROPLASTY;  Surgeon: Sanjay Collado MD;  Location: [facility] MAIN OR;  Service: Orthopedics    VEIN LIGATION AND STRIPPING BILATERAL        Social History     Socioeconomic History    Marital status:     Number of children: 3    Years of education: 9   Tobacco Use    Smoking status: Never    Smokeless tobacco: Never   Vaping Use    Vaping status: Never Used   Substance and Sexual Activity    Alcohol use: No    Drug use: No    Sexual activity: Defer      Family History   Problem Relation Age of Onset    Diabetes Father     Heart disease Father     Hypertension Father     Hyperlipidemia Father     Thyroid disease Father     Cancer Sister         lung cancer    Thyroid disease Sister     Diabetes Brother     Heart disease Brother     Cancer Brother         lung cancer      Allergies   Allergen Reactions    Codeine Shortness Of Breath    Oxycodone Hallucinations     Terrible nausea and vomiting      Social Determinants of Health     Tobacco Use: Low Risk  (9/3/2024)    Patient History     Smoking Tobacco Use: Never     Smokeless Tobacco Use: Never     Passive Exposure: Not on file   Alcohol Use: Not At Risk (9/3/2024)    AUDIT-C     Frequency of Alcohol Consumption: Never     Average Number of Drinks: Patient does not drink     Frequency of Binge Drinking: Never   Financial Resource Strain: Not on file   Food Insecurity: Not on file   Transportation Needs: Not on file   Physical Activity: Not on file   Stress: Not on file   Social Connections: Unknown (10/11/2023)    Family and Community Support     Help with Day-to-Day Activities: Not on file     Lonely or Isolated: Not on file   Interpersonal Safety: Not At Risk (9/3/2024)    Abuse Screen     Unsafe at Home or Work/School: no     Feels Threatened by Someone?: no     Does Anyone Keep You from Contacting Others or Doint Things Outside the Home?: no     Physical Sign of Abuse  Present: no   Depression: Not at risk (9/3/2024)    PHQ-2     PHQ-2 Score: 0   Housing Stability: Unknown (9/3/2024)    Housing Stability     Current Living Arrangements: home     Potentially Unsafe Housing Conditions: Not on file   Utilities: Not on file   Health Literacy: Unknown (10/11/2023)    Education     Help with school or training?: Not on file     Preferred Language: Not on file   Employment: Unknown (10/11/2023)    Employment     Do you want help finding or keeping work or a job?: Not on file   Disabilities: Not At Risk (9/3/2024)    Disabilities     Concentrating, Remembering, or Making Decisions Difficulty: no     Doing Errands Independently Difficulty: no        Home Medications     Prior to Admission medications    Medication Sig Start Date End Date Taking? Authorizing Provider   amLODIPine-benazepril (LOTREL 5-20) 5-20 MG per capsule Take 1 capsule by mouth Daily. 5/12/22   Jose F Duke MD   aspirin 81 MG tablet Take 1 tablet by mouth Daily.  Patient taking differently: Take 325 mg by mouth Daily. 10/15/19   Sanjay Collado MD   calcitriol (ROCALTROL) 0.25 MCG capsule Take 1 capsule by mouth 2 (Two) Times a Day. 4/27/23   Gege Ford MD   CALCIUM CARBONATE-VIT D-MIN PO Take 1,200 mg by mouth 3 (Three) Times a Day.    Ekaterina Moncada MD   clorazepate (TRANXENE) 7.5 MG tablet Take 1 tablet by mouth 2 (Two) Times a Day. 12/9/22   Ekaterina Moncada MD   diclofenac (VOLTAREN) 1 % gel gel Apply 4 g topically to the appropriate area as directed 4 (Four) Times a Day As Needed.    Ekaterina Moncada MD   Diclofenac Sodium (VOLTAREN) 1 % gel gel Apply 4 g topically to the appropriate area as directed.    Ekaterina Moncada MD   Dulaglutide (Trulicity) 1.5 MG/0.5ML solution pen-injector Inject by subcutaneous route.    Ekaterina Moncada MD   empagliflozin (Jardiance) 25 MG tablet tablet Take 1 tablet by mouth Daily. 8/25/21   Gege Ford MD   escitalopram (LEXAPRO) 10 MG  tablet Take 1 tablet by mouth Every Evening. 5/21/19   Ekaterina Moncada MD   esomeprazole (nexIUM) 40 MG capsule Take 1 capsule by mouth 2 (Two) Times a Day.    Ekaterina Moncada MD   ferrous gluconate (FERGON) 324 MG tablet Take 1 tablet by mouth Daily With Breakfast. 3/23/23   Ekaterina Moncada MD   insulin aspart (NOVOLOG FLEXPEN) 100 UNIT/ML solution pen-injector sc pen Inject 10 units before each meal  Patient taking differently: 10 Units. Inject 10 units before each meal 8/20/19   Gege Ford MD   insulin glargine (LANTUS, SEMGLEE) 100 UNIT/ML injection Inject 30 Units under the skin into the appropriate area as directed Daily.  Patient taking differently: Inject 40 Units under the skin into the appropriate area as directed Every Morning. 5/24/21   Gege Ford MD   levothyroxine (SYNTHROID, LEVOTHROID) 112 MCG tablet Take 1 tablet p.o. daily 1/27/23   Gege Ford MD   Lotemax 0.5 % gel ophthalmic gel INSTILL ONE DROP DAILY INTO THE RIGHT EYE AND ONE DROP THREE TIMES A DAY INTO THE LEFT EYE 2/6/24   Ekaterina Moncada MD   magnesium oxide (MAG-OX) 400 tablet tablet Take 1 tablet by mouth 2 (Two) Times a Day.    Ekaterina Moncada MD   meloxicam (MOBIC) 7.5 MG tablet Take 1 tablet by mouth 2 (Two) Times a Day As Needed. for pain 8/8/23   Ekaterina Moncada MD   metFORMIN (Glucophage) 1000 MG tablet Take 1 tablet by mouth 2 (Two) Times a Day With Meals. 6/14/22   Gege Ford MD   metoprolol tartrate (LOPRESSOR) 100 MG tablet Take 1 tablet by mouth 2 (Two) Times a Day.    Ekaterina Moncada MD   oxyCODONE-acetaminophen (PERCOCET) 7.5-325 MG per tablet Take 1 tablet by mouth 3 (Three) Times a Day As Needed for Severe Pain. 9/7/24   Lisa Wells MD   oxyCODONE-acetaminophen (PERCOCET) 7.5-325 MG per tablet Take 1 tablet by mouth 3 (Three) Times a Day As Needed for Severe Pain. DNF before 10/5/2024 10/5/24   Lisa Wells MD   oxyCODONE-acetaminophen (PERCOCET)  7.5-325 MG per tablet Take 1 tablet by mouth 3 (Three) Times a Day As Needed for Severe Pain. DNF before 11/5/2024 11/5/24   Lisa Wells MD   penicillin v potassium (VEETID) 500 MG tablet TAKE 1 TABLET BY MOUTH EVERY 6 HOURS UNTIL GONE    Ekaterina Moncada MD   pramipexole (MIRAPEX) 1 MG tablet Take 1 tablet by mouth 2 (Two) Times a Day. 11/13/22   Ekaterina Moncada MD   promethazine (PHENERGAN) 25 MG tablet Take 1 tablet by mouth As Needed. 9/22/19   Ekaterina Moncada MD   promethazine (PHENERGAN) 25 MG tablet Every 4 (Four) Hours.    Ekaterina Moncada MD   rosuvastatin (Crestor) 5 MG tablet Take 1 tablet by mouth Daily. 8/29/23 8/28/24  Gege Ford MD   vitamin D (ERGOCALCIFEROL) 1.25 MG (25521 UT) capsule capsule Take 1 capsule by mouth 1 (One) Time Per Week. 4/23/21   Ekaterina Moncada MD   oxyCODONE-acetaminophen (PERCOCET) 7.5-325 MG per tablet Take 1 tablet by mouth 3 (Three) Times a Day As Needed for Severe Pain. 7/7/24 9/3/24  Lisa Wells MD   oxyCODONE-acetaminophen (PERCOCET) 7.5-325 MG per tablet Take 1 tablet by mouth 3 (Three) Times a Day As Needed for Severe Pain. DNF before 8/6/2024 8/6/24 9/3/24  Lisa Wells MD        Objective / Physical Exam     Vital signs:  Temp: 98.3 °F (36.8 °C)  BP: 150/76  Heart Rate: 96  Resp: 16  SpO2: 91 %  Weight: 77.1 kg (170 lb)    Admission Weight: Weight: 77.1 kg (170 lb)    Physical Exam  Vitals and nursing note reviewed.   HENT:      Head: Normocephalic and atraumatic.   Eyes:      Extraocular Movements: Extraocular movements intact.      Pupils: Pupils are equal, round, and reactive to light.   Cardiovascular:      Rate and Rhythm: Normal rate and regular rhythm.      Pulses: Normal pulses.      Heart sounds: Normal heart sounds.   Pulmonary:      Effort: Pulmonary effort is normal.      Breath sounds: Normal breath sounds.   Abdominal:      General: Bowel sounds are normal.      Palpations: Abdomen is soft.       Tenderness: There is no abdominal tenderness.   Musculoskeletal:      Comments: All extremities with normal ROM other than right leg not assessed due to known right femur fracture  Right leg shorter than left   Skin:     General: Skin is warm and dry.   Neurological:      Mental Status: She is alert and oriented to person, place, and time.   Psychiatric:         Mood and Affect: Mood normal.         Behavior: Behavior normal.          Labs     Results from last 7 days   Lab Units 09/03/24  1647   WBC 10*3/mm3 9.38   HEMOGLOBIN g/dL 15.9   HEMATOCRIT % 49.5*   PLATELETS 10*3/mm3 203      Results from last 7 days   Lab Units 09/03/24  1647   ALK PHOS U/L 84   AST (SGOT) U/L 26   ALT (SGPT) U/L 21      Results from last 7 days   Lab Units 09/03/24  1647   PROTIME Seconds 11.2   INR  1.03   APTT seconds 30.4*      Results from last 7 days   Lab Units 09/03/24  1647   SODIUM mmol/L 138   POTASSIUM mmol/L 4.5   CHLORIDE mmol/L 98   CO2 mmol/L 23.2   BUN mg/dL 14   CREATININE mg/dL 0.94   GLUCOSE mg/dL 146*        Imaging     XR Hip With or Without Pelvis 2 - 3 View Right    Result Date: 9/3/2024  XR HIP W OR WO PELVIS 2-3 VIEW RIGHT, XR FEMUR 2 VW RIGHT Date of Exam: 9/3/2024 6:10 PM EDT Indication: Fall with right hip pain Comparison: None available. Findings: There is an acute mildly displaced, impacted, and angulated subcapital right femoral neck fracture.  There is surrounding soft tissue edema. Mild right hip osteoarthrosis. Severe tricompartmental right knee osteoarthrosis.     Impression: Acute displaced, impacted, and angulated subcapital right femoral neck fracture. Electronically Signed: Harpal Herrera  9/3/2024 6:37 PM EDT  Workstation ID: [id number]    XR Femur 2 View Right    Result Date: 9/3/2024  XR HIP W OR WO PELVIS 2-3 VIEW RIGHT, XR FEMUR 2 VW RIGHT Date of Exam: 9/3/2024 6:10 PM EDT Indication: Fall with right hip pain Comparison: None available. Findings: There is an acute mildly displaced, impacted,  and angulated subcapital right femoral neck fracture.  There is surrounding soft tissue edema. Mild right hip osteoarthrosis. Severe tricompartmental right knee osteoarthrosis.     Impression: Acute displaced, impacted, and angulated subcapital right femoral neck fracture. Electronically Signed: Harpal Herrera  9/3/2024 6:37 PM EDT  Workstation ID: [id number]        Current Medications     Scheduled Meds:  sodium chloride, 10 mL, Intravenous, Q12H           LYUDMILA Aguilar   [facility] Medicine  09/03/24   22:57 EDT

## 2024-09-05 ENCOUNTER — READMISSION MANAGEMENT (OUTPATIENT)
Dept: CALL CENTER | Facility: HOSPITAL | Age: 79
End: 2024-09-05
Payer: MEDICARE

## 2024-09-05 ENCOUNTER — DOCUMENTATION (OUTPATIENT)
Dept: HOME HEALTH SERVICES | Facility: HOME HEALTHCARE | Age: 79
End: 2024-09-05
Payer: MEDICARE

## 2024-09-05 ENCOUNTER — TRANSCRIBE ORDERS (OUTPATIENT)
Dept: HOME HEALTH SERVICES | Facility: HOME HEALTHCARE | Age: 79
End: 2024-09-05
Payer: MEDICARE

## 2024-09-05 ENCOUNTER — HOME HEALTH ADMISSION (OUTPATIENT)
Dept: HOME HEALTH SERVICES | Facility: HOME HEALTHCARE | Age: 79
End: 2024-09-05
Payer: MEDICARE

## 2024-09-05 VITALS
HEIGHT: 67 IN | WEIGHT: 170 LBS | SYSTOLIC BLOOD PRESSURE: 114 MMHG | RESPIRATION RATE: 16 BRPM | HEART RATE: 84 BPM | DIASTOLIC BLOOD PRESSURE: 66 MMHG | BODY MASS INDEX: 26.68 KG/M2 | OXYGEN SATURATION: 96 % | TEMPERATURE: 97.6 F

## 2024-09-05 DIAGNOSIS — Z96.641 S/P TOTAL RIGHT HIP ARTHROPLASTY: Primary | ICD-10-CM

## 2024-09-05 LAB
ANION GAP SERPL CALCULATED.3IONS-SCNC: 17.3 MMOL/L (ref 5–15)
BASOPHILS # BLD AUTO: 0.03 10*3/MM3 (ref 0–0.2)
BASOPHILS NFR BLD AUTO: 0.2 % (ref 0–1.5)
BUN SERPL-MCNC: 17 MG/DL (ref 8–23)
BUN/CREAT SERPL: 21 (ref 7–25)
CALCIUM SPEC-SCNC: 7 MG/DL (ref 8.6–10.5)
CHLORIDE SERPL-SCNC: 99 MMOL/L (ref 98–107)
CO2 SERPL-SCNC: 19.7 MMOL/L (ref 22–29)
CREAT SERPL-MCNC: 0.81 MG/DL (ref 0.57–1)
DEPRECATED RDW RBC AUTO: 44.6 FL (ref 37–54)
EGFRCR SERPLBLD CKD-EPI 2021: 74.9 ML/MIN/1.73
EOSINOPHIL # BLD AUTO: 0.01 10*3/MM3 (ref 0–0.4)
EOSINOPHIL NFR BLD AUTO: 0.1 % (ref 0.3–6.2)
ERYTHROCYTE [DISTWIDTH] IN BLOOD BY AUTOMATED COUNT: 13.2 % (ref 12.3–15.4)
GLUCOSE BLDC GLUCOMTR-MCNC: 195 MG/DL (ref 70–105)
GLUCOSE BLDC GLUCOMTR-MCNC: 234 MG/DL (ref 70–105)
GLUCOSE SERPL-MCNC: 200 MG/DL (ref 65–99)
HCT VFR BLD AUTO: 41.5 % (ref 34–46.6)
HGB BLD-MCNC: 13.1 G/DL (ref 12–15.9)
IMM GRANULOCYTES # BLD AUTO: 0.09 10*3/MM3 (ref 0–0.05)
IMM GRANULOCYTES NFR BLD AUTO: 0.6 % (ref 0–0.5)
LYMPHOCYTES # BLD AUTO: 0.78 10*3/MM3 (ref 0.7–3.1)
LYMPHOCYTES NFR BLD AUTO: 5.6 % (ref 19.6–45.3)
MCH RBC QN AUTO: 29.1 PG (ref 26.6–33)
MCHC RBC AUTO-ENTMCNC: 31.6 G/DL (ref 31.5–35.7)
MCV RBC AUTO: 92.2 FL (ref 79–97)
MONOCYTES # BLD AUTO: 0.52 10*3/MM3 (ref 0.1–0.9)
MONOCYTES NFR BLD AUTO: 3.7 % (ref 5–12)
NEUTROPHILS NFR BLD AUTO: 12.49 10*3/MM3 (ref 1.7–7)
NEUTROPHILS NFR BLD AUTO: 89.8 % (ref 42.7–76)
NRBC BLD AUTO-RTO: 0 /100 WBC (ref 0–0.2)
PLATELET # BLD AUTO: 133 10*3/MM3 (ref 140–450)
PMV BLD AUTO: 10.9 FL (ref 6–12)
POTASSIUM SERPL-SCNC: 4.7 MMOL/L (ref 3.5–5.2)
RBC # BLD AUTO: 4.5 10*6/MM3 (ref 3.77–5.28)
SODIUM SERPL-SCNC: 136 MMOL/L (ref 136–145)
WBC NRBC COR # BLD AUTO: 13.92 10*3/MM3 (ref 3.4–10.8)

## 2024-09-05 PROCEDURE — 80048 BASIC METABOLIC PNL TOTAL CA: CPT | Performed by: ORTHOPAEDIC SURGERY

## 2024-09-05 PROCEDURE — 97162 PT EVAL MOD COMPLEX 30 MIN: CPT

## 2024-09-05 PROCEDURE — 85025 COMPLETE CBC W/AUTO DIFF WBC: CPT | Performed by: ORTHOPAEDIC SURGERY

## 2024-09-05 PROCEDURE — 63710000001 PROMETHAZINE PER 25 MG: Performed by: ORTHOPAEDIC SURGERY

## 2024-09-05 PROCEDURE — 99232 SBSQ HOSP IP/OBS MODERATE 35: CPT | Performed by: INTERNAL MEDICINE

## 2024-09-05 PROCEDURE — 25010000002 CEFAZOLIN PER 500 MG: Performed by: ORTHOPAEDIC SURGERY

## 2024-09-05 PROCEDURE — 63710000001 INSULIN LISPRO (HUMAN) PER 5 UNITS

## 2024-09-05 PROCEDURE — 97110 THERAPEUTIC EXERCISES: CPT

## 2024-09-05 PROCEDURE — 97166 OT EVAL MOD COMPLEX 45 MIN: CPT

## 2024-09-05 PROCEDURE — 82948 REAGENT STRIP/BLOOD GLUCOSE: CPT

## 2024-09-05 RX ORDER — OXYCODONE AND ACETAMINOPHEN 5; 325 MG/1; MG/1
1 TABLET ORAL EVERY 4 HOURS PRN
Qty: 18 TABLET | Refills: 0 | Status: SHIPPED | OUTPATIENT
Start: 2024-09-05 | End: 2024-09-08

## 2024-09-05 RX ADMIN — LISINOPRIL 20 MG: 20 TABLET ORAL at 08:07

## 2024-09-05 RX ADMIN — EMPAGLIFLOZIN 25 MG: 25 TABLET, FILM COATED ORAL at 08:07

## 2024-09-05 RX ADMIN — PREDNISOLONE ACETATE 1 DROP: 10 SUSPENSION/ DROPS OPHTHALMIC at 00:58

## 2024-09-05 RX ADMIN — OXYCODONE HYDROCHLORIDE 10 MG: 5 TABLET ORAL at 13:16

## 2024-09-05 RX ADMIN — ASPIRIN 81 MG CHEWABLE TABLET 81 MG: 81 TABLET CHEWABLE at 08:07

## 2024-09-05 RX ADMIN — INSULIN LISPRO 3 UNITS: 100 INJECTION, SOLUTION INTRAVENOUS; SUBCUTANEOUS at 08:20

## 2024-09-05 RX ADMIN — INSULIN LISPRO 2 UNITS: 100 INJECTION, SOLUTION INTRAVENOUS; SUBCUTANEOUS at 12:21

## 2024-09-05 RX ADMIN — SODIUM CHLORIDE 2000 MG: 900 INJECTION INTRAVENOUS at 00:57

## 2024-09-05 RX ADMIN — AMLODIPINE BESYLATE 5 MG: 5 TABLET ORAL at 08:07

## 2024-09-05 RX ADMIN — SODIUM CHLORIDE 2000 MG: 900 INJECTION INTRAVENOUS at 08:07

## 2024-09-05 RX ADMIN — CALCITRIOL CAPSULES 0.25 MCG 0.25 MCG: 0.25 CAPSULE ORAL at 08:07

## 2024-09-05 RX ADMIN — LEVOTHYROXINE SODIUM 125 MCG: 0.12 TABLET ORAL at 06:04

## 2024-09-05 RX ADMIN — PANTOPRAZOLE SODIUM 40 MG: 40 TABLET, DELAYED RELEASE ORAL at 06:04

## 2024-09-05 RX ADMIN — Medication 10 ML: at 08:07

## 2024-09-05 RX ADMIN — Medication 400 MG: at 08:07

## 2024-09-05 RX ADMIN — METOPROLOL TARTRATE 100 MG: 50 TABLET, FILM COATED ORAL at 08:07

## 2024-09-05 RX ADMIN — PREDNISOLONE ACETATE 1 DROP: 10 SUSPENSION/ DROPS OPHTHALMIC at 13:16

## 2024-09-05 RX ADMIN — ROSUVASTATIN CALCIUM 5 MG: 5 TABLET, COATED ORAL at 08:07

## 2024-09-05 RX ADMIN — PREDNISOLONE ACETATE 1 DROP: 10 SUSPENSION/ DROPS OPHTHALMIC at 07:00

## 2024-09-05 RX ADMIN — OXYCODONE HYDROCHLORIDE 10 MG: 5 TABLET ORAL at 06:04

## 2024-09-05 RX ADMIN — PRAMIPEXOLE DIHYDROCHLORIDE 1 MG: 1 TABLET ORAL at 08:07

## 2024-09-05 RX ADMIN — PROMETHAZINE HYDROCHLORIDE 25 MG: 25 TABLET ORAL at 06:04

## 2024-09-05 RX ADMIN — CLORAZEPATE DIPOTASSIUM 7.5 MG: 3.75 TABLET ORAL at 08:27

## 2024-09-05 NOTE — PERIOPERATIVE NURSING NOTE
Dr Lambert aware of patient's low BP. 500 cc bolus of NS ordered.     Pt daughter states pt BP was running low at pain clinic the day she fell. States she typically runs low, has been higher since fall d/t pain

## 2024-09-05 NOTE — PROGRESS NOTES
CARDIOLOGY PROGRESS NOTE:    Marilyn J Pumphrey  78 y.o.  female  1945  [id number]      Referring Provider: Hospitalist    Reason for follow-up: Post hip fracture     Patient Care Team:  Suma Campbell APRN as PCP - General  Suma Campbell APRN as PCP - Family Medicine  Heath Musa MD as Consulting Physician (Cardiology)    Subjective .  Patient doing well without any symptoms    Objective lying in bed comfortably     Review of Systems   Constitutional: Negative for malaise/fatigue.   Cardiovascular:  Negative for chest pain, dyspnea on exertion, leg swelling and palpitations.   Respiratory:  Negative for cough and shortness of breath.    Gastrointestinal:  Negative for abdominal pain, nausea and vomiting.   Neurological:  Negative for dizziness, focal weakness, headaches, light-headedness and numbness.   All other systems reviewed and are negative.      Allergies: Codeine    Scheduled Meds:amLODIPine, 5 mg, Oral, Q24H   And  lisinopril, 20 mg, Oral, Q24H  aspirin, 81 mg, Oral, BID  calcitriol, 0.25 mcg, Oral, BID  ceFAZolin, 2,000 mg, Intravenous, Q8H  clorazepate, 7.5 mg, Oral, BID  empagliflozin, 25 mg, Oral, Daily  escitalopram, 10 mg, Oral, Nightly  insulin glargine, 40 Units, Subcutaneous, Nightly  insulin lispro, 2-7 Units, Subcutaneous, 4x Daily With Meals & Nightly  levothyroxine, 125 mcg, Oral, Q AM  magnesium oxide, 400 mg, Oral, BID  metoprolol tartrate, 100 mg, Oral, Q12H  pantoprazole, 40 mg, Oral, Q AM  pramipexole, 1 mg, Oral, BID  prednisoLONE acetate, 1 drop, Right Eye, Q8H  rosuvastatin, 5 mg, Oral, Daily  sodium chloride, 10 mL, Intravenous, Q12H  [START ON 9/9/2024] vitamin D, 50,000 Units, Oral, Weekly      Continuous Infusions:lactated ringers, 9 mL/hr, Last Rate: 100 mL/hr (09/04/24 1740)      PRN Meds:.  acetaminophen    aluminum-magnesium hydroxide-simethicone    senna-docusate sodium **AND** polyethylene glycol **AND** bisacodyl **AND** bisacodyl    Calcium  "Replacement - Follow Nurse / BPA Driven Protocol    dextrose    dextrose    glucagon (human recombinant)    lactated ringers    Magnesium Standard Dose Replacement - Follow Nurse / BPA Driven Protocol    melatonin    metoclopramide    Morphine    Morphine    ondansetron ODT **OR** ondansetron    oxyCODONE    Phosphorus Replacement - Follow Nurse / BPA Driven Protocol    Potassium Replacement - Follow Nurse / BPA Driven Protocol    promethazine    promethazine    [COMPLETED] Insert Peripheral IV **AND** sodium chloride    sodium chloride    sodium chloride        VITAL SIGNS  Vitals:    09/05/24 0101 09/05/24 0409 09/05/24 0806 09/05/24 1144   BP: 116/61 132/73 127/61 114/66   BP Location: Right arm Right arm  Right arm   Patient Position: Lying Lying  Sitting   Pulse: 85 77 84    Resp: 16 16  16   Temp: 97.5 °F (36.4 °C)   97.6 °F (36.4 °C)   TempSrc: Oral   Oral   SpO2: 95%  96%    Weight:       Height:           Flowsheet Rows      Flowsheet Row First Filed Value   Admission Height 170.2 cm (67\") Documented at 09/03/2024 1624   Admission Weight 77.1 kg (170 lb) Documented at 09/03/2024 1624             TELEMETRY: Sinus rhythm    Physical Exam:  Constitutional:       Appearance: Well-developed.   Eyes:      General: No scleral icterus.     Conjunctiva/sclera: Conjunctivae normal.   HENT:      Head: Normocephalic and atraumatic.   Neck:      Vascular: No carotid bruit or JVD.   Pulmonary:      Effort: Pulmonary effort is normal.      Breath sounds: Normal breath sounds. No wheezing. No rales.   Cardiovascular:      Normal rate. Regular rhythm.   Pulses:     Intact distal pulses.   Abdominal:      General: Bowel sounds are normal.      Palpations: Abdomen is soft.   Musculoskeletal:      Cervical back: Normal range of motion and neck supple. Skin:     General: Skin is warm and dry.      Findings: No rash.   Neurological:      Mental Status: Alert.          Results Review:   I reviewed the patient's new clinical " results.  Lab Results (last 24 hours)       Procedure Component Value Units Date/Time    POC Glucose 4x Daily Before Meals & at Bedtime [[id number]]  (Abnormal) Collected: 09/05/24 1138    Specimen: Blood Updated: 09/05/24 1140     Glucose 195 mg/dL      Comment: Serial Number: [id number]:  [id number]       POC Glucose 4x Daily Before Meals & at Bedtime [[id number]]  (Abnormal) Collected: 09/05/24 0816    Specimen: Blood Updated: 09/05/24 0818     Glucose 234 mg/dL      Comment: Serial Number: [id number]:  [id number]       Basic Metabolic Panel [[id number]]  (Abnormal) Collected: 09/05/24 0104    Specimen: Blood from Arm, Right Updated: 09/05/24 0129     Glucose 200 mg/dL      BUN 17 mg/dL      Creatinine 0.81 mg/dL      Sodium 136 mmol/L      Potassium 4.7 mmol/L      Chloride 99 mmol/L      CO2 19.7 mmol/L      Calcium 7.0 mg/dL      BUN/Creatinine Ratio 21.0     Anion Gap 17.3 mmol/L      eGFR 74.9 mL/min/1.73     Narrative:      GFR Normal >60  Chronic Kidney Disease <60  Kidney Failure <15    The GFR formula is only valid for adults with stable renal function between ages 18 and 70.    CBC Auto Differential [[id number]]  (Abnormal) Collected: 09/05/24 0104    Specimen: Blood from Arm, Right Updated: 09/05/24 0114     WBC 13.92 10*3/mm3      RBC 4.50 10*6/mm3      Hemoglobin 13.1 g/dL      Comment: Result checked          Hematocrit 41.5 %      MCV 92.2 fL      MCH 29.1 pg      MCHC 31.6 g/dL      RDW 13.2 %      RDW-SD 44.6 fl      MPV 10.9 fL      Platelets 133 10*3/mm3      Neutrophil % 89.8 %      Lymphocyte % 5.6 %      Monocyte % 3.7 %      Eosinophil % 0.1 %      Basophil % 0.2 %      Immature Grans % 0.6 %      Neutrophils, Absolute 12.49 10*3/mm3      Lymphocytes, Absolute 0.78 10*3/mm3      Monocytes, Absolute 0.52 10*3/mm3      Eosinophils, Absolute 0.01 10*3/mm3      Basophils, Absolute 0.03 10*3/mm3      Immature Grans, Absolute 0.09 10*3/mm3      nRBC 0.0 /100 WBC     POC Glucose Once  [[id number]]  (Abnormal) Collected: 09/04/24 2326    Specimen: Blood Updated: 09/04/24 2328     Glucose 180 mg/dL      Comment: Serial Number: [id number]:  [id number]       POC Glucose Once [[id number]]  (Abnormal) Collected: 09/04/24 1933    Specimen: Blood Updated: 09/04/24 1936     Glucose 126 mg/dL      Comment: Serial Number: [id number]:  [id number]       POC Glucose Once [[id number]]  (Normal) Collected: 09/04/24 1715    Specimen: Blood Updated: 09/04/24 1753     Glucose 81 mg/dL      Comment: Serial Number: [id number]:  [id number]       High Sensitivity Troponin T 2Hr [[id number]]  (Abnormal) Collected: 09/04/24 1325    Specimen: Blood Updated: 09/04/24 1407     HS Troponin T 16 ng/L      Troponin T Delta 5 ng/L     Narrative:      High Sensitive Troponin T Reference Range:  <14.0 ng/L- Negative Female for AMI  <22.0 ng/L- Negative Male for AMI  >=14 - Abnormal Female indicating possible myocardial injury.  >=22 - Abnormal Male indicating possible myocardial injury.   Clinicians would have to utilize clinical acumen, EKG, Troponin, and serial changes to determine if it is an Acute Myocardial Infarction or myocardial injury due to an underlying chronic condition.                 Imaging Results (Last 24 Hours)       Procedure Component Value Units Date/Time    XR Hip With or Without Pelvis 2 - 3 View Right [[id number]] Collected: 09/04/24 1949     Updated: 09/04/24 1952    Narrative:      XR HIP W OR WO PELVIS 2-3 VIEW RIGHT    Date of Exam: 9/4/2024 7:47 PM EDT    Indication: postop    Comparison: Right hip radiograph 9/3/2024    Findings:  Anatomic alignment of the right hip status post hemiarthroplasty. No visualized periprosthetic fracture. Expected soft tissue gas and swelling noted in the recent postoperative setting.      Impression:      Impression:  Expected changes of recent right hip arthroplasty.      Electronically Signed: Alec Miles MD    9/4/2024 7:50 PM EDT    Workstation ID:  [id number]    FL C Arm During Surgery [[id number]] Resulted: 09/04/24 1855     Updated: 09/04/24 1855    Narrative:      This procedure was auto-finalized with no dictation required.    XR Hip 1 View Without Pelvis Right (Surgery Only) [[id number]] Resulted: 09/04/24 1853     Updated: 09/04/24 1854            EKG      I personally viewed and interpreted the patient's EKG/Telemetry data:    ECHOCARDIOGRAM:  Results for orders placed during the hospital encounter of 05/11/22    Adult Transthoracic Echo Complete W/ Cont if Necessary Per Protocol (With Agitated Saline)    Interpretation Summary  · Left ventricular ejection fraction appears to be 61 - 65%.  · Saline test results are negative.  · No pericardial effusion noted       STRESS MYOVIEW:  Results for orders placed during the hospital encounter of 12/04/20    Stress Test With Myocardial Perfusion One Day    Interpretation Summary  · Left ventricular ejection fraction is normal. (Calculated EF = 58%).  · Myocardial perfusion imaging indicates a normal myocardial perfusion study with no evidence of ischemia.  · Impressions are consistent with a low risk study.       CARDIAC CATHETERIZATION:  No results found for this or any previous visit.       OTHER:         Assessment & Plan     Principal Problem:    Femur fracture, right  Active Problems:    Chronic pain    Rheumatoid arthritis    Depression    Diabetes 1.5, managed as type 1    History of pulmonary embolism    Mixed hyperlipidemia    Hypertension, benign    Hypothyroidism, postsurgical    Restless legs syndrome (RLS)    Type 2 diabetes mellitus with hyperglycemia, with long-term current use of insulin    BENITEZ (iron deficiency anemia)    Fall       Status post fall and hip fracture  Patient presented with fall and hip fracture and does not have any other symptoms  Patient has an EKG which looks normal rhythm  Patient had an echocardiogram and a stress moistly in the past which did not show any ischemia  Patient does  not have any symptoms and hence she does not need any further cardiac workup and is cleared for surgery with low risk.  Patient underwent hip surgery without any complications and is doing well.     Diabetes  Patient is on sliding scale insulin for now     Hypertension  Patient blood pressure is currently stable but is on home medicines.     *Hyperlipidemia  Patient is currently on statins and the lipid levels are well within normal limits.    History of pulmonary embolism  Patient will be managed by the primary care doctor only    I discussed the patients findings and my recommendations with patient and nurse    Heath Musa MD  09/05/24  13:37 EDT

## 2024-09-05 NOTE — DISCHARGE SUMMARY
[facility] Services  Discharge Summary    Date of Service: 2024  Patient Name: Marilyn J Pumphrey  : 1945  MRN: [id number]    Date of Admission: 9/3/2024  Discharge Diagnosis: Right femoral neck fracture s/p total hip arthroplasty  Date of Discharge: 2024  Primary Care Physician: Suma Campbell APRN      Presenting Problem:   Femur fracture, right [S72.91XA]  Closed fracture of neck of right femur, initial encounter [S72.001A]  Fall, initial encounter [W19.XXXA]    Active and Resolved Hospital Problems:  Active Hospital Problems    Diagnosis POA    **Femur fracture, right [S72.91XA] Yes    Fall [W19.XXXA] Yes    BENITEZ (iron deficiency anemia) [D50.9] Yes    Depression [F32.A] Yes    Rheumatoid arthritis [M06.9] Yes    Diabetes 1.5, managed as type 1 [E13.9] Yes    History of pulmonary embolism [Z86.711] Yes    Restless legs syndrome (RLS) [G25.81] Yes    Chronic pain [G89.29] Yes    Hypothyroidism, postsurgical [E89.0] Yes    Mixed hyperlipidemia [E78.2] Yes    Hypertension, benign [I10] Yes    Type 2 diabetes mellitus with hyperglycemia, with long-term current use of insulin [E11.65, Z79.4] Not Applicable      Resolved Hospital Problems   No resolved problems to display.         Hospital Course     HPI:  Marilyn J Pumphrey is a 78 y.o. female with PMH of polyarthralgia, chronic pain, DM type II, thyroid CA s/p thyroidectomy now treated for hypothyroidism, HTN, XAVI, RLS, H/o PE who presented to [facility] ED 9/3/2024 after a fall. She stated she was wheeling her groceries in her house with a wagon when the wheel became stuck on the sidewalk causing her to fall onto her right side. She was unable to stand. She has constant pain in her right lateral hip and her right groin. The pain is a sharp, stabbing pain with any movement. She denied any recent illness. She is supposed to see ortho Dr. Bradford on  to scheduled a right knee replacement. She has had a prior left knee  replacement.     Hospital Course:    Right femur fracture s/p total hip arthroplasty  Ground-level fall  - XR femur right and XR hip pelvis right showed acute displaced, impacted, and angulated subcapital right femoral neck fracture  - ortho consulted and patient is postop day 1 and doing very well.  Patient is cleared from orthopedic standpoint to go home.  - PT worked with patient and recommend home with PT  - As needed pain control  -A walker has been ordered     Hypertension  Hyperlipidemia  - cont home norvasc/benazepril, statin     DM type II  - glucose 146  - cont home jardiance and lantus when verified   - SSI q6 hours     Chronic pain   Polyarthralgia  - treating fracture pain at this time    Depression  - cont home lexapro     GERD  - cont home ppi     BENITEZ  - cont home ferrous sulfate     H/o thyroid CA s/p thyroidectomy   - cont home synthroid      RLS  - cont home mirapex        DISCHARGE Follow Up Recommendations for labs and diagnostics: Orthopedic surgery and PCP        Day of Discharge     Vital Signs:  Temp:  [97.5 °F (36.4 °C)-98.3 °F (36.8 °C)] 97.5 °F (36.4 °C)  Heart Rate:  [] 84  Resp:  [8-19] 16  BP: ()/(40-86) 127/61  Flow (L/min):  [2-7] 3    Physical Exam:  Physical Exam Vitals and nursing note reviewed.   HENT:      Head: Normocephalic and atraumatic.   Eyes:      Extraocular Movements: Extraocular movements intact.      Pupils: Pupils are equal, round, and reactive to light.   Cardiovascular:      Rate and Rhythm: Normal rate and regular rhythm.      Pulses: Normal pulses.      Heart sounds: Normal heart sounds.   Pulmonary:      Effort: Pulmonary effort is normal.      Breath sounds: Normal breath sounds.   Abdominal:      General: Bowel sounds are normal.      Palpations: Abdomen is soft.      Tenderness: There is no abdominal tenderness.   Musculoskeletal:      Comments: All extremities with normal ROM, right hip with slight restricted mobility secondary to recent fracture  and surgery.  Skin:     General: Skin is warm and dry.   Neurological:      Mental Status: She is alert and oriented to person, place, and time.   Psychiatric:         Mood and Affect: Mood normal.         Behavior: Behavior normal.       Pertinent  and/or Most Recent Results     LAB RESULTS:      Lab 09/05/24  0104 09/04/24  0121 09/03/24  1647   WBC 13.92* 12.85* 9.38   HEMOGLOBIN 13.1 16.2* 15.9   HEMATOCRIT 41.5 49.3* 49.5*   PLATELETS 133* 172 203   NEUTROS ABS 12.49* 9.42* 6.33   IMMATURE GRANS (ABS) 0.09* 0.08* 0.05   LYMPHS ABS 0.78 1.78 1.87   MONOS ABS 0.52 1.31* 0.85   EOS ABS 0.01 0.20 0.24   MCV 92.2 88.2 87.0   PROTIME  --   --  11.2   APTT  --   --  30.4*         Lab 09/05/24  0104 09/04/24  0121 09/03/24  1647   SODIUM 136 139 138   POTASSIUM 4.7 4.0 4.5   CHLORIDE 99 99 98   CO2 19.7* 26.9 23.2   ANION GAP 17.3* 13.1 16.8*   BUN 17 13 14   CREATININE 0.81 0.83 0.94   EGFR 74.9 72.7 62.6   GLUCOSE 200* 150* 146*   CALCIUM 7.0* 8.1* 8.6         Lab 09/03/24  1647   TOTAL PROTEIN 7.3   ALBUMIN 4.4   GLOBULIN 2.9   ALT (SGPT) 21   AST (SGOT) 26   BILIRUBIN 0.6   ALK PHOS 84         Lab 09/04/24  1325 09/04/24  1032 09/03/24  1647   HSTROP T 16* 11  --    PROTIME  --   --  11.2   INR  --   --  1.03             Lab 09/03/24  1748   ABO TYPING O   RH TYPING Negative   ANTIBODY SCREEN Negative         Brief Urine Lab Results       None          Microbiology Results (last 10 days)       ** No results found for the last 240 hours. **            XR Hip With or Without Pelvis 2 - 3 View Right    Result Date: 9/4/2024  Impression: Impression: Expected changes of recent right hip arthroplasty. Electronically Signed: Alec Miles MD  9/4/2024 7:50 PM EDT  Workstation ID: [id number]    XR Shoulder 1 View Left    Result Date: 9/4/2024  Impression: Impression: No acute left shoulder findings. Mild degenerative change of the left shoulder. Electronically Signed: Halima Harding MD  9/4/2024 9:39 AM EDT  Workstation  ID: [id number]    XR Humerus Left    Result Date: 9/4/2024  Impression: Impression: No acute abnormality of the left humerus. Electronically Signed: Halima Harding MD  9/4/2024 9:38 AM EDT  Workstation ID: [id number]    XR Chest 1 View    Result Date: 9/4/2024  Impression: Impression: No acute cardiopulmonary findings. Electronically Signed: Halima Harding MD  9/4/2024 9:35 AM EDT  Workstation ID: [id number]    XR Hip With or Without Pelvis 2 - 3 View Right    Result Date: 9/3/2024  Impression: Impression: Acute displaced, impacted, and angulated subcapital right femoral neck fracture. Electronically Signed: Harpal Herrera  9/3/2024 6:37 PM EDT  Workstation ID: [id number]    XR Femur 2 View Right    Result Date: 9/3/2024  Impression: Impression: Acute displaced, impacted, and angulated subcapital right femoral neck fracture. Electronically Signed: Harpal Herrera  9/3/2024 6:37 PM EDT  Workstation ID: [id number]     Results for orders placed during the hospital encounter of 11/06/23    Doppler Ankle Brachial Index Single Level CAR    Interpretation Summary    Right Conclusion: The right DRAKE is normal. Normal digital pressures.    Left Conclusion: The left DRAKE is normal. Normal digital pressures.      Results for orders placed during the hospital encounter of 11/06/23    Doppler Ankle Brachial Index Single Level CAR    Interpretation Summary    Right Conclusion: The right DRAKE is normal. Normal digital pressures.    Left Conclusion: The left DRAKE is normal. Normal digital pressures.      Results for orders placed during the hospital encounter of 05/11/22    Adult Transthoracic Echo Complete W/ Cont if Necessary Per Protocol (With Agitated Saline)    Interpretation Summary  · Left ventricular ejection fraction appears to be 61 - 65%.  · Saline test results are negative.  · No pericardial effusion noted      Labs Pending at Discharge:      Procedures Performed  Procedure(s):  TOTAL HIP ARTHROPLASTY ANTERIOR         Consults:    Consults       Date and Time Order Name Status Description    9/4/2024  2:10 PM Inpatient Cardiology Consult Completed     9/3/2024  6:40 PM Ortho (on-call MD unless specified) Completed     9/3/2024  6:40 PM Hospitalist (on-call MD unless specified)                Discharge Details        Discharge Medications        New Medications        Instructions Start Date   naloxone 4 MG/0.1ML nasal spray  Commonly known as: NARCAN   Call 911. Don't prime. [place] in 1 nostril for overdose. Repeat in 2-3 minutes in other nostril if no or minimal breathing/responsiveness.      oxyCODONE-acetaminophen 5-325 MG per tablet  Commonly known as: Percocet  Replaces: oxyCODONE-acetaminophen 7.5-325 MG per tablet   1 tablet, Oral, Every 4 Hours PRN             Changes to Medications        Instructions Start Date   aspirin 81 MG tablet  What changed: how much to take   81 mg, Oral, Daily      insulin aspart 100 UNIT/ML solution pen-injector sc pen  Commonly known as: NovoLOG FlexPen  What changed: how much to take   Inject 10 units before each meal      insulin glargine 100 UNIT/ML injection  Commonly known as: LANT SEMGLEE  What changed:   how much to take  when to take this   30 Units, Subcutaneous, Daily      levothyroxine 112 MCG tablet  Commonly known as: SYNTHROID, LEVOTHROID  What changed:   how much to take  how to take this  when to take this   Take 1 tablet p.o. daily             Continue These Medications        Instructions Start Date   amLODIPine-benazepril 5-20 MG per capsule  Commonly known as: LOTREL 5-20   1 capsule, Oral, Daily      calcitriol 0.25 MCG capsule  Commonly known as: ROCALTROL   0.25 mcg, Oral, 2 Times Daily      CALCIUM CARBONATE-VIT D-MIN PO   1,200 mg, Oral, 2 Times Daily      clorazepate 7.5 MG tablet  Commonly known as: TRANXENE   7.5 mg, Oral, 2 Times Daily      diclofenac 1 % gel gel  Commonly known as: VOLTAREN   4 g, Topical, 4 Times Daily PRN      Diclofenac Sodium 1 % gel gel  Commonly  known as: VOLTAREN   4 g, Topical      empagliflozin 25 MG tablet tablet  Commonly known as: Jardiance   25 mg, Oral, Daily      escitalopram 10 MG tablet  Commonly known as: LEXAPRO   10 mg, Oral, Every Evening      esomeprazole 40 MG capsule  Commonly known as: nexIUM   40 mg, Oral, 2 Times Daily      Lotemax 0.5 % gel ophthalmic gel  Generic drug: loteprednol etabonate   INSTILL ONE DROP DAILY INTO THE RIGHT EYE AND ONE DROP THREE TIMES A DAY INTO THE LEFT EYE      magnesium oxide 400 tablet tablet  Commonly known as: MAG-OX   400 mg, Oral, 2 Times Daily      metFORMIN 1000 MG tablet  Commonly known as: Glucophage   1,000 mg, Oral, 2 Times Daily With Meals      metoprolol tartrate 100 MG tablet  Commonly known as: LOPRESSOR   100 mg, Oral, 2 Times Daily      pramipexole 1 MG tablet  Commonly known as: MIRAPEX   1 mg, Oral, 2 Times Daily      promethazine 25 MG tablet  Commonly known as: PHENERGAN   Every 4 Hours      promethazine 25 MG tablet  Commonly known as: PHENERGAN   1 tablet, Oral, As Needed      rosuvastatin 5 MG tablet  Commonly known as: Crestor   5 mg, Oral, Daily      vitamin D 1.25 MG (86592 UT) capsule capsule  Commonly known as: ERGOCALCIFEROL   50,000 Units, Oral, Weekly             Stop These Medications      oxyCODONE-acetaminophen 7.5-325 MG per tablet  Commonly known as: PERCOCET  Replaced by: oxyCODONE-acetaminophen 5-325 MG per tablet              Allergies   Allergen Reactions    Codeine Shortness Of Breath         Discharge Disposition: Home with PT  [facility]-Health [place] [facility]    Diet:  Hospital:  Diet Order   Procedures    Diet: Diabetic; Consistent Carbohydrate; Fluid Consistency: Thin (IDDSI 0)         Discharge Activity:         CODE STATUS:  Code Status and Medical Interventions: CPR (Attempt to Resuscitate); Full Support   Ordered at: 09/03/24 2011     Level Of Support Discussed With:    Patient     Code Status (Patient has no pulse and is not breathing):    CPR (Attempt to Resuscitate)      Medical Interventions (Patient has pulse or is breathing):    Full Support         Future Appointments   Date Time Provider Department Center   12/3/2024  1:10 PM Lisa Wells MD [facility]           Time spent on Discharge including face to face service:  >30 minutes    Signature: Electronically signed by Oleg Nazario MD, 09/05/24, 11:05 EDT.  [facility] Hospitalist Team

## 2024-09-05 NOTE — CASE MANAGEMENT/SOCIAL WORK
Continued Stay Note  [facility]     Patient Name: Marilyn J Pumphrey  MRN: [id number]  Today's Date: 9/5/2024    Admit Date: 9/3/2024    Plan: Home with [facility]. Family can transport.   Discharge Plan       Row Name 09/05/24 1453       Plan    Plan Home with [facility]. Family can transport.    Plan Comments Patient will discharge home with [facility].  Patient received Rolling walker from [facility].  Family will transport at discharge                 Expected Discharge Date and Time       Expected Discharge Date Expected Discharge Time    Sep 5, 2024         Lyubov Gamble RN    [facility]  [email or web address]  Office [phone]  Cell [phone]

## 2024-09-05 NOTE — PLAN OF CARE
Goal Outcome Evaluation:                   Patient arrived from PACU alert but drowsy. Patient's family was at bedside. She was able to eat dinner and denied any pain and requested to take her Lantus after eating dinner. She is alert and oriented X4, no complaints or concerns at this time. She is resting with call light in reach.

## 2024-09-05 NOTE — PLAN OF CARE
Goal Outcome Evaluation:                      Patient has been able to ambulate to the bathroom with a walker and a gait belt with a stand by assist. She is up to the bedside chair and has been up to the bathroom. She is tolerating food, call light is in reach. Pain remains in the left shoulder area.

## 2024-09-05 NOTE — PLAN OF CARE
Goal Outcome Evaluation:              Outcome Evaluation: Pt did great with PT and pain control, going to discharge home today with walker and will have sister with her.

## 2024-09-05 NOTE — ANESTHESIA POSTPROCEDURE EVALUATION
Patient: Marilyn J Pumphrey    Procedure Summary       Date: 09/04/24 Room / [facility] OR [facility] MA[facility]    Anesthesia Start: 1742 Anesthesia Stop: 1924    Procedure: TOTAL HIP ARTHROPLASTY ANTERIOR (Right: Hip) Diagnosis:       Fall, initial encounter      (Fall, initial encounter [W19.XXXA])    Surgeons: Cyrus Martin II, MD Provider: Dickson Lambert MD    Anesthesia Type: general, ERAS Protocol ASA Status: 3            Anesthesia Type: general, ERAS Protocol    Vitals  Vitals Value Taken Time   /54 09/04/24 2117   Temp 98.2 °F (36.8 °C) 09/04/24 2117   Pulse 95 09/04/24 2117   Resp 15 09/04/24 2117   SpO2 94 % 09/04/24 2117           Post Anesthesia Care and Evaluation    Patient location during evaluation: PACU  Patient participation: complete - patient participated  Level of consciousness: awake  Pain scale: See nurse's notes for pain score.  Pain management: adequate    Airway patency: patent  Anesthetic complications: No anesthetic complications  PONV Status: none  Cardiovascular status: acceptable  Respiratory status: acceptable and spontaneous ventilation  Hydration status: acceptable    Comments: Patient seen and examined postoperatively; vital signs stable; SpO2 greater than or equal to 90%; cardiopulmonary status stable; nausea/vomiting adequately controlled; pain adequately controlled; no apparent anesthesia complications; patient discharged from anesthesia care when discharge criteria were met

## 2024-09-05 NOTE — THERAPY EVALUATION
Patient Name: Marilyn J Pumphrey  : 1945    MRN: [id number]                              Today's Date: 2024       Admit Date: 9/3/2024    Visit Dx:     ICD-10-CM ICD-9-CM   1. Fall, initial encounter  W19.XXXA E888.9   2. Closed fracture of neck of right femur, initial encounter  S72.001A 820.8     Patient Active Problem List   Diagnosis    Chronic pain    Other long term (current) drug therapy    Rheumatoid arthritis    Acute bronchitis    Arm pain, right    CAD (coronary artery disease)    Chest pain    Depression    Diabetes 1.5, managed as type 1    Edema of nasopharynx    FH: thromboembolic disease    Gastroesophageal reflux disease    History of pulmonary embolism    Mixed hyperlipidemia    Hypertension, benign    Hypokalemia    Hypothyroidism, postsurgical    Injury of costal cartilage    Malignant neoplasm of thyroid gland    XAVI (obstructive sleep apnea)    Osteoarthritis    Polyarthralgia    Postsurgical hypoparathyroidism    Restless legs syndrome (RLS)    Type 2 diabetes mellitus with hyperglycemia, with long-term current use of insulin    Type 2 diabetes mellitus without complications    Wrist sprain    Osteoarthritis of left knee    Sepsis    Impaired swallowing associated with throat pain    Status post left knee replacement    Abnormal LFTs    Hx of total knee arthroplasty    Acute respiratory failure with hypoxia    Atelectasis    Facial droop    Degeneration of lumbar intervertebral disc    Acute embolism and thrombosis of unspecified deep veins of unspecified lower extremity    Anemia    Anxiety disorder    B12 deficiency    Nova's esophagus    Benign neoplasm of transverse colon    Constipation    Nausea    Dvrtclos of lg int w/o perforation or abscess w/o bleeding    Diaphragmatic hernia without obstruction or gangrene    Epigastric pain    Family history of colonic polyps    History of colonic polyps    Hx of hiatal hernia    BENITEZ (iron deficiency anemia)    Iron deficiency  anemia due to chronic blood loss    Malabsorption of iron    Pain in right knee    Polypharmacy    Spondylolisthesis    Sprain and strain of wrist    Thyroid disease    Hypercholesterolemia    Hypertension    Endometriosis    Femur fracture, right    Fall     Past Medical History:   Diagnosis Date    Anemia, unspecified     Anxiety     Arthritis     Bone pain     Cancer 09/2016    THYROID    Depression     Disease of thyroid gland     DM type 2 (diabetes mellitus, type 2)     TYPE 2    Fall     2/2021 FELL ON ICE    GERD (gastroesophageal reflux disease)     Hypertension     Irregular heart beat     Knee pain     Leaky heart valve     11/2020    Leg cramps     Low back pain     Low iron     getting iron infusions 5/10/23    Numbness around mouth     5/12/2022-- was inpatient at [facility]-- ct ran and MRI-- no stroke    Osteoarthritis     Polypharmacy 04/20/2023    PONV (postoperative nausea and vomiting)     DOES WELL WITH PROPOFOL    Sleep apnea     C-PAP IN USE    UTI (urinary tract infection)      Past Surgical History:   Procedure Laterality Date    CARPAL TUNNEL RELEASE      CHOLECYSTECTOMY  1974    HYSTERECTOMY  1993    partial abdominal hysterectomy    OOPHORECTOMY Bilateral 2015    THYROID SURGERY      2 thyroid surgery    TONSILLECTOMY      TOTAL KNEE ARTHROPLASTY Left 10/14/2019    Procedure: TOTAL KNEE ARTHROPLASTY;  Surgeon: Sanjay Collado MD;  Location: [facility] OR;  Service: Orthopedics    VEIN LIGATION AND STRIPPING BILATERAL        General Information       Row Name 09/05/24 1330          OT Time and Intention    Document Type evaluation  -MS     Mode of Treatment occupational therapy  -MS       Row Name 09/05/24 0085          General Information    Patient Profile Reviewed yes  -MS     Prior Level of Function independent:;ADL's  -MS     Existing Precautions/Restrictions fall;hip, anterior;right  -MS     Barriers to Rehab medically complex  -MS       Row Name 09/05/24 8625          Occupational Profile     Reason for Services/Referral (Occupational Profile) Pt is a 79 y/o F admitted to [facility] 9/3/24 with RLE pain s/p mechanical fall. Pt anticipating R TKA by Dr. Bradford 9/18/24, pending plan of care. XR R femur (+) acute, displaced, impacted and angulated subcapital R femoral neck fx. XR L shoulder/L humerus (-) acute. POD#1 R anterior ZAKIYA by Dr. Martin. PMHx significant for RA, hx PE, HTN, DMII hx thyroid cancer, and anxiety. At baseline pt resides with sister and brother in law in [facility] with ramp entry. Pt typically (I) with ADLs and (I) with mobility with use of cane. Pt has accessible bathroom with bench.  -MS     Environmental Supports and Barriers (Occupational Profile) supportive family  -MS       Row Name 09/05/24 1333          Living Environment    People in Home sibling(s)  -MS       Row Name 09/05/24 1333          Home Main Entrance    Number of Stairs, Main Entrance none;other (see comments)  ramp entry  -MS       Row Name 09/05/24 1333          Stairs Within Home, Primary    Number of Stairs, Within Home, Primary none  -MS       Row Name 09/05/24 1333          Cognition    Orientation Status (Cognition) oriented x 4  -MS       Row Name 09/05/24 1333          Safety Issues, Functional Mobility    Impairments Affecting Function (Mobility) endurance/activity tolerance;strength  -MS               User Key  (r) = Recorded By, (t) = Taken By, (c) = Cosigned By      Initials Name Provider Type    MS Anna Chavez OT Occupational Therapist                     Mobility/ADL's       Row Name 09/05/24 1334          Bed Mobility    Bed Mobility bed mobility (all) activities  -MS     All Activities, Story (Bed Mobility) unable to assess  -MS     Comment, (Bed Mobility) sitting up in chair upon arrival  -MS       Row Name 09/05/24 1334          Transfers    Transfers sit-stand transfer;toilet transfer  -MS       Row Name 09/05/24 1334          Sit-Stand Transfer    Sit-Stand Story (Transfers) contact guard   -MS     Assistive Device (Sit-Stand Transfers) walker, front-wheeled  -MS       Row Name 09/05/24 1334          Toilet Transfer    Type (Toilet Transfer) sit-stand  -MS     [place] Level (Toilet Transfer) contact guard  -MS     Assistive Device (Toilet Transfer) commode;grab bars/safety frame;walker, front-wheeled  -MS       Row Name 09/05/24 1334          Functional Mobility    Patient was able to Ambulate yes  -MS       Row Name 09/05/24 1334          Activities of Daily Living    BADL Assessment/Intervention toileting  -MS       Row Name 09/05/24 1334          Mobility    Extremity Weight-bearing Status right lower extremity  -MS     Right Lower Extremity (Weight-bearing Status) weight-bearing as tolerated (WBAT)  -MS       Row Name 09/05/24 1334          Toileting Assessment/Training    [place] Level (Toileting) supervision  -MS     Assistive Devices (Toileting) commode;grab bar/safety frame  -MS     Position (Toileting) unsupported sitting  -MS               User Key  (r) = Recorded By, (t) = Taken By, (c) = Cosigned By      Initials Name Provider Type    MS Anna Chavez OT Occupational Therapist                   Obj/Interventions       Row Name 09/05/24 1335          Sensory Assessment (Somatosensory)    Sensory Assessment (Somatosensory) sensation intact  -MS       Row Name 09/05/24 1335          Vision Assessment/Intervention    Visual Impairment/Limitations WNL  -MS       Row Name 09/05/24 1335          Range of Motion Comprehensive    General Range of Motion bilateral upper extremity ROM WNL  -MS       Row Name 09/05/24 1335          Strength Comprehensive (MMT)    Comment, General Manual Muscle Testing (MMT) Assessment BUE grossly 4-/5  -MS       Row Name 09/05/24 1335          Balance    Balance Assessment sitting static balance;sitting dynamic balance;standing static balance;standing dynamic balance  -MS     Static Sitting Balance modified independence  -MS     Dynamic Sitting Balance  modified independence  -MS     Position, Sitting Balance supported;sitting in chair  -MS     Static Standing Balance supervision  -MS     Dynamic Standing Balance standby assist  -MS     Position/Device Used, Standing Balance supported;walker, front-wheeled  -MS               User Key  (r) = Recorded By, (t) = Taken By, (c) = Cosigned By      Initials Name Provider Type    Anna Mcnulty, OT Occupational Therapist                   Goals/Plan       Row Name 09/05/24 1353          Dressing Goal 1 (OT)    Activity/Device (Dressing Goal 1, OT) dressing skills, all  -MS     [place]/Cues Needed (Dressing Goal 1, OT) modified independence  -MS     Time Frame (Dressing Goal 1, OT) long term goal (LTG);2 weeks  -MS     Progress/Outcome (Dressing Goal 1, OT) new goal  -MS       Row Name 09/05/24 1353          Toileting Goal 1 (OT)    Activity/Device (Toileting Goal 1, OT) toileting skills, all  -MS     [place] Level/Cues Needed (Toileting Goal 1, OT) modified independence  -MS     Time Frame (Toileting Goal 1, OT) long term goal (LTG);2 weeks  -MS     Progress/Outcome (Toileting Goal 1, OT) new goal  -MS       Row Name 09/05/24 1353          Problem Specific Goal 1 (OT)    Problem Specific Goal 1 (OT) increase standing tolerance needed for ADL routine >5 minutes without rest break  -MS     Time Frame (Problem Specific Goal 1, OT) long term goal (LTG);2 weeks  -MS     Progress/Outcome (Problem Specific Goal 1, OT) new goal  -MS       Row Name 09/05/24 1356          Therapy Assessment/Plan (OT)    Planned Therapy Interventions (OT) activity tolerance training;adaptive equipment training;BADL retraining;functional balance retraining;IADL retraining;neuromuscular control/coordination retraining;occupation/activity based interventions;passive ROM/stretching;patient/caregiver education/training;ROM/therapeutic exercise;strengthening exercise;transfer/mobility retraining  -MS               User Key  (r) = Recorded By,  (t) = Taken By, (c) = Cosigned By      Initials Name Provider Type    MS Anna Chavez, OT Occupational Therapist                   Clinical Impression       Row Name 09/05/24 4157          Pain Assessment    Pretreatment Pain Rating 2/10  -MS     Posttreatment Pain Rating 2/10  -MS     Pain Location - Side/Orientation Left  -MS     Pain Location - shoulder  -MS     Pain Intervention(s) Repositioned  -MS       Row Name 09/05/24 0150          Plan of Care Review    Plan of Care Reviewed With patient  -MS     Progress no change  -MS     Outcome Evaluation Pt is a 77 y/o F admitted to [facility] 9/3/24 with RLE pain s/p mechanical fall. POD#1 R anterior ZAKIYA by Dr. Martin. At baseline pt resides with sister and brother in law in [facility] with ramp entry. Pt typically (I) with ADLs and (I) with mobility with use of cane. Pt has accessible bathroom with bench. This date pt A&Ox4 on RA sitting up in chair upon arrival. Pt c/o 2/10 pain in L shoulder, minimal pain in R hip. Pt comes to standing from chair with CGA and ambulates to bathroom with CGA with RW, demo good use RW to offload weight through RLE. Pt completes toileting task with supervision. Pt likely to require assist with ADL routine, however anticipate pt to be safe to dc home with family assist and HHOT. OT will follow within acute setting.  -MS       Row Name 09/05/24 1269          Therapy Assessment/Plan (OT)    Rehab Potential (OT) good, to achieve stated therapy goals  -MS     Criteria for Skilled Therapeutic Interventions Met (OT) meets criteria;yes;skilled treatment is necessary  -MS     Therapy Frequency (OT) 5 times/wk  -MS     Predicted Duration of Therapy Intervention (OT) until d/c  -MS       Row Name 09/05/24 4452          Therapy Plan Review/Discharge Plan (OT)    Anticipated Discharge Disposition (OT) home with assist;home with home health  -MS       Row Name 09/05/24 1338          Vital Signs    O2 Delivery Pre Treatment room air  -MS     O2 Delivery Intra  Treatment room air  -MS     O2 Delivery Post Treatment room air  -MS     Pre Patient Position Sitting  -MS     Intra Patient Position Standing  -MS     Post Patient Position Sitting  -MS       Row Name 09/05/24 1337          Positioning and Restraints    Pre-Treatment Position sitting in chair/recliner  -MS     Post Treatment Position chair  -MS     In Chair notified nsg;reclined;call light within reach;encouraged to call for assist;exit alarm on;legs elevated  -MS               User Key  (r) = Recorded By, (t) = Taken By, (c) = Cosigned By      Initials Name Provider Type    MS ChavezAnna, OT Occupational Therapist                   Outcome Measures       Row Name 09/05/24 1353          How much help from another is currently needed...    Putting on and taking off regular lower body clothing? 3  -MS     Bathing (including washing, rinsing, and drying) 3  -MS     Toileting (which includes using toilet bed pan or urinal) 3  -MS     Putting on and taking off regular upper body clothing 4  -MS     Taking care of personal grooming (such as brushing teeth) 4  -MS     Eating meals 4  -MS     AM-PAC 6 Clicks Score (OT) 21  -MS       Row Name 09/05/24 1156 09/05/24 0807       How much help from another person do you currently need...    Turning from your back to your side while in flat bed without using bedrails? 4  -BR 4  -LB    Moving from lying on back to sitting on the side of a flat bed without bedrails? 4  -BR 3  -LB    Moving to and from a bed to a chair (including a wheelchair)? 3  -BR 3  -LB    Standing up from a chair using your arms (e.g., wheelchair, bedside chair)? 4  -BR 3  -LB    Climbing 3-5 steps with a railing? 3  -BR 3  -LB    To walk in hospital room? 3  -BR 3  -LB    AM-PAC 6 Clicks Score (PT) 21  -BR 19  -LB    Highest Level of Mobility Goal 6 --> Walk 10 steps or more  -BR 6 --> Walk 10 steps or more  -LB      Row Name 09/05/24 1353 09/05/24 1156       Functional Assessment    Outcome Measure  Options AM-PAC 6 Clicks Daily Activity (OT)  -MS AM-PAC 6 Clicks Basic Mobility (PT)  -BR              User Key  (r) = Recorded By, (t) = Taken By, (c) = Cosigned By      Initials Name Provider Type    Belle Weaver, RN Registered Nurse    Anna Mcnulty, OT Occupational Therapist    BR Amanda Silva, PT Physical Therapist                    Occupational Therapy Education       Title: PT OT SLP Therapies (Done)       Topic: Occupational Therapy (Done)       Point: ADL training (Done)       Description:   Instruct learner(s) on proper safety adaptation and remediation techniques during self care or transfers.   Instruct in proper use of assistive devices.                  Learning Progress Summary             Patient Acceptance, E,TB, VU by MS at 9/5/2024 1354                         Point: Precautions (Done)       Description:   Instruct learner(s) on prescribed precautions during self-care and functional transfers.                  Learning Progress Summary             Patient Acceptance, E,TB, VU by MS at 9/5/2024 1354                         Point: Body mechanics (Done)       Description:   Instruct learner(s) on proper positioning and spine alignment during self-care, functional mobility activities and/or exercises.                  Learning Progress Summary             Patient Acceptance, E,TB, VU by MS at 9/5/2024 1354                                         User Key       Initials Effective Dates Name Provider Type Discipline    MS 07/13/22 -  Anna Chavez, OT Occupational Therapist OT                  OT Recommendation and Plan  Planned Therapy Interventions (OT): activity tolerance training, adaptive equipment training, BADL retraining, functional balance retraining, IADL retraining, neuromuscular control/coordination retraining, occupation/activity based interventions, passive ROM/stretching, patient/caregiver education/training, ROM/therapeutic exercise, strengthening exercise,  transfer/mobility retraining  Therapy Frequency (OT): 5 times/wk  Plan of Care Review  Plan of Care Reviewed With: patient  Progress: no change  Outcome Evaluation: Pt is a 77 y/o F admitted to [facility] 9/3/24 with RLE pain s/p mechanical fall. POD#1 R anterior ZAKIYA by Dr. Martin. At baseline pt resides with sister and brother in law in [facility] with ramp entry. Pt typically (I) with ADLs and (I) with mobility with use of cane. Pt has accessible bathroom with bench. This date pt A&Ox4 on RA sitting up in chair upon arrival. Pt c/o 2/10 pain in L shoulder, minimal pain in R hip. Pt comes to standing from chair with CGA and ambulates to bathroom with CGA with RW, demo good use RW to offload weight through RLE. Pt completes toileting task with supervision. Pt likely to require assist with ADL routine, however anticipate pt to be safe to dc home with family assist and HHOT. OT will follow within acute setting.     Time Calculation:                   Anna Chavez OT  9/5/2024

## 2024-09-05 NOTE — CASE MANAGEMENT/SOCIAL WORK
Case Management Discharge Note      Final Note: HOME WITH [facility]    Provided Post Acute Provider List?: Yes  Post Acute Provider List: Home Health  Delivered To: Patient, Support Person  Support Person: daughter  Method of Delivery: In person    Selected Continued Care - Discharged on 9/5/2024 Admission date: 9/3/2024 - Discharge disposition: Home-Health Care Svc                 Durable Medical Equipment Coordination complete.      Service Provider Selected Services Address Phone Fax Patient Preferred    [facility] [address] [address] [phone] --                Home Medical Care Coordination complete.      Service Provider Selected Services Address Phone Fax Patient Preferred    formerly [facility] [address] [facility] [address] [phone] --                    Transportation Services  Private: Car    Final Discharge Disposition Code: 06 - home with home health care

## 2024-09-05 NOTE — PLAN OF CARE
Goal Outcome Evaluation:  Plan of Care Reviewed With: patient   Marilyn Pumphrey is a 78 y.o. female with PMH of polyarthralgia, chronic pain, DM type II, thyroid CA s/p thyroidectomy now treated for hypothyroidism, hx  L TKA, HTN, XAVI, RLS, H/o PE who presented to [facility] ED 9/3/2024 after a fall. She stated she was wheeling her groceries in her house with a wagon when the wheel became stuck on the sidewalk causing her to fall onto her right side. X-ray R hip: Acute displaced, impacted, and angulated subcapital right femoral neck fracture. X-ray L shoulder (-). CXR (-). Pt is POD #1 for R cemented anterior ZAKIYA per Dr. Martin. Pt is WBAT RLE. Pt has left shoulder pain as her primary complaint. Pt A and O x 4. At baseline, pt lives with her sister in [facility] with ramp entry and pt uses a cane at baseline. Pt had a R TKA planned for 9/18. This date, pt transfers to standing with CGA of 1 and ambulated 100 feet with standard walker WBAT RLE with cues for erect posture. Pt performed 10 reps ZAKIYA protocol with cues. Pt is progressing very well with recovery. PT recommendation is Home with Assist and  PT. Pt will need a standard walker for home.                Anticipated Discharge Disposition (PT): home with assist, home with home health

## 2024-09-05 NOTE — OP NOTE
Anterior Cemented Total Hip Operative Note  Dr. SAMAN Hewitt” Veronica II  [phone]    PATIENT NAME: Marilyn J Pumphrey  MRN: [id number]  : 1945 AGE: 78 y.o. GENDER: female  DATE OF OPERATION: 2024  PREOPERATIVE DIAGNOSIS: Femoral Neck Fracture  POSTOPERATIVE DIAGNOSIS: Same  OPERATION PERFORMED: Right Cemented Anterior Total Hip Arthroplasty  SURGEON: Cyrus Martin MD  Circulator: Tristin Sullivan RN  Scrub Person: Gayle Stevens RN; [address]; Chai Wilkerson RN  Vendor Representative: Ladonna Robledo  Assistant: Kristine Dean APRN  ANESTHESIA: General  ASSISTANT:  Kristine dean. This case would not have been possible without another set of skilled surgical hands for retraction, bone reduction, use of instrumentation, assistance with implants, and closure.  This assistance was vital to the success of the case.   ESTIMATED BLOOD LOSS: 300cc  SPONGE AND NEEDLE COUNT: Correct  INDICATIONS:  Fracture: This patient was noted to have a femoral neck fracture.  Surgical options were discussed with the patient and they elected to undergo a total hip replacement. A discussion of operative versus nonoperative treatment was had. They elected to undergo anterior total hip arthroplasty. The risks of surgery were discussed and included the risk of anesthesia, infection, damage to neurovascular structures, implant loosening/failure, fracture, hardware prominence, dislocation, the need for further procedures, medical complications, and others. No guarantees were made. The patient wished to proceed with surgery and a surgical consent was signed.  COMPONENTS:   Acetabular Cup: Smith [employer] Nephew R3 acetabular cup: 56 Outer Diameter  Cup Screws: Smith & Nephew 6.5 mm screws: No Screws Were Used  Smith [employer] Nephew Neutral Acetabular Liner: Neutral  Smith [employer] Nephew Polar Cemented Stem: Size 1  Smith & Nephew Oxinium Head: Dual Mobilitymm +0  2 packs cement    PERTINENT FINDINGS: Fracture: Fracture of the femoral  neck    DETAILS OF PROCEDURE:   The patient was met in the preoperative area. The site was marked. The consent and H&P were reviewed. The patient was then wheeled back to the operative suite underwent anesthesia. The [place] table boots were secured to the patients’ feet. The patient was moved onto the [place] table and secured in the supine position. The perineal post was inserted and the boots were secured into the leg holders. Surgical alcohol was used to thoroughly clean the operative area.     The hip and leg was then prepped in the normal sterile fashion, multiple layers of sterile drapes, and surgical space suits for the entire operative team. New outer gloves were used by all sterile surgical team members after final draping. The surgical incision was marked. A surgical timeout was performed.    A Modified Chavez-Gan anterior approach was used. Dissection was carried down to the fascia. The fascia was incised and the tensor fascia ute muscle was retracted laterally and the sartorius medially. The lateral femoral circumflex vessels were identified and cauterized using bovie. The rectus femoris was retracted medially. A capsulotomy was then performed. The capsule was tagged with Ethibond for later repair. Retractors were placed on either side of the femoral neck and dissection was further carried down so that the lesser trochanter could be palpated and the superior rim of the acetabulum could be visualized.    The femoral neck cut was then made from  just proximal to the lesser trochanter medially headed towards the saddle laterally. Care was taken not to extend the cut into the lesser or greater trochanters. The head and neck segment were removed with a corkscrew. The acetabulum was then exposed. The labrum was removed using a kocher and scalpel and excess osteophytes were removed using an osteotome.    The acetabulum was progressively reamed, beginning with medialization and then finalizing the position of  the reamer to approximate the final cup position. Fluoroscopy was used to ensure proper placement of the reamer, including adequate medialization as well as appropriate abduction and anteversion. The real cup was then opened and inserted using fluoroscopy, ensuring good position in terms of abduction and anteversion.     No Screws: Initial press-fit fixation of the cup was very robust and no screws were required for supplemental fixation.    After thorough irrigation and ensuring that no soft tissue was entrapped within the cup, the real liner was snapped into place.    The hook was placed just distal to the greater trochanter. The femur was then externally rotated, extended and adducted under the well leg. Soft tissue releases were performed to gain exposure to the proximal femur. Capsule was released from the saddle and the lateral femur. Care was taken to preserve the short external rotator tendons. The capsule was also released along the medial femur. The hydraulic femoral lift was then used to better expose the femur. Further soft tissue releases were then performed, again ensuring preservation of the short external rotators.    The femur was machined with a cookie cutter osteotome and then a rasp was used to further lateralize the starting point. The sclerotic bone on the lateral shoulder of the femur was removed with a rongeour and curette as needed to protect the greater trochanter. Progressive broaches were inserted until adequate fill had been achieved. Using fluoroscopy, the femoral stem was visualized after trial reduction of the hip. The length and offset were compared to the non-operative hip. Trials of stem size and neck length were trialed until equal leg length and offset were obtained. Additionally hip stability was tested with internal and external rotation of the leg. The leg was stable with at least 90° of external rotation and there was no impingement at 60° of internal rotation. Cement was  "mixed while the hip was dislocated and the trial implants removed. The femoral canal was prepared for cement and a cement restrictor was used. After implantation of the final stem and ball, the leg was once again brought into normal anatomic position and relocated. Final x-rays were taken with final implants noting good position of the stem and cup, and no visualized fracture.    An analgesic cocktail was then injected about the hip as well as the surgical dissection area. The capsule was closed.  The deep fascia was closed with a running stitch.  The skin was closed in layers and a sterile dressing was applied.    The patient was moved from the [place] table to the [facility] where the boots were removed. The patient was taken to the recovery room in stable condition. There were no complications and the patient tolerated the procedure well.    R \"Domo\" Veronica PENA MD  Orthopaedic Surgery  [facility]  [phone]              "

## 2024-09-05 NOTE — PROGRESS NOTES
Patient is postop day 1 total hip arthroplasty for femoral neck fracture.  Patient with minimal complaints of pain.  She is motivated to go home.  From my standpoint, she passes physical therapy she may be discharged home once cleared by medicine.  If she does not pass physical therapy she would need to go to SNF.  She does states she has a fair amount of help at home.  She should follow-up in the office in 3 to 4 weeks.

## 2024-09-05 NOTE — NURSING NOTE
Son currently at bedside, grand daughter went to get patient some food. She is reporting no pain in the hip except in the left shoulder still. X Ray was obtained and the patient is wanting to speak with ortho about maybe getting a MRI performed because there is so much pain in it.

## 2024-09-05 NOTE — THERAPY EVALUATION
Patient Name: Marilyn J Pumphrey  : 1945    MRN: [id number]                              Today's Date: 2024       Admit Date: 9/3/2024    Visit Dx:     ICD-10-CM ICD-9-CM   1. Fall, initial encounter  W19.XXXA E888.9   2. Closed fracture of neck of right femur, initial encounter  S72.001A 820.8     Patient Active Problem List   Diagnosis    Chronic pain    Other long term (current) drug therapy    Rheumatoid arthritis    Acute bronchitis    Arm pain, right    CAD (coronary artery disease)    Chest pain    Depression    Diabetes 1.5, managed as type 1    Edema of nasopharynx    FH: thromboembolic disease    Gastroesophageal reflux disease    History of pulmonary embolism    Mixed hyperlipidemia    Hypertension, benign    Hypokalemia    Hypothyroidism, postsurgical    Injury of costal cartilage    Malignant neoplasm of thyroid gland    XAVI (obstructive sleep apnea)    Osteoarthritis    Polyarthralgia    Postsurgical hypoparathyroidism    Restless legs syndrome (RLS)    Type 2 diabetes mellitus with hyperglycemia, with long-term current use of insulin    Type 2 diabetes mellitus without complications    Wrist sprain    Osteoarthritis of left knee    Sepsis    Impaired swallowing associated with throat pain    Status post left knee replacement    Abnormal LFTs    Hx of total knee arthroplasty    Acute respiratory failure with hypoxia    Atelectasis    Facial droop    Degeneration of lumbar intervertebral disc    Acute embolism and thrombosis of unspecified deep veins of unspecified lower extremity    Anemia    Anxiety disorder    B12 deficiency    Nova's esophagus    Benign neoplasm of transverse colon    Constipation    Nausea    Dvrtclos of lg int w/o perforation or abscess w/o bleeding    Diaphragmatic hernia without obstruction or gangrene    Epigastric pain    Family history of colonic polyps    History of colonic polyps    Hx of hiatal hernia    BENITEZ (iron deficiency anemia)    Iron deficiency  anemia due to chronic blood loss    Malabsorption of iron    Pain in right knee    Polypharmacy    Spondylolisthesis    Sprain and strain of wrist    Thyroid disease    Hypercholesterolemia    Hypertension    Endometriosis    Femur fracture, right    Fall     Past Medical History:   Diagnosis Date    Anemia, unspecified     Anxiety     Arthritis     Bone pain     Cancer 09/2016    THYROID    Depression     Disease of thyroid gland     DM type 2 (diabetes mellitus, type 2)     TYPE 2    Fall     2/2021 FELL ON ICE    GERD (gastroesophageal reflux disease)     Hypertension     Irregular heart beat     Knee pain     Leaky heart valve     11/2020    Leg cramps     Low back pain     Low iron     getting iron infusions 5/10/23    Numbness around mouth     5/12/2022-- was inpatient at [facility]-- ct ran and MRI-- no stroke    Osteoarthritis     Polypharmacy 04/20/2023    PONV (postoperative nausea and vomiting)     DOES WELL WITH PROPOFOL    Sleep apnea     C-PAP IN USE    UTI (urinary tract infection)      Past Surgical History:   Procedure Laterality Date    CARPAL TUNNEL RELEASE      CHOLECYSTECTOMY  1974    HYSTERECTOMY  1993    partial abdominal hysterectomy    OOPHORECTOMY Bilateral 2015    THYROID SURGERY      2 thyroid surgery    TONSILLECTOMY      TOTAL KNEE ARTHROPLASTY Left 10/14/2019    Procedure: TOTAL KNEE ARTHROPLASTY;  Surgeon: Sanjay Collado MD;  Location: [facility] OR;  Service: Orthopedics    VEIN LIGATION AND STRIPPING BILATERAL        General Information       Row Name 09/05/24 1050          Physical Therapy Time and Intention    Document Type evaluation  -BR     Mode of Treatment physical therapy  -BR       Row Name 09/05/24 1050          General Information    Patient Profile Reviewed yes  -BR     Prior Level of Function independent:;all household mobility;community mobility;gait;transfer  Pt uses a cane at home.  -BR     Existing Precautions/Restrictions hip, anterior;right;fall  -BR     Barriers to  Rehab medically complex  -BR       Row Name 09/05/24 1050          Living Environment    People in Home sibling(s)  -BR       Row Name 09/05/24 1050          Home Main Entrance    Number of Stairs, Main Entrance none  ramp entry  -BR       Row Name 09/05/24 1050          Stairs Within Home, Primary    Number of Stairs, Within Home, Primary none  -BR       Row Name 09/05/24 1050          Cognition    Orientation Status (Cognition) oriented x 4  -BR       Row Name 09/05/24 1050          Safety Issues, Functional Mobility    Impairments Affecting Function (Mobility) endurance/activity tolerance;range of motion (ROM);strength;balance  -BR               User Key  (r) = Recorded By, (t) = Taken By, (c) = Cosigned By      Initials Name Provider Type    BR Amanda Silva PT Physical Therapist                   Mobility       Row Name 09/05/24 1122          Bed Mobility    Bed Mobility supine-sit  -BR     Supine-Sit [place] (Bed Mobility) supervision  -BR     Assistive Device (Bed Mobility) draw sheet;head of bed elevated;bed rails  -BR       Row Name 09/05/24 1137          Sit-Stand Transfer    Sit-Stand [place] (Transfers) verbal cues;contact guard  -BR     Assistive Device (Sit-Stand Transfers) walker, front-wheeled  -BR       Row Name 09/05/24 1137 09/05/24 0930       Gait/Stairs (Locomotion)    [place] Level (Gait) contact guard;1 person assist;verbal cues  -BR --    Assistive Device (Gait) walker, standard  -BR --    Patient was able to Ambulate yes  -BR yes  -BR    Distance in Feet (Gait) 100  -  -BR    Deviations/Abnormal Patterns (Gait) pipe decreased  -BR --    Left Sided Gait Deviations forward flexed posture  -BR --      Row Name 09/05/24 1122          Mobility    Extremity Weight-bearing Status right lower extremity  -BR     Right Lower Extremity (Weight-bearing Status) weight-bearing as tolerated (WBAT)  -BR               User Key  (r) = Recorded By, (t) = Taken By, (c) = Cosigned By       Initials Name Provider Type    Amanda Valladares PT Physical Therapist                   Obj/Interventions       Row Name 09/05/24 1139          Range of Motion Comprehensive    General Range of Motion bilateral lower extremity ROM WFL  -BR       Row Name 09/05/24 1139          Strength Comprehensive (MMT)    Comment, General Manual Muscle Testing (MMT) Assessment RLE grossly 3/5; LLE grossly 3+/5  -BR       Row Name 09/05/24 1139          Motor Skills    Therapeutic Exercise hip  ZAKIYA protocol exercises x 10 reps  -BR       Row Name 09/05/24 1139          Balance    Balance Assessment sitting dynamic balance;standing static balance  -BR     Dynamic Sitting Balance standby assist  -BR     Position, Sitting Balance unsupported;sitting edge of bed  -BR     Static Standing Balance supervision  -BR     Position/Device Used, Standing Balance supported;walker, rolling  -BR       Row Name 09/05/24 1139          Sensory Assessment (Somatosensory)    Sensory Assessment (Somatosensory) sensation intact  -BR               User Key  (r) = Recorded By, (t) = Taken By, (c) = Cosigned By      Initials Name Provider Type    Amanda Valladares PT Physical Therapist                   Goals/Plan       Row Name 09/05/24 1154          Bed Mobility Goal 1 (PT)    Activity/Assistive Device (Bed Mobility Goal 1, PT) bed mobility activities, all  -BR     [place] Level/Cues Needed (Bed Mobility Goal 1, PT) independent  -BR     Time Frame (Bed Mobility Goal 1, PT) long term goal (LTG);2 weeks  -BR       Row Name 09/05/24 1154          Transfer Goal 1 (PT)    Activity/Assistive Device (Transfer Goal 1, PT) transfers, all  -BR     [place] Level/Cues Needed (Transfer Goal 1, PT) modified independence  -BR     Time Frame (Transfer Goal 1, PT) long term goal (LTG);2 weeks  -BR       Row Name 09/05/24 1154          Gait Training Goal 1 (PT)    Activity/Assistive Device (Gait Training Goal 1, PT) gait (walking  locomotion);assistive device use  -BR     [place] Level (Gait Training Goal 1, PT) modified independence  -BR     Distance (Gait Training Goal 1, PT) 100  -BR     Time Frame (Gait Training Goal 1, PT) long term goal (LTG);2 weeks  -BR       Row Name 09/05/24 1154          Patient Education Goal (PT)    Activity (Patient Education Goal, PT) Pt independent with HEP.  -BR     Time Frame (Patient Education Goal, PT) long term goal (LTG);2 weeks  -BR       Row Name 09/05/24 1154          Therapy Assessment/Plan (PT)    Planned Therapy Interventions (PT) balance training;bed mobility training;gait training;strengthening;ROM (range of motion);stair training;patient/family education;transfer training;home exercise program  -BR               User Key  (r) = Recorded By, (t) = Taken By, (c) = Cosigned By      Initials Name Provider Type    BR Amanda Silva, PT Physical Therapist                   Clinical Impression       Row Name 09/05/24 1141          Pain    Pretreatment Pain Rating 2/10  -BR     Posttreatment Pain Rating 2/10  -BR     Pain Location - Side/Orientation Left  -BR     Pain Location - shoulder  -BR       Row Name 09/05/24 1141          Plan of Care Review    Plan of Care Reviewed With patient  -BR     Outcome Evaluation Marilyn Pumphrey is a 78 y.o. female with PMH of polyarthralgia, chronic pain, DM type II, thyroid CA s/p thyroidectomy now treated for hypothyroidism, hx  L TKA, HTN, XAVI, RLS, H/o PE who presented to [facility] ED 9/3/2024 after a fall. She stated she was wheeling her groceries in her house with a wagon when the wheel became stuck on the sidewalk causing her to fall onto her right side. X-ray R hip: Acute displaced, impacted, and angulated subcapital right femoral neck fracture. X-ray L shoulder (-). CXR (-). Pt is POD #1 for R cemented anterior ZAKIYA per Dr. Martin. Pt is WBAT RLE. Pt has left shoulder pain as her primary complaint. Pt A and O x 4. At baseline, pt lives [place] her sister [place] ramp [place] and pt uses a cane at baseline. Pt had a R TKA planned for 9/18. This date, pt transfers to standing with CGA of 1 and ambulated 100 feet with standard walker WBAT RLE with cues for erect posture. Pt performed 10 reps ZAKIYA protocol with cues. Pt is progressing very well with recovery. PT recommendation is Home with Assist and HH PT. Pt will need a standard walker for home.  -BR       Row Name 09/05/24 1141          Therapy Assessment/Plan (PT)    Rehab Potential (PT) good, to achieve stated therapy goals  -BR     Criteria for Skilled Interventions Met (PT) yes;meets criteria;skilled treatment is necessary  -BR     Therapy Frequency (PT) daily  -BR     Predicted Duration of Therapy Intervention (PT) until D/C  -BR       Row Name 09/05/24 1141          Vital Signs    Pre SpO2 (%) 95  -BR     O2 Delivery Pre Treatment room air  -BR     Pre Patient Position Supine  -BR     Intra Patient Position Standing  -BR     Post Patient Position Sitting  -BR       Row Name 09/05/24 1141          Positioning and Restraints    Pre-Treatment Position in bed  -BR     Post Treatment Position chair  -BR     In Chair notified nsg;reclined;call light within reach;encouraged to call for assist;exit alarm on;legs elevated  -BR               User Key  (r) = Recorded By, (t) = Taken By, (c) = Cosigned By      Initials Name Provider Type    Amanda Valladares, PT Physical Therapist                   Outcome Measures       Row Name 09/05/24 1156 09/05/24 0807       How much help from another person do you currently need...    Turning from your back to your side while in flat bed without using bedrails? 4  -BR 4  -LB    Moving from lying on back to sitting on the side of a flat bed without bedrails? 4  -BR 3  -LB    Moving to and from a bed to a chair (including a wheelchair)? 3  -BR 3  -LB    Standing up from a chair using your arms (e.g., wheelchair, bedside chair)? 4  -BR 3  -LB    Climbing 3-5 steps with a railing? 3   -BR 3  -LB    To walk in hospital room? 3  -BR 3  -LB    AM-PAC 6 Clicks Score (PT) 21  -BR 19  -LB    Highest Level of Mobility Goal 6 --> Walk 10 steps or more  -BR 6 --> Walk 10 steps or more  -LB      Row Name 09/05/24 1156          Functional Assessment    Outcome Measure Options AM-PAC 6 Clicks Basic Mobility (PT)  -BR               User Key  (r) = Recorded By, (t) = Taken By, (c) = Cosigned By      Initials Name Provider Type    LB Belle Scott RN Registered Nurse    Amanda Valladares, PT Physical Therapist                                 Physical Therapy Education       Title: PT OT SLP Therapies (Done)       Topic: Physical Therapy (Done)       Point: Mobility training (Done)       Learning Progress Summary             Patient Acceptance, E,D,H, VU,DU,NR by BR at 9/5/2024 1156                         Point: Home exercise program (Done)       Learning Progress Summary             Patient Acceptance, E,D,H, VU,DU,NR by BR at 9/5/2024 1156                         Point: Body mechanics (Done)       Learning Progress Summary             Patient Acceptance, E,D,H, VU,DU,NR by BR at 9/5/2024 1156                         Point: Precautions (Done)       Learning Progress Summary             Patient Acceptance, E,D,H, VU,DU,NR by BR at 9/5/2024 1156                                         User Key       Initials Effective Dates Name Provider Type Discipline    BR 02/01/22 -  Amanda Silva, SAMANTA Physical Therapist PT                  PT Recommendation and Plan  Planned Therapy Interventions (PT): balance training, bed mobility training, gait training, strengthening, ROM (range of motion), stair training, patient/family education, transfer training, home exercise program  Plan of Care Reviewed With: patient  Outcome Evaluation: Marilyn Pumphrey is a 78 y.o. female with PMH of polyarthralgia, chronic pain, DM type II, thyroid CA s/p thyroidectomy now treated for hypothyroidism, hx  L TKA, HTN, XAVI, RLS,  H/o PE who presented to [facility] ED 9/3/2024 after a fall. She stated she was wheeling her groceries in her house with a wagon when the wheel became stuck on the sidewalk causing her to fall onto her right side. X-ray R hip: Acute displaced, impacted, and angulated subcapital right femoral neck fracture. X-ray L shoulder (-). CXR (-). Pt is POD #1 for R cemented anterior ZAKIYA per Dr. Martin. Pt is WBAT RLE. Pt has left shoulder pain as her primary complaint. Pt A and O x 4. At baseline, pt lives with her sister in [facility] with ramp entry and pt uses a cane at baseline. Pt had a R TKA planned for 9/18. This date, pt transfers to standing with CGA of 1 and ambulated 100 feet with standard walker WBAT RLE with cues for erect posture. Pt performed 10 reps ZAKIYA protocol with cues. Pt is progressing very well with recovery. PT recommendation is Home with Assist and HH PT. Pt will need a standard walker for home.     Time Calculation:         PT Charges       Row Name 09/05/24 1157             Time Calculation    Start Time 0929  -BR      Stop Time 1002  -BR      Time Calculation (min) 33 min  -BR      PT Received On 09/05/24  -BR      PT - Next Appointment 09/06/24  -BR      PT Goal Re-Cert Due Date 09/19/24  -BR         Time Calculation- PT    Total Timed Code Minutes- PT 15 minute(s)  -BR                User Key  (r) = Recorded By, (t) = Taken By, (c) = Cosigned By      Initials Name Provider Type    BR Amanda Silva PT Physical Therapist                  Therapy Charges for Today       Code Description Service Date Service Provider Modifiers Qty    09879548139 HC PT EVAL MOD COMPLEXITY 3 9/5/2024 Amanda Silva, PT GP 1    95155389233 HC PT THER PROC EA 15 MIN 9/5/2024 Amanda Silva, PT GP 1            PT G-Codes  Outcome Measure Options: AM-PAC 6 Clicks Basic Mobility (PT)  AM-PAC 6 Clicks Score (PT): 21  PT Discharge Summary  Anticipated Discharge Disposition (PT): home with assist, home with home health    Amanda  Shira, PT  9/5/2024

## 2024-09-05 NOTE — PROGRESS NOTES
Demographics verified with patient. Pt is agreeable to services and has no other agency.  Pt lives with her sister and asks that we use the following number as the primary contact for visits: [phone]  Secondary number will be: [phone]    PT    Dr. Cyrus Martin will be the following provider    PCP: Suma Campbell NP  Pharmacy: [facility] in [place]

## 2024-09-05 NOTE — PLAN OF CARE
Goal Outcome Evaluation:  Plan of Care Reviewed With: patient        Progress: no change  Outcome Evaluation: Pt is a 79 y/o F admitted to [facility] 9/3/24 with RLE pain s/p mechanical fall. POD#1 R anterior ZAKIYA by Dr. Martin. At baseline pt resides with sister and brother in law in [facility] with ramp entry. Pt typically (I) with ADLs and (I) with mobility with use of cane. Pt has accessible bathroom with bench. This date pt A&Ox4 on RA sitting up in chair upon arrival. Pt c/o 2/10 pain in L shoulder, minimal pain in R hip. Pt comes to standing from chair with CGA and ambulates to bathroom with CGA with RW, demo good use RW to offload weight through RLE. Pt completes toileting task with supervision. Pt likely to require assist with ADL routine, however anticipate pt to be safe to dc home with family assist and HHOT. OT will follow within acute setting.      Anticipated Discharge Disposition (OT): home with assist, home with home health

## 2024-09-05 NOTE — DISCHARGE PLACEMENT REQUEST
"Pumphrey, Marilyn J (78 y.o. Female)       Date of Birth   1945    Social Security Number       Address   [address] [place], [place] [address] [facility]    Home Phone   [phone]    MRN   [id number]       Mandaen   Anglican    Marital Status                               Admission Date   9/3/24    Admission Type   Emergency    Admitting Provider   Jose Juan Wilcox DO    Attending Provider   Oleg Nazario MD    Department, Room/Bed   [facility] SURGICAL INPATIENT, 4101/1       Discharge Date       Discharge Disposition       Discharge Destination                                 Attending Provider: Oleg Nazario MD    Allergies: Codeine    Isolation: None   Infection: None   Code Status: CPR    Ht: 170.2 cm (67\")   Wt: 77.1 kg (170 lb)    Admission Cmt: None   Principal Problem: Femur fracture, right [S72.91XA]                   Active Insurance as of 9/3/2024       Primary Coverage       Payor Plan Insurance Group Employer/Plan Group    MEDICARE MEDICARE A & B        Payor Plan Address Payor Plan Phone Number Payor Plan Fax Number Effective Dates    [address] [phone]  10/1/2011 - None Entered    [facility] [id number]         Subscriber Name Subscriber Birth Date Member ID       PUMPHREY,MARILYN J 1945 [id number]               Secondary Coverage       Payor Plan Insurance Group Employer/Plan Group             Payor Plan Address Payor Plan Phone Number Payor Plan Fax Number Effective Dates    [address] [address] [phone]  3/1/2012 - None Entered    [facility] [address]         Subscriber Name Subscriber Birth Date Member ID       PUMPHREY,MARILYN J 1945 [id number]                     Emergency Contacts        (Rel.) Home Phone Work Phone Mobile Phone    PUMPHREYVÍCTOR LA (Daughter) -- -- [phone]    JASMINA SLAUGHTER (Sister) [phone] -- [phone]    PumphreyReynaldo (Relative) -- -- [phone]                "

## 2024-09-06 NOTE — OUTREACH NOTE
Prep Survey      Flowsheet Row Responses   [facility] facility patient discharged from? Rk   Is LACE score < 7 ? No   Eligibility Readm Mgmt   Discharge diagnosis Femur fracture, right, TOTAL HIP ARTHROPLASTY ANTERIOR   Does the patient have one of the following disease processes/diagnoses(primary or secondary)? Total Joint Replacement   Does the patient have Home health ordered? Yes   What is the Home health agency?  [facility]   Is there a DME ordered? Yes   What DME was ordered? walker from [facility]   Prep survey completed? Yes            Rubina ABAD - Registered Nurse

## 2024-09-07 ENCOUNTER — HOME CARE VISIT (OUTPATIENT)
Dept: HOME HEALTH SERVICES | Facility: HOME HEALTHCARE | Age: 79
End: 2024-09-07
Payer: MEDICARE

## 2024-09-09 ENCOUNTER — HOME CARE VISIT (OUTPATIENT)
Dept: HOME HEALTH SERVICES | Facility: HOME HEALTHCARE | Age: 79
End: 2024-09-09

## 2024-09-10 ENCOUNTER — HOME CARE VISIT (OUTPATIENT)
Dept: HOME HEALTH SERVICES | Facility: HOME HEALTHCARE | Age: 79
End: 2024-09-10
Payer: MEDICARE

## 2024-09-10 ENCOUNTER — READMISSION MANAGEMENT (OUTPATIENT)
Dept: CALL CENTER | Facility: HOSPITAL | Age: 79
End: 2024-09-10
Payer: MEDICARE

## 2024-09-10 VITALS
RESPIRATION RATE: 18 BRPM | TEMPERATURE: 98 F | HEART RATE: 85 BPM | SYSTOLIC BLOOD PRESSURE: 120 MMHG | DIASTOLIC BLOOD PRESSURE: 80 MMHG | OXYGEN SATURATION: 95 %

## 2024-09-10 PROCEDURE — G0151 HHCP-SERV OF PT,EA 15 MIN: HCPCS

## 2024-09-10 NOTE — OUTREACH NOTE
Total Joint Week 1 Survey      Flowsheet Row Responses   [facility] facility patient discharged from? Rk   Does the patient have one of the following disease processes/diagnoses(primary or secondary)? Total Joint Replacement   Joint surgery performed? Hip   Week 1 attempt successful? No   Unsuccessful attempts Attempt 1            Marisela REYES - Registered Nurse

## 2024-09-10 NOTE — HOME HEALTH
"Reason for Hosp/Primary Dx/ Comorbidities: R femoral fx sp R hip replacement    Focus of Care: R femoral fx sp R hip replacement    Skilled Need: Skilled PT needed to improve patient's functional mobility, improve safety for transfers and ambulation and return patient to PLOF.        Current Functional status/mobility/DME: Upon eval patient requires Min A for transfers, Min A for gait with a RW with patient exhibiting an antalgic gait pattern tolerating less than 50 feet    HB status/Living Arrangements: Due to dec act tolerance, weakness, decline in transfers and ambulation. Patient requires a taxing effort to leave home, putting patient at risk for falls or injury. Patient lives with sister, ramp to get in and out of the home      Skin Integrity/wound status: dressing intact      Code Status: Full Code    Fall Risk: High    POC confirmed with Dr. Domo Martin II on 9.10.24    Educated on Emergency Plan, steps to take prior to going to the ER and when to Call [facility]:  completed     Medication issues/Concerns: none    Additional Problems/Concerns:  none    Plan for next visit: HEP teaching, gait training    Patient goal: \"to be able to walk without pain\"    Home Health disciplines ordered:   PT SOC + 2w3"

## 2024-09-12 ENCOUNTER — HOME CARE VISIT (OUTPATIENT)
Dept: HOME HEALTH SERVICES | Facility: HOME HEALTHCARE | Age: 79
End: 2024-09-12
Payer: MEDICARE

## 2024-09-12 ENCOUNTER — NURSE TRIAGE (OUTPATIENT)
Dept: CALL CENTER | Facility: HOSPITAL | Age: 79
End: 2024-09-12
Payer: MEDICARE

## 2024-09-12 VITALS
DIASTOLIC BLOOD PRESSURE: 66 MMHG | OXYGEN SATURATION: 96 % | SYSTOLIC BLOOD PRESSURE: 132 MMHG | RESPIRATION RATE: 15 BRPM | HEART RATE: 82 BPM | TEMPERATURE: 98.3 F

## 2024-09-12 PROCEDURE — G0157 HHC PT ASSISTANT EA 15: HCPCS

## 2024-09-12 NOTE — TELEPHONE ENCOUNTER
Reason for Disposition   [1] Wound pain AND [2] not controlled with pain medications    Additional Information   Negative: Major bleeding into NPWT device (e.g., cannister filling with blood, sudden gush of blood)   Negative: Major bleeding from wound (e.g., actively dripping or spurting)   Negative: Sounds like a life-threatening emergency to the triager   Negative: Widespread rash   Negative: [1] Wound infection suspected (i.e., pain, spreading redness, or pus; in a cut, puncture, scrape or sutured wound) AND [2] not chronic wound   Negative: [1] New foot sore or wound AND [2] diabetes mellitus   Negative: New skin sore or ulcer   Negative: SEVERE pain (e.g. excruciating)   Negative: [1] Total Contact Cast feels too tight (e.g., causing pain, numbness or toes tingling) AND [2] not improved with leg elevation   Negative: [1] NPWT AND [2] new bright red blood in canister or tubing   Negative: Patient sounds very sick or weak to the triager   Negative: [1] Looks infected (e.g., spreading redness, red streak, pus) AND [2] fever   Negative: [1] Looks infected AND [2] large red area (> 2 in. or 5 cm)   Negative: [1] Looks infected AND [2] diabetes mellitus or weak immune system (e.g., HIV positive, cancer chemo, splenectomy, organ transplant, chronic steroids)   Negative: [1] NPWT AND [2] suction is not working   Negative: [1] NPWT AND [2] device is alarming   Negative: [1] Caller has URGENT question AND [2] triager unable to answer question   Negative: [1] NPWT AND [2] dressing  came off wound   Negative: [1] NPWT AND [2] new minor bleeding (e.g., flecks of blood, pink-tinged drainage) AND [3] taking Coumadin (warfarin) or other strong blood thinner, or known bleeding disorder (e.g., thrombocytopenia)   Negative: [1] Looks infected (e.g., spreading redness, red streak, pus) AND [2] no fever   Negative: Total Contact Cast is damaged (e.g., cracked, broken, or feels soft after getting wet)   Negative: [1] NPWT AND [2]  "new or worsening odor from wound   Negative: [1] NPWT AND [2] new rash or redness around wound    Answer Assessment - Initial Assessment Questions  1. SYMPTOM or QUESTION: \"What is your reason for calling today?\" or \"How can I best help you?\" (e.g., bleeding, redness, drainage, pain)      Wound vac due to come off today  2.  WOUND APPEARANCE: \"What does the wound look like?\" \"Is there new or spreading redness?\"  If YES, ask: \"What is the size of the red area?\" (Inches, centimeters, or compare to size of a coin).  \"Has the drainage changed or increased?\"  \"Is there a new odor?\"        Wound vac in place  3.  ONSET: \"When did the problem begin?\"      9/4  4.  LOCATION: \"Where is the wound(s)?\" (e.g., , ankle, lower left leg)      Right hip  5.  BLEEDING: \"Are you having a problem with bleeding?\"  (e.g., amount, timing)      Wound vac in place  6.  PAIN: \"Is there any pain?\" If so, ask: \"How bad is the pain?\" (Scale 1-10; or mild, moderate, severe)      Had a bad night with pain  7. FEVER: \"Do you have a fever?\" If so, ask: \"What is your temperature, how was it measured, and when did it start?\"      No fever  8. OTHER SYMPTOMS: \"Do you have any other symptoms?\" (e.g., chills, rash elsewhere, new weakness)      Is sleeping now after being up most of the night  9.  TREATMENT: \"How have you been treating the wound?\"      PT home therapy, sister canceled appt today due to patient being tired from being up all night.  Also canceled appt  9/7 while PT was enroute to house. Pt changed her mind about therapy that day.  Therapist explaind it is necessary she get treatments to remain mobile.  Nurse advised today the wound vac has got to get removed, part of the assessment of her wound is necessary.    10. NEGATIVE PRESSURE WOUND THERAPY  (NPWT): \"What concerns do you have about your negative pressure dressing?\"  \"When was your last dressing change?\"  \"Are you getting a device alert or alarm?\"  \"What have you done to try to fix " "the problem?\"           Who is to remove it, advised PT will assess, she needs to call them back as just missed their call and allow them to come out today.    11.  PREGNANCY: \"Is there any chance you are pregnant?\" \"When was your last menstrual period?        na    Protocols used: Chronic Wounds and Negative Pressure Wound Therapy-ADULT-    "

## 2024-09-12 NOTE — TELEPHONE ENCOUNTER
Gave sister number to ERICA Beckman, [phone] and must let therapist come today to assess the wound.

## 2024-09-13 ENCOUNTER — READMISSION MANAGEMENT (OUTPATIENT)
Dept: CALL CENTER | Facility: HOSPITAL | Age: 79
End: 2024-09-13
Payer: MEDICARE

## 2024-09-13 NOTE — OUTREACH NOTE
Total Joint Week 1 Survey      Flowsheet Row Responses   [facility] patient discharged from? Rk   Does the patient have one of the following disease processes/diagnoses(primary or secondary)? Total Joint Replacement   Joint surgery performed? Hip   Week 1 attempt successful? Yes   Call start time 1343   Call end time 1346   Discharge diagnosis Femur fracture, right, TOTAL HIP ARTHROPLASTY ANTERIOR   Person spoke with today (if not patient) and relationship patient   Does the patient have all medications related to this admission filled (includes all antibiotics, pain medications, etc.) Yes   Is the patient taking all medications as directed (includes completed medication regime)? Yes   Comments regarding appointments Dr. Martin 09/25   Does the patient have a follow up appointment with their surgeon? Yes   Has the patient kept scheduled appointments due by today? Yes   What is the Home health agency?  [facility]   Has home health visited the patient within 72 hours of discharge? Yes   Home health comments PT is visiting   What DME was ordered? walker from [place]   Psychosocial issues? No   If the patient has started attending therapy, what post op day did they begin to attend (either in home or as an out patient)?   patient is having PT in home 2x per week   Does the patient have a wound vac in place? No   Has the patient fallen since discharge? No   Comments pt is having some pain but she reports she wasnt taking pain med as prescribed   Did the patient receive a copy of their discharge instructions? Yes   Nursing interventions Reviewed instructions with patient   What is the patient's perception of their functional status since discharge? Improving   If the patient is a current smoker, are they able to teach back resources for cessation? Not a smoker   Is the patient/caregiver able to teach back the hierarchy of who to call/visit for symptoms/problems? PCP, Specialist, Home health nurse, Urgent Care, ED, 911  Yes   Week 1 call completed? Yes   Is the patient interested in additional calls from an ambulatory ? No   Would this patient benefit from a Referral to [facility] Social Work? No   Wrap up additional comments Pt states she is doing well.  She has been very pleased with her experience with [facility].  Denies needs/concerns.  She is aware of upcoming appt with Dr. Martin   Call end time [id number]            MANNY MOSS - Registered Nurse

## 2024-09-17 ENCOUNTER — HOME CARE VISIT (OUTPATIENT)
Dept: HOME HEALTH SERVICES | Facility: HOME HEALTHCARE | Age: 79
End: 2024-09-17
Payer: MEDICARE

## 2024-09-17 VITALS
DIASTOLIC BLOOD PRESSURE: 70 MMHG | TEMPERATURE: 97.8 F | OXYGEN SATURATION: 96 % | RESPIRATION RATE: 14 BRPM | SYSTOLIC BLOOD PRESSURE: 126 MMHG | HEART RATE: 82 BPM

## 2024-09-17 PROCEDURE — G0157 HHC PT ASSISTANT EA 15: HCPCS

## 2024-09-20 ENCOUNTER — HOME CARE VISIT (OUTPATIENT)
Dept: HOME HEALTH SERVICES | Facility: HOME HEALTHCARE | Age: 79
End: 2024-09-20
Payer: MEDICARE

## 2024-09-24 ENCOUNTER — READMISSION MANAGEMENT (OUTPATIENT)
Dept: CALL CENTER | Facility: HOSPITAL | Age: 79
End: 2024-09-24
Payer: MEDICARE

## 2024-09-25 ENCOUNTER — HOME CARE VISIT (OUTPATIENT)
Dept: HOME HEALTH SERVICES | Facility: HOME HEALTHCARE | Age: 79
End: 2024-09-25
Payer: MEDICARE

## 2024-09-26 ENCOUNTER — HOME CARE VISIT (OUTPATIENT)
Dept: HOME HEALTH SERVICES | Facility: HOME HEALTHCARE | Age: 79
End: 2024-09-26
Payer: MEDICARE

## 2024-10-08 ENCOUNTER — READMISSION MANAGEMENT (OUTPATIENT)
Dept: CALL CENTER | Facility: HOSPITAL | Age: 79
End: 2024-10-08
Payer: MEDICARE

## 2024-10-08 NOTE — OUTREACH NOTE
Total Joint Month 1 Survey      Flowsheet Row Responses   [facility] facility patient discharged from? Rk   Does the patient have one of the following disease processes/diagnoses(primary or secondary)? Total Joint Replacement   Joint surgery performed? Hip   Month 1 attempt successful? No   Unsuccessful attempts Attempt 1            ELLIOT REYES - Registered Nurse

## 2024-10-09 ENCOUNTER — READMISSION MANAGEMENT (OUTPATIENT)
Dept: CALL CENTER | Facility: HOSPITAL | Age: 79
End: 2024-10-09
Payer: MEDICARE

## 2024-10-09 NOTE — OUTREACH NOTE
Total Joint Month 1 Survey      Flowsheet Row Responses   [facility] facility patient discharged from? Rk   Does the patient have one of the following disease processes/diagnoses(primary or secondary)? Total Joint Replacement   Joint surgery performed? Hip   Month 1 attempt successful? No   Unsuccessful attempts Attempt 2            ELLIOT REYES - Registered Nurse

## 2024-12-03 ENCOUNTER — OFFICE VISIT (OUTPATIENT)
Dept: PAIN MEDICINE | Facility: CLINIC | Age: 79
End: 2024-12-03
Payer: MEDICARE

## 2024-12-03 VITALS
HEART RATE: 77 BPM | SYSTOLIC BLOOD PRESSURE: 147 MMHG | DIASTOLIC BLOOD PRESSURE: 87 MMHG | WEIGHT: 167 LBS | OXYGEN SATURATION: 97 % | RESPIRATION RATE: 16 BRPM | BODY MASS INDEX: 26.16 KG/M2

## 2024-12-03 DIAGNOSIS — G89.4 CHRONIC PAIN SYNDROME: Primary | ICD-10-CM

## 2024-12-03 DIAGNOSIS — M47.817 LUMBOSACRAL SPONDYLOSIS WITHOUT MYELOPATHY: ICD-10-CM

## 2024-12-03 DIAGNOSIS — M25.50 POLYARTHRALGIA: ICD-10-CM

## 2024-12-03 DIAGNOSIS — Z79.899 HIGH RISK MEDICATION USE: ICD-10-CM

## 2024-12-03 PROCEDURE — 3077F SYST BP >= 140 MM HG: CPT | Performed by: ANESTHESIOLOGY

## 2024-12-03 PROCEDURE — 3079F DIAST BP 80-89 MM HG: CPT | Performed by: ANESTHESIOLOGY

## 2024-12-03 PROCEDURE — G2211 COMPLEX E/M VISIT ADD ON: HCPCS | Performed by: ANESTHESIOLOGY

## 2024-12-03 PROCEDURE — 1125F AMNT PAIN NOTED PAIN PRSNT: CPT | Performed by: ANESTHESIOLOGY

## 2024-12-03 PROCEDURE — 99214 OFFICE O/P EST MOD 30 MIN: CPT | Performed by: ANESTHESIOLOGY

## 2024-12-03 RX ORDER — OXYCODONE AND ACETAMINOPHEN 7.5; 325 MG/1; MG/1
1 TABLET ORAL 3 TIMES DAILY PRN
Qty: 90 TABLET | Refills: 0 | Status: SHIPPED | OUTPATIENT
Start: 2024-12-07

## 2024-12-03 RX ORDER — OXYCODONE AND ACETAMINOPHEN 7.5; 325 MG/1; MG/1
1 TABLET ORAL 3 TIMES DAILY PRN
Qty: 90 TABLET | Refills: 0 | Status: SHIPPED | OUTPATIENT
Start: 2025-01-06

## 2024-12-03 NOTE — PROGRESS NOTES
Subjective    CC joints pain left knee pain  Marilyn J Pumphrey is a 79 y.o. female with multiple comorbidities including DM, polyarthralgia from  inflammatory osteoarthritis, bilateral knee pain, status post left TKA , here for f/u.    Had a fall in broke left hip in September, status post ORIF and ZAKIYA/Dr. Martin.  Recovering well.  Continues to have postop pain.  Has follow-up scheduled.  Postponed left knee TKA with Dr. Bradford.  Was prescribed oxycodone postop.  Continues to have good relief and functional benefit with oxycodone without side effects.  Ambulating with cane.  Completed home PT.    Chronic back pain radiating to bilateral hips worse with prolonged activity.  Chronic polyarthralgia/left knee pain,  and generalized myofascial pain interfering with daily activity, sleep,mood.  Considering right knee TKA.  Tried methotrexate and  Celebrex without relief.  Tried Cymbalta, sevella, tramadol without relief.  Seen Rheumatology. and ortho. considering right knee replecement.   Good releif with knee injections by ortho.      Utilizes hydrocodone 10/325  with good relief functional benefit denies side effects.    L-spine MRI 2021.  Multiple level degenerative disc disease and degenerative changes.  Small left paracentral disc protrusion at L3-L4 which appears to abut the exiting nerve root with mild encroachment on neural foramina on the left.    Pain Assessment   Location of Pain: Lower Back, R Hip, L Hip, Leg, neck pain, joint, right knee  Description of Pain: Dull/Aching, Throbbing, Stabbing  Previous Pain Rating :9  Current Pain Ratin  Aggravating Factors: Activity  Alleviating Factors: Rest, Medication  PEG Assessment   What number best describes your pain on average in the past week?9  What number best describes how, during the past week, pain has interfered with your enjoyment of life?8  What number best describes how, during the past week, pain has interfered with your general activity? 10      The following portions of the patient's history were reviewed and updated as appropriate: allergies, current medications, past family history, past medical history, past social history, past surgical history and problem list.    Review of Systems   Musculoskeletal:  Positive for arthralgias, back pain, joint swelling, myalgias and neck pain.   All other systems reviewed and are negative.    Objective   Physical Exam   Constitutional: No distress.   Cane   Musculoskeletal:      Comments: Lumbar loading positive, pain on extension of low back past 5 degrees.  TTP on the lumbar facets noted.  Leg raise left   Vitals reviewed.    /87   Pulse 77   Resp 16   Wt 75.8 kg (167 lb)   SpO2 97%   BMI 26.16 kg/m²     PHQ 9 on chart  Opioid risk tool low risk    Assessment & Plan   Diagnoses and all orders for this visit:    1. Chronic pain syndrome (Primary)  -     oxyCODONE-acetaminophen (PERCOCET) 7.5-325 MG per tablet; Take 1 tablet by mouth 3 (Three) Times a Day As Needed for Severe Pain. DNF before 1/6/2025  Dispense: 90 tablet; Refill: 0  -     oxyCODONE-acetaminophen (PERCOCET) 7.5-325 MG per tablet; Take 1 tablet by mouth 3 (Three) Times a Day As Needed for Severe Pain.  Dispense: 90 tablet; Refill: 0    2. Lumbosacral spondylosis without myelopathy    3. Polyarthralgia    4. High risk medication use      Summary  79 y.o. female with multiple comorbidities including DM, polyarthralgia from  inflammatory osteoarthritis, knee pain S/P left TKA, here for f/u.  Chronic polyarthralgia/left knee pain,  and generalized myofascial pain interfering with daily activity, sleep,mood.      Had a fall in broke left hip in September, status post ORIF and ZAKIYA/Dr. Martin.  Recovering well.  Continues to have postop pain.  Has follow-up scheduled.  Postponed left knee TKA with Dr. Bradford.  Was prescribed oxycodone postop.  Continues to have good relief and functional benefit with oxycodone without side effects.  Ambulating  with cane.  Completed home PT.    Continue oxycodone 7.5 /325 2-3 times daily as needed for severe pain.  UDS and  Inspect reviewed.    Discussed risk of tolerance, dependence, respiratory depression, coma and death associated with use of oral opioids for treatment of chronic nonmalignant pain.      Discussed increased risk with combining opioid and benzo. On clorazepate prn anxiety.    RTC in 2-3 months

## 2024-12-10 ENCOUNTER — TRANSCRIBE ORDERS (OUTPATIENT)
Dept: ADMINISTRATIVE | Facility: HOSPITAL | Age: 79
End: 2024-12-10
Payer: MEDICARE

## 2024-12-10 DIAGNOSIS — R52 PAIN, GENERALIZED: Primary | ICD-10-CM

## 2025-01-07 ENCOUNTER — TRANSCRIBE ORDERS (OUTPATIENT)
Dept: ADMINISTRATIVE | Facility: HOSPITAL | Age: 80
End: 2025-01-07
Payer: MEDICARE

## 2025-01-07 DIAGNOSIS — R52 PAIN: Primary | ICD-10-CM

## 2025-01-10 ENCOUNTER — HOSPITAL ENCOUNTER (OUTPATIENT)
Dept: MRI IMAGING | Facility: HOSPITAL | Age: 80
Discharge: HOME | End: 2025-01-10
Payer: MEDICARE

## 2025-01-10 DIAGNOSIS — R52 PAIN: ICD-10-CM

## 2025-01-10 DIAGNOSIS — R52 PAIN, GENERALIZED: ICD-10-CM

## 2025-01-10 PROCEDURE — 73718 MRI LOWER EXTREMITY W/O DYE: CPT

## 2025-01-10 PROCEDURE — 73721 MRI JNT OF LWR EXTRE W/O DYE: CPT

## 2025-02-04 ENCOUNTER — OFFICE VISIT (OUTPATIENT)
Dept: PAIN MEDICINE | Facility: CLINIC | Age: 80
End: 2025-02-04
Payer: MEDICARE

## 2025-02-04 VITALS
HEART RATE: 76 BPM | DIASTOLIC BLOOD PRESSURE: 81 MMHG | RESPIRATION RATE: 16 BRPM | SYSTOLIC BLOOD PRESSURE: 127 MMHG | WEIGHT: 167 LBS | OXYGEN SATURATION: 94 % | BODY MASS INDEX: 26.16 KG/M2

## 2025-02-04 DIAGNOSIS — M25.50 POLYARTHRALGIA: ICD-10-CM

## 2025-02-04 DIAGNOSIS — G89.4 CHRONIC PAIN SYNDROME: Primary | ICD-10-CM

## 2025-02-04 DIAGNOSIS — Z79.899 HIGH RISK MEDICATION USE: ICD-10-CM

## 2025-02-04 DIAGNOSIS — M47.817 LUMBOSACRAL SPONDYLOSIS WITHOUT MYELOPATHY: ICD-10-CM

## 2025-02-04 RX ORDER — OXYCODONE AND ACETAMINOPHEN 7.5; 325 MG/1; MG/1
1 TABLET ORAL 3 TIMES DAILY PRN
Qty: 90 TABLET | Refills: 0 | Status: SHIPPED | OUTPATIENT
Start: 2025-02-07

## 2025-02-04 RX ORDER — OXYCODONE AND ACETAMINOPHEN 7.5; 325 MG/1; MG/1
1 TABLET ORAL 3 TIMES DAILY PRN
Qty: 90 TABLET | Refills: 0 | Status: SHIPPED | OUTPATIENT
Start: 2025-03-07

## 2025-02-05 NOTE — PROGRESS NOTES
Subjective    CC joints pain left knee pain  Marilyn J Pumphrey is a 79 y.o. female with multiple comorbidities including DM, polyarthralgia from  inflammatory osteoarthritis, bilateral knee pain, S/P left TKA , S/p right hip ORIF and ZAKIYA/Dr. Martin 2024 here for f/u.    Denies any new complaints.  Continues to recover from hip surgery.  Postponing right TKA.    Chronic back pain radiating to bilateral hips worse with prolonged activity.  Chronic polyarthralgia/left knee pain,  and generalized myofascial pain interfering with daily activity, sleep,mood.  Considering right knee TKA.  Tried methotrexate and  Celebrex without relief.  Tried Cymbalta, sevella, tramadol without relief.  Seen Rheumatology. and ortho. considering right knee replecement.   Good releif with knee injections by ortho.      Utilizes hydrocodone 10  with good relief functional benefit denies side effects.    L-spine MRI 2021.  Multiple level degenerative disc disease and degenerative changes.  Small left paracentral disc protrusion at L3-L4 which appears to abut the exiting nerve root with mild encroachment on neural foramina on the left.    Pain Assessment   Location of Pain: Lower Back, R Hip, L Hip, Leg, neck pain, joint, right knee  Description of Pain: Dull/Aching, Throbbing, Stabbing  Previous Pain Rating :9  Current Pain Ratin  Aggravating Factors: Activity  Alleviating Factors: Rest, Medication  PEG Assessment   What number best describes your pain on average in the past week?9  What number best describes how, during the past week, pain has interfered with your enjoyment of life?8  What number best describes how, during the past week, pain has interfered with your general activity? 10     The following portions of the patient's history were reviewed and updated as appropriate: allergies, current medications, past family history, past medical history, past social history, past surgical history and problem list.    Review of  Systems   Musculoskeletal:  Positive for arthralgias, back pain, joint swelling, myalgias and neck pain.   All other systems reviewed and are negative.    Objective   Physical Exam   Constitutional: No distress.   Cane   Musculoskeletal:      Comments: Lumbar loading positive, pain on extension of low back past 5 degrees.  TTP on the lumbar facets noted.  Leg raise left   Vitals reviewed.    /81   Pulse 76   Resp 16   Wt 75.8 kg (167 lb)   SpO2 94%   BMI 26.16 kg/m²     PHQ 9 on chart  Opioid risk tool low risk    Assessment & Plan   Diagnoses and all orders for this visit:    1. Chronic pain syndrome (Primary)  -     oxyCODONE-acetaminophen (PERCOCET) 7.5-325 MG per tablet; Take 1 tablet by mouth 3 (Three) Times a Day As Needed for Severe Pain. DNF before 3/7/2025  Dispense: 90 tablet; Refill: 0  -     oxyCODONE-acetaminophen (PERCOCET) 7.5-325 MG per tablet; Take 1 tablet by mouth 3 (Three) Times a Day As Needed for Severe Pain.  Dispense: 90 tablet; Refill: 0    2. Lumbosacral spondylosis without myelopathy    3. Polyarthralgia    4. High risk medication use  -     Urine Drug Screen - Urine, Clean Catch; Future      Summary  79 y.o. female with multiple comorbidities including DM, polyarthralgia from  inflammatory osteoarthritis, knee pain S/P left TKA, right ZAKIYA/ORIF/Sweet/2024 here for f/u.  Chronic polyarthralgia/left knee pain,  and generalized myofascial pain interfering with daily activity, sleep,mood.      Denies any new complaints.  Continues to recover from hip surgery.  Postponing right TKA.    Continue oxycodone 7.5 /325 2-3 times daily as needed for severe pain.  UDS and  Inspect reviewed.    Discussed risk of tolerance, dependence, respiratory depression, coma and death associated with use of oral opioids for treatment of chronic nonmalignant pain.      Discussed increased risk with combining opioid and benzo. On clorazepate prn anxiety.    RTC in 2-3 months

## 2025-03-18 ENCOUNTER — TRANSCRIBE ORDERS (OUTPATIENT)
Dept: ADMINISTRATIVE | Facility: HOSPITAL | Age: 80
End: 2025-03-18
Payer: MEDICARE

## 2025-03-18 DIAGNOSIS — R52 PAIN, GENERALIZED: Primary | ICD-10-CM

## 2025-03-24 ENCOUNTER — OFFICE VISIT (OUTPATIENT)
Dept: PAIN MEDICINE | Facility: CLINIC | Age: 80
End: 2025-03-24
Payer: MEDICARE

## 2025-03-24 VITALS
WEIGHT: 172 LBS | BODY MASS INDEX: 26.94 KG/M2 | HEART RATE: 70 BPM | OXYGEN SATURATION: 97 % | RESPIRATION RATE: 16 BRPM | SYSTOLIC BLOOD PRESSURE: 141 MMHG | DIASTOLIC BLOOD PRESSURE: 80 MMHG

## 2025-03-24 DIAGNOSIS — Z79.899 HIGH RISK MEDICATION USE: ICD-10-CM

## 2025-03-24 DIAGNOSIS — G89.4 CHRONIC PAIN SYNDROME: ICD-10-CM

## 2025-03-24 DIAGNOSIS — M25.50 POLYARTHRALGIA: Primary | ICD-10-CM

## 2025-03-24 DIAGNOSIS — M47.817 LUMBOSACRAL SPONDYLOSIS WITHOUT MYELOPATHY: ICD-10-CM

## 2025-03-24 PROCEDURE — 3079F DIAST BP 80-89 MM HG: CPT | Performed by: ANESTHESIOLOGY

## 2025-03-24 PROCEDURE — 1125F AMNT PAIN NOTED PAIN PRSNT: CPT | Performed by: ANESTHESIOLOGY

## 2025-03-24 PROCEDURE — 3077F SYST BP >= 140 MM HG: CPT | Performed by: ANESTHESIOLOGY

## 2025-03-24 PROCEDURE — 99214 OFFICE O/P EST MOD 30 MIN: CPT | Performed by: ANESTHESIOLOGY

## 2025-03-24 PROCEDURE — G2211 COMPLEX E/M VISIT ADD ON: HCPCS | Performed by: ANESTHESIOLOGY

## 2025-03-24 RX ORDER — OXYCODONE AND ACETAMINOPHEN 7.5; 325 MG/1; MG/1
1 TABLET ORAL 3 TIMES DAILY PRN
Qty: 90 TABLET | Refills: 0 | Status: SHIPPED | OUTPATIENT
Start: 2025-03-24

## 2025-03-24 NOTE — PROGRESS NOTES
Subjective    CC joints pain left knee pain  Marilyn J Pumphrey is a 79 y.o. female with multiple comorbidities including DM, polyarthralgia from  inflammatory osteoarthritis, bilateral knee pain, S/P left TKA , S/p right hip ORIF and ZAKIYA/Dr. Martin 2024 here for f/u.    Continued right hip and right knee pain.  Has right hip/pelvis MRI pending.  Continues to have good relief with functional benefit of oxycodone without side effects.    Chronic back pain radiating to bilateral hips worse with prolonged activity.  Chronic polyarthralgia/left knee pain,  and generalized myofascial pain interfering with daily activity, sleep,mood.  Considering right knee TKA.  Tried methotrexate and  Celebrex without relief.  Tried Cymbalta, sevella, tramadol without relief.  Seen Rheumatology. and ortho. considering right knee replecement.   Good releif with knee injections by ortho.      Utilizes hydrocodone 10/325  with good relief functional benefit denies side effects.    L-spine MRI 2021.  Multiple level degenerative disc disease and degenerative changes.  Small left paracentral disc protrusion at L3-L4 which appears to abut the exiting nerve root with mild encroachment on neural foramina on the left.    Pain Assessment   Location of Pain: Lower Back, R Hip, L Hip, Leg, neck pain, joint, right knee  Description of Pain: Dull/Aching, Throbbing, Stabbing  Previous Pain Rating :9  Current Pain Ratin  Aggravating Factors: Activity  Alleviating Factors: Rest, Medication  PEG Assessment   What number best describes your pain on average in the past week?9  What number best describes how, during the past week, pain has interfered with your enjoyment of life?8  What number best describes how, during the past week, pain has interfered with your general activity? 10     The following portions of the patient's history were reviewed and updated as appropriate: allergies, current medications, past family history, past medical  history, past social history, past surgical history and problem list.    Review of Systems   Musculoskeletal:  Positive for arthralgias, back pain, joint swelling, myalgias and neck pain.   All other systems reviewed and are negative.    Objective   Physical Exam   Constitutional: No distress.   Cane   Musculoskeletal:      Comments: Lumbar loading positive, pain on extension of low back past 5 degrees.  TTP on the lumbar facets noted.  Leg raise left   Vitals reviewed.    /80 (BP Location: Left arm, Patient Position: Sitting, Cuff Size: Adult)   Pulse 70   Resp 16   Wt 78 kg (172 lb)   SpO2 97%   BMI 26.94 kg/m²     PHQ 9 on chart  Opioid risk tool low risk    Assessment & Plan   Diagnoses and all orders for this visit:    1. Polyarthralgia (Primary)  -     oxyCODONE-acetaminophen (PERCOCET) 7.5-325 MG per tablet; Take 1 tablet by mouth 3 (Three) Times a Day As Needed for Severe Pain. DNF before 3/7/2025  Dispense: 90 tablet; Refill: 0  -     oxyCODONE-acetaminophen (PERCOCET) 7.5-325 MG per tablet; Take 1 tablet by mouth 3 (Three) Times a Day As Needed for Severe Pain.  Dispense: 90 tablet; Refill: 0    2. Chronic pain syndrome    3. Lumbosacral spondylosis without myelopathy  -     oxyCODONE-acetaminophen (PERCOCET) 7.5-325 MG per tablet; Take 1 tablet by mouth 3 (Three) Times a Day As Needed for Severe Pain. DNF before 3/7/2025  Dispense: 90 tablet; Refill: 0  -     oxyCODONE-acetaminophen (PERCOCET) 7.5-325 MG per tablet; Take 1 tablet by mouth 3 (Three) Times a Day As Needed for Severe Pain.  Dispense: 90 tablet; Refill: 0    4. High risk medication use      Summary  79 y.o. female with multiple comorbidities including DM, polyarthralgia from  inflammatory osteoarthritis, knee pain S/P left TKA, right ZAKIYA/ORIF/Sweet/2024 here for f/u.  Chronic polyarthralgia/left knee pain,  and generalized myofascial pain interfering with daily activity, sleep,mood.      Continued right hip and right knee pain.   Has right hip/pelvis MRI pending.  Continues to have good relief with functional benefit of oxycodone without side effects.    Continue oxycodone 7.5 /325 2-3 times daily as needed for severe pain.  UDS and  Inspect reviewed.    Discussed risk of tolerance, dependence, respiratory depression, coma and death associated with use of oral opioids for treatment of chronic nonmalignant pain.      Discussed increased risk with combining opioid and benzo. On clorazepate prn anxiety.    RTC in 2-3 months

## 2025-04-12 ENCOUNTER — HOSPITAL ENCOUNTER (OUTPATIENT)
Dept: MRI IMAGING | Facility: HOSPITAL | Age: 80
Discharge: HOME | End: 2025-04-12
Payer: MEDICARE

## 2025-04-12 DIAGNOSIS — R52 PAIN, GENERALIZED: ICD-10-CM

## 2025-04-12 PROCEDURE — 73721 MRI JNT OF LWR EXTRE W/O DYE: CPT

## 2025-04-15 ENCOUNTER — TRANSCRIBE ORDERS (OUTPATIENT)
Dept: ADMINISTRATIVE | Facility: HOSPITAL | Age: 80
End: 2025-04-15
Payer: MEDICARE

## 2025-04-15 DIAGNOSIS — C73 MALIGNANT NEOPLASM OF THYROID GLAND: Primary | ICD-10-CM

## 2025-04-15 DIAGNOSIS — R22.1 NECK MASS: ICD-10-CM

## 2025-04-29 ENCOUNTER — HOSPITAL ENCOUNTER (OUTPATIENT)
Dept: ULTRASOUND IMAGING | Facility: HOSPITAL | Age: 80
Discharge: HOME | End: 2025-04-29
Admitting: NURSE PRACTITIONER
Payer: MEDICARE

## 2025-04-29 DIAGNOSIS — R22.1 NECK MASS: ICD-10-CM

## 2025-04-29 DIAGNOSIS — C73 MALIGNANT NEOPLASM OF THYROID GLAND: ICD-10-CM

## 2025-04-29 PROCEDURE — 76536 US EXAM OF HEAD AND NECK: CPT

## 2025-05-21 ENCOUNTER — TRANSCRIBE ORDERS (OUTPATIENT)
Dept: ADMINISTRATIVE | Facility: HOSPITAL | Age: 80
End: 2025-05-21
Payer: MEDICARE

## 2025-05-21 DIAGNOSIS — R07.9 CHEST PAIN, UNSPECIFIED TYPE: Primary | ICD-10-CM

## 2025-05-22 ENCOUNTER — TELEPHONE (OUTPATIENT)
Dept: CARDIOLOGY | Facility: CLINIC | Age: 80
End: 2025-05-22
Payer: MEDICARE

## 2025-05-22 NOTE — TELEPHONE ENCOUNTER
Scheduled appt 5/30 with Any.  I am cancelling that appt.     Left message for patient to call me back. Her testing is not until 6/6/25. She needs appt after 6/6.

## 2025-06-03 ENCOUNTER — OFFICE VISIT (OUTPATIENT)
Dept: PAIN MEDICINE | Facility: CLINIC | Age: 80
End: 2025-06-03
Payer: MEDICARE

## 2025-06-03 VITALS
OXYGEN SATURATION: 95 % | HEART RATE: 66 BPM | BODY MASS INDEX: 28.35 KG/M2 | SYSTOLIC BLOOD PRESSURE: 124 MMHG | DIASTOLIC BLOOD PRESSURE: 78 MMHG | WEIGHT: 181 LBS | RESPIRATION RATE: 16 BRPM

## 2025-06-03 DIAGNOSIS — M47.817 LUMBOSACRAL SPONDYLOSIS WITHOUT MYELOPATHY: Primary | ICD-10-CM

## 2025-06-03 DIAGNOSIS — M79.18 MYOFASCIAL PAIN SYNDROME: ICD-10-CM

## 2025-06-03 DIAGNOSIS — Z79.899 HIGH RISK MEDICATION USE: ICD-10-CM

## 2025-06-03 DIAGNOSIS — M25.551 RIGHT HIP PAIN: ICD-10-CM

## 2025-06-03 DIAGNOSIS — M25.50 POLYARTHRALGIA: ICD-10-CM

## 2025-06-03 RX ORDER — OXYCODONE AND ACETAMINOPHEN 7.5; 325 MG/1; MG/1
1 TABLET ORAL 3 TIMES DAILY PRN
Qty: 90 TABLET | Refills: 0 | Status: SHIPPED | OUTPATIENT
Start: 2025-07-06

## 2025-06-03 RX ORDER — OXYCODONE AND ACETAMINOPHEN 7.5; 325 MG/1; MG/1
1 TABLET ORAL 3 TIMES DAILY PRN
Qty: 90 TABLET | Refills: 0 | Status: SHIPPED | OUTPATIENT
Start: 2025-06-07

## 2025-06-03 RX ORDER — LISINOPRIL 10 MG/1
1 TABLET ORAL EVERY 12 HOURS SCHEDULED
COMMUNITY
Start: 2025-05-20

## 2025-06-06 ENCOUNTER — HOSPITAL ENCOUNTER (OUTPATIENT)
Dept: NUCLEAR MEDICINE | Facility: HOSPITAL | Age: 80
Discharge: HOME | End: 2025-06-06
Payer: MEDICARE

## 2025-06-06 DIAGNOSIS — R07.9 CHEST PAIN, UNSPECIFIED TYPE: ICD-10-CM

## 2025-06-06 LAB
BH CV REST NUCLEAR ISOTOPE DOSE: 9 MCI
BH CV STRESS BP STAGE 2: NORMAL
BH CV STRESS COMMENTS STAGE 1: NORMAL
BH CV STRESS DOSE REGADENOSON STAGE 1: 0.4
BH CV STRESS DURATION MIN STAGE 1: 0
BH CV STRESS DURATION SEC STAGE 1: 10
BH CV STRESS DURATION SEC STAGE 2: 0
BH CV STRESS HR STAGE 1: 72
BH CV STRESS HR STAGE 2: 74
BH CV STRESS HR STAGE 3: 74
BH CV STRESS NUCLEAR ISOTOPE DOSE: 28.3 MCI
BH CV STRESS PROTOCOL 1: NORMAL
BH CV STRESS RECOVERY BP: NORMAL MMHG
BH CV STRESS RECOVERY HR: 75 BPM
BH CV STRESS STAGE 1: 1
BH CV STRESS STAGE 2: 2
BH CV STRESS STAGE 3: 3
MAXIMAL PREDICTED HEART RATE: 142 BPM
PERCENT MAX PREDICTED HR: 52.11 %
SPECT HRT GATED+EF W RNC IV: 64 %
STRESS BASELINE BP: NORMAL MMHG
STRESS BASELINE HR: 65 BPM
STRESS PERCENT HR: 61 %
STRESS POST PEAK BP: NORMAL MMHG
STRESS POST PEAK HR: 74 BPM
STRESS TARGET HR: 121 BPM

## 2025-06-06 PROCEDURE — 78452 HT MUSCLE IMAGE SPECT MULT: CPT

## 2025-06-06 PROCEDURE — 34310000005 TECHNETIUM TETROFOSMIN KIT: Performed by: NURSE PRACTITIONER

## 2025-06-06 PROCEDURE — A9502 TC99M TETROFOSMIN: HCPCS | Performed by: NURSE PRACTITIONER

## 2025-06-06 PROCEDURE — 93017 CV STRESS TEST TRACING ONLY: CPT

## 2025-06-06 PROCEDURE — 25010000002 REGADENOSON 0.4 MG/5ML SOLUTION: Performed by: NURSE PRACTITIONER

## 2025-06-06 RX ORDER — REGADENOSON 0.08 MG/ML
0.4 INJECTION, SOLUTION INTRAVENOUS
Status: COMPLETED | OUTPATIENT
Start: 2025-06-06 | End: 2025-06-06

## 2025-06-06 RX ADMIN — TETROFOSMIN 1 DOSE: 1.38 INJECTION, POWDER, LYOPHILIZED, FOR SOLUTION INTRAVENOUS at 10:50

## 2025-06-06 RX ADMIN — REGADENOSON 0.4 MG: 0.08 INJECTION, SOLUTION INTRAVENOUS at 11:54

## 2025-06-06 RX ADMIN — TETROFOSMIN 1 DOSE: 1.38 INJECTION, POWDER, LYOPHILIZED, FOR SOLUTION INTRAVENOUS at 11:54

## 2025-06-16 NOTE — PROGRESS NOTES
Subjective:     Encounter Date:06/19/2025      Patient ID: Marilyn J Pumphrey is a 79 y.o. female.    Chief Complaint:  History of Present Illness    Marilyn J Pumphrey is a pleasant 79 y.o. female with a history of type 2 diabetes, hypertension, dyslipidemia, sleep apnea who presents to the office today for discussion of recent Myoview study.  Myoview study showed no evidence of ischemia, low risk with LVEF of 64%.  Patient reports this study was ordered by her PCP due to two separate episodes of chest pain as well as ongoing dyspnea on exertion.  Recent echocardiogram completed 6/4/2025 shows midrange LVEF of 45 to 50% with mild to moderate AI and mild to moderate mitral valve regurgitation.  Previous LVEF from 20 2150 to 55%.   Patient seen and examined, labs and chart reviewed. Patient states doing well from cardiology standpoint she denies further episodes of chest pain, fatigue, palpitations, diaphoresis, lightheadedness/dizziness, syncope, edema, nausea.  Patient continues to experience intermittent episodes of dyspnea on exertion.  Extensive discussion with patient and family member today in office regarding further cardiac workup.  Myoview study without evidence of ischemia and LVEF of 64%.  However, there is reduced uptake in stress when compared to rest images.  There is concern for cardiac dilation.  Patient is already on Jardiance and lisinopril.  We will transition metoprolol to tartrate to metoprolol succinate to optimize GDMT.  Patient has not required any use of sublingual nitroglycerin.  If patient has any further episodes of chest pain or worsening CONLEY we will plan cardiac catheterization at that time.      The following portions of the patient's history were reviewed and updated as appropriate: allergies, current medications, past family history, past medical history, past social history, past surgical history, and problem list.    Past Medical History:   Diagnosis Date    Anemia, unspecified  "    Anxiety     Arthritis     Bone pain     Cancer 09/2016    THYROID    Chronic pain disorder     Depression     Disease of thyroid gland     DM type 2 (diabetes mellitus, type 2)     TYPE 2    Fall     2/2021 FELL ON ICE    GERD (gastroesophageal reflux disease)     Hypertension     Irregular heart beat     Joint pain     Knee pain     Leaky heart valve     11/2020    Leg cramps     Low back pain     Low iron     getting iron infusions 5/10/23    Numbness around mouth     5/12/2022-- was inpatient at [facility]-- ct ran and MRI-- no stroke    Osteoarthritis     Polypharmacy 04/20/2023    PONV (postoperative nausea and vomiting)     DOES WELL WITH PROPOFOL    Sleep apnea     C-PAP IN USE    UTI (urinary tract infection)      Past Surgical History:   Procedure Laterality Date    CARDIAC CATHETERIZATION  2019    CARPAL TUNNEL RELEASE      CHOLECYSTECTOMY  1974    FRACTURE SURGERY  Sept24    Broke hip    HYSTERECTOMY  1993    partial abdominal hysterectomy    JOINT REPLACEMENT  2020    Knee replacement    OOPHORECTOMY Bilateral 2015    ORTHOPEDIC SURGERY  Sept 24    Hip replacement    THYROID SURGERY      2 thyroid surgery    TONSILLECTOMY      TOTAL HIP ARTHROPLASTY Right 09/04/2024    Procedure: TOTAL HIP ARTHROPLASTY ANTERIOR;  Surgeon: Cyrus Martin II, MD;  Location: [facility] MAIN OR;  Service: Orthopedics;  Laterality: Right;    TOTAL KNEE ARTHROPLASTY Left 10/14/2019    Procedure: TOTAL KNEE ARTHROPLASTY;  Surgeon: Sanjay Collado MD;  Location: [facility] MAIN OR;  Service: Orthopedics    VEIN LIGATION AND STRIPPING BILATERAL       /78 (BP Location: Left arm, Patient Position: Sitting, Cuff Size: Large Adult) Comment: manual  Pulse 58   Ht 170.2 cm (67\")   Wt 83.3 kg (183 lb 9.6 oz)   SpO2 95%   BMI 28.76 kg/m²   Family History   Problem Relation Age of Onset    Diabetes Father         Cancer    Heart disease Father     Hypertension Father     Hyperlipidemia Father     Thyroid disease Father     Anemia " Father         Helene had anemia sveral time    Heart attack Father     Cancer Sister         lung cancer    Thyroid disease Sister     Diabetes Brother     Heart disease Brother     Cancer Brother         3 brothers  with cancer    Clotting disorder Brother     Heart attack Brother        Current Outpatient Medications:     aspirin 81 MG tablet, Take 1 tablet by mouth Daily. (Patient taking differently: Take 325 mg by mouth Daily. Indications: Disease involving Lipid Deposits in the Arteries), Disp: 30 tablet, Rfl: 0    calcitriol (ROCALTROL) 0.25 MCG capsule, Take 1 capsule by mouth 2 (Two) Times a Day., Disp: 60 capsule, Rfl: 3    CALCIUM CARBONATE-VIT D-MIN PO, Take 1,200 mg by mouth 2 (Two) Times a Day. Indications: vit def, Disp: , Rfl:     clorazepate (TRANXENE) 7.5 MG tablet, Take 1 tablet by mouth 2 (Two) Times a Day. Indications: Feeling Anxious, Disp: , Rfl:     empagliflozin (Jardiance) 25 MG tablet tablet, Take 1 tablet by mouth Daily., Disp: 90 tablet, Rfl: 3    escitalopram (LEXAPRO) 10 MG tablet, Take 1 tablet by mouth Every Evening. Indications: Major Depressive Disorder, Disp: , Rfl:     esomeprazole (nexIUM) 40 MG capsule, Take 1 capsule by mouth 2 (Two) Times a Day. Indications: Heartburn, Disp: , Rfl:     insulin aspart (NOVOLOG FLEXPEN) 100 UNIT/ML solution pen-injector sc pen, Inject 10 units before each meal, Disp: 5 pen, Rfl: 4    insulin glargine (LANTUS, SEMGLEE) 100 UNIT/ML injection, Inject 30 Units under the skin into the appropriate area as directed Daily., Disp: 30 mL, Rfl: 6    levothyroxine (SYNTHROID, LEVOTHROID) 112 MCG tablet, Take 1 tablet p.o. daily (Patient taking differently: Take 125 mcg by mouth Every Morning. Take 1 tablet p.o. daily), Disp: 90 tablet, Rfl: 4    lisinopril (PRINIVIL,ZESTRIL) 10 MG tablet, Take 1 tablet by mouth Every 12 (Twelve) Hours., Disp: , Rfl:     magnesium oxide (MAG-OX) 400 tablet tablet, Take 1 tablet by mouth 2 (Two) Times a Day. Indications:  Disorder with Low Magnesium Levels, Disp: , Rfl:     naloxone (NARCAN) 4 MG/0.1ML nasal spray, Call 911. Don't prime. [place] in 1 nostril for overdose. Repeat in 2-3 minutes in other nostril if no or minimal breathing/responsiveness., Disp: 2 each, Rfl: 0    oxyCODONE-acetaminophen (PERCOCET) 7.5-325 MG per tablet, Take 1 tablet by mouth 3 (Three) Times a Day As Needed for Severe Pain., Disp: 90 tablet, Rfl: 0    pramipexole (MIRAPEX) 1 MG tablet, Take 1 tablet by mouth 2 (Two) Times a Day. Indications: Restless Leg Syndrome, Disp: , Rfl:     promethazine (PHENERGAN) 25 MG tablet, Take 1 tablet by mouth Every 6 (Six) Hours As Needed for Nausea. Indications: Nausea and Vomiting, Disp: , Rfl: 1    rosuvastatin (Crestor) 5 MG tablet, Take 1 tablet by mouth Daily., Disp: 90 tablet, Rfl: 3    vitamin D (ERGOCALCIFEROL) 1.25 MG (84646 UT) capsule capsule, Take 1 capsule by mouth 1 (One) Time Per Week. Indications: Vitamin D Deficiency, Disp: , Rfl:     Lotemax 0.5 % gel ophthalmic gel, INSTILL ONE DROP DAILY INTO THE RIGHT EYE AND ONE DROP THREE TIMES A DAY INTO THE LEFT EYE (Patient not taking: Reported on 6/19/2025), Disp: , Rfl:     metFORMIN (Glucophage) 1000 MG tablet, Take 1 tablet by mouth 2 (Two) Times a Day With Meals. (Patient not taking: Reported on 6/19/2025), Disp: 180 tablet, Rfl: 2    metoprolol succinate XL (TOPROL-XL) 100 MG 24 hr tablet, Take 1 tablet by mouth Daily., Disp: 90 tablet, Rfl: 1    [START ON 7/6/2025] oxyCODONE-acetaminophen (PERCOCET) 7.5-325 MG per tablet, Take 1 tablet by mouth 3 (Three) Times a Day As Needed for Severe Pain. DNF before 7/6/2025 (Patient not taking: Reported on 6/19/2025), Disp: 90 tablet, Rfl: 0    Allergies   Allergen Reactions    Codeine Shortness Of Breath     Social History     Socioeconomic History    Marital status:     Number of children: 3    Years of education: 9   Tobacco Use    Smoking status: Never     Passive exposure: Past    Smokeless tobacco: Never     Tobacco comments:     Never used   Vaping Use    Vaping status: Never Used   Substance and Sexual Activity    Alcohol use: Never    Drug use: Never    Sexual activity: Not Currently     Partners: Male     Birth control/protection: Hysterectomy     Comment:      Review of Systems   Constitutional: Negative for malaise/fatigue.   Cardiovascular:  Positive for dyspnea on exertion. Negative for chest pain, leg swelling and palpitations.   Respiratory:  Negative for cough and shortness of breath.    Gastrointestinal:  Negative for abdominal pain, nausea and vomiting.   Neurological:  Negative for dizziness, headaches, light-headedness, numbness and weakness.   All other systems reviewed and are negative.             Objective:     Vitals reviewed.   Constitutional:       Appearance: Overweight and not in distress.   Eyes:      Pupils: Pupils are equal, round, and reactive to light.   HENT:      Nose: Nose normal.    Mouth/Throat:      Pharynx: Oropharynx is clear.   Pulmonary:      Effort: Pulmonary effort is normal.      Breath sounds: Normal breath sounds. No wheezing. No rhonchi.   Cardiovascular:      Normal rate. Regular rhythm.   Pulses:     Intact distal pulses.   Edema:     Peripheral edema absent.   Abdominal:      Palpations: Abdomen is soft.   Musculoskeletal: Normal range of motion.      Cervical back: Normal range of motion and neck supple. Skin:     General: Skin is warm and dry.   Neurological:      General: No focal deficit present.      Mental Status: Alert and oriented to person, place and time.         Procedures      LAB RESULTS (LAST 7 DAYS)  Lab Review:   Echocardiogram   Results for orders placed during the hospital encounter of 05/11/22    Adult Transthoracic Echo Complete W/ Cont if Necessary Per Protocol (With Agitated Saline)    Interpretation Summary  · Left ventricular ejection fraction appears to be 61 - 65%.  · Saline test results are negative.  · No pericardial effusion  noted      Cardiac Catheterization   No results found for this or any previous visit.      CBC        BMP        CMP         BNP        TROPONIN        CoAg        Creatinine Clearance  CrCl cannot be calculated (Patient's most recent lab result is older than the maximum 30 days allowed.).    ABG        Radiology  No radiology results for the last day            Assessment    Diagnoses and all orders for this visit:    1. Heart failure with mid-range ejection fraction (HFmEF) (Primary)  -     metoprolol succinate XL (TOPROL-XL) 100 MG 24 hr tablet; Take 1 tablet by mouth Daily.  Dispense: 90 tablet; Refill: 1    2. Hypertension, benign  -     metoprolol succinate XL (TOPROL-XL) 100 MG 24 hr tablet; Take 1 tablet by mouth Daily.  Dispense: 90 tablet; Refill: 1    3. Hypercholesterolemia    4. History of pulmonary embolism    5. Type 2 diabetes mellitus with hyperglycemia, with long-term current use of insulin                  MDM    Hypertension  Blood pressure elevated  Reports well-controlled at home  Patient to monitor blood pressure and call office if consistently greater than 130/80 for further medication titration  Lisinopril 10 mg  Lopressor 100 mg    HFmEF  Valvular heart disease  Recent echocardiogram with LVEF of 45 to 50%, mild to moderate aortic and mitral valve regurgitation  Previous LVEF from 20 2150 to 55%  GDMT: Lisinopril, Jardiance  Transition Lopressor to metoprolol succinate  Appears euvolemic    Chest pain  Patient recently seen by PCP for chest pain, reports no further episodes of angina   Myoview study without evidence of ischemia  However reduced uptake in stress images when compared to rest  Aspirin, statin, beta-blocker  SL nitroglycerin as needed   Recommended cardiac catheterization if any further episodes of chest pain or worsening CONLEY  Patient to return in 3 months to see Dr Musa to reevaluate need for catheterization  Education provided to focus on aggressive risk factor modification  including achieving and maintaining appropriate weight and blood pressure; cholesterol and diabetes management; as well as avoidance of tobacco products. This would include a target HDL goal >40, LDL <70 in addition to a heart healthy diet, and increase activity to a goal of at least 30 minutes of routine aerobic activity 5-7 days a week     Dyslipidemia  Statin therapy    Type 2 diabetes  With long-term use insulin therapy  Metformin  Jardiance  A1c 9.0   Managed per pcp       Patient's previous medical records, labs, and EKG were reviewed and discussed with the patient at today's visit.     Patient to be seen as needed or in 3 months with cardiology     Electronically signed by LYUDMILA Bass, 06/19/25, 3:26 PM EDT.

## 2025-06-19 ENCOUNTER — OFFICE VISIT (OUTPATIENT)
Dept: CARDIOLOGY | Facility: CLINIC | Age: 80
End: 2025-06-19
Payer: MEDICARE

## 2025-06-19 VITALS
DIASTOLIC BLOOD PRESSURE: 78 MMHG | WEIGHT: 183.6 LBS | HEART RATE: 58 BPM | SYSTOLIC BLOOD PRESSURE: 174 MMHG | HEIGHT: 67 IN | OXYGEN SATURATION: 95 % | BODY MASS INDEX: 28.82 KG/M2

## 2025-06-19 DIAGNOSIS — Z79.4 TYPE 2 DIABETES MELLITUS WITH HYPERGLYCEMIA, WITH LONG-TERM CURRENT USE OF INSULIN: Chronic | ICD-10-CM

## 2025-06-19 DIAGNOSIS — I10 HYPERTENSION, BENIGN: Chronic | ICD-10-CM

## 2025-06-19 DIAGNOSIS — E11.65 TYPE 2 DIABETES MELLITUS WITH HYPERGLYCEMIA, WITH LONG-TERM CURRENT USE OF INSULIN: Chronic | ICD-10-CM

## 2025-06-19 DIAGNOSIS — E78.00 HYPERCHOLESTEROLEMIA: ICD-10-CM

## 2025-06-19 DIAGNOSIS — I50.22 HEART FAILURE WITH MID-RANGE EJECTION FRACTION (HFMEF): Primary | ICD-10-CM

## 2025-06-19 DIAGNOSIS — Z86.711 HISTORY OF PULMONARY EMBOLISM: Chronic | ICD-10-CM

## 2025-06-19 PROBLEM — E11.9 TYPE 2 DIABETES MELLITUS WITHOUT COMPLICATIONS: Status: RESOLVED | Noted: 2018-04-23 | Resolved: 2025-06-19

## 2025-06-19 RX ORDER — METOPROLOL SUCCINATE 100 MG/1
100 TABLET, EXTENDED RELEASE ORAL DAILY
Qty: 90 TABLET | Refills: 1 | Status: SHIPPED | OUTPATIENT
Start: 2025-06-19

## 2025-06-20 ENCOUNTER — TELEPHONE (OUTPATIENT)
Dept: CARDIOLOGY | Facility: CLINIC | Age: 80
End: 2025-06-20
Payer: MEDICARE

## 2025-06-20 NOTE — TELEPHONE ENCOUNTER
Attempted to call patient.  No answer.    Please have patient monitor blood pressure daily for the next 5 days and call office with readings.  If they remain high at that time I will adjust medical therapy.  I do not like to change medications based on 1 reading    Please also have her reach out if symptoms of chest pain or worsening shortness of breath as well.    Thank you and I will look patient's updated blood pressure readings next week.

## 2025-06-20 NOTE — TELEPHONE ENCOUNTER
Call patient.[phone] .  Was seen yesterday by LAZARA Clark and asked to call back if b/p is high.  It is 163/97 now.  Still short of breath.  No pain.

## 2025-08-05 ENCOUNTER — OFFICE VISIT (OUTPATIENT)
Dept: PAIN MEDICINE | Facility: CLINIC | Age: 80
End: 2025-08-05
Payer: MEDICARE

## 2025-08-05 VITALS
WEIGHT: 187 LBS | OXYGEN SATURATION: 96 % | SYSTOLIC BLOOD PRESSURE: 150 MMHG | DIASTOLIC BLOOD PRESSURE: 76 MMHG | RESPIRATION RATE: 16 BRPM | BODY MASS INDEX: 29.29 KG/M2 | HEART RATE: 61 BPM

## 2025-08-05 DIAGNOSIS — M25.50 POLYARTHRALGIA: ICD-10-CM

## 2025-08-05 DIAGNOSIS — M47.817 LUMBOSACRAL SPONDYLOSIS WITHOUT MYELOPATHY: ICD-10-CM

## 2025-08-05 DIAGNOSIS — M79.18 MYOFASCIAL PAIN SYNDROME: ICD-10-CM

## 2025-08-05 DIAGNOSIS — Z79.899 HIGH RISK MEDICATION USE: ICD-10-CM

## 2025-08-05 DIAGNOSIS — M25.551 RIGHT HIP PAIN: ICD-10-CM

## 2025-08-05 PROCEDURE — G2211 COMPLEX E/M VISIT ADD ON: HCPCS | Performed by: ANESTHESIOLOGY

## 2025-08-05 PROCEDURE — 3078F DIAST BP <80 MM HG: CPT | Performed by: ANESTHESIOLOGY

## 2025-08-05 PROCEDURE — 1125F AMNT PAIN NOTED PAIN PRSNT: CPT | Performed by: ANESTHESIOLOGY

## 2025-08-05 PROCEDURE — 3077F SYST BP >= 140 MM HG: CPT | Performed by: ANESTHESIOLOGY

## 2025-08-05 PROCEDURE — 99214 OFFICE O/P EST MOD 30 MIN: CPT | Performed by: ANESTHESIOLOGY

## 2025-08-05 RX ORDER — OXYCODONE AND ACETAMINOPHEN 7.5; 325 MG/1; MG/1
1 TABLET ORAL 3 TIMES DAILY PRN
Qty: 90 TABLET | Refills: 0 | Status: SHIPPED | OUTPATIENT
Start: 2025-08-07

## 2025-08-05 RX ORDER — RANOLAZINE 500 MG/1
1 TABLET, EXTENDED RELEASE ORAL EVERY 12 HOURS SCHEDULED
COMMUNITY
Start: 2025-07-18

## 2025-08-05 RX ORDER — DILTIAZEM HYDROCHLORIDE 120 MG/1
120 TABLET, FILM COATED ORAL EVERY MORNING
COMMUNITY
Start: 2025-07-18

## 2025-08-05 RX ORDER — OXYCODONE AND ACETAMINOPHEN 7.5; 325 MG/1; MG/1
1 TABLET ORAL 3 TIMES DAILY PRN
Qty: 90 TABLET | Refills: 0 | Status: SHIPPED | OUTPATIENT
Start: 2025-09-06

## (undated) DEVICE — C-ARM: Brand: UNBRANDED

## (undated) DEVICE — IRRIGATOR BULB ASEPTO 60CC STRL

## (undated) DEVICE — PICO 7 10CM X 20CM: Brand: PICO™ 7

## (undated) DEVICE — HANDPIECE SET WITH COAXIAL MULTI-ORIFICE TIP AND SUCTION TUBE: Brand: INTERPULSE

## (undated) DEVICE — APPL CHLORAPREP HI/LITE 26ML ORNG

## (undated) DEVICE — PIN DRL NOHEAD TROC 3.2X75MM

## (undated) DEVICE — MEDICINE CUP, GRADUATED, STER: Brand: MEDLINE

## (undated) DEVICE — PACK,UNIVERSAL,NO GOWNS: Brand: MEDLINE

## (undated) DEVICE — WRP COMPR HIP SMI/COLDTHERAPY/PREM 4GELBG3HR/24 1P/U 6PK

## (undated) DEVICE — CEMENT MIXING SYSTEM WITH FEMORAL BREAKWAY NOZZLE: Brand: REVOLUTION

## (undated) DEVICE — PREP SOL POVIDONE/IODINE BT 4OZ

## (undated) DEVICE — PK TOTL HIP 50

## (undated) DEVICE — STRAP STIRUP WO/ RNG

## (undated) DEVICE — SYR LUERLOK 50ML

## (undated) DEVICE — ZIPPERED TOGA, 2X LARGE: Brand: FLYTE

## (undated) DEVICE — UNDERGLV SURG BIOGEL INDICAT PI SZ8.5 BLU

## (undated) DEVICE — 450 ML BOTTLE OF 0.05% CHLORHEXIDINE GLUCONATE IN 99.95% STERILE WATER FOR IRRIGATION, USP AND APPLICATOR.: Brand: IRRISEPT ANTIMICROBIAL WOUND LAVAGE

## (undated) DEVICE — BNDG ELAS ELITE V/CLOSE 4IN 5YD LF STRL

## (undated) DEVICE — PK KN TOTL 40

## (undated) DEVICE — 3M™ IOBAN™ 2 ANTIMICROBIAL INCISE DRAPE 6650EZ: Brand: IOBAN™ 2

## (undated) DEVICE — BNDG ELAS ELITE V/CLOSE 6IN 5YD LF STRL

## (undated) DEVICE — GLV SURG BIOGEL LTX PF 8 1/2

## (undated) DEVICE — 2108 SERIES SAGITTAL BLADE (19.5 X 1.27 X 81.0MM)

## (undated) DEVICE — ADHS SKIN PREMIERPRO EXOFIN TOPICAL HI/VISC .5ML

## (undated) DEVICE — GLV SURG SENSICARE W/ALOE PF LF 8 STRL

## (undated) DEVICE — NEEDLE, QUINCKE, 20GX3.5": Brand: MEDLINE

## (undated) DEVICE — GLV SURG BIOGEL LTX PF 8

## (undated) DEVICE — 6617 IOBAN II PATIENT ISOLATION DRAPE 5/BX,4BX/CS: Brand: STERI-DRAPE™ IOBAN™ 2

## (undated) DEVICE — KT PT POSITION SUPINE HANA/PROFX TABL

## (undated) DEVICE — SOL ISO/ALC RUB 91PCT 16OZ

## (undated) DEVICE — KT SURG TURNOVER 050

## (undated) DEVICE — ADHS SKIN DERMABOND TOP ADVANCED

## (undated) DEVICE — COAXIAL FEMORAL CANAL TIP

## (undated) DEVICE — GLV SURG PREMIERPRO ORTHO LTX PF SZ8 BRN

## (undated) DEVICE — ANTIBACTERIAL UNDYED BRAIDED (POLYGLACTIN 910), SYNTHETIC ABSORBABLE SUTURE: Brand: COATED VICRYL

## (undated) DEVICE — APPL CHLORAPREP W/TINT 26ML ORNG

## (undated) DEVICE — TRY SKINPREP DRYPREP

## (undated) DEVICE — ZIP 24 SURGICAL SKIN CLOSURE DEVICE, PSA: Brand: ZIP 24 SURGICAL SKIN CLOSURE DEVICE

## (undated) DEVICE — GLV SURG SENSICARE PI ORTHO SZ8.5 LF STRL

## (undated) DEVICE — OPTIFOAM GENTLE SA, POSTOP, 4X12: Brand: MEDLINE

## (undated) DEVICE — SYR LUERLOK 30CC

## (undated) DEVICE — 3M™ STERI-DRAPE™  ISOLATION DRAPE WITH INCISE FILM AND POUCH 1017: Brand: STERI-DRAPE™

## (undated) DEVICE — SOL ISO/ALC RUB 70PCT 4OZ

## (undated) DEVICE — NDL SPINE 18G 31/2IN PNK

## (undated) DEVICE — 3 BONE CEMENT MIXER: Brand: MIXEVAC

## (undated) DEVICE — PENCL SMOKE/EVAC MEGADYNE TELESCP 15FT

## (undated) DEVICE — UNDRGLV SURG BIOGEL PUNCTUREINDICATION SZ7 PF STRL

## (undated) DEVICE — SUT ETHIB 2 CV V37 MS/4 30IN MX69G

## (undated) DEVICE — DRAPE,U/ SHT,SPLIT,PLAS,STERIL: Brand: MEDLINE

## (undated) DEVICE — RECIPROCATING BLADE HEAVY DUTY LONG, OFFSET  (77.6 X 0.77 X 11.2MM)